# Patient Record
Sex: FEMALE | Race: WHITE | NOT HISPANIC OR LATINO | Employment: FULL TIME | URBAN - METROPOLITAN AREA
[De-identification: names, ages, dates, MRNs, and addresses within clinical notes are randomized per-mention and may not be internally consistent; named-entity substitution may affect disease eponyms.]

---

## 2022-11-17 ENCOUNTER — HOSPITAL ENCOUNTER (OUTPATIENT)
Dept: RADIOLOGY | Facility: HOSPITAL | Age: 67
Discharge: HOME | End: 2022-11-17
Attending: NURSE PRACTITIONER
Payer: COMMERCIAL

## 2022-11-17 VITALS — HEIGHT: 64 IN | BODY MASS INDEX: 32.27 KG/M2 | WEIGHT: 189 LBS

## 2022-11-17 DIAGNOSIS — N95.9 UNSPECIFIED MENOPAUSAL AND PERIMENOPAUSAL DISORDER: ICD-10-CM

## 2022-11-17 DIAGNOSIS — Z12.31 BREAST CANCER SCREENING BY MAMMOGRAM: ICD-10-CM

## 2022-11-17 PROCEDURE — 77063 BREAST TOMOSYNTHESIS BI: CPT | Mod: 26,,, | Performed by: RADIOLOGY

## 2022-11-17 PROCEDURE — 77063 MAMMO DIGITAL SCREENING BILAT WITH TOMO: ICD-10-PCS | Mod: 26,,, | Performed by: RADIOLOGY

## 2022-11-17 PROCEDURE — 77067 MAMMO DIGITAL SCREENING BILAT WITH TOMO: ICD-10-PCS | Mod: 26,,, | Performed by: RADIOLOGY

## 2022-11-17 PROCEDURE — 77080 DXA BONE DENSITY AXIAL: CPT | Mod: 26,,, | Performed by: RADIOLOGY

## 2022-11-17 PROCEDURE — 77080 DXA BONE DENSITY AXIAL: CPT | Mod: TC

## 2022-11-17 PROCEDURE — 77080 DEXA BONE DENSITY SPINE HIP: ICD-10-PCS | Mod: 26,,, | Performed by: RADIOLOGY

## 2022-11-17 PROCEDURE — 77067 SCR MAMMO BI INCL CAD: CPT | Mod: 26,,, | Performed by: RADIOLOGY

## 2022-11-17 PROCEDURE — 77063 BREAST TOMOSYNTHESIS BI: CPT | Mod: TC

## 2023-01-25 ENCOUNTER — OFFICE VISIT (OUTPATIENT)
Dept: OBSTETRICS AND GYNECOLOGY | Facility: CLINIC | Age: 68
End: 2023-01-25
Payer: COMMERCIAL

## 2023-01-25 ENCOUNTER — TELEPHONE (OUTPATIENT)
Dept: OBSTETRICS AND GYNECOLOGY | Facility: CLINIC | Age: 68
End: 2023-01-25

## 2023-01-25 VITALS
DIASTOLIC BLOOD PRESSURE: 74 MMHG | WEIGHT: 183.63 LBS | RESPIRATION RATE: 18 BRPM | OXYGEN SATURATION: 98 % | SYSTOLIC BLOOD PRESSURE: 126 MMHG | HEART RATE: 99 BPM | HEIGHT: 64 IN | BODY MASS INDEX: 31.35 KG/M2

## 2023-01-25 DIAGNOSIS — R10.2 PELVIC PAIN: Primary | ICD-10-CM

## 2023-01-25 DIAGNOSIS — N95.0 PMB (POSTMENOPAUSAL BLEEDING): Primary | ICD-10-CM

## 2023-01-25 PROCEDURE — 3074F SYST BP LT 130 MM HG: CPT | Mod: CPTII,S$GLB,, | Performed by: OBSTETRICS & GYNECOLOGY

## 2023-01-25 PROCEDURE — 1101F PT FALLS ASSESS-DOCD LE1/YR: CPT | Mod: CPTII,S$GLB,, | Performed by: OBSTETRICS & GYNECOLOGY

## 2023-01-25 PROCEDURE — 3288F PR FALLS RISK ASSESSMENT DOCUMENTED: ICD-10-PCS | Mod: CPTII,S$GLB,, | Performed by: OBSTETRICS & GYNECOLOGY

## 2023-01-25 PROCEDURE — 3008F BODY MASS INDEX DOCD: CPT | Mod: CPTII,S$GLB,, | Performed by: OBSTETRICS & GYNECOLOGY

## 2023-01-25 PROCEDURE — 1126F AMNT PAIN NOTED NONE PRSNT: CPT | Mod: CPTII,S$GLB,, | Performed by: OBSTETRICS & GYNECOLOGY

## 2023-01-25 PROCEDURE — 1159F PR MEDICATION LIST DOCUMENTED IN MEDICAL RECORD: ICD-10-PCS | Mod: CPTII,S$GLB,, | Performed by: OBSTETRICS & GYNECOLOGY

## 2023-01-25 PROCEDURE — 3078F DIAST BP <80 MM HG: CPT | Mod: CPTII,S$GLB,, | Performed by: OBSTETRICS & GYNECOLOGY

## 2023-01-25 PROCEDURE — 99203 OFFICE O/P NEW LOW 30 MIN: CPT | Mod: S$GLB,,, | Performed by: OBSTETRICS & GYNECOLOGY

## 2023-01-25 PROCEDURE — 1101F PR PT FALLS ASSESS DOC 0-1 FALLS W/OUT INJ PAST YR: ICD-10-PCS | Mod: CPTII,S$GLB,, | Performed by: OBSTETRICS & GYNECOLOGY

## 2023-01-25 PROCEDURE — 3008F PR BODY MASS INDEX (BMI) DOCUMENTED: ICD-10-PCS | Mod: CPTII,S$GLB,, | Performed by: OBSTETRICS & GYNECOLOGY

## 2023-01-25 PROCEDURE — 99203 PR OFFICE/OUTPT VISIT, NEW, LEVL III, 30-44 MIN: ICD-10-PCS | Mod: S$GLB,,, | Performed by: OBSTETRICS & GYNECOLOGY

## 2023-01-25 PROCEDURE — 1126F PR PAIN SEVERITY QUANTIFIED, NO PAIN PRESENT: ICD-10-PCS | Mod: CPTII,S$GLB,, | Performed by: OBSTETRICS & GYNECOLOGY

## 2023-01-25 PROCEDURE — 3074F PR MOST RECENT SYSTOLIC BLOOD PRESSURE < 130 MM HG: ICD-10-PCS | Mod: CPTII,S$GLB,, | Performed by: OBSTETRICS & GYNECOLOGY

## 2023-01-25 PROCEDURE — 99999 PR PBB SHADOW E&M-EST. PATIENT-LVL III: ICD-10-PCS | Mod: PBBFAC,,, | Performed by: OBSTETRICS & GYNECOLOGY

## 2023-01-25 PROCEDURE — 1159F MED LIST DOCD IN RCRD: CPT | Mod: CPTII,S$GLB,, | Performed by: OBSTETRICS & GYNECOLOGY

## 2023-01-25 PROCEDURE — 3288F FALL RISK ASSESSMENT DOCD: CPT | Mod: CPTII,S$GLB,, | Performed by: OBSTETRICS & GYNECOLOGY

## 2023-01-25 PROCEDURE — 3078F PR MOST RECENT DIASTOLIC BLOOD PRESSURE < 80 MM HG: ICD-10-PCS | Mod: CPTII,S$GLB,, | Performed by: OBSTETRICS & GYNECOLOGY

## 2023-01-25 PROCEDURE — 99999 PR PBB SHADOW E&M-EST. PATIENT-LVL III: CPT | Mod: PBBFAC,,, | Performed by: OBSTETRICS & GYNECOLOGY

## 2023-01-25 RX ORDER — DICLOFENAC SODIUM 10 MG/G
GEL TOPICAL
COMMUNITY
End: 2023-03-06 | Stop reason: CLARIF

## 2023-01-25 RX ORDER — ROSUVASTATIN CALCIUM 10 MG/1
10 TABLET, COATED ORAL NIGHTLY
COMMUNITY
Start: 2022-11-16

## 2023-01-25 RX ORDER — METFORMIN HYDROCHLORIDE 500 MG/1
500 TABLET, EXTENDED RELEASE ORAL
Status: ON HOLD | COMMUNITY
Start: 2023-01-15 | End: 2023-03-22 | Stop reason: HOSPADM

## 2023-01-25 RX ORDER — METHOCARBAMOL 500 MG/1
500-1000 TABLET, FILM COATED ORAL EVERY 6 HOURS PRN
COMMUNITY
Start: 2023-01-10 | End: 2023-03-06 | Stop reason: CLARIF

## 2023-01-25 RX ORDER — PRAMIPEXOLE DIHYDROCHLORIDE 0.25 MG/1
0.25 TABLET ORAL NIGHTLY
COMMUNITY
Start: 2022-10-13

## 2023-01-25 RX ORDER — LOSARTAN POTASSIUM 100 MG/1
100 TABLET ORAL DAILY
COMMUNITY
Start: 2022-11-07

## 2023-01-25 RX ORDER — FAMOTIDINE 20 MG/1
20 TABLET, FILM COATED ORAL 2 TIMES DAILY
COMMUNITY
Start: 2023-01-16

## 2023-01-25 RX ORDER — ASPIRIN 81 MG/1
1 TABLET ORAL DAILY
Status: ON HOLD | COMMUNITY
End: 2023-07-21 | Stop reason: HOSPADM

## 2023-01-25 RX ORDER — ALENDRONATE SODIUM 35 MG/1
35 TABLET ORAL
COMMUNITY
Start: 2023-01-10

## 2023-01-25 RX ORDER — ATENOLOL 50 MG/1
50 TABLET ORAL DAILY
COMMUNITY
Start: 2022-11-07

## 2023-01-25 NOTE — TELEPHONE ENCOUNTER
Pt was called and she desires appt due to complaints of PMB x 1 month. Referred by Marcia Dewey NP. Appt given for today with Dr. Marcum at 2:45 pm. Address given and pt voiced understanding.

## 2023-01-25 NOTE — TELEPHONE ENCOUNTER
----- Message from Hetal Harp MA sent at 1/25/2023 10:25 AM CST -----  Contact: self  Kaity Masterson  MRN: [id number]  Home Phone      Not on file.  Work Phone      Not on file.  Mobile          [phone]    Patient Care Team:  Marcia Dewey, NP as PCP - General (Family Medicine)  OB? No  What phone number can you be reached at? [phone]  Message: Needs to make appt for irrg bleeding.  Patient will be a NP / referral from Marcia Dewey.

## 2023-01-25 NOTE — PROGRESS NOTES
Subjective:    Patient ID: Kaity Masterson is a 67 y.o. y.o. female.     Chief Complaint:   Chief Complaint   Patient presents with    Vaginal Bleeding     Pt states she was having PMB        History of Present Illness:  Kaity presents today complaining of PMB. She reports the bleeding started ~ 1 1/2 months ago. It did stop about 3-4 days ago. Bleeding is light pink in nature. She reports no cramping present with it.         ROS:   Review of Systems   Constitutional:  Negative for activity change, appetite change, chills, diaphoresis, fatigue, fever and unexpected weight change.   HENT:  Negative for mouth sores and tinnitus.    Eyes:  Negative for discharge and visual disturbance.   Respiratory:  Negative for cough, shortness of breath and wheezing.    Cardiovascular:  Negative for chest pain, palpitations and leg swelling.   Gastrointestinal:  Negative for abdominal pain, bloating, blood in stool, constipation, diarrhea, nausea and vomiting.   Endocrine: Negative for diabetes, hair loss, hot flashes, hyperthyroidism and hypothyroidism.   Genitourinary:  Positive for postmenopausal bleeding. Negative for dysuria, flank pain, frequency, genital sores, hematuria, urgency, vaginal bleeding, vaginal discharge, vaginal pain, postcoital bleeding and vaginal odor.   Musculoskeletal:  Negative for back pain, joint swelling and myalgias.   Integumentary:  Negative for rash, acne, breast mass, nipple discharge and breast skin changes.   Neurological:  Negative for seizures, syncope, numbness and headaches.   Hematological:  Negative for adenopathy. Does not bruise/bleed easily.   Psychiatric/Behavioral:  Negative for depression and sleep disturbance. The patient is not nervous/anxious.    Breast: Negative for mass, mastodynia, nipple discharge and skin changes        Objective:    Vital Signs:  Vitals:    01/25/23 1524   BP: 126/74   Pulse: 99   Resp: 18       Physical Exam:  General:  alert, cooperative, no distress   Head  Normocephalic, without obvious abnormality, atraumatic   Neck .supple, symmetrical, trachea midline   Skin:  Skin color, texture, turgor normal. No rashes or lesions   Abdomen:  soft, non-tender; bowel sounds normal   Pelvis: External genitalia: normal general appearance  Urinary system: urethral meatus normal, bladder nontender  Vaginal: atrophic mucosa  Cervix: normal appearance  Uterus: normal size, shape, position  Adnexa: non palpable   Extremities extremities normal, atraumatic, no cyanosis or edema   Neurologic Grossly normal            Assessment:      1. PMB (postmenopausal bleeding)          Plan:      PLAN:   1. TVUS

## 2023-01-26 ENCOUNTER — APPOINTMENT (OUTPATIENT)
Dept: RADIOLOGY | Facility: CLINIC | Age: 68
End: 2023-01-26
Attending: OBSTETRICS & GYNECOLOGY
Payer: COMMERCIAL

## 2023-01-26 DIAGNOSIS — R10.2 PELVIC PAIN: ICD-10-CM

## 2023-01-26 PROCEDURE — 76830 TRANSVAGINAL US NON-OB: CPT | Mod: 26,,, | Performed by: RADIOLOGY

## 2023-01-26 PROCEDURE — 76856 US EXAM PELVIC COMPLETE: CPT | Mod: 26,,, | Performed by: RADIOLOGY

## 2023-01-26 PROCEDURE — 76856 US EXAM PELVIC COMPLETE: CPT | Mod: TC

## 2023-01-26 PROCEDURE — 76830 US PELVIS COMP WITH TRANSVAG NON-OB (XPD): ICD-10-PCS | Mod: 26,,, | Performed by: RADIOLOGY

## 2023-01-26 PROCEDURE — 76856 US PELVIS COMP WITH TRANSVAG NON-OB (XPD): ICD-10-PCS | Mod: 26,,, | Performed by: RADIOLOGY

## 2023-02-09 ENCOUNTER — PROCEDURE VISIT (OUTPATIENT)
Dept: OBSTETRICS AND GYNECOLOGY | Facility: CLINIC | Age: 68
End: 2023-02-09
Attending: OBSTETRICS & GYNECOLOGY
Payer: COMMERCIAL

## 2023-02-09 VITALS
BODY MASS INDEX: 30.56 KG/M2 | HEIGHT: 64 IN | HEART RATE: 68 BPM | OXYGEN SATURATION: 98 % | DIASTOLIC BLOOD PRESSURE: 78 MMHG | WEIGHT: 179 LBS | SYSTOLIC BLOOD PRESSURE: 124 MMHG | RESPIRATION RATE: 18 BRPM

## 2023-02-09 DIAGNOSIS — N95.0 PMB (POSTMENOPAUSAL BLEEDING): Primary | ICD-10-CM

## 2023-02-09 DIAGNOSIS — R93.89 THICKENED ENDOMETRIUM: ICD-10-CM

## 2023-02-09 PROCEDURE — 88342 IMHCHEM/IMCYTCHM 1ST ANTB: CPT | Mod: 26,,, | Performed by: PATHOLOGY

## 2023-02-09 PROCEDURE — 88341 IMHCHEM/IMCYTCHM EA ADD ANTB: CPT | Mod: 26,,, | Performed by: PATHOLOGY

## 2023-02-09 PROCEDURE — 88342 IMHCHEM/IMCYTCHM 1ST ANTB: CPT | Performed by: PATHOLOGY

## 2023-02-09 PROCEDURE — 88341 PR IHC OR ICC EACH ADD'L SINGLE ANTIBODY  STAINPR: ICD-10-PCS | Mod: 26,,, | Performed by: PATHOLOGY

## 2023-02-09 PROCEDURE — 88305 TISSUE EXAM BY PATHOLOGIST: ICD-10-PCS | Mod: 26,,, | Performed by: PATHOLOGY

## 2023-02-09 PROCEDURE — 58100 BIOPSY OF UTERUS LINING: CPT | Mod: S$GLB,,, | Performed by: OBSTETRICS & GYNECOLOGY

## 2023-02-09 PROCEDURE — 88341 IMHCHEM/IMCYTCHM EA ADD ANTB: CPT | Mod: 59 | Performed by: PATHOLOGY

## 2023-02-09 PROCEDURE — 88342 CHG IMMUNOCYTOCHEMISTRY: ICD-10-PCS | Mod: 26,,, | Performed by: PATHOLOGY

## 2023-02-09 PROCEDURE — 58100 ENDOMETRIAL BIOPSY: ICD-10-PCS | Mod: S$GLB,,, | Performed by: OBSTETRICS & GYNECOLOGY

## 2023-02-09 PROCEDURE — 88305 TISSUE EXAM BY PATHOLOGIST: CPT | Mod: 26,,, | Performed by: PATHOLOGY

## 2023-02-09 PROCEDURE — 88305 TISSUE EXAM BY PATHOLOGIST: CPT | Performed by: PATHOLOGY

## 2023-02-09 RX ORDER — HYDROCODONE BITARTRATE AND ACETAMINOPHEN 10; 325 MG/1; MG/1
1 TABLET ORAL EVERY 6 HOURS PRN
COMMUNITY
Start: 2023-02-01 | End: 2023-03-06 | Stop reason: CLARIF

## 2023-02-09 NOTE — PROCEDURES
Endometrial biopsy    Date/Time: 2/9/2023 12:15 PM  Performed by: Nery Carey MD  Authorized by: Nery Carey MD     Consent:     Consent obtained:  Verbal and written    Consent given by:  Patient    Patient questions answered: yes      Patient agrees, verbalizes understanding, and wants to proceed: yes      Instructions and paperwork completed: yes    Indication:     Indications: Post-menopausal bleeding      Chronicity of post-menopausal bleeding:  New    Progression of post-menopausal bleeding:  Unchanged  Procedure:     Procedure: endometrial biopsy with Pipelle      Cervix cleaned and prepped: yes      Uterus sounded: yes      Uterus sound depth (cm):  9    Specimen collected: specimen collected and sent to pathology      Patient tolerated procedure well with no complications: yes

## 2023-02-15 ENCOUNTER — TELEPHONE (OUTPATIENT)
Dept: OBSTETRICS AND GYNECOLOGY | Facility: CLINIC | Age: 68
End: 2023-02-15
Payer: COMMERCIAL

## 2023-02-15 NOTE — TELEPHONE ENCOUNTER
Pt informed that the pathology from the biopsy is still in process and has not resulted yet, pt informed we will call her with results when its reviewed, pt v/u.

## 2023-02-15 NOTE — TELEPHONE ENCOUNTER
----- Message from Mary Jo Giraldo sent at 2/15/2023  9:33 AM CST -----  Contact: self  Kaity Masterson  MRN: [id number]  Home Phone      [phone]  Work Phone      Not on file.  Mobile          [phone]    Patient Care Team:  Marcia Dewey NP as PCP - General (Family Medicine)  OB? No  What phone number can you be reached at? [phone]  Message: patient is calling in for results.

## 2023-02-20 LAB
COMMENT: NORMAL
FINAL PATHOLOGIC DIAGNOSIS: NORMAL
GROSS: NORMAL
Lab: NORMAL

## 2023-02-27 ENCOUNTER — TELEPHONE (OUTPATIENT)
Dept: OBSTETRICS AND GYNECOLOGY | Facility: CLINIC | Age: 68
End: 2023-02-27
Payer: COMMERCIAL

## 2023-02-27 DIAGNOSIS — C54.1 ENDOMETRIAL ADENOCARCINOMA: Primary | ICD-10-CM

## 2023-02-27 NOTE — TELEPHONE ENCOUNTER
Pt notified of EMB results of endometrial cancer.  Discussed referral to gyn oncology.  Order placed.  Questions answered.

## 2023-02-27 NOTE — TELEPHONE ENCOUNTER
Pt was called and appt for GYN ONC was made for Wednesday 3/1/23 @ 1:30 pm with Dr. Aguilar. Address and phone number given to pt. Pt voiced understanding.

## 2023-02-27 NOTE — TELEPHONE ENCOUNTER
Pt was called and she is reviewing EMB results on her portal and has questions. Pt aware Dr. Marcum is has been out of the office and will not be able to review her results until tomorrow. Offered pt that I can have on call provider review results today to give her an answer until Dr. Marcum is able to review results and pt desires that. Please advise. Thank you.

## 2023-02-27 NOTE — TELEPHONE ENCOUNTER
----- Message from Mary Jo Giraldo sent at 2/27/2023  9:18 AM CST -----  Contact: self  Kaity Masterson  MRN: [id number]  Home Phone      [phone]  Work Phone      Not on file.  Mobile          [phone]    Patient Care Team:  Marcia Dewey NP as PCP - General (Family Medicine)  OB? No  What phone number can you be reached at? [phone]  Message: patient is wanting results

## 2023-02-28 NOTE — PROGRESS NOTES
REFERRING PROVIDER  Nery Carey MD    HISTORY OF PRESENT CONDITION  CC: type 1 endometrial cancer  Kaity Masterson is a 67 y.o.  who presents in consultation at for an opinion regarding grade 2 endometrioid endometrial cancer.    +PO. -N/V. +Flatus. +BM. -VB, VD, changes in stool or urine. -Abdominal or pelvic pain. -Unintentional weight loss.      REVIEW OF SYSTEMS  All systems reviewed and negative except as noted in HPI.    OBJECTIVE   Vitals:    23 1314   BP: (!) 193/79   Pulse: 75      Body mass index is 29.59 kg/m².      1. General: Well appearing, no apparent distress, alert and oriented.  2. Lymph: Neck symmetric without cervical or supraclavicular adenopathy or mass.  3. Lungs: Normal respiratory rate, no accessory muscle use.  4. Cardiac: Normal rate  5. Psych: Normal affect.  6. Abdomen:  non-distended, soft, non-tender, are no masses, no ascites, no hepatosplenomegaly.  7. Skin: Warm, dry, no rashes or lesions.   8. Extremities: Bilateral lower extremities without edema or tenderness.  9. Genitourinary: Deferred             ECOG status: 0    LABORATORY DATA  Lab data reviewed.    RADIOLOGICAL DATA  Radiology data reviewed.    PATHOLOGY DATA  Pathology data reviewed.    ASSESSMENT / PLAN     1. Malignant neoplasm of endometrium    2. Endometrial adenocarcinoma       23: Hb 12.7, Plt 265, Cr 0.93  23: Pelvic US: 7 cm uterus    I discussed with the patient that the primary treatment for type 1 endometrial cancer is surgery. This includes a total hysterectomy, bilateral salpingo-oophorectomy, and sentinel lymph node biopsies. I will plan to perform this in a laparoscopic fashion using the robot. The procedure and its risks and benefits were discussed in detail.      PATIENT EDUCATION  Ready to learn, no apparent learning barriers were identified; learning preferences include listening.  Explained diagnosis and treatment plan; patient expressed understanding of the  content.    INFORMED CONSENT  Discussed the risks, benefits, and alternatives of the procedure and of possible blood transfusion.  Discussed the necessity of other members of the healthcare team participating in the procedure.  All questions answered and consent given.    Dallas Aguilar

## 2023-03-01 ENCOUNTER — TELEPHONE (OUTPATIENT)
Dept: OBSTETRICS AND GYNECOLOGY | Facility: CLINIC | Age: 68
End: 2023-03-01
Payer: COMMERCIAL

## 2023-03-01 ENCOUNTER — OFFICE VISIT (OUTPATIENT)
Dept: GYNECOLOGIC ONCOLOGY | Facility: CLINIC | Age: 68
End: 2023-03-01
Payer: COMMERCIAL

## 2023-03-01 VITALS
WEIGHT: 172.38 LBS | HEART RATE: 75 BPM | SYSTOLIC BLOOD PRESSURE: 193 MMHG | BODY MASS INDEX: 29.59 KG/M2 | DIASTOLIC BLOOD PRESSURE: 79 MMHG

## 2023-03-01 DIAGNOSIS — C54.1 MALIGNANT NEOPLASM OF ENDOMETRIUM: Primary | ICD-10-CM

## 2023-03-01 DIAGNOSIS — C54.1 ENDOMETRIAL ADENOCARCINOMA: ICD-10-CM

## 2023-03-01 PROCEDURE — 3077F PR MOST RECENT SYSTOLIC BLOOD PRESSURE >= 140 MM HG: ICD-10-PCS | Mod: CPTII,S$GLB,, | Performed by: OBSTETRICS & GYNECOLOGY

## 2023-03-01 PROCEDURE — 3288F FALL RISK ASSESSMENT DOCD: CPT | Mod: CPTII,S$GLB,, | Performed by: OBSTETRICS & GYNECOLOGY

## 2023-03-01 PROCEDURE — 1160F PR REVIEW ALL MEDS BY PRESCRIBER/CLIN PHARMACIST DOCUMENTED: ICD-10-PCS | Mod: CPTII,S$GLB,, | Performed by: OBSTETRICS & GYNECOLOGY

## 2023-03-01 PROCEDURE — 3078F DIAST BP <80 MM HG: CPT | Mod: CPTII,S$GLB,, | Performed by: OBSTETRICS & GYNECOLOGY

## 2023-03-01 PROCEDURE — 4010F ACE/ARB THERAPY RXD/TAKEN: CPT | Mod: CPTII,S$GLB,, | Performed by: OBSTETRICS & GYNECOLOGY

## 2023-03-01 PROCEDURE — 1101F PR PT FALLS ASSESS DOC 0-1 FALLS W/OUT INJ PAST YR: ICD-10-PCS | Mod: CPTII,S$GLB,, | Performed by: OBSTETRICS & GYNECOLOGY

## 2023-03-01 PROCEDURE — 1125F AMNT PAIN NOTED PAIN PRSNT: CPT | Mod: CPTII,S$GLB,, | Performed by: OBSTETRICS & GYNECOLOGY

## 2023-03-01 PROCEDURE — 99999 PR PBB SHADOW E&M-EST. PATIENT-LVL V: CPT | Mod: PBBFAC,,, | Performed by: OBSTETRICS & GYNECOLOGY

## 2023-03-01 PROCEDURE — 3008F PR BODY MASS INDEX (BMI) DOCUMENTED: ICD-10-PCS | Mod: CPTII,S$GLB,, | Performed by: OBSTETRICS & GYNECOLOGY

## 2023-03-01 PROCEDURE — 3288F PR FALLS RISK ASSESSMENT DOCUMENTED: ICD-10-PCS | Mod: CPTII,S$GLB,, | Performed by: OBSTETRICS & GYNECOLOGY

## 2023-03-01 PROCEDURE — 1159F MED LIST DOCD IN RCRD: CPT | Mod: CPTII,S$GLB,, | Performed by: OBSTETRICS & GYNECOLOGY

## 2023-03-01 PROCEDURE — 1159F PR MEDICATION LIST DOCUMENTED IN MEDICAL RECORD: ICD-10-PCS | Mod: CPTII,S$GLB,, | Performed by: OBSTETRICS & GYNECOLOGY

## 2023-03-01 PROCEDURE — 3077F SYST BP >= 140 MM HG: CPT | Mod: CPTII,S$GLB,, | Performed by: OBSTETRICS & GYNECOLOGY

## 2023-03-01 PROCEDURE — 1101F PT FALLS ASSESS-DOCD LE1/YR: CPT | Mod: CPTII,S$GLB,, | Performed by: OBSTETRICS & GYNECOLOGY

## 2023-03-01 PROCEDURE — 99205 OFFICE O/P NEW HI 60 MIN: CPT | Mod: S$GLB,,, | Performed by: OBSTETRICS & GYNECOLOGY

## 2023-03-01 PROCEDURE — 4010F PR ACE/ARB THEARPY RXD/TAKEN: ICD-10-PCS | Mod: CPTII,S$GLB,, | Performed by: OBSTETRICS & GYNECOLOGY

## 2023-03-01 PROCEDURE — 99205 PR OFFICE/OUTPT VISIT, NEW, LEVL V, 60-74 MIN: ICD-10-PCS | Mod: S$GLB,,, | Performed by: OBSTETRICS & GYNECOLOGY

## 2023-03-01 PROCEDURE — 3078F PR MOST RECENT DIASTOLIC BLOOD PRESSURE < 80 MM HG: ICD-10-PCS | Mod: CPTII,S$GLB,, | Performed by: OBSTETRICS & GYNECOLOGY

## 2023-03-01 PROCEDURE — 1160F RVW MEDS BY RX/DR IN RCRD: CPT | Mod: CPTII,S$GLB,, | Performed by: OBSTETRICS & GYNECOLOGY

## 2023-03-01 PROCEDURE — 3008F BODY MASS INDEX DOCD: CPT | Mod: CPTII,S$GLB,, | Performed by: OBSTETRICS & GYNECOLOGY

## 2023-03-01 PROCEDURE — 1125F PR PAIN SEVERITY QUANTIFIED, PAIN PRESENT: ICD-10-PCS | Mod: CPTII,S$GLB,, | Performed by: OBSTETRICS & GYNECOLOGY

## 2023-03-01 PROCEDURE — 99999 PR PBB SHADOW E&M-EST. PATIENT-LVL V: ICD-10-PCS | Mod: PBBFAC,,, | Performed by: OBSTETRICS & GYNECOLOGY

## 2023-03-01 RX ORDER — HEPARIN SODIUM 5000 [USP'U]/ML
5000 INJECTION, SOLUTION INTRAVENOUS; SUBCUTANEOUS ONCE
Status: CANCELLED | OUTPATIENT
Start: 2023-03-01 | End: 2023-03-01

## 2023-03-01 RX ORDER — LIDOCAINE HYDROCHLORIDE 10 MG/ML
1 INJECTION, SOLUTION EPIDURAL; INFILTRATION; INTRACAUDAL; PERINEURAL ONCE
Status: CANCELLED | OUTPATIENT
Start: 2023-03-01 | End: 2023-03-01

## 2023-03-01 NOTE — TELEPHONE ENCOUNTER
----- Message from Mary Jo Giraldo sent at 3/1/2023  2:43 PM CST -----  Contact: self  Kaity Masterson  MRN: [id number]  Home Phone      [phone]  Work Phone      Not on file.  Mobile          [phone]    Patient Care Team:  Marcia Dewey NP as PCP - General (Family Medicine)  OB? No  What phone number can you be reached at? [phone]  Message: patient have a couple of questions for the nurse.

## 2023-03-01 NOTE — TELEPHONE ENCOUNTER
Pt was called and she stated she wanted to mention that during the EMB she had some pain and hert  hip has been hurting since. Pt was seen by Dr. Aguilar and informed him of this and he stated he will evaluate this at time of surgery which is scheduled for 3/8/23. Pt desires this information to be passed along to Dr. Marcum.

## 2023-03-06 ENCOUNTER — ANESTHESIA EVENT (OUTPATIENT)
Dept: SURGERY | Facility: OTHER | Age: 68
End: 2023-03-06
Payer: COMMERCIAL

## 2023-03-06 ENCOUNTER — HOSPITAL ENCOUNTER (OUTPATIENT)
Dept: PREADMISSION TESTING | Facility: OTHER | Age: 68
Discharge: HOME | End: 2023-03-06
Attending: OBSTETRICS & GYNECOLOGY
Payer: COMMERCIAL

## 2023-03-06 ENCOUNTER — TELEPHONE (OUTPATIENT)
Dept: GYNECOLOGIC ONCOLOGY | Facility: CLINIC | Age: 68
End: 2023-03-06
Payer: COMMERCIAL

## 2023-03-06 VITALS
RESPIRATION RATE: 19 BRPM | DIASTOLIC BLOOD PRESSURE: 81 MMHG | TEMPERATURE: 99 F | BODY MASS INDEX: 28 KG/M2 | HEIGHT: 64 IN | SYSTOLIC BLOOD PRESSURE: 194 MMHG | OXYGEN SATURATION: 100 % | HEART RATE: 72 BPM | WEIGHT: 164 LBS

## 2023-03-06 DIAGNOSIS — Z01.818 PREOP TESTING: Primary | ICD-10-CM

## 2023-03-06 LAB
ABO + RH BLD: NORMAL
BLD GP AB SCN CELLS X3 SERPL QL: NORMAL

## 2023-03-06 PROCEDURE — 36415 COLL VENOUS BLD VENIPUNCTURE: CPT | Performed by: ANESTHESIOLOGY

## 2023-03-06 PROCEDURE — 86900 BLOOD TYPING SEROLOGIC ABO: CPT | Performed by: ANESTHESIOLOGY

## 2023-03-06 RX ORDER — SODIUM CHLORIDE, SODIUM LACTATE, POTASSIUM CHLORIDE, CALCIUM CHLORIDE 600; 310; 30; 20 MG/100ML; MG/100ML; MG/100ML; MG/100ML
INJECTION, SOLUTION INTRAVENOUS CONTINUOUS
Status: CANCELLED | OUTPATIENT
Start: 2023-03-06

## 2023-03-06 RX ORDER — FAMOTIDINE 20 MG/1
20 TABLET, FILM COATED ORAL
Status: CANCELLED | OUTPATIENT
Start: 2023-03-06 | End: 2023-03-06

## 2023-03-06 RX ORDER — LIDOCAINE HYDROCHLORIDE 10 MG/ML
0.5 INJECTION, SOLUTION EPIDURAL; INFILTRATION; INTRACAUDAL; PERINEURAL ONCE
Status: CANCELLED | OUTPATIENT
Start: 2023-03-06 | End: 2023-03-06

## 2023-03-06 RX ORDER — ACETAMINOPHEN 325 MG/1
975 TABLET ORAL
Status: CANCELLED | OUTPATIENT
Start: 2023-03-06 | End: 2023-03-06

## 2023-03-06 RX ORDER — OXYCODONE AND ACETAMINOPHEN 7.5; 325 MG/1; MG/1
1 TABLET ORAL EVERY 6 HOURS PRN
COMMUNITY
Start: 2023-02-07 | End: 2023-03-06 | Stop reason: CLARIF

## 2023-03-06 NOTE — ANESTHESIA PREPROCEDURE EVALUATION
03/06/2023  Kaity Masterson is a 67 y.o., female.      Pre-op Assessment    I have reviewed the Patient Summary Reports.     I have reviewed the Nursing Notes.    I have reviewed the Medications.     Review of Systems  Anesthesia Hx:  No problems with previous Anesthesia    Social:  Non-Smoker    EENT/Dental:EENT/Dental Normal   Cardiovascular:   Hypertension, well controlled    Pulmonary:  Pulmonary Normal    Hepatic/GI:   PUD,    Musculoskeletal:  Musculoskeletal Normal    Neurological:  Neurology Normal    Endocrine:   Diabetes, well controlled  Obesity / BMI > 30      Physical Exam  General: Cooperative and Alert    Airway:  Mallampati: II   Mouth Opening: Normal  TM Distance: Normal  Tongue: Normal  Neck ROM: Normal ROM    Dental:  Intact        Anesthesia Plan  Type of Anesthesia, risks & benefits discussed:    Anesthesia Type: Gen ETT  Intra-op Monitoring Plan: Standard ASA Monitors  Post Op Pain Control Plan: multimodal analgesia  Induction:  IV  Airway Plan: Video  Informed Consent: Informed consent signed with the Patient and all parties understand the risks and agree with anesthesia plan.  All questions answered.   ASA Score: 3  Anesthesia Plan Notes: Has recent labs, T&S ordered.   Sees PCP on reg basis, [facility] [place].    Ready For Surgery From Anesthesia Perspective.     .

## 2023-03-06 NOTE — DISCHARGE INSTRUCTIONS
Information to Prepare you for your Surgery    PRE-ADMIT TESTING -  [phone]    [address] [facility]          Your surgery has been scheduled at [facility]. We are pleased to have the opportunity to serve you. For Further Information please call [phone].    On the day of surgery please report to the Information Desk on the 1st floor.    CONTACT YOUR PHYSICIAN'S OFFICE THE DAY PRIOR TO YOUR SURGERY TO OBTAIN YOUR ARRIVAL TIME.     The evening before surgery do not eat anything after 9 p.m. ( this includes hard candy, chewing gum and mints).  You may only have WATER  from 9 p.m. until you leave your home.   DO NOT DRINK ANY LIQUIDS ON THE WAY TO THE HOSPITAL.      Gatorade/Powerade helps to increase your comfort before surgery and to decrease your nausea after surgery. The carbohydrates in Gatorade/Powerade help reduce your body's stress response to surgery.  If you are a diabetic-drink only water prior to surgery.      Current Visitor policy(12/27/2021) - Patients may have 2 visitors pre and post procedure. Only 2 visitors will be allowed in the Surgical building with the patient. No one under the age of 12 will be allowed into the facility.    SPECIAL MEDICATION INSTRUCTIONS: TAKE medications checked off by the Anesthesiologist on your Medication List.    Angiogram Patients: Take medications as instructed by your physician, including aspirin.     Surgery Patients:    If you take ASPIRIN - Your PHYSICIAN/SURGEON will need to inform you IF/OR when you need to stop taking aspirin prior to your surgery.     The week prior to surgery do not ot take any medications containing IBUPROFEN or NSAIDS ( Advil, Motrin, Goodys, BC, Aleve, Naproxen etc) If you are not sure if you should take a medicine please call your surgeon's office.  Ok to take Tylenol    Do Not Wear any make-up (especially eye make-up) to surgery. Please remove any false eyelashes or eyelash  extensions. If you arrive the day of surgery with makeup/eyelashes on you will be required to remove prior to surgery. (There is a risk of corneal abrasions if eye makeup/eyelash extensions are not removed)      Leave all valuables at home.   Do Not wear any jewelry or watches, including any metal in body piercings. Jewelry must be removed prior to coming to the hospital.  There is a possibility that rings that are unable to be removed may be cut off if they are on the surgical extremity.    Please remove all hair extensions, wigs, clips and any other metal accessories/ ornaments from your hair.  These items may pose a flammable/fire risk in Surgery and must be removed.    Do not shave your surgical area at least 5 days prior to your surgery. The surgical prep will be performed at the hospital according to Infection Control regulations.    Contact Lens must be removed before surgery. Either do not wear the contact lens or bring a case and solution for storage.  Please bring a container for eyeglasses or dentures as required.  Bring any paperwork your physician has provided, such as consent forms,  history and physicals, doctor's orders, etc.   Bring comfortable clothes that are loose fitting to wear upon discharge. Take into consideration the type of surgery being performed.  Maintain your diet as advised per your physician the day prior to surgery.      Adequate rest the night before surgery is advised.   Park in the Parking lot behind the hospital or in the [facility] [place] [facility] across the street from the parking lot. Parking is complimentary.  If you will be discharged the same day as your procedure, please arrange for a responsible adult to drive you home or to accompany you if traveling by taxi.   YOU WILL NOT BE PERMITTED TO DRIVE OR TO LEAVE THE HOSPITAL ALONE AFTER SURGERY.   If you are being discharged the same day, it is strongly recommended that you arrange for someone to remain with you for the first  24 hrs following your surgery.    The Surgeon will speak to your family/visitor after your surgery regarding the outcome of your surgery and post op care.  The Surgeon may speak to you after your surgery, but there is a possibility you may not remember the details.  Please check with your family members regarding the conversation with the Surgeon.    We strongly recommend whoever is bringing you home be present for discharge instructions.  This will ensure a thorough understanding for your post op home care.    ALL CHILDREN MUST ALWAYS BE ACCOMPANIED BY AN ADULT.    Visitors-Refer to current Visitor policy handouts.    Thank you for your cooperation.  The Staff of [facility].            Bathing Instructions with Hibiclens    Shower the evening before and morning of your procedure with Chlorhexidine (Hibiclens)  do not use Chlorhexidine on your face or genitals. Do not get in your eyes.  Wash your face with water and your regular face wash/soap  Use your regular shampoo  Apply Chlorhexidine (Hibiclens) directly on your skin or on a wet washcloth and wash gently. When showering: Move away from the shower stream when applying Chlorhexidine (Hibiclens) to avoid rinsing off too soon.  Rinse thoroughly with warm water  Do not dilute Chlorhexidine (Hibiclens)   Dry off as usual, do not use any deodorant, powder, body lotions, perfume, after shave or cologne.

## 2023-03-08 ENCOUNTER — ANESTHESIA (OUTPATIENT)
Dept: SURGERY | Facility: OTHER | Age: 68
End: 2023-03-08
Payer: COMMERCIAL

## 2023-03-08 ENCOUNTER — HOSPITAL ENCOUNTER (OUTPATIENT)
Facility: OTHER | Age: 68
Discharge: HOME | End: 2023-03-08
Attending: OBSTETRICS & GYNECOLOGY | Admitting: OBSTETRICS & GYNECOLOGY
Payer: COMMERCIAL

## 2023-03-08 DIAGNOSIS — C54.1 MALIGNANT NEOPLASM OF ENDOMETRIUM: ICD-10-CM

## 2023-03-08 DIAGNOSIS — Z98.890 S/P ROBOT-ASSISTED SURGICAL PROCEDURE: Primary | ICD-10-CM

## 2023-03-08 LAB
POCT GLUCOSE: 243 MG/DL (ref 70–110)
POCT GLUCOSE: 244 MG/DL (ref 70–110)
POCT GLUCOSE: 259 MG/DL (ref 70–110)

## 2023-03-08 PROCEDURE — 88307 TISSUE EXAM BY PATHOLOGIST: CPT | Mod: 26,,, | Performed by: PATHOLOGY

## 2023-03-08 PROCEDURE — 88309 PR  SURG PATH,LEVEL VI: ICD-10-PCS | Mod: 26,,, | Performed by: PATHOLOGY

## 2023-03-08 PROCEDURE — 88305 TISSUE EXAM BY PATHOLOGIST: CPT | Mod: 26,,, | Performed by: PATHOLOGY

## 2023-03-08 PROCEDURE — 71000016 HC POSTOP RECOV ADDL HR: Performed by: OBSTETRICS & GYNECOLOGY

## 2023-03-08 PROCEDURE — 81288 MLH1 GENE: CPT | Performed by: PATHOLOGY

## 2023-03-08 PROCEDURE — 25000003 PHARM REV CODE 250: Performed by: ANESTHESIOLOGY

## 2023-03-08 PROCEDURE — 63600175 PHARM REV CODE 636 W HCPCS: Performed by: ANESTHESIOLOGY

## 2023-03-08 PROCEDURE — 38571 LAPAROSCOPY LYMPHADENECTOMY: CPT | Mod: AS,51,, | Performed by: NURSE PRACTITIONER

## 2023-03-08 PROCEDURE — 88342 IMHCHEM/IMCYTCHM 1ST ANTB: CPT | Mod: 26,,, | Performed by: PATHOLOGY

## 2023-03-08 PROCEDURE — 58571 TLH W/T/O 250 G OR LESS: CPT | Mod: ,,, | Performed by: OBSTETRICS & GYNECOLOGY

## 2023-03-08 PROCEDURE — 88309 TISSUE EXAM BY PATHOLOGIST: CPT | Performed by: PATHOLOGY

## 2023-03-08 PROCEDURE — 36000713 HC OR TIME LEV V EA ADD 15 MIN: Performed by: OBSTETRICS & GYNECOLOGY

## 2023-03-08 PROCEDURE — 88307 TISSUE EXAM BY PATHOLOGIST: CPT | Mod: 59 | Performed by: PATHOLOGY

## 2023-03-08 PROCEDURE — 38571 PR LAP,PELVIC LYMPHADENECTOMY: ICD-10-PCS | Mod: 51,,, | Performed by: OBSTETRICS & GYNECOLOGY

## 2023-03-08 PROCEDURE — 88309 TISSUE EXAM BY PATHOLOGIST: CPT | Mod: 26,,, | Performed by: PATHOLOGY

## 2023-03-08 PROCEDURE — 37000008 HC ANESTHESIA 1ST 15 MINUTES: Performed by: OBSTETRICS & GYNECOLOGY

## 2023-03-08 PROCEDURE — 82962 GLUCOSE BLOOD TEST: CPT | Performed by: OBSTETRICS & GYNECOLOGY

## 2023-03-08 PROCEDURE — 38571 LAPAROSCOPY LYMPHADENECTOMY: CPT | Mod: 51,,, | Performed by: OBSTETRICS & GYNECOLOGY

## 2023-03-08 PROCEDURE — 36000712 HC OR TIME LEV V 1ST 15 MIN: Performed by: OBSTETRICS & GYNECOLOGY

## 2023-03-08 PROCEDURE — 25000003 PHARM REV CODE 250: Performed by: NURSE ANESTHETIST, CERTIFIED REGISTERED

## 2023-03-08 PROCEDURE — 63600175 PHARM REV CODE 636 W HCPCS: Performed by: OBSTETRICS & GYNECOLOGY

## 2023-03-08 PROCEDURE — 58571 PR LAPAROSCOPY W TOT HYSTERECTUTERUS <=250 GRAM  W TUBE/OVARY: ICD-10-PCS | Mod: AS,,, | Performed by: NURSE PRACTITIONER

## 2023-03-08 PROCEDURE — 88305 TISSUE EXAM BY PATHOLOGIST: CPT | Performed by: PATHOLOGY

## 2023-03-08 PROCEDURE — 88112 CYTOPATH CELL ENHANCE TECH: CPT | Mod: 26,,, | Performed by: PATHOLOGY

## 2023-03-08 PROCEDURE — 38571 PR LAP,PELVIC LYMPHADENECTOMY: ICD-10-PCS | Mod: AS,51,, | Performed by: NURSE PRACTITIONER

## 2023-03-08 PROCEDURE — 88342 CHG IMMUNOCYTOCHEMISTRY: ICD-10-PCS | Mod: 26,,, | Performed by: PATHOLOGY

## 2023-03-08 PROCEDURE — 88341 IMHCHEM/IMCYTCHM EA ADD ANTB: CPT | Mod: 59 | Performed by: PATHOLOGY

## 2023-03-08 PROCEDURE — 25000003 PHARM REV CODE 250: Performed by: OBSTETRICS & GYNECOLOGY

## 2023-03-08 PROCEDURE — 88307 PR  SURG PATH,LEVEL V: ICD-10-PCS | Mod: 26,,, | Performed by: PATHOLOGY

## 2023-03-08 PROCEDURE — 88341 IMHCHEM/IMCYTCHM EA ADD ANTB: CPT | Mod: 26,,, | Performed by: PATHOLOGY

## 2023-03-08 PROCEDURE — 37000009 HC ANESTHESIA EA ADD 15 MINS: Performed by: OBSTETRICS & GYNECOLOGY

## 2023-03-08 PROCEDURE — 88305 TISSUE EXAM BY PATHOLOGIST: ICD-10-PCS | Mod: 26,,, | Performed by: PATHOLOGY

## 2023-03-08 PROCEDURE — 71000039 HC RECOVERY, EACH ADD'L HOUR: Performed by: OBSTETRICS & GYNECOLOGY

## 2023-03-08 PROCEDURE — 38900 PR INTRAOPERATIVE SENTINEL LYMPH NODE ID W DYE INJECTION: ICD-10-PCS | Mod: AS,50,, | Performed by: NURSE PRACTITIONER

## 2023-03-08 PROCEDURE — 58571 TLH W/T/O 250 G OR LESS: CPT | Mod: AS,,, | Performed by: NURSE PRACTITIONER

## 2023-03-08 PROCEDURE — 58571 PR LAPAROSCOPY W TOT HYSTERECTUTERUS <=250 GRAM  W TUBE/OVARY: ICD-10-PCS | Mod: ,,, | Performed by: OBSTETRICS & GYNECOLOGY

## 2023-03-08 PROCEDURE — 38900 PR INTRAOPERATIVE SENTINEL LYMPH NODE ID W DYE INJECTION: ICD-10-PCS | Mod: 50,,, | Performed by: OBSTETRICS & GYNECOLOGY

## 2023-03-08 PROCEDURE — 88112 CYTOPATH CELL ENHANCE TECH: CPT | Performed by: PATHOLOGY

## 2023-03-08 PROCEDURE — 71000033 HC RECOVERY, INTIAL HOUR: Performed by: OBSTETRICS & GYNECOLOGY

## 2023-03-08 PROCEDURE — 27201423 OPTIME MED/SURG SUP & DEVICES STERILE SUPPLY: Performed by: OBSTETRICS & GYNECOLOGY

## 2023-03-08 PROCEDURE — 38900 IO MAP OF SENT LYMPH NODE: CPT | Mod: 50,,, | Performed by: OBSTETRICS & GYNECOLOGY

## 2023-03-08 PROCEDURE — 63600175 PHARM REV CODE 636 W HCPCS: Performed by: NURSE ANESTHETIST, CERTIFIED REGISTERED

## 2023-03-08 PROCEDURE — 88112 PR  CYTOPATH, CELL ENHANCE TECH: ICD-10-PCS | Mod: 26,,, | Performed by: PATHOLOGY

## 2023-03-08 PROCEDURE — 71000015 HC POSTOP RECOV 1ST HR: Performed by: OBSTETRICS & GYNECOLOGY

## 2023-03-08 PROCEDURE — 88341 PR IHC OR ICC EACH ADD'L SINGLE ANTIBODY  STAINPR: ICD-10-PCS | Mod: 26,,, | Performed by: PATHOLOGY

## 2023-03-08 PROCEDURE — 88342 IMHCHEM/IMCYTCHM 1ST ANTB: CPT | Mod: 59 | Performed by: PATHOLOGY

## 2023-03-08 PROCEDURE — 38900 IO MAP OF SENT LYMPH NODE: CPT | Mod: AS,50,, | Performed by: NURSE PRACTITIONER

## 2023-03-08 PROCEDURE — 88381 MICRODISSECTION MANUAL: CPT | Performed by: PATHOLOGY

## 2023-03-08 RX ORDER — MEPERIDINE HYDROCHLORIDE 25 MG/ML
12.5 INJECTION INTRAMUSCULAR; INTRAVENOUS; SUBCUTANEOUS ONCE AS NEEDED
Status: DISCONTINUED | OUTPATIENT
Start: 2023-03-08 | End: 2023-03-08 | Stop reason: HOSPADM

## 2023-03-08 RX ORDER — LIDOCAINE HYDROCHLORIDE 10 MG/ML
1 INJECTION, SOLUTION EPIDURAL; INFILTRATION; INTRACAUDAL; PERINEURAL ONCE
Status: DISCONTINUED | OUTPATIENT
Start: 2023-03-08 | End: 2023-03-08 | Stop reason: HOSPADM

## 2023-03-08 RX ORDER — ACETAMINOPHEN 325 MG/1
975 TABLET ORAL
Status: COMPLETED | OUTPATIENT
Start: 2023-03-08 | End: 2023-03-08

## 2023-03-08 RX ORDER — LIDOCAINE HCL/PF 100 MG/5ML
SYRINGE (ML) INTRAVENOUS
Status: DISCONTINUED | OUTPATIENT
Start: 2023-03-08 | End: 2023-03-08

## 2023-03-08 RX ORDER — ROCURONIUM BROMIDE 10 MG/ML
INJECTION, SOLUTION INTRAVENOUS
Status: DISCONTINUED | OUTPATIENT
Start: 2023-03-08 | End: 2023-03-08

## 2023-03-08 RX ORDER — FENTANYL CITRATE 50 UG/ML
INJECTION, SOLUTION INTRAMUSCULAR; INTRAVENOUS
Status: DISCONTINUED | OUTPATIENT
Start: 2023-03-08 | End: 2023-03-08

## 2023-03-08 RX ORDER — DEXAMETHASONE SODIUM PHOSPHATE 4 MG/ML
INJECTION, SOLUTION INTRA-ARTICULAR; INTRALESIONAL; INTRAMUSCULAR; INTRAVENOUS; SOFT TISSUE
Status: DISCONTINUED | OUTPATIENT
Start: 2023-03-08 | End: 2023-03-08

## 2023-03-08 RX ORDER — KETAMINE HCL IN 0.9 % NACL 50 MG/5 ML
SYRINGE (ML) INTRAVENOUS
Status: DISCONTINUED | OUTPATIENT
Start: 2023-03-08 | End: 2023-03-08

## 2023-03-08 RX ORDER — HYDROMORPHONE HYDROCHLORIDE 2 MG/ML
0.4 INJECTION, SOLUTION INTRAMUSCULAR; INTRAVENOUS; SUBCUTANEOUS EVERY 5 MIN PRN
Status: DISCONTINUED | OUTPATIENT
Start: 2023-03-08 | End: 2023-03-08 | Stop reason: HOSPADM

## 2023-03-08 RX ORDER — OXYCODONE HYDROCHLORIDE 5 MG/1
5 TABLET ORAL
Status: DISCONTINUED | OUTPATIENT
Start: 2023-03-08 | End: 2023-03-08 | Stop reason: HOSPADM

## 2023-03-08 RX ORDER — IBUPROFEN 600 MG/1
600 TABLET ORAL EVERY 6 HOURS
Qty: 30 TABLET | Refills: 2 | Status: SHIPPED | OUTPATIENT
Start: 2023-03-08 | End: 2023-04-05

## 2023-03-08 RX ORDER — LABETALOL HYDROCHLORIDE 5 MG/ML
INJECTION, SOLUTION INTRAVENOUS
Status: DISCONTINUED | OUTPATIENT
Start: 2023-03-08 | End: 2023-03-08

## 2023-03-08 RX ORDER — LIDOCAINE HYDROCHLORIDE 10 MG/ML
0.5 INJECTION, SOLUTION EPIDURAL; INFILTRATION; INTRACAUDAL; PERINEURAL ONCE
Status: DISCONTINUED | OUTPATIENT
Start: 2023-03-08 | End: 2023-03-08 | Stop reason: HOSPADM

## 2023-03-08 RX ORDER — HEPARIN SODIUM 5000 [USP'U]/ML
5000 INJECTION, SOLUTION INTRAVENOUS; SUBCUTANEOUS ONCE
Status: COMPLETED | OUTPATIENT
Start: 2023-03-08 | End: 2023-03-08

## 2023-03-08 RX ORDER — SODIUM CHLORIDE, SODIUM LACTATE, POTASSIUM CHLORIDE, CALCIUM CHLORIDE 600; 310; 30; 20 MG/100ML; MG/100ML; MG/100ML; MG/100ML
INJECTION, SOLUTION INTRAVENOUS CONTINUOUS
Status: DISCONTINUED | OUTPATIENT
Start: 2023-03-08 | End: 2023-03-08 | Stop reason: HOSPADM

## 2023-03-08 RX ORDER — DIPHENHYDRAMINE HYDROCHLORIDE 50 MG/ML
25 INJECTION INTRAMUSCULAR; INTRAVENOUS EVERY 6 HOURS PRN
Status: DISCONTINUED | OUTPATIENT
Start: 2023-03-08 | End: 2023-03-08 | Stop reason: HOSPADM

## 2023-03-08 RX ORDER — OXYCODONE HYDROCHLORIDE 5 MG/1
5 TABLET ORAL EVERY 4 HOURS PRN
Qty: 10 TABLET | Refills: 0 | Status: ON HOLD | OUTPATIENT
Start: 2023-03-08 | End: 2023-03-22 | Stop reason: HOSPADM

## 2023-03-08 RX ORDER — CEFAZOLIN SODIUM 1 G/3ML
2 INJECTION, POWDER, FOR SOLUTION INTRAMUSCULAR; INTRAVENOUS
Status: COMPLETED | OUTPATIENT
Start: 2023-03-08 | End: 2023-03-08

## 2023-03-08 RX ORDER — DEXTROMETHORPHAN HYDROBROMIDE, GUAIFENESIN 5; 100 MG/5ML; MG/5ML
650 LIQUID ORAL EVERY 6 HOURS
Qty: 30 TABLET | Refills: 2 | Status: ON HOLD | OUTPATIENT
Start: 2023-03-08 | End: 2023-07-18 | Stop reason: CLARIF

## 2023-03-08 RX ORDER — INDOCYANINE GREEN AND WATER 25 MG
KIT INJECTION
Status: DISCONTINUED | OUTPATIENT
Start: 2023-03-08 | End: 2023-03-08 | Stop reason: HOSPADM

## 2023-03-08 RX ORDER — PROPOFOL 10 MG/ML
VIAL (ML) INTRAVENOUS
Status: DISCONTINUED | OUTPATIENT
Start: 2023-03-08 | End: 2023-03-08

## 2023-03-08 RX ORDER — BUPIVACAINE HYDROCHLORIDE 2.5 MG/ML
INJECTION, SOLUTION EPIDURAL; INFILTRATION; INTRACAUDAL
Status: DISCONTINUED | OUTPATIENT
Start: 2023-03-08 | End: 2023-03-08 | Stop reason: HOSPADM

## 2023-03-08 RX ORDER — FAMOTIDINE 20 MG/1
20 TABLET, FILM COATED ORAL
Status: COMPLETED | OUTPATIENT
Start: 2023-03-08 | End: 2023-03-08

## 2023-03-08 RX ORDER — PHENYLEPHRINE HCL IN 0.9% NACL 1 MG/10 ML
SYRINGE (ML) INTRAVENOUS
Status: DISCONTINUED | OUTPATIENT
Start: 2023-03-08 | End: 2023-03-08

## 2023-03-08 RX ORDER — ONDANSETRON 2 MG/ML
INJECTION INTRAMUSCULAR; INTRAVENOUS
Status: DISCONTINUED | OUTPATIENT
Start: 2023-03-08 | End: 2023-03-08

## 2023-03-08 RX ORDER — SODIUM CHLORIDE 0.9 % (FLUSH) 0.9 %
3 SYRINGE (ML) INJECTION
Status: DISCONTINUED | OUTPATIENT
Start: 2023-03-08 | End: 2023-03-08 | Stop reason: HOSPADM

## 2023-03-08 RX ORDER — PROCHLORPERAZINE EDISYLATE 5 MG/ML
5 INJECTION INTRAMUSCULAR; INTRAVENOUS EVERY 30 MIN PRN
Status: DISCONTINUED | OUTPATIENT
Start: 2023-03-08 | End: 2023-03-08 | Stop reason: HOSPADM

## 2023-03-08 RX ADMIN — ROCURONIUM BROMIDE 10 MG: 10 SOLUTION INTRAVENOUS at 09:03

## 2023-03-08 RX ADMIN — GLYCOPYRROLATE 0.1 MG: 0.2 INJECTION, SOLUTION INTRAMUSCULAR; INTRAVITREAL at 08:03

## 2023-03-08 RX ADMIN — HEPARIN SODIUM 5000 UNITS: 5000 INJECTION, SOLUTION INTRAVENOUS; SUBCUTANEOUS at 08:03

## 2023-03-08 RX ADMIN — OXYCODONE HYDROCHLORIDE 5 MG: 5 TABLET ORAL at 12:03

## 2023-03-08 RX ADMIN — SODIUM CHLORIDE, SODIUM LACTATE, POTASSIUM CHLORIDE, AND CALCIUM CHLORIDE: .6; .31; .03; .02 INJECTION, SOLUTION INTRAVENOUS at 07:03

## 2023-03-08 RX ADMIN — DEXAMETHASONE SODIUM PHOSPHATE 8 MG: 4 INJECTION, SOLUTION INTRA-ARTICULAR; INTRALESIONAL; INTRAMUSCULAR; INTRAVENOUS; SOFT TISSUE at 08:03

## 2023-03-08 RX ADMIN — FAMOTIDINE 20 MG: 20 TABLET ORAL at 06:03

## 2023-03-08 RX ADMIN — CEFAZOLIN 2 G: 1 INJECTION, POWDER, FOR SOLUTION INTRAMUSCULAR; INTRAVENOUS at 08:03

## 2023-03-08 RX ADMIN — LABETALOL HYDROCHLORIDE 5 MG: 5 INJECTION INTRAVENOUS at 09:03

## 2023-03-08 RX ADMIN — ACETAMINOPHEN 975 MG: 325 TABLET, FILM COATED ORAL at 06:03

## 2023-03-08 RX ADMIN — ROCURONIUM BROMIDE 50 MG: 10 SOLUTION INTRAVENOUS at 08:03

## 2023-03-08 RX ADMIN — SODIUM CHLORIDE, SODIUM LACTATE, POTASSIUM CHLORIDE, AND CALCIUM CHLORIDE: .6; .31; .03; .02 INJECTION, SOLUTION INTRAVENOUS at 09:03

## 2023-03-08 RX ADMIN — LABETALOL HYDROCHLORIDE 5 MG: 5 INJECTION INTRAVENOUS at 08:03

## 2023-03-08 RX ADMIN — ONDANSETRON 4 MG: 2 INJECTION INTRAMUSCULAR; INTRAVENOUS at 10:03

## 2023-03-08 RX ADMIN — FENTANYL CITRATE 50 MCG: 0.05 INJECTION, SOLUTION INTRAMUSCULAR; INTRAVENOUS at 08:03

## 2023-03-08 RX ADMIN — LIDOCAINE HYDROCHLORIDE 40 MG: 20 INJECTION, SOLUTION INTRAVENOUS at 08:03

## 2023-03-08 RX ADMIN — HYDROMORPHONE HYDROCHLORIDE 0.4 MG: 2 INJECTION, SOLUTION INTRAMUSCULAR; INTRAVENOUS; SUBCUTANEOUS at 11:03

## 2023-03-08 RX ADMIN — Medication 100 MCG: at 08:03

## 2023-03-08 RX ADMIN — PROPOFOL 150 MG: 10 INJECTION, EMULSION INTRAVENOUS at 08:03

## 2023-03-08 RX ADMIN — Medication 40 MG: at 08:03

## 2023-03-08 RX ADMIN — SUGAMMADEX 200 MG: 100 INJECTION, SOLUTION INTRAVENOUS at 10:03

## 2023-03-08 RX ADMIN — PROCHLORPERAZINE EDISYLATE 5 MG: 5 INJECTION INTRAMUSCULAR; INTRAVENOUS at 11:03

## 2023-03-08 RX ADMIN — Medication 10 MG: at 09:03

## 2023-03-08 NOTE — PLAN OF CARE
Kaity Masterson has met all discharge criteria from Phase II. Vital Signs are stable, ambulating  without difficulty. Discharge instructions given, patient verbalized understanding. Discharged from facility via wheelchair in stable condition.

## 2023-03-08 NOTE — OP NOTE
[facility] Surgery ([place])    Procedure(s) (LRB):  XI ROBOTIC HYSTERECTOMY (N/A)  SALPINGO-OOPHORECTOMY (Bilateral)  BIOPSY, PELVIC LYMPH NODE (Bilateral)  CYSTOSCOPY    DATE OF SURGERY  3/8/2023     Surgeon(s) and Role:  Primary: Dallas Aguilar MD     ANESTHESIA TYPE  General     PRE-OPERATIVE DIAGNOSIS  Malignant neoplasm of endometrium [C54.1]    POST-OPERATIVE DIAGNOSIS  Post-Op Diagnosis Codes:     * Malignant neoplasm of endometrium [C54.1]    FINDINGS:  Normal diaphragms, liver capsule, and omentum.  Normal tubes and ovaries.  Bilateral sentinel lymph node mapping: two right external iliac artery lymph nodes, one left external iliac vein lymph node.  Surgiflo was applied to the vaginal cuff.  Intact bladder.    Procedure in Detail: The patient was prepped and draped in synchronous position in [facility]. After sterile prep and drape, the cervix was injected at 3 and 9 o'clock with ICG dye, and a Garcia catheter was then inserted. Gloves and gowns were changed, and we began by directly entering the abdomen.  A small incision was made at the umbilicus using a Hossan trocar, the peritoneal cavity was entered, and a pneumoperitoneum was established including trocar placement.  The laparoscope was inserted and examination of the peritoneal cavity revealed the operative findings above.  Intraperitoneal anatomy was normal and we proceeded with placement of our robotic trocars..  The remaining 3 robotic ports and assistant port were placed under direct vision and the robot was docked.  Intraperitoneal anatomy was normal and we proceeded with the assigned procedure..  We began by dividing the round ligament, developing the pararectal space, and identifying the ureter. [place] lymph nodes were visualized and removed bilaterally..  Hemostasis was ascertained. We then proceeded with hysterectomy.  Beginning on the right side, the ureter was identified, and the ovarian vessels were sealed and transected using  bipolar coagulation. The cardinal ligaments were skeletonized, the ureter visualized laterally, and the uterine vessels were ligated and divided using bipolar coagulation. An identical procedure was performed on the left side.  The bladder flap was developed and the uterosacral ligaments were transected.   The vagina was incised, and a circumferential incision was made.  The tubes, ovaries, uterus, and cervix were removed through the vagina..  The vagina was closed in a double fashion using  Stratafix suture, incorporating the uterosacral ligaments into the cuff closure. The ureters were followed from the pelvic brim to the vaginal cuff closure, and were not compromised.  Surgiflo was applied to the vaginal cuff.  Hemostasis was satisfactory. The trocars were removed, and the fascia of assistant port was closed with a Pawel-Flores using Vicryl suture. The camera port was closed with Vicryl suture. The skin of all sites was closed with 4-0 Monocryl.  Cystoscopy was performed in usual fashion and revealed the operative findings above.  The patient was taken to recovery in stable condition.    Salt, needle, sponge, and instrument count was correct.  I was present and scrubbed for the entirety of the case.     ASSISTANT SURGEONS  Kelley Whitt's presence was critical to the completion of this due case to a qualified resident not being available.    UNUSUAL CIRCUMSTANCES  No    CONDITION  chronic    POST-OP COMPLICATION  No    DIABETIC  No    SPECIMENS  Specimens (From admission, onward)       Start     Ordered    03/08/23 0955  Specimen to Pathology, Surgery Gynecology and Obstetrics  Once        Comments: Pre-op Diagnosis: Malignant neoplasm of endometrium [C54.1]Procedure(s):XI ROBOTIC HYSTERECTOMYSALPINGO-OOPHORECTOMYBIOPSY, PELVIC LYMPH NODE Number of specimens: 4Name of specimens: 1. Left pelvic sentinel lymph node2. Right pelvic sentinel lymph node # 13. Right pelvic sentinel lymph node # 24. Uterus, cervix,  bilateral tubes and ovaries     References:    Click here for ordering Quick Tip   Question Answer Comment   Procedure Type: Gynecology and Obstetrics    Specimen Class: Known or suspected malignancy    Which provider would you like to cc? MITESH ARCE    Release to patient Immediate        03/08/23 0954    03/08/23 0913  Cytology, Fluid/Wash/Brush  Once        Comments: Pelvic washings for cytology     Question Answer Comment   Source: Peritoneal/abdominal/pelvic wash    Clinical Information: malignant neoplasm of endometrium    Specific Site: pelvis    Other Requests: n/a    Release to patient Immediate        03/08/23 0914                     ESTIMATED BLOOD LOSS  25 mL

## 2023-03-08 NOTE — INTERVAL H&P NOTE
The patient has been examined and the H&P has been reviewed:    I concur with the findings and no changes have occurred since H&P was written.    Procedure risks, benefits and alternative options discussed and understood by patient/family.    Proceed to OR for Davinci assisted laparoscopic hysterectomy/BSO/bilateral SLND, OIP pending intraoperative findings.    Kelley Whitt, FNP-C, AOCNP  Gynecologic Oncology    For Dr. Dallas Aguilar    There are no hospital problems to display for this patient.

## 2023-03-08 NOTE — OPERATIVE NOTE ADDENDUM
Certification of Assistant at Surgery       Surgery Date: 3/8/2023     Participating Surgeons:  Surgeon(s) and Role:     * Dallas Aguilar MD - Primary    Procedures:  Procedure(s) (LRB):  ROBOTIC HYSTERECTOMY,WITH SALPINGO-OOPHORECTOMY (Bilateral)  BIOPSY, PELVIC LYMPH NODE (Bilateral)  CYSTOSCOPY    Assistant Surgeon's Certification of Necessity:  I understand that section 1842 (b) (6) (d) of the Social Security Act generally prohibits Medicare Part B reasonable charge payment for the services of assistants at surgery in teaching hospitals when qualified residents are available to furnish such services. I certify that the services for which payment is claimed were medically necessary, and that no qualified resident was available to perform the services. I further understand that these services are subject to post-payment review by the Medicare carrier.      Kelley Whitt NP    03/08/2023  11:38 AM

## 2023-03-08 NOTE — DISCHARGE INSTRUCTIONS
Abdominal Laparoscopy Discharge Instructions      Activity  Limit your activity for 4-6 weeks.  Dont lift anything heavier than 5-10 pounds.  Avoid strenuous activities, such as mowing the lawn, vacuuming, or playing sports.  Limit your activity to short, slow walks. Gradually increase your pace and distance as you feel able.  Listen to your body. If an activity causes pain, stop.  Rest when you are tired.  Dont have sexual intercourse or use tampons or douches until your doctor says its safe to do so.    Home Care  Always keep your incision clean and dry  Shower as instructed in 24 hours. You may wash your incision gently with mild soap and warm water and pat dry.  Avoid tub baths, hot tubs and swimming pools until seen by your physician for a post-op follow up.  If you have steri strips they should fall off in 7-10 days.    If you have band aids covering your incisions change daily or when soiled.  The band aids may be removed post op day 1 if they are clean and dry.  Take your medication exactly as instructed by your doctor.  Return to your diet as you feel able. Eat a healthy, well-balanced diet.  Avoid constipation.  Use laxatives, stool softeners, or enemas as directed by your doctor.  Eat more high-fiber foods.  Drink 6-8 glasses of water every day, unless directed otherwise.  Follow-Up  Make a follow-up appointment as directed by our staff.       When to Call Your Doctor  Call your doctor right away if you have any of the following:  Fever above 101.5°F  (38.6°C) or chills  Bright red vaginal bleeding or a foul-smelling discharge  Vaginal bleeding that soaks more than one sanitary pad per hour  Trouble urinating or burning sensation when you urinate  Severe abdominal pain or bloating  Redness, swelling, or drainage at your incision site  Shortness of breath        ________________________________________________________________  FOR EMERGENCIES:  If any unusual problems or difficulties occur, contact                  ________________________ or the resident at [phone] (page ) or at the Clinic office, [phone].

## 2023-03-08 NOTE — ANESTHESIA POSTPROCEDURE EVALUATION
Anesthesia Post Evaluation    Patient: Kaity Masterson    Procedure(s) Performed: Procedure(s) (LRB):  ROBOTIC HYSTERECTOMY,WITH SALPINGO-OOPHORECTOMY (Bilateral)  BIOPSY, PELVIC LYMPH NODE (Bilateral)  CYSTOSCOPY    Final Anesthesia Type: general      Patient location during evaluation: PACU  Patient participation: Yes- Able to Participate  Level of consciousness: awake and alert  Post-procedure vital signs: reviewed and stable  Pain management: adequate  Airway patency: patent    PONV status at discharge: No PONV  Anesthetic complications: no      Cardiovascular status: blood pressure returned to baseline  Respiratory status: unassisted, spontaneous ventilation and room air  Hydration status: euvolemic  Follow-up not needed.          Vitals Value Taken Time   /65 03/08/23 1222   Temp 36 °C (96.8 °F) 03/08/23 1104   Pulse 68 03/08/23 1229   Resp 17 03/08/23 1209   SpO2 98 % 03/08/23 1229   Vitals shown include unvalidated device data.      No case tracking events are documented in the log.      Pain/Carlos Score: Pain Rating Prior to Med Admin: 4 (3/8/2023 12:09 PM)  Carlos Score: 8 (3/8/2023 11:22 AM)

## 2023-03-08 NOTE — ANESTHESIA PROCEDURE NOTES
Intubation    Date/Time: 3/8/2023 8:15 AM  Performed by: Alejandra Fernandes CRNA  Authorized by: Brett Holcomb MD     Intubation:     Induction:  Intravenous    Intubated:  Postinduction    Mask Ventilation:  Easy with oral airway    Attempts:  1    Attempted By:  CRNA    Method of Intubation:  Video laryngoscopy    Blade:  Wagner 3    Laryngeal View Grade: Grade I - full view of cords      Difficult Airway Encountered?: No      Complications:  None    Airway Device:  Oral endotracheal tube    Airway Device Size:  7.5    Style/Cuff Inflation:  Cuffed (inflated to minimal occlusive pressure)    Inflation Amount (mL):  4    Tube secured:  21    Secured at:  The lips    Placement Verified By:  Capnometry    Complicating Factors:  None    Findings Post-Intubation:  Atraumatic/condition of teeth unchanged and BS equal bilateral

## 2023-03-08 NOTE — TRANSFER OF CARE
Anesthesia Transfer of Care Note    Patient: Kaity Masterson    Procedure(s) Performed: Procedure(s) (LRB):  ROBOTIC HYSTERECTOMY,WITH SALPINGO-OOPHORECTOMY (Bilateral)  BIOPSY, PELVIC LYMPH NODE (Bilateral)  CYSTOSCOPY    Patient location: PACU    Anesthesia Type: general    Transport from OR: Transported from OR on 6-10 L/min O2 by face mask with adequate spontaneous ventilation    Post pain: adequate analgesia    Post assessment: no apparent anesthetic complications and tolerated procedure well    Post vital signs: stable    Level of consciousness: awake and alert    Nausea/Vomiting: no nausea/vomiting    Complications: none    Transfer of care protocol was followed      Last vitals:   Visit Vitals  BP (!) 165/79 (BP Location: Left arm, Patient Position: Lying)   Pulse 66   Temp 36 °C (96.8 °F) (Skin)   Resp 17   SpO2 100%   Breastfeeding No

## 2023-03-08 NOTE — DISCHARGE SUMMARY
DISCHARGE SUMMARY:     Admission Date: 3/8/2023  Discharge Date: 3/8/2023   Discharge Provider: Dallas Arce M.D.   Responsible Author(s): Dallas Arce     PRIMARY DIAGNOSIS   #1 Grade 1 endometrioid endometrial cancer     ADDITIONAL DIAGNOSES   #2 Overweight     BRIEF HOSPITAL COURSE: No notes on file      Surgeon: Dallas Arce MD    PROCEDURE DATE: 3/8/2023     TYPE OF PROCEDURE(S):   Procedure(s) (LRB):  ROBOTIC HYSTERECTOMY,WITH SALPINGO-OOPHORECTOMY (Bilateral)  BIOPSY, PELVIC LYMPH NODE (Bilateral)  CYSTOSCOPY    PROCEDURAL COMPLICATIONS:   N/A    POSTOPERATIVE COURSE:   uncomplicated    PATHOLOGY:   Specimens (From admission, onward)       Start     Ordered    03/08/23 0955  Specimen to Pathology, Surgery Gynecology and Obstetrics  Once        Comments: Pre-op Diagnosis: Malignant neoplasm of endometrium [C54.1]Procedure(s):XI ROBOTIC HYSTERECTOMYSALPINGO-OOPHORECTOMYBIOPSY, PELVIC LYMPH NODE Number of specimens: 4Name of specimens: 1. Left pelvic sentinel lymph node2. Right pelvic sentinel lymph node # 13. Right pelvic sentinel lymph node # 24. Uterus, cervix, bilateral tubes and ovaries     References:    Click here for ordering Quick Tip   Question Answer Comment   Procedure Type: Gynecology and Obstetrics    Specimen Class: Known or suspected malignancy    Which provider would you like to cc? DALLAS ARCE    Release to patient Immediate        03/08/23 0954    03/08/23 0913  Cytology, Fluid/Wash/Brush  Once        Comments: Pelvic washings for cytology     Question Answer Comment   Source: Peritoneal/abdominal/pelvic wash    Clinical Information: malignant neoplasm of endometrium    Specific Site: pelvis    Other Requests: n/a    Release to patient Immediate        03/08/23 0914                     CYTOLOGY:  Specimens (From admission, onward)       Start     Ordered    03/08/23 0955  Specimen to Pathology, Surgery Gynecology and Obstetrics  Once        Comments: Pre-op Diagnosis: Malignant neoplasm of  endometrium [C54.1]Procedure(s):XI ROBOTIC HYSTERECTOMYSALPINGO-OOPHORECTOMYBIOPSY, PELVIC LYMPH NODE Number of specimens: 4Name of specimens: 1. Left pelvic sentinel lymph node2. Right pelvic sentinel lymph node # 13. Right pelvic sentinel lymph node # 24. Uterus, cervix, bilateral tubes and ovaries     References:    Click here for ordering Quick Tip   Question Answer Comment   Procedure Type: Gynecology and Obstetrics    Specimen Class: Known or suspected malignancy    Which provider would you like to cc? MITESH ARCE    Release to patient Immediate        03/08/23 0954 03/08/23 0913  Cytology, Fluid/Wash/Brush  Once        Comments: Pelvic washings for cytology     Question Answer Comment   Source: Peritoneal/abdominal/pelvic wash    Clinical Information: malignant neoplasm of endometrium    Specific Site: pelvis    Other Requests: n/a    Release to patient Immediate        03/08/23 0914                       Disposition: Home or Self Care

## 2023-03-08 NOTE — OR NURSING
Dr. Bishop notified of , no new orders noted,   Pt to take home medication that was held for her surgery

## 2023-03-09 VITALS
TEMPERATURE: 98 F | HEART RATE: 64 BPM | DIASTOLIC BLOOD PRESSURE: 61 MMHG | SYSTOLIC BLOOD PRESSURE: 126 MMHG | RESPIRATION RATE: 18 BRPM | OXYGEN SATURATION: 97 %

## 2023-03-10 LAB
FINAL PATHOLOGIC DIAGNOSIS: NORMAL
Lab: NORMAL

## 2023-03-19 PROBLEM — C54.1 MALIGNANT NEOPLASM OF ENDOMETRIUM: Status: ACTIVE | Noted: 2023-03-19

## 2023-03-20 ENCOUNTER — HOSPITAL ENCOUNTER (INPATIENT)
Facility: HOSPITAL | Age: 68
LOS: 2 days | Discharge: HOME | DRG: 639 | End: 2023-03-22
Attending: SURGERY | Admitting: INTERNAL MEDICINE
Payer: COMMERCIAL

## 2023-03-20 DIAGNOSIS — R06.02 SHORTNESS OF BREATH: ICD-10-CM

## 2023-03-20 DIAGNOSIS — C54.1 MALIGNANT NEOPLASM OF ENDOMETRIUM: ICD-10-CM

## 2023-03-20 DIAGNOSIS — K86.89 PANCREATIC MASS: ICD-10-CM

## 2023-03-20 DIAGNOSIS — E11.10 DIABETIC KETOACIDOSIS WITHOUT COMA ASSOCIATED WITH TYPE 2 DIABETES MELLITUS: Primary | ICD-10-CM

## 2023-03-20 LAB
ALBUMIN SERPL BCP-MCNC: 3.7 G/DL (ref 3.5–5.2)
ALP SERPL-CCNC: 112 U/L (ref 55–135)
ALT SERPL W/O P-5'-P-CCNC: 29 U/L (ref 10–44)
ANION GAP SERPL CALC-SCNC: 12 MMOL/L (ref 8–16)
ANION GAP SERPL CALC-SCNC: 14 MMOL/L (ref 8–16)
ANION GAP SERPL CALC-SCNC: 18 MMOL/L (ref 8–16)
AST SERPL-CCNC: 24 U/L (ref 10–40)
B-OH-BUTYR BLD STRIP-SCNC: 5.8 MMOL/L (ref 0–0.5)
BACTERIA #/AREA URNS HPF: ABNORMAL /HPF
BASOPHILS # BLD AUTO: 0.05 K/UL (ref 0–0.2)
BASOPHILS NFR BLD: 0.5 % (ref 0–1.9)
BILIRUB SERPL-MCNC: 0.5 MG/DL (ref 0.1–1)
BILIRUB UR QL STRIP: ABNORMAL
BNP SERPL-MCNC: 20 PG/ML (ref 0–99)
BUN SERPL-MCNC: 18 MG/DL (ref 8–23)
BUN SERPL-MCNC: 19 MG/DL (ref 8–23)
BUN SERPL-MCNC: 20 MG/DL (ref 8–23)
CALCIUM SERPL-MCNC: 10.9 MG/DL (ref 8.7–10.5)
CALCIUM SERPL-MCNC: 11.5 MG/DL (ref 8.7–10.5)
CALCIUM SERPL-MCNC: 12.2 MG/DL (ref 8.7–10.5)
CHLORIDE SERPL-SCNC: 105 MMOL/L (ref 95–110)
CHLORIDE SERPL-SCNC: 108 MMOL/L (ref 95–110)
CHLORIDE SERPL-SCNC: 110 MMOL/L (ref 95–110)
CK SERPL-CCNC: 27 U/L (ref 20–180)
CLARITY UR: CLEAR
CO2 SERPL-SCNC: 12 MMOL/L (ref 23–29)
CO2 SERPL-SCNC: 13 MMOL/L (ref 23–29)
CO2 SERPL-SCNC: 13 MMOL/L (ref 23–29)
COLOR UR: YELLOW
CREAT SERPL-MCNC: 0.9 MG/DL (ref 0.5–1.4)
CREAT SERPL-MCNC: 1.2 MG/DL (ref 0.5–1.4)
CREAT SERPL-MCNC: 1.4 MG/DL (ref 0.5–1.4)
D DIMER PPP IA.FEU-MCNC: 2.49 MG/L FEU
DIFFERENTIAL METHOD: ABNORMAL
EOSINOPHIL # BLD AUTO: 0.1 K/UL (ref 0–0.5)
EOSINOPHIL NFR BLD: 0.7 % (ref 0–8)
ERYTHROCYTE [DISTWIDTH] IN BLOOD BY AUTOMATED COUNT: 14.6 % (ref 11.5–14.5)
EST. GFR  (NO RACE VARIABLE): 41 ML/MIN/1.73 M^2
EST. GFR  (NO RACE VARIABLE): 49 ML/MIN/1.73 M^2
EST. GFR  (NO RACE VARIABLE): >60 ML/MIN/1.73 M^2
GLUCOSE SERPL-MCNC: 271 MG/DL (ref 70–110)
GLUCOSE SERPL-MCNC: 402 MG/DL (ref 70–110)
GLUCOSE SERPL-MCNC: 464 MG/DL (ref 70–110)
GLUCOSE UR QL STRIP: ABNORMAL
HCT VFR BLD AUTO: 43.8 % (ref 37–48.5)
HGB BLD-MCNC: 14.2 G/DL (ref 12–16)
HGB UR QL STRIP: NEGATIVE
HYALINE CASTS #/AREA URNS LPF: 4 /LPF
IMM GRANULOCYTES # BLD AUTO: 0.04 K/UL (ref 0–0.04)
IMM GRANULOCYTES NFR BLD AUTO: 0.4 % (ref 0–0.5)
INFLUENZA A, MOLECULAR: NEGATIVE
INFLUENZA B, MOLECULAR: NEGATIVE
KETONES UR QL STRIP: ABNORMAL
LACTATE SERPL-SCNC: 1.6 MMOL/L (ref 0.5–2.2)
LEUKOCYTE ESTERASE UR QL STRIP: NEGATIVE
LYMPHOCYTES # BLD AUTO: 1.9 K/UL (ref 1–4.8)
LYMPHOCYTES NFR BLD: 18.7 % (ref 18–48)
MAGNESIUM SERPL-MCNC: 2 MG/DL (ref 1.6–2.6)
MCH RBC QN AUTO: 29.6 PG (ref 27–31)
MCHC RBC AUTO-ENTMCNC: 32.4 G/DL (ref 32–36)
MCV RBC AUTO: 91 FL (ref 82–98)
MICROSCOPIC COMMENT: ABNORMAL
MONOCYTES # BLD AUTO: 0.6 K/UL (ref 0.3–1)
MONOCYTES NFR BLD: 6.1 % (ref 4–15)
NEUTROPHILS # BLD AUTO: 7.6 K/UL (ref 1.8–7.7)
NEUTROPHILS NFR BLD: 73.6 % (ref 38–73)
NITRITE UR QL STRIP: NEGATIVE
NRBC BLD-RTO: 0 /100 WBC
PH UR STRIP: 6 [PH] (ref 5–8)
PHOSPHATE SERPL-MCNC: 1.8 MG/DL (ref 2.7–4.5)
PHOSPHATE SERPL-MCNC: 2.7 MG/DL (ref 2.7–4.5)
PLATELET # BLD AUTO: 360 K/UL (ref 150–450)
PMV BLD AUTO: 11.7 FL (ref 9.2–12.9)
POC PH VENOUS: 7.24
POCT GLUCOSE: 194 MG/DL (ref 70–110)
POCT GLUCOSE: 215 MG/DL (ref 70–110)
POCT GLUCOSE: 271 MG/DL (ref 70–110)
POCT GLUCOSE: 273 MG/DL (ref 70–110)
POCT GLUCOSE: 302 MG/DL (ref 70–110)
POCT GLUCOSE: 306 MG/DL (ref 70–110)
POCT GLUCOSE: 346 MG/DL (ref 70–110)
POCT GLUCOSE: 425 MG/DL (ref 70–110)
POTASSIUM SERPL-SCNC: 3.7 MMOL/L (ref 3.5–5.1)
POTASSIUM SERPL-SCNC: 4 MMOL/L (ref 3.5–5.1)
POTASSIUM SERPL-SCNC: 4.7 MMOL/L (ref 3.5–5.1)
PROCALCITONIN SERPL IA-MCNC: 0.12 NG/ML
PROT SERPL-MCNC: 7.6 G/DL (ref 6–8.4)
PROT UR QL STRIP: ABNORMAL
RBC # BLD AUTO: 4.8 M/UL (ref 4–5.4)
RBC #/AREA URNS HPF: 1 /HPF (ref 0–4)
SARS-COV-2 RDRP RESP QL NAA+PROBE: NEGATIVE
SODIUM SERPL-SCNC: 135 MMOL/L (ref 136–145)
SP GR UR STRIP: >=1.03 (ref 1–1.03)
SPECIMEN SOURCE: NORMAL
SQUAMOUS #/AREA URNS HPF: 3 /HPF
TROPONIN I SERPL DL<=0.01 NG/ML-MCNC: 0.01 NG/ML (ref 0–0.03)
URN SPEC COLLECT METH UR: ABNORMAL
UROBILINOGEN UR STRIP-ACNC: 1 EU/DL
WBC # BLD AUTO: 10.35 K/UL (ref 3.9–12.7)
WBC #/AREA URNS HPF: 5 /HPF (ref 0–5)

## 2023-03-20 PROCEDURE — 83605 ASSAY OF LACTIC ACID: CPT | Performed by: SURGERY

## 2023-03-20 PROCEDURE — 99291 CRITICAL CARE FIRST HOUR: CPT

## 2023-03-20 PROCEDURE — 82800 BLOOD PH: CPT | Performed by: SURGERY

## 2023-03-20 PROCEDURE — 85379 FIBRIN DEGRADATION QUANT: CPT | Performed by: SURGERY

## 2023-03-20 PROCEDURE — 82550 ASSAY OF CK (CPK): CPT | Performed by: SURGERY

## 2023-03-20 PROCEDURE — 83880 ASSAY OF NATRIURETIC PEPTIDE: CPT | Performed by: SURGERY

## 2023-03-20 PROCEDURE — 87502 INFLUENZA DNA AMP PROBE: CPT | Performed by: SURGERY

## 2023-03-20 PROCEDURE — 80048 BASIC METABOLIC PNL TOTAL CA: CPT | Mod: XB | Performed by: SURGERY

## 2023-03-20 PROCEDURE — 25000003 PHARM REV CODE 250: Performed by: INTERNAL MEDICINE

## 2023-03-20 PROCEDURE — 20000000 HC ICU ROOM

## 2023-03-20 PROCEDURE — 25000003 PHARM REV CODE 250: Performed by: SURGERY

## 2023-03-20 PROCEDURE — 85025 COMPLETE CBC W/AUTO DIFF WBC: CPT | Performed by: SURGERY

## 2023-03-20 PROCEDURE — 80053 COMPREHEN METABOLIC PANEL: CPT | Performed by: SURGERY

## 2023-03-20 PROCEDURE — 94760 N-INVAS EAR/PLS OXIMETRY 1: CPT

## 2023-03-20 PROCEDURE — 84100 ASSAY OF PHOSPHORUS: CPT | Mod: 91 | Performed by: SURGERY

## 2023-03-20 PROCEDURE — 81000 URINALYSIS NONAUTO W/SCOPE: CPT | Performed by: SURGERY

## 2023-03-20 PROCEDURE — 93010 ELECTROCARDIOGRAM REPORT: CPT | Mod: ,,, | Performed by: INTERNAL MEDICINE

## 2023-03-20 PROCEDURE — 96361 HYDRATE IV INFUSION ADD-ON: CPT

## 2023-03-20 PROCEDURE — 83970 ASSAY OF PARATHORMONE: CPT | Performed by: SURGERY

## 2023-03-20 PROCEDURE — 82962 GLUCOSE BLOOD TEST: CPT

## 2023-03-20 PROCEDURE — 99900035 HC TECH TIME PER 15 MIN (STAT)

## 2023-03-20 PROCEDURE — 96374 THER/PROPH/DIAG INJ IV PUSH: CPT

## 2023-03-20 PROCEDURE — U0002 COVID-19 LAB TEST NON-CDC: HCPCS | Performed by: SURGERY

## 2023-03-20 PROCEDURE — 25500020 PHARM REV CODE 255: Performed by: SURGERY

## 2023-03-20 PROCEDURE — 83735 ASSAY OF MAGNESIUM: CPT | Performed by: SURGERY

## 2023-03-20 PROCEDURE — 84484 ASSAY OF TROPONIN QUANT: CPT | Performed by: SURGERY

## 2023-03-20 PROCEDURE — 93005 ELECTROCARDIOGRAM TRACING: CPT

## 2023-03-20 PROCEDURE — 36415 COLL VENOUS BLD VENIPUNCTURE: CPT | Performed by: SURGERY

## 2023-03-20 PROCEDURE — 82010 KETONE BODYS QUAN: CPT | Performed by: SURGERY

## 2023-03-20 PROCEDURE — 93010 EKG 12-LEAD: ICD-10-PCS | Mod: ,,, | Performed by: INTERNAL MEDICINE

## 2023-03-20 PROCEDURE — 63600175 PHARM REV CODE 636 W HCPCS: Performed by: INTERNAL MEDICINE

## 2023-03-20 PROCEDURE — 87040 BLOOD CULTURE FOR BACTERIA: CPT | Mod: 59 | Performed by: SURGERY

## 2023-03-20 PROCEDURE — 84145 PROCALCITONIN (PCT): CPT | Performed by: SURGERY

## 2023-03-20 RX ORDER — MUPIROCIN 20 MG/G
OINTMENT TOPICAL 2 TIMES DAILY
Status: CANCELLED | OUTPATIENT
Start: 2023-03-21 | End: 2023-03-26

## 2023-03-20 RX ORDER — ACETAMINOPHEN 325 MG/1
650 TABLET ORAL EVERY 8 HOURS PRN
Status: DISCONTINUED | OUTPATIENT
Start: 2023-03-20 | End: 2023-03-22 | Stop reason: HOSPADM

## 2023-03-20 RX ORDER — TALC
6 POWDER (GRAM) TOPICAL NIGHTLY PRN
Status: DISCONTINUED | OUTPATIENT
Start: 2023-03-20 | End: 2023-03-22 | Stop reason: HOSPADM

## 2023-03-20 RX ORDER — DEXTROSE MONOHYDRATE, SODIUM CHLORIDE, AND POTASSIUM CHLORIDE 50; 1.49; 4.5 G/1000ML; G/1000ML; G/1000ML
INJECTION, SOLUTION INTRAVENOUS CONTINUOUS
Status: DISCONTINUED | OUTPATIENT
Start: 2023-03-20 | End: 2023-03-21

## 2023-03-20 RX ORDER — SODIUM CHLORIDE 9 MG/ML
INJECTION, SOLUTION INTRAVENOUS CONTINUOUS
Status: DISCONTINUED | OUTPATIENT
Start: 2023-03-20 | End: 2023-03-20

## 2023-03-20 RX ORDER — SODIUM CHLORIDE 0.9 % (FLUSH) 0.9 %
10 SYRINGE (ML) INJECTION
Status: DISCONTINUED | OUTPATIENT
Start: 2023-03-20 | End: 2023-03-22 | Stop reason: HOSPADM

## 2023-03-20 RX ORDER — ONDANSETRON 2 MG/ML
4 INJECTION INTRAMUSCULAR; INTRAVENOUS EVERY 8 HOURS PRN
Status: DISCONTINUED | OUTPATIENT
Start: 2023-03-20 | End: 2023-03-22 | Stop reason: HOSPADM

## 2023-03-20 RX ORDER — PRAMIPEXOLE DIHYDROCHLORIDE 0.12 MG/1
0.25 TABLET ORAL NIGHTLY
Status: DISCONTINUED | OUTPATIENT
Start: 2023-03-20 | End: 2023-03-22 | Stop reason: HOSPADM

## 2023-03-20 RX ADMIN — IOHEXOL 75 ML: 350 INJECTION, SOLUTION INTRAVENOUS at 02:03

## 2023-03-20 RX ADMIN — INSULIN HUMAN 3 UNITS/HR: 1 INJECTION, SOLUTION INTRAVENOUS at 03:03

## 2023-03-20 RX ADMIN — Medication 6 MG: at 08:03

## 2023-03-20 RX ADMIN — PRAMIPEXOLE DIHYDROCHLORIDE 0.25 MG: 0.12 TABLET ORAL at 08:03

## 2023-03-20 RX ADMIN — SODIUM CHLORIDE 1000 ML: 9 INJECTION, SOLUTION INTRAVENOUS at 03:03

## 2023-03-20 RX ADMIN — SODIUM CHLORIDE 1000 ML: 9 INJECTION, SOLUTION INTRAVENOUS at 01:03

## 2023-03-20 RX ADMIN — ACETAMINOPHEN 650 MG: 325 TABLET ORAL at 11:03

## 2023-03-20 RX ADMIN — SODIUM CHLORIDE: 9 INJECTION, SOLUTION INTRAVENOUS at 05:03

## 2023-03-20 RX ADMIN — DEXTROSE, SODIUM CHLORIDE, AND POTASSIUM CHLORIDE: 5; .45; .15 INJECTION INTRAVENOUS at 11:03

## 2023-03-20 NOTE — Clinical Note
Diagnosis: Diabetic ketoacidosis without coma associated with type 2 diabetes mellitus [[id number]]   Future Attending Provider: DEACON CHARLTON [[id number]]   Admitting Provider:: DEACON CHARLTON [[id number]]

## 2023-03-20 NOTE — PLAN OF CARE
Problem: Adult Inpatient Plan of Care  Goal: Plan of Care Review  Outcome: Ongoing, Progressing  Goal: Patient-Specific Goal (Individualized)  Outcome: Ongoing, Progressing  Goal: Absence of Hospital-Acquired Illness or Injury  Outcome: Ongoing, Progressing  Goal: Optimal Comfort and Wellbeing  Outcome: Ongoing, Progressing  Goal: Readiness for Transition of Care  Outcome: Ongoing, Progressing     Problem: Fall Injury Risk  Goal: Absence of Fall and Fall-Related Injury  Outcome: Ongoing, Progressing     Problem: Diabetic Ketoacidosis  Goal: Fluid and Electrolyte Balance with Absence of Ketosis  Outcome: Ongoing, Progressing     Problem: Electrolyte Imbalance  Goal: Electrolyte Balance  Outcome: Ongoing, Progressing     Problem: Gas Exchange Impaired  Goal: Optimal Gas Exchange  Outcome: Ongoing, Progressing     Problem: Tissue Perfusion Altered  Goal: Improved Tissue Perfusion  Outcome: Ongoing, Progressing     Problem: Pain Acute  Goal: Acceptable Pain Control and Functional Ability  Outcome: Ongoing, Progressing

## 2023-03-20 NOTE — ED PROVIDER NOTES
Encounter Date: 3/20/2023       History     Chief Complaint   Patient presents with    Shortness of Breath     Patient to ER SOB, weakness for a week, denies fever, coughing reports that she had a hysterectomy on 3/8/23 due to cancer      Kaity Masterson is a 67 y.o. female that presents with shortness of breath today  Patient with recent hysterectomy for possible endometrial cancer/[facility]  Patient with shortness of breath & fatigue over last couple days, type 2 diabetic  Patient with no obvious fever, no hypoxia, just not feeling well today in the ER  Blood sugar over 400 with obvious ketone smell on her breath on evaluation  Patient takes metformin for type 2 diabetes with never insulin based on history now    Review of patient's allergies indicates:  No Known Allergies  Past Medical History:   Diagnosis Date    Anemia, unspecified     Hypertension     PUD (peptic ulcer disease)     Type 2 diabetes mellitus without complications      Past Surgical History:   Procedure Laterality Date    colonsocopy      2017    LAPAROSCOPIC CHOLECYSTECTOMY      LYMPH NODE BIOPSY Bilateral 3/8/2023    Procedure: BIOPSY, PELVIC LYMPH NODE;  Surgeon: Dallas Aguilar MD;  Location: [facility];  Service: OB/GYN;  Laterality: Bilateral;    ROBOTIC HYSTERECTOMY, WITH SALPINGO-OOPHORECTOMY Bilateral 3/8/2023    Procedure: ROBOTIC HYSTERECTOMY,WITH SALPINGO-OOPHORECTOMY;  Surgeon: Dallas Aguilar MD;  Location: [facility];  Service: OB/GYN;  Laterality: Bilateral;    TOTAL KNEE ARTHROPLASTY Left     2016    TOTAL KNEE ARTHROPLASTY Right     2019    VAGINAL DELIVERY      x 2  (one with epidural)     Family History   Problem Relation Age of Onset    Diabetes Mother     Colon cancer Mother     Breast cancer Mother     Hypertension Brother     Hypertension Sister     Breast cancer Sister     Lung cancer Sister     Breast cancer Maternal Aunt     Colon cancer Maternal Aunt     Ovarian cancer Neg Hx      Social History     Tobacco Use    Smoking  status: Never    Smokeless tobacco: Never   Substance Use Topics    Alcohol use: Not Currently    Drug use: Never     Review of Systems   Constitutional:  Positive for fatigue.   HENT: Negative.     Eyes: Negative.    Respiratory:  Positive for shortness of breath.    Cardiovascular: Negative.    Genitourinary: Negative.    Musculoskeletal: Negative.    Skin: Negative.    Neurological: Negative.    Psychiatric/Behavioral: Negative.       Physical Exam     Initial Vitals [03/20/23 1217]   BP Pulse Resp Temp SpO2   (!) 140/93 (!) 142 20 96.5 °F (35.8 °C) 99 %      MAP       --         Physical Exam    Nursing note and vitals reviewed.  Constitutional: She appears well-developed.   HENT:   Head: Normocephalic and atraumatic.   Right Ear: External ear normal.   Left Ear: External ear normal.   Nose: Nose normal.   Mouth/Throat: Oropharynx is clear and moist.   Eyes: Conjunctivae and EOM are normal. Pupils are equal, round, and reactive to light.   Neck: Neck supple. No JVD present.   Normal range of motion.  Cardiovascular:  Normal rate and regular rhythm.           Pulmonary/Chest: No respiratory distress. She has no wheezes. She has no rhonchi. She has no rales. She exhibits no tenderness.   Abdominal: Abdomen is soft. Bowel sounds are normal. She exhibits no distension. There is no abdominal tenderness. There is no rebound.   Musculoskeletal:         General: Normal range of motion.      Cervical back: Normal range of motion and neck supple.     Neurological: She is alert and oriented to person, place, and time. She has normal strength and normal reflexes.   Skin: Skin is warm and dry.       ED Course   Procedures  Labs Reviewed   CBC W/ AUTO DIFFERENTIAL - Abnormal; Notable for the following components:       Result Value    RDW 14.6 (*)     Gran % 73.6 (*)     All other components within normal limits   COMPREHENSIVE METABOLIC PANEL - Abnormal; Notable for the following components:    Sodium 135 (*)     CO2 12 (*)      Glucose 464 (*)     Calcium 12.2 (*)     Anion Gap 18 (*)     eGFR 41 (*)     All other components within normal limits    Narrative:     Glucose & Calcium critical result(s) called and verbal readback   obtained from Kizzy Abrams RN. by MK1 03/20/2023 13:57   D DIMER, QUANTITATIVE - Abnormal; Notable for the following components:    D-Dimer 2.49 (*)     All other components within normal limits   URINALYSIS, REFLEX TO URINE CULTURE - Abnormal; Notable for the following components:    Specific Gravity, UA >=1.030 (*)     Protein, UA 1+ (*)     Glucose, UA 2+ (*)     Ketones, UA 3+ (*)     Bilirubin (UA) 1+ (*)     All other components within normal limits    Narrative:     Specimen Source->Urine   URINALYSIS MICROSCOPIC - Abnormal; Notable for the following components:    Bacteria Moderate (*)     Hyaline Casts, UA 4 (*)     All other components within normal limits    Narrative:     Specimen Source->Urine   BETA - HYDROXYBUTYRATE, SERUM - Abnormal; Notable for the following components:    Beta-Hydroxybutyrate 5.8 (*)     All other components within normal limits   BASIC METABOLIC PANEL - Abnormal; Notable for the following components:    Sodium 135 (*)     CO2 13 (*)     Glucose 402 (*)     Calcium 11.5 (*)     eGFR 49 (*)     All other components within normal limits    Narrative:     With next lab draw   POCT GLUCOSE - Abnormal; Notable for the following components:    POCT Glucose 425 (*)     All other components within normal limits   INFLUENZA A & B BY MOLECULAR   CULTURE, BLOOD   CULTURE, BLOOD   SARS-COV-2 RNA AMPLIFICATION, QUAL   TROPONIN I   CK   B-TYPE NATRIURETIC PEPTIDE   LACTIC ACID, PLASMA   PROCALCITONIN   PHOSPHORUS    Narrative:     With next lab draw   MAGNESIUM    Narrative:     With next lab draw   BASIC METABOLIC PANEL   PHOSPHORUS   PTH, INTACT   BASIC METABOLIC PANEL   PHOSPHORUS   POCT GLUCOSE MONITORING CONTINUOUS     EKG Readings: (Independently Interpreted)   No STEMI  Normal  sinus rhythm  No ectopy  Normal conduction  Normal ST segments  Normal T-wave  Normal axis  Heart rate in the 90s   ECG Results              EKG 12-lead (Final result)  Result time 03/20/23 15:48:08      Final result by Interface, Lab In [facility] (03/20/23 15:48:08)                   Narrative:    Test Reason : U07.1,    Vent. Rate : 136 BPM     Atrial Rate : 136 BPM     P-R Int : 144 ms          QRS Dur : 076 ms      QT Int : 282 ms       P-R-T Axes : 075 067 119 degrees     QTc Int : 424 ms    Sinus tachycardia  Nonspecific ST abnormality  Abnormal ECG  No previous ECGs available  Confirmed by Basilio Goode MD ([id number]) on 3/20/2023 3:47:56 PM    Referred By: [email or web address]   SELF           Confirmed By:Basilio Goode MD                                  Imaging Results              CTA Chest Non-Coronary (PE Studies) (Final result)  Result time 03/20/23 14:59:45      Final result by Palomo Casper MD (03/20/23 14:59:45)                   Impression:      No evidence for pulmonary embolus or other acute intrathoracic process.    Hepatic steatosis.  Cholecystectomy.      Electronically signed by: Palomo Casper MD  Date:    03/20/2023  Time:    14:59               Narrative:    EXAMINATION:  CTA CHEST NON CORONARY (PE STUDIES)    CLINICAL HISTORY:  Pulmonary embolism (PE) suspected, high prob;    TECHNIQUE:  Low dose axial images, sagittal and coronal reformations were obtained from the thoracic inlet to the lung bases following the IV administration of 75 mL of Omnipaque 350.  Contrast timing was optimized to evaluate the pulmonary arteries.  MIP images were performed.    COMPARISON:  None    FINDINGS:  There are no pulmonary arterial filling defects to indicate the presence of pulmonary thromboemboli.    The heart size is normal without pericardial effusion.  There is no hilar or mediastinal lymphadenopathy.    No pulmonary consolidation or pleural effusion.    The liver is hypoattenuating indicative of fatty  infiltration.  There has been a cholecystectomy.  Degenerative change of the spine typical for age is present.                                       X-Ray Chest 1 View (Final result)  Result time 23 14:03:49      Final result by Claudine Pierce MD (23 14:03:49)                   Impression:      No acute abnormality.      Electronically signed by: Claudine Pierce MD  Date:    2023  Time:    14:03               Narrative:    EXAMINATION:  XR CHEST 1 VIEW    CLINICAL HISTORY:  COVID;    TECHNIQUE:  Single frontal view of the chest was performed.    COMPARISON:  None    FINDINGS:  The lungs are clear, with normal appearance of pulmonary vasculature and no pleural effusion or pneumothorax.    The cardiac silhouette is normal in size. The hilar and mediastinal contours are unremarkable.    Bones are intact.                                       Medications   sodium chloride 0.9% bolus 1,000 mL 1,000 mL (1,000 mLs Intravenous New Bag 3/20/23 1545)   dextrose 10% bolus 125 mL 125 mL (has no administration in time range)   dextrose 10% bolus 250 mL 250 mL (has no administration in time range)   insulin regular in 0.9 % NaCl 100 unit/100 mL (1 unit/mL) infusion (3 Units/hr Intravenous New Bag 3/20/23 1546)   sodium chloride 0.9% bolus 1,000 mL 1,000 mL (0 mLs Intravenous Stopped 3/20/23 1454)   iohexoL (OMNIPAQUE 350) injection 75 mL (75 mLs Intravenous Given 3/20/23 1450)   insulin regular bolus from bag/infusion 7.05 Units 7.05 mL (7.05 Units Intravenous Bolus from Bag 3/20/23 1547)     Medical Decision Makin-year-old female presents with shortness of breath in the emergency room today  Patient with a blood sugar over 400 with CO2 of 12 with likely diabetic ketoacidosis  Patient has been recovering from hysterectomy & drinking protein drinks every day  These are not diabetic drinks based on the pictures she provided in the ER today  Patient with serum ketones, ketones in the urine, IV fluids &  insulin drip initiated   Patient will need to be treated for diabetic ketoacidosis going forward on admit    Critical Care ED Physician Time (minutes):  -- Performed by: Perez Horton M.D.  -- Date/Time: 4:14 PM 3/20/2023   -- Direct Patient Care (Face Time): 5  -- Additional History from Records or Taking Additional History: 5  -- Ordering, Reviewing, and Interpreting Diagnostic Studies: 5  -- Total Time in Documentation: 11  -- Consultation with Other Physicians: 5  -- Consultation with Family Related to Condition: 0  -- Total Critical Care Time: 31  -- Critical care was necessary to treat diabetic ketoacidosis  -- Critical care was time spent personally by me on the following activities:   -- examination of patient, ordering and performing treatments   -- review of radiographic studies, re-evaluation of pt's condition  -- review of labs and evaluation of response to treatment                           Clinical Impression:   Final diagnoses:  [E11.10] Diabetic ketoacidosis without coma associated with type 2 diabetes mellitus (Primary)        ED Disposition Condition    Observation Stable                Perez Horton MD  03/20/23 4925

## 2023-03-20 NOTE — PLAN OF CARE
03/20/23 1614   ED Admissions Case Approval   ED Admissions Case Approval  Approved         Chart review complete. Admission criteria met in critical inpatient level of care. Upgrade complete.

## 2023-03-21 PROBLEM — K76.0 FATTY LIVER: Status: ACTIVE | Noted: 2023-03-21

## 2023-03-21 PROBLEM — E78.5 HYPERLIPIDEMIA: Status: ACTIVE | Noted: 2023-03-21

## 2023-03-21 PROBLEM — I10 PRIMARY HYPERTENSION: Status: ACTIVE | Noted: 2023-03-21

## 2023-03-21 PROBLEM — R06.09 DYSPNEA ON EXERTION: Status: ACTIVE | Noted: 2023-03-21

## 2023-03-21 PROBLEM — E11.10 DIABETIC KETOACIDOSIS WITHOUT COMA ASSOCIATED WITH TYPE 2 DIABETES MELLITUS: Status: ACTIVE | Noted: 2023-03-21

## 2023-03-21 LAB
AMYLASE SERPL-CCNC: 20 U/L (ref 20–110)
ANION GAP SERPL CALC-SCNC: 11 MMOL/L (ref 8–16)
ANION GAP SERPL CALC-SCNC: 8 MMOL/L (ref 8–16)
ANION GAP SERPL CALC-SCNC: 8 MMOL/L (ref 8–16)
ANION GAP SERPL CALC-SCNC: 9 MMOL/L (ref 8–16)
ASCENDING AORTA: 2.64 CM
AV INDEX (PROSTH): 0.69
AV MEAN GRADIENT: 4 MMHG
AV PEAK GRADIENT: 6 MMHG
AV VALVE AREA: 2.29 CM2
AV VELOCITY RATIO: 0.63
BASOPHILS # BLD AUTO: 0.04 K/UL (ref 0–0.2)
BASOPHILS NFR BLD: 0.7 % (ref 0–1.9)
BSA FOR ECHO PROCEDURE: 1.78 M2
BUN SERPL-MCNC: 15 MG/DL (ref 8–23)
BUN SERPL-MCNC: 15 MG/DL (ref 8–23)
BUN SERPL-MCNC: 16 MG/DL (ref 8–23)
BUN SERPL-MCNC: 17 MG/DL (ref 8–23)
CALCIUM SERPL-MCNC: 10.4 MG/DL (ref 8.7–10.5)
CALCIUM SERPL-MCNC: 10.4 MG/DL (ref 8.7–10.5)
CALCIUM SERPL-MCNC: 10.5 MG/DL (ref 8.7–10.5)
CALCIUM SERPL-MCNC: 10.5 MG/DL (ref 8.7–10.5)
CHLORIDE SERPL-SCNC: 110 MMOL/L (ref 95–110)
CHLORIDE SERPL-SCNC: 111 MMOL/L (ref 95–110)
CHLORIDE SERPL-SCNC: 111 MMOL/L (ref 95–110)
CHLORIDE SERPL-SCNC: 112 MMOL/L (ref 95–110)
CO2 SERPL-SCNC: 14 MMOL/L (ref 23–29)
CO2 SERPL-SCNC: 15 MMOL/L (ref 23–29)
CO2 SERPL-SCNC: 16 MMOL/L (ref 23–29)
CO2 SERPL-SCNC: 16 MMOL/L (ref 23–29)
CREAT SERPL-MCNC: 0.7 MG/DL (ref 0.5–1.4)
CREAT SERPL-MCNC: 0.8 MG/DL (ref 0.5–1.4)
CV ECHO LV RWT: 0.37 CM
DIFFERENTIAL METHOD: ABNORMAL
DOP CALC AO PEAK VEL: 1.2 M/S
DOP CALC AO VTI: 22.4 CM
DOP CALC LVOT AREA: 3.3 CM2
DOP CALC LVOT DIAMETER: 2.06 CM
DOP CALC LVOT PEAK VEL: 0.75 M/S
DOP CALC LVOT STROKE VOLUME: 51.3 CM3
DOP CALC RVOT PEAK VEL: 0.59 M/S
DOP CALC RVOT VTI: 11.5 CM
DOP CALCLVOT PEAK VEL VTI: 15.4 CM
E WAVE DECELERATION TIME: 308.97 MSEC
E/A RATIO: 0.58
E/E' RATIO: 7 M/S
ECHO LV POSTERIOR WALL: 0.87 CM (ref 0.6–1.1)
EJECTION FRACTION: 60 %
EOSINOPHIL # BLD AUTO: 0.2 K/UL (ref 0–0.5)
EOSINOPHIL NFR BLD: 2.6 % (ref 0–8)
ERYTHROCYTE [DISTWIDTH] IN BLOOD BY AUTOMATED COUNT: 14.6 % (ref 11.5–14.5)
EST. GFR  (NO RACE VARIABLE): >60 ML/MIN/1.73 M^2
ESTIMATED AVG GLUCOSE: 255 MG/DL (ref 68–131)
FRACTIONAL SHORTENING: 30 % (ref 28–44)
GLUCOSE SERPL-MCNC: 215 MG/DL (ref 70–110)
GLUCOSE SERPL-MCNC: 225 MG/DL (ref 70–110)
GLUCOSE SERPL-MCNC: 275 MG/DL (ref 70–110)
GLUCOSE SERPL-MCNC: 276 MG/DL (ref 70–110)
HBA1C MFR BLD: 10.5 % (ref 4–5.6)
HCT VFR BLD AUTO: 33.6 % (ref 37–48.5)
HGB BLD-MCNC: 11.1 G/DL (ref 12–16)
IMM GRANULOCYTES # BLD AUTO: 0.03 K/UL (ref 0–0.04)
IMM GRANULOCYTES NFR BLD AUTO: 0.5 % (ref 0–0.5)
INTERVENTRICULAR SEPTUM: 1.06 CM (ref 0.6–1.1)
IVC DIAMETER: 1.13 CM
IVRT: 121.79 MSEC
LA MAJOR: 3.83 CM
LA MINOR: 3.49 CM
LA WIDTH: 2.8 CM
LEFT ATRIUM SIZE: 3.36 CM
LEFT ATRIUM VOLUME INDEX MOD: 18 ML/M2
LEFT ATRIUM VOLUME INDEX: 16.6 ML/M2
LEFT ATRIUM VOLUME MOD: 31.65 CM3
LEFT ATRIUM VOLUME: 29.21 CM3
LEFT INTERNAL DIMENSION IN SYSTOLE: 3.33 CM (ref 2.1–4)
LEFT VENTRICLE DIASTOLIC VOLUME INDEX: 60.03 ML/M2
LEFT VENTRICLE DIASTOLIC VOLUME: 105.65 ML
LEFT VENTRICLE MASS INDEX: 91 G/M2
LEFT VENTRICLE SYSTOLIC VOLUME INDEX: 25.6 ML/M2
LEFT VENTRICLE SYSTOLIC VOLUME: 45.09 ML
LEFT VENTRICULAR INTERNAL DIMENSION IN DIASTOLE: 4.76 CM (ref 3.5–6)
LEFT VENTRICULAR MASS: 159.98 G
LIPASE SERPL-CCNC: 19 U/L (ref 4–60)
LV LATERAL E/E' RATIO: 8.4 M/S
LV SEPTAL E/E' RATIO: 6 M/S
LVOT MG: 1.45 MMHG
LVOT MV: 0.58 CM/S
LYMPHOCYTES # BLD AUTO: 1.3 K/UL (ref 1–4.8)
LYMPHOCYTES NFR BLD: 22.7 % (ref 18–48)
MCH RBC QN AUTO: 29.8 PG (ref 27–31)
MCHC RBC AUTO-ENTMCNC: 33 G/DL (ref 32–36)
MCV RBC AUTO: 90 FL (ref 82–98)
MONOCYTES # BLD AUTO: 0.6 K/UL (ref 0.3–1)
MONOCYTES NFR BLD: 9.7 % (ref 4–15)
MV PEAK A VEL: 0.73 M/S
MV PEAK E VEL: 0.42 M/S
MV STENOSIS PRESSURE HALF TIME: 89.6 MS
MV VALVE AREA P 1/2 METHOD: 2.46 CM2
NEUTROPHILS # BLD AUTO: 3.7 K/UL (ref 1.8–7.7)
NEUTROPHILS NFR BLD: 63.8 % (ref 38–73)
NRBC BLD-RTO: 0 /100 WBC
PHOSPHATE SERPL-MCNC: 1.1 MG/DL (ref 2.7–4.5)
PHOSPHATE SERPL-MCNC: 1.8 MG/DL (ref 2.7–4.5)
PHOSPHATE SERPL-MCNC: 2.2 MG/DL (ref 2.7–4.5)
PISA TR MAX VEL: 2.6 M/S
PLATELET # BLD AUTO: 210 K/UL (ref 150–450)
PLATELET BLD QL SMEAR: ABNORMAL
PMV BLD AUTO: 11.6 FL (ref 9.2–12.9)
POCT GLUCOSE: 223 MG/DL (ref 70–110)
POCT GLUCOSE: 223 MG/DL (ref 70–110)
POCT GLUCOSE: 236 MG/DL (ref 70–110)
POCT GLUCOSE: 242 MG/DL (ref 70–110)
POCT GLUCOSE: 243 MG/DL (ref 70–110)
POCT GLUCOSE: 245 MG/DL (ref 70–110)
POCT GLUCOSE: 255 MG/DL (ref 70–110)
POCT GLUCOSE: 259 MG/DL (ref 70–110)
POCT GLUCOSE: 261 MG/DL (ref 70–110)
POCT GLUCOSE: 265 MG/DL (ref 70–110)
POCT GLUCOSE: 273 MG/DL (ref 70–110)
POCT GLUCOSE: 286 MG/DL (ref 70–110)
POCT GLUCOSE: 288 MG/DL (ref 70–110)
POCT GLUCOSE: 314 MG/DL (ref 70–110)
POTASSIUM SERPL-SCNC: 3.5 MMOL/L (ref 3.5–5.1)
POTASSIUM SERPL-SCNC: 3.5 MMOL/L (ref 3.5–5.1)
POTASSIUM SERPL-SCNC: 3.6 MMOL/L (ref 3.5–5.1)
POTASSIUM SERPL-SCNC: 3.6 MMOL/L (ref 3.5–5.1)
PTH-INTACT SERPL-MCNC: 48.5 PG/ML (ref 9–77)
PULM VEIN S/D RATIO: 1.88
PV MEAN GRADIENT: 0.95 MMHG
PV MV: 0.63 M/S
PV PEAK D VEL: 0.26 M/S
PV PEAK S VEL: 0.49 M/S
PV PEAK VELOCITY: 0.82 CM/S
RA MAJOR: 3.83 CM
RA PRESSURE: 8 MMHG
RA WIDTH: 2.69 CM
RBC # BLD AUTO: 3.73 M/UL (ref 4–5.4)
RIGHT VENTRICULAR END-DIASTOLIC DIMENSION: 3.06 CM
SINUS: 2.82 CM
SODIUM SERPL-SCNC: 134 MMOL/L (ref 136–145)
SODIUM SERPL-SCNC: 135 MMOL/L (ref 136–145)
SODIUM SERPL-SCNC: 136 MMOL/L (ref 136–145)
SODIUM SERPL-SCNC: 136 MMOL/L (ref 136–145)
STJ: 2.75 CM
TDI LATERAL: 0.05 M/S
TDI SEPTAL: 0.07 M/S
TDI: 0.06 M/S
TR MAX PG: 27 MMHG
TV REST PULMONARY ARTERY PRESSURE: 35 MMHG
WBC # BLD AUTO: 5.76 K/UL (ref 3.9–12.7)

## 2023-03-21 PROCEDURE — 82150 ASSAY OF AMYLASE: CPT | Performed by: INTERNAL MEDICINE

## 2023-03-21 PROCEDURE — 83036 HEMOGLOBIN GLYCOSYLATED A1C: CPT | Performed by: INTERNAL MEDICINE

## 2023-03-21 PROCEDURE — 84100 ASSAY OF PHOSPHORUS: CPT | Performed by: SURGERY

## 2023-03-21 PROCEDURE — 25000003 PHARM REV CODE 250: Performed by: SURGERY

## 2023-03-21 PROCEDURE — 36415 COLL VENOUS BLD VENIPUNCTURE: CPT | Performed by: SURGERY

## 2023-03-21 PROCEDURE — 25000003 PHARM REV CODE 250: Performed by: INTERNAL MEDICINE

## 2023-03-21 PROCEDURE — 11000001 HC ACUTE MED/SURG PRIVATE ROOM

## 2023-03-21 PROCEDURE — 25000003 PHARM REV CODE 250: Performed by: PHYSICIAN ASSISTANT

## 2023-03-21 PROCEDURE — 36415 COLL VENOUS BLD VENIPUNCTURE: CPT | Performed by: INTERNAL MEDICINE

## 2023-03-21 PROCEDURE — 63600175 PHARM REV CODE 636 W HCPCS: Performed by: INTERNAL MEDICINE

## 2023-03-21 PROCEDURE — 80048 BASIC METABOLIC PNL TOTAL CA: CPT | Performed by: SURGERY

## 2023-03-21 PROCEDURE — 85025 COMPLETE CBC W/AUTO DIFF WBC: CPT | Performed by: SURGERY

## 2023-03-21 PROCEDURE — 83690 ASSAY OF LIPASE: CPT | Performed by: INTERNAL MEDICINE

## 2023-03-21 PROCEDURE — 63600175 PHARM REV CODE 636 W HCPCS: Performed by: PHYSICIAN ASSISTANT

## 2023-03-21 PROCEDURE — 94761 N-INVAS EAR/PLS OXIMETRY MLT: CPT

## 2023-03-21 PROCEDURE — 99223 PR INITIAL HOSPITAL CARE,LEVL III: ICD-10-PCS | Mod: ,,, | Performed by: INTERNAL MEDICINE

## 2023-03-21 PROCEDURE — 99223 1ST HOSP IP/OBS HIGH 75: CPT | Mod: ,,, | Performed by: INTERNAL MEDICINE

## 2023-03-21 RX ORDER — ALUMINUM HYDROXIDE, MAGNESIUM HYDROXIDE, AND SIMETHICONE 2400; 240; 2400 MG/30ML; MG/30ML; MG/30ML
30 SUSPENSION ORAL EVERY 6 HOURS PRN
Status: DISCONTINUED | OUTPATIENT
Start: 2023-03-21 | End: 2023-03-22 | Stop reason: HOSPADM

## 2023-03-21 RX ORDER — DEXTROSE 40 %
30 GEL (GRAM) ORAL
Status: DISCONTINUED | OUTPATIENT
Start: 2023-03-21 | End: 2023-03-22 | Stop reason: HOSPADM

## 2023-03-21 RX ORDER — INSULIN ASPART 100 [IU]/ML
0-5 INJECTION, SOLUTION INTRAVENOUS; SUBCUTANEOUS
Status: DISCONTINUED | OUTPATIENT
Start: 2023-03-21 | End: 2023-03-22 | Stop reason: HOSPADM

## 2023-03-21 RX ORDER — GLUCAGON 1 MG
1 KIT INJECTION
Status: DISCONTINUED | OUTPATIENT
Start: 2023-03-21 | End: 2023-03-22 | Stop reason: HOSPADM

## 2023-03-21 RX ORDER — ASPIRIN 81 MG/1
81 TABLET ORAL DAILY
Status: DISCONTINUED | OUTPATIENT
Start: 2023-03-22 | End: 2023-03-22 | Stop reason: HOSPADM

## 2023-03-21 RX ORDER — INSULIN ASPART 100 [IU]/ML
5 INJECTION, SOLUTION INTRAVENOUS; SUBCUTANEOUS
Status: DISCONTINUED | OUTPATIENT
Start: 2023-03-21 | End: 2023-03-22 | Stop reason: HOSPADM

## 2023-03-21 RX ORDER — DEXTROSE MONOHYDRATE, SODIUM CHLORIDE, AND POTASSIUM CHLORIDE 50; 1.49; 4.5 G/1000ML; G/1000ML; G/1000ML
INJECTION, SOLUTION INTRAVENOUS CONTINUOUS
Status: DISCONTINUED | OUTPATIENT
Start: 2023-03-21 | End: 2023-03-21

## 2023-03-21 RX ORDER — FAMOTIDINE 20 MG/1
20 TABLET, FILM COATED ORAL 2 TIMES DAILY
Status: DISCONTINUED | OUTPATIENT
Start: 2023-03-21 | End: 2023-03-22 | Stop reason: HOSPADM

## 2023-03-21 RX ORDER — PRAMIPEXOLE DIHYDROCHLORIDE 0.12 MG/1
0.25 TABLET ORAL NIGHTLY
Status: DISCONTINUED | OUTPATIENT
Start: 2023-03-21 | End: 2023-03-21 | Stop reason: SDUPTHER

## 2023-03-21 RX ORDER — ATORVASTATIN CALCIUM 40 MG/1
40 TABLET, FILM COATED ORAL NIGHTLY
Status: DISCONTINUED | OUTPATIENT
Start: 2023-03-21 | End: 2023-03-22 | Stop reason: HOSPADM

## 2023-03-21 RX ORDER — DEXTROSE 40 %
15 GEL (GRAM) ORAL
Status: DISCONTINUED | OUTPATIENT
Start: 2023-03-21 | End: 2023-03-22 | Stop reason: HOSPADM

## 2023-03-21 RX ORDER — MUPIROCIN 20 MG/G
OINTMENT TOPICAL 2 TIMES DAILY
Status: DISCONTINUED | OUTPATIENT
Start: 2023-03-21 | End: 2023-03-22 | Stop reason: HOSPADM

## 2023-03-21 RX ORDER — LOSARTAN POTASSIUM 50 MG/1
100 TABLET ORAL DAILY
Status: DISCONTINUED | OUTPATIENT
Start: 2023-03-21 | End: 2023-03-22 | Stop reason: HOSPADM

## 2023-03-21 RX ORDER — CLONIDINE HYDROCHLORIDE 0.1 MG/1
0.1 TABLET ORAL EVERY 6 HOURS PRN
Status: DISCONTINUED | OUTPATIENT
Start: 2023-03-21 | End: 2023-03-22 | Stop reason: HOSPADM

## 2023-03-21 RX ORDER — ATENOLOL 25 MG/1
50 TABLET ORAL DAILY
Status: DISCONTINUED | OUTPATIENT
Start: 2023-03-21 | End: 2023-03-22 | Stop reason: HOSPADM

## 2023-03-21 RX ADMIN — MUPIROCIN: 20 OINTMENT TOPICAL at 10:03

## 2023-03-21 RX ADMIN — INSULIN DETEMIR 5 UNITS: 100 INJECTION, SOLUTION SUBCUTANEOUS at 12:03

## 2023-03-21 RX ADMIN — CLONIDINE HYDROCHLORIDE 0.1 MG: 0.1 TABLET ORAL at 05:03

## 2023-03-21 RX ADMIN — DEXTROSE, SODIUM CHLORIDE, AND POTASSIUM CHLORIDE: 5; .45; .15 INJECTION INTRAVENOUS at 06:03

## 2023-03-21 RX ADMIN — MUPIROCIN: 20 OINTMENT TOPICAL at 08:03

## 2023-03-21 RX ADMIN — PRAMIPEXOLE DIHYDROCHLORIDE 0.25 MG: 0.12 TABLET ORAL at 08:03

## 2023-03-21 RX ADMIN — INSULIN ASPART 1 UNITS: 100 INJECTION, SOLUTION INTRAVENOUS; SUBCUTANEOUS at 08:03

## 2023-03-21 RX ADMIN — FAMOTIDINE 20 MG: 20 TABLET ORAL at 12:03

## 2023-03-21 RX ADMIN — LOSARTAN POTASSIUM 100 MG: 50 TABLET, FILM COATED ORAL at 12:03

## 2023-03-21 RX ADMIN — INSULIN ASPART 5 UNITS: 100 INJECTION, SOLUTION INTRAVENOUS; SUBCUTANEOUS at 04:03

## 2023-03-21 RX ADMIN — ATENOLOL 50 MG: 25 TABLET ORAL at 12:03

## 2023-03-21 RX ADMIN — INSULIN ASPART 5 UNITS: 100 INJECTION, SOLUTION INTRAVENOUS; SUBCUTANEOUS at 10:03

## 2023-03-21 RX ADMIN — INSULIN DETEMIR 14 UNITS: 100 INJECTION, SOLUTION SUBCUTANEOUS at 09:03

## 2023-03-21 RX ADMIN — ATORVASTATIN CALCIUM 40 MG: 40 TABLET, FILM COATED ORAL at 08:03

## 2023-03-21 RX ADMIN — Medication 6 MG: at 08:03

## 2023-03-21 RX ADMIN — FAMOTIDINE 20 MG: 20 TABLET ORAL at 08:03

## 2023-03-21 RX ADMIN — ACETAMINOPHEN 650 MG: 325 TABLET ORAL at 04:03

## 2023-03-21 NOTE — PLAN OF CARE
Problem: Adult Inpatient Plan of Care  Goal: Plan of Care Review  Outcome: Ongoing, Progressing  Goal: Patient-Specific Goal (Individualized)  Outcome: Ongoing, Progressing  Goal: Absence of Hospital-Acquired Illness or Injury  Outcome: Ongoing, Progressing  Goal: Optimal Comfort and Wellbeing  Outcome: Ongoing, Progressing  Goal: Readiness for Transition of Care  Outcome: Ongoing, Progressing     Problem: Fall Injury Risk  Goal: Absence of Fall and Fall-Related Injury  Outcome: Ongoing, Progressing     Problem: Diabetic Ketoacidosis  Goal: Fluid and Electrolyte Balance with Absence of Ketosis  Outcome: Ongoing, Progressing     Problem: Electrolyte Imbalance  Goal: Electrolyte Balance  Outcome: Ongoing, Progressing     Problem: Gas Exchange Impaired  Goal: Optimal Gas Exchange  Outcome: Ongoing, Progressing     Problem: Tissue Perfusion Altered  Goal: Improved Tissue Perfusion  Outcome: Ongoing, Progressing     Problem: Pain Acute  Goal: Acceptable Pain Control and Functional Ability  Outcome: Ongoing, Progressing     Problem: Diabetes Comorbidity  Goal: Blood Glucose Level Within Targeted Range  Outcome: Ongoing, Progressing

## 2023-03-21 NOTE — EICU
"Intervention Initiated From:  Bedside    Lawanda intervened regarding:  Rounding (Video assessment)    Comments: Video assessment completed.  Pt lying in bed.  Noted to be AAO.  Responds appropriately.  States, "I had a rough night."  Insulin infusion in progress.  Pt without acute distress at present.  "

## 2023-03-21 NOTE — EICU
Intervention Initiated From:  [facility] / EICU    Lawanda intervened regarding:  Rounding (Video assessment)    Nurse Notified:  Yes    Doctor Notified:  No    Comments: Video rounding complete. Nurse at bedside.VSS. Alert with Insulin drip infusing. No complaints voiced.

## 2023-03-21 NOTE — H&P
[facility] Medicine  History & Physical    Patient Name: Kaity Masterson  MRN: [id number]  Patient Class: IP- Inpatient  Admission Date: 3/20/2023  Attending Physician: Skye Allen MD   Primary Care Provider: Marcia Dewey NP         Patient information was obtained from patient and ER records.     Subjective:     Principal Problem:Diabetic ketoacidosis without coma associated with type 2 diabetes mellitus    Chief Complaint:   Chief Complaint   Patient presents with    Shortness of Breath     Patient to ER SOB, weakness for a week, denies fever, coughing reports that she had a hysterectomy on 3/8/23 due to cancer         HPI:   Kaity Masterson is a 67 y.o. female with PMH of DM-2, HTN , hyperlipidemia who gets care  with [facility] [place] .  She reports she has been taking protein shakes for the last 3 weeks .She reports her DM has been well controlled  on metformin    ( reported A1c 6.1 %) .   She was admitted to ICU for DKA ; Her sugars were high and she had a large AG. And she had high betaydroxybutyrate .  Beta-Hydroxybutyrate 0.0 - 0.5 mmol/L 5.8 High     She received IVF and IV insulin drip ; her Gap has closed . She is off of insulin drip .  Her sugars are 215-288. Her presenting complaint was shortness of breath , CXR , CTA chest are normal   BNP normal , troponin is  normal . EKG : Sinus tachycardia , Nonspecific ST abnormality ,Abnormal ECG   No previous ECGs available .          Past Medical History:   Diagnosis Date    Anemia, unspecified     Hypertension     Osteoporosis     PUD (peptic ulcer disease)     Type 2 diabetes mellitus without complications        Past Surgical History:   Procedure Laterality Date    CHOLECYSTECTOMY      colonsocopy      2017    HYSTERECTOMY  03/08/2023    LAPAROSCOPIC CHOLECYSTECTOMY      LYMPH NODE BIOPSY Bilateral 03/08/2023    Procedure: BIOPSY, PELVIC LYMPH NODE;  Surgeon: Dallas Aguilar MD;  [facility] OR;   Service: OB/GYN;  Laterality: Bilateral;    ROBOTIC HYSTERECTOMY, WITH SALPINGO-OOPHORECTOMY Bilateral 03/08/2023    Procedure: ROBOTIC HYSTERECTOMY,WITH SALPINGO-OOPHORECTOMY;  Surgeon: Dallas Aguilar MD;  Location: [facility];  Service: OB/GYN;  Laterality: Bilateral;    TOTAL KNEE ARTHROPLASTY Left     2016    TOTAL KNEE ARTHROPLASTY Right     2019    VAGINAL DELIVERY      x 2  (one with epidural)       Review of patient's allergies indicates:  No Known Allergies    No current facility-administered medications on file prior to encounter.     Current Outpatient Medications on File Prior to Encounter   Medication Sig    acetaminophen (TYLENOL) 650 MG TbSR Take 1 tablet (650 mg total) by mouth every 6 (six) hours. Alternate with ibuprofen so you are taking something every 6 hours    alendronate (FOSAMAX) 35 MG tablet Take 35 mg by mouth every 7 days.    aspirin (ECOTRIN) 81 MG EC tablet Take 1 tablet by mouth once daily.    atenoloL (TENORMIN) 50 MG tablet Take 50 mg by mouth once daily. am    famotidine (PEPCID) 20 MG tablet Take 20 mg by mouth 2 (two) times daily.    ibuprofen (ADVIL,MOTRIN) 600 MG tablet Take 1 tablet (600 mg total) by mouth every 6 (six) hours. Alternative with tylenol so you are taking something every 3 hours    losartan (COZAAR) 100 MG tablet Take 100 mg by mouth.    metFORMIN (GLUCOPHAGE-XR) 500 MG ER 24hr tablet Take 500 mg by mouth.    oxyCODONE (ROXICODONE) 5 MG immediate release tablet Take 1 tablet (5 mg total) by mouth every 4 (four) hours as needed for Pain.    pramipexole (MIRAPEX) 0.25 MG tablet 0.25 mg every evening.    rosuvastatin (CRESTOR) 10 MG tablet Take 10 mg by mouth.     Family History       Problem Relation (Age of Onset)    Breast cancer Mother, Sister, Maternal Aunt    Colon cancer Mother, Maternal Aunt    Diabetes Mother    Hypertension Brother, Sister    Lung cancer Sister          Tobacco Use    Smoking status: Never    Smokeless tobacco: Never    Substance and Sexual Activity    Alcohol use: Not Currently    Drug use: Never    Sexual activity: Not Currently     Partners: Male     Review of Systems   Constitutional:  Positive for fatigue. Negative for chills and fever.   HENT:  Negative for congestion, hearing loss, sinus pressure and sore throat.    Eyes:  Negative for photophobia.   Respiratory:  Positive for shortness of breath. Negative for cough, choking, chest tightness and wheezing.    Cardiovascular:  Negative for chest pain and palpitations.   Gastrointestinal:  Negative for blood in stool, nausea and vomiting.   Genitourinary:  Negative for dysuria and hematuria.   Musculoskeletal:  Negative for arthralgias and myalgias.   Skin:  Negative for pallor.   Neurological:  Positive for weakness. Negative for dizziness and numbness.   Hematological:  Does not bruise/bleed easily.   Psychiatric/Behavioral:  Negative for confusion and suicidal ideas. The patient is not nervous/anxious.    Objective:     Vital Signs (Most Recent):  Temp: 97.8 °F (36.6 °C) (03/21/23 0713)  Pulse: 82 (03/21/23 0900)  Resp: (!) 23 (03/21/23 0900)  BP: (!) 147/75 (03/21/23 0900)  SpO2: 96 % (03/21/23 0900)   Vital Signs (24h Range):  Temp:  [96.5 °F (35.8 °C)-98.1 °F (36.7 °C)] 97.8 °F (36.6 °C)  Pulse:  [] 82  Resp:  [5-36] 23  SpO2:  [96 %-100 %] 96 %  BP: (123-180)/() 147/75     Weight: 70.5 kg (155 lb 5 oz)  Body mass index is 26.66 kg/m².    Physical Exam  Vitals and nursing note reviewed.   Constitutional:       Appearance: She is well-developed.   HENT:      Head: Normocephalic and atraumatic.      Right Ear: External ear normal.      Left Ear: External ear normal.   Eyes:      Conjunctiva/sclera: Conjunctivae normal.      Pupils: Pupils are equal, round, and reactive to light.   Neck:      Thyroid: No thyromegaly.      Vascular: No JVD.      Trachea: No tracheal deviation.   Cardiovascular:      Rate and Rhythm: Normal rate and regular rhythm.      Heart  sounds: Normal heart sounds.   Pulmonary:      Effort: Pulmonary effort is normal. No respiratory distress.      Breath sounds: Normal breath sounds. No wheezing or rales.   Chest:      Chest wall: No tenderness.   Abdominal:      General: Bowel sounds are normal. There is no distension.      Palpations: Abdomen is soft. There is no mass.      Tenderness: There is no abdominal tenderness. There is no guarding or rebound.   Musculoskeletal:         General: Normal range of motion.      Cervical back: Normal range of motion and neck supple.   Lymphadenopathy:      Cervical: No cervical adenopathy.   Skin:     General: Skin is warm and dry.   Neurological:      Mental Status: She is alert and oriented to person, place, and time.      Cranial Nerves: No cranial nerve deficit.      Motor: No abnormal muscle tone.      Coordination: Coordination normal.      Deep Tendon Reflexes: Reflexes are normal and symmetric. Reflexes normal.      Comments: CN: Optic discs are flat with normal vasculature, PERRL, Extraoccular movements and visual fields are full. Normal facial sensation and strength, Hearing symmetric, Tongue and Palate are midline and strong. Shoulder Shrug symmetric and strong.         CRANIAL NERVES     CN III, IV, VI   Pupils are equal, round, and reactive to light.     Significant Labs: All pertinent labs within the past 24 hours have been reviewed.  A1C: No results for input(s): HGBA1C in the last 4320 hours.  CBC:   Recent Labs   Lab 03/20/23  1244 03/21/23  0407   WBC 10.35 5.76   HGB 14.2 11.1*   HCT 43.8 33.6*    210     CMP:   Recent Labs   Lab 03/20/23  1244 03/20/23  1527 03/20/23  2357 03/21/23  0407 03/21/23  0817   *   < > 136 135*  134* 136   K 4.0   < > 3.5 3.6  3.5 3.6      < > 111* 111*  110 112*   CO2 12*   < > 14* 16*  15* 16*   *   < > 215* 276*  275* 225*   BUN 18   < > 17 16  15 15   CREATININE 1.4   < > 0.7 0.7  0.7 0.8   CALCIUM 12.2*   < > 10.5 10.4  10.4  10.5   PROT 7.6  --   --   --   --    ALBUMIN 3.7  --   --   --   --    BILITOT 0.5  --   --   --   --    ALKPHOS 112  --   --   --   --    AST 24  --   --   --   --    ALT 29  --   --   --   --    ANIONGAP 18*   < > 11 8  9 8    < > = values in this interval not displayed.   BNP  Recent Labs   Lab 03/20/23  1244   BNP 20       Cardiac Markers:   Recent Labs   Lab 03/20/23  1244   BNP 20     Lipid Panel: No results for input(s): CHOL, HDL, LDLCALC, TRIG, CHOLHDL in the last 48 hours.  Troponin:   Recent Labs   Lab 03/20/23  1244   TROPONINI 0.011     TSH: No results for input(s): TSH in the last 4320 hours.  Urine Studies:   Recent Labs   Lab 03/20/23  1253   COLORU Yellow   APPEARANCEUA Clear   PHUR 6.0   SPECGRAV >=1.030*   PROTEINUA 1+*   GLUCUA 2+*   KETONESU 3+*   BILIRUBINUA 1+*   OCCULTUA Negative   NITRITE Negative   UROBILINOGEN 1.0   LEUKOCYTESUR Negative   RBCUA 1   WBCUA 5   BACTERIA Moderate*   SQUAMEPITHEL 3   HYALINECASTS 4*       Significant Imaging: I have reviewed all pertinent imaging results/findings within the past 24 hours.  CT: I have reviewed all pertinent results/findings within the past 24 hours and my personal findings are:     CXR: I have reviewed all pertinent results/findings within the past 24 hours and my personal findings are:     EKG: I have reviewed all pertinent results/findings within the past 24 hours and my personal findings are:          The lungs are clear, with normal appearance of pulmonary vasculature and no pleural effusion or pneumothorax.     The cardiac silhouette is normal in size. The hilar and mediastinal contours are unremarkable.     Bones are intact.     Impression:     No acute abnormality.    No evidence for pulmonary embolus or other acute intrathoracic process.     Hepatic steatosis.  Cholecystectomy.         Assessment/Plan:     * Diabetic ketoacidosis without coma associated with type 2 diabetes mellitus  She received IVF  And IV insulin .  Now off of  insulin drip   Keep him off of metformin   Long acting insulin .  Diabetic teaching consult   Transfer to floor       Fatty liver  check ultrasound liver   Will  remind patient to avoid alcohol 100%.  Avoid excessive tylenol intake also .    Dyspnea on exertion  Normal BNP  Normal cxr  Normal CTA chest   ECHO  Today         Primary hypertension  Resume losartan and atenolol        Hyperlipidemia  Resume statin        VTE Risk Mitigation (From admission, onward)         Ordered     IP VTE HIGH RISK PATIENT  Once         03/20/23 1615     Place sequential compression device  Until discontinued         03/20/23 1615              On 03/21/2023, patient should be placed in hospital observation services under my care.        Melody Egan MD  Department of Hospital Medicine  [facility] - Intensive Care

## 2023-03-21 NOTE — PLAN OF CARE
[facility] - Intensive Care  Initial Discharge Assessment       Primary Care Provider: Marcia Dewey NP    Admission Diagnosis: Diabetic ketoacidosis without coma associated with type 2 diabetes mellitus [E11.10]    Admission Date: 3/20/2023  Expected Discharge Date:     Discharge Barriers Identified: None    Payor: BLUE CROSS BLUE SHIELD / Plan: [employer] [place] PPO / Product Type: PPO /     Extended Emergency Contact Information  Primary Emergency Contact: irene walden  Mobile Phone: [phone]  Relation: Spouse   needed? No  Secondary Emergency Contact: Radha Walden  Mobile Phone: [phone]  Relation: Daughter  Preferred language: English   needed? No    Discharge Plan A: Home with family  Discharge Plan B: Home with family      [id number] - [place], [place] - [address]  [address]  [place] [address]  Phone: [phone] Fax: [phone]      Initial Assessment (most recent)       Adult Discharge Assessment - 03/21/23 1252          Discharge Assessment    Assessment Type Discharge Planning Assessment     Confirmed/corrected address, phone number and insurance Yes     Confirmed Demographics Correct on Facesheet     Source of Information patient     Communicated CYNTHIA with patient/caregiver Date not available/Unable to determine     Reason For Admission DKA     People in Home spouse     Facility Arrived From: Home     Do you expect to return to your current living situation? Yes     Do you have help at home or someone to help you manage your care at home? Yes     Who are your caregiver(s) and their phone number(s)? Rolly Walden (Spouse) [phone]     Prior to hospitilization cognitive status: Alert/Oriented     Current cognitive status: Alert/Oriented     Equipment Currently Used at Home walker, rolling;cane, straight     Readmission within 30 days? No     Patient currently being followed by outpatient case management? No     Do you currently have  service(s) that help you manage your care at home? No     Do you take prescription medications? Yes     Do you have prescription coverage? Yes     Coverage BCBS     Do you have any problems affording any of your prescribed medications? No     How do you get to doctors appointments? car, drives self;family or friend will provide     Are you on dialysis? No     Do you take coumadin? No     Discharge Plan A Home with family     Discharge Plan B Home with family     DME Needed Upon Discharge  glucometer     Discharge Plan discussed with: Patient     Discharge Barriers Identified None                        Discharge assessment completed with patient. Denies any post-acute care needs at this time. SW to confirm with patient if she has a working glucometer at home. SW to remain available.

## 2023-03-21 NOTE — ASSESSMENT & PLAN NOTE
check ultrasound liver   Will  remind patient to avoid alcohol 100%.  Avoid excessive tylenol intake also .

## 2023-03-21 NOTE — SUBJECTIVE & OBJECTIVE
Past Medical History:   Diagnosis Date    Anemia, unspecified     Hypertension     Osteoporosis     PUD (peptic ulcer disease)     Type 2 diabetes mellitus without complications        Past Surgical History:   Procedure Laterality Date    CHOLECYSTECTOMY      colonsocopy      2017    HYSTERECTOMY  03/08/2023    LAPAROSCOPIC CHOLECYSTECTOMY      LYMPH NODE BIOPSY Bilateral 03/08/2023    Procedure: BIOPSY, PELVIC LYMPH NODE;  Surgeon: Dallas Aguilar MD;  Location: [facility];  Service: OB/GYN;  Laterality: Bilateral;    ROBOTIC HYSTERECTOMY, WITH SALPINGO-OOPHORECTOMY Bilateral 03/08/2023    Procedure: ROBOTIC HYSTERECTOMY,WITH SALPINGO-OOPHORECTOMY;  Surgeon: Dallas Aguilar MD;  Location: [facility];  Service: OB/GYN;  Laterality: Bilateral;    TOTAL KNEE ARTHROPLASTY Left     2016    TOTAL KNEE ARTHROPLASTY Right     2019    VAGINAL DELIVERY      x 2  (one with epidural)       Review of patient's allergies indicates:  No Known Allergies    No current facility-administered medications on file prior to encounter.     Current Outpatient Medications on File Prior to Encounter   Medication Sig    acetaminophen (TYLENOL) 650 MG TbSR Take 1 tablet (650 mg total) by mouth every 6 (six) hours. Alternate with ibuprofen so you are taking something every 6 hours    alendronate (FOSAMAX) 35 MG tablet Take 35 mg by mouth every 7 days.    aspirin (ECOTRIN) 81 MG EC tablet Take 1 tablet by mouth once daily.    atenoloL (TENORMIN) 50 MG tablet Take 50 mg by mouth once daily. am    famotidine (PEPCID) 20 MG tablet Take 20 mg by mouth 2 (two) times daily.    ibuprofen (ADVIL,MOTRIN) 600 MG tablet Take 1 tablet (600 mg total) by mouth every 6 (six) hours. Alternative with tylenol so you are taking something every 3 hours    losartan (COZAAR) 100 MG tablet Take 100 mg by mouth.    metFORMIN (GLUCOPHAGE-XR) 500 MG ER 24hr tablet Take 500 mg by mouth.    oxyCODONE (ROXICODONE) 5 MG immediate release tablet Take 1 tablet (5 mg total) by mouth  every 4 (four) hours as needed for Pain.    pramipexole (MIRAPEX) 0.25 MG tablet 0.25 mg every evening.    rosuvastatin (CRESTOR) 10 MG tablet Take 10 mg by mouth.     Family History       Problem Relation (Age of Onset)    Breast cancer Mother, Sister, Maternal Aunt    Colon cancer Mother, Maternal Aunt    Diabetes Mother    Hypertension Brother, Sister    Lung cancer Sister          Tobacco Use    Smoking status: Never    Smokeless tobacco: Never   Substance and Sexual Activity    Alcohol use: Not Currently    Drug use: Never    Sexual activity: Not Currently     Partners: Male     Review of Systems   Constitutional:  Positive for fatigue. Negative for chills and fever.   HENT:  Negative for congestion, hearing loss, sinus pressure and sore throat.    Eyes:  Negative for photophobia.   Respiratory:  Positive for shortness of breath. Negative for cough, choking, chest tightness and wheezing.    Cardiovascular:  Negative for chest pain and palpitations.   Gastrointestinal:  Negative for blood in stool, nausea and vomiting.   Genitourinary:  Negative for dysuria and hematuria.   Musculoskeletal:  Negative for arthralgias and myalgias.   Skin:  Negative for pallor.   Neurological:  Positive for weakness. Negative for dizziness and numbness.   Hematological:  Does not bruise/bleed easily.   Psychiatric/Behavioral:  Negative for confusion and suicidal ideas. The patient is not nervous/anxious.    Objective:     Vital Signs (Most Recent):  Temp: 97.8 °F (36.6 °C) (03/21/23 0713)  Pulse: 82 (03/21/23 0900)  Resp: (!) 23 (03/21/23 0900)  BP: (!) 147/75 (03/21/23 0900)  SpO2: 96 % (03/21/23 0900)   Vital Signs (24h Range):  Temp:  [96.5 °F (35.8 °C)-98.1 °F (36.7 °C)] 97.8 °F (36.6 °C)  Pulse:  [] 82  Resp:  [5-36] 23  SpO2:  [96 %-100 %] 96 %  BP: (123-180)/() 147/75     Weight: 70.5 kg (155 lb 5 oz)  Body mass index is 26.66 kg/m².    Physical Exam  Vitals and nursing note reviewed.   Constitutional:        Appearance: She is well-developed.   HENT:      Head: Normocephalic and atraumatic.      Right Ear: External ear normal.      Left Ear: External ear normal.   Eyes:      Conjunctiva/sclera: Conjunctivae normal.      Pupils: Pupils are equal, round, and reactive to light.   Neck:      Thyroid: No thyromegaly.      Vascular: No JVD.      Trachea: No tracheal deviation.   Cardiovascular:      Rate and Rhythm: Normal rate and regular rhythm.      Heart sounds: Normal heart sounds.   Pulmonary:      Effort: Pulmonary effort is normal. No respiratory distress.      Breath sounds: Normal breath sounds. No wheezing or rales.   Chest:      Chest wall: No tenderness.   Abdominal:      General: Bowel sounds are normal. There is no distension.      Palpations: Abdomen is soft. There is no mass.      Tenderness: There is no abdominal tenderness. There is no guarding or rebound.   Musculoskeletal:         General: Normal range of motion.      Cervical back: Normal range of motion and neck supple.   Lymphadenopathy:      Cervical: No cervical adenopathy.   Skin:     General: Skin is warm and dry.   Neurological:      Mental Status: She is alert and oriented to person, place, and time.      Cranial Nerves: No cranial nerve deficit.      Motor: No abnormal muscle tone.      Coordination: Coordination normal.      Deep Tendon Reflexes: Reflexes are normal and symmetric. Reflexes normal.      Comments: CN: Optic discs are flat with normal vasculature, PERRL, Extraoccular movements and visual fields are full. Normal facial sensation and strength, Hearing symmetric, Tongue and Palate are midline and strong. Shoulder Shrug symmetric and strong.         CRANIAL NERVES     CN III, IV, VI   Pupils are equal, round, and reactive to light.     Significant Labs: All pertinent labs within the past 24 hours have been reviewed.  A1C: No results for input(s): HGBA1C in the last 4320 hours.  CBC:   Recent Labs   Lab 03/20/23  1244 03/21/23  0407    WBC 10.35 5.76   HGB 14.2 11.1*   HCT 43.8 33.6*    210     CMP:   Recent Labs   Lab 03/20/23  1244 03/20/23  1527 03/20/23  2357 03/21/23  0407 03/21/23  0817   *   < > 136 135*  134* 136   K 4.0   < > 3.5 3.6  3.5 3.6      < > 111* 111*  110 112*   CO2 12*   < > 14* 16*  15* 16*   *   < > 215* 276*  275* 225*   BUN 18   < > 17 16  15 15   CREATININE 1.4   < > 0.7 0.7  0.7 0.8   CALCIUM 12.2*   < > 10.5 10.4  10.4 10.5   PROT 7.6  --   --   --   --    ALBUMIN 3.7  --   --   --   --    BILITOT 0.5  --   --   --   --    ALKPHOS 112  --   --   --   --    AST 24  --   --   --   --    ALT 29  --   --   --   --    ANIONGAP 18*   < > 11 8  9 8    < > = values in this interval not displayed.   BNP  Recent Labs   Lab 03/20/23  1244   BNP 20       Cardiac Markers:   Recent Labs   Lab 03/20/23  1244   BNP 20     Lipid Panel: No results for input(s): CHOL, HDL, LDLCALC, TRIG, CHOLHDL in the last 48 hours.  Troponin:   Recent Labs   Lab 03/20/23  1244   TROPONINI 0.011     TSH: No results for input(s): TSH in the last 4320 hours.  Urine Studies:   Recent Labs   Lab 03/20/23  1253   COLORU Yellow   APPEARANCEUA Clear   PHUR 6.0   SPECGRAV >=1.030*   PROTEINUA 1+*   GLUCUA 2+*   KETONESU 3+*   BILIRUBINUA 1+*   OCCULTUA Negative   NITRITE Negative   UROBILINOGEN 1.0   LEUKOCYTESUR Negative   RBCUA 1   WBCUA 5   BACTERIA Moderate*   SQUAMEPITHEL 3   HYALINECASTS 4*       Significant Imaging: I have reviewed all pertinent imaging results/findings within the past 24 hours.  CT: I have reviewed all pertinent results/findings within the past 24 hours and my personal findings are:     CXR: I have reviewed all pertinent results/findings within the past 24 hours and my personal findings are:     EKG: I have reviewed all pertinent results/findings within the past 24 hours and my personal findings are:          The lungs are clear, with normal appearance of pulmonary vasculature and no pleural effusion  or pneumothorax.     The cardiac silhouette is normal in size. The hilar and mediastinal contours are unremarkable.     Bones are intact.     Impression:     No acute abnormality.    No evidence for pulmonary embolus or other acute intrathoracic process.     Hepatic steatosis.  Cholecystectomy.

## 2023-03-21 NOTE — CONSULTS
"3:15 PM - 4:00 PM - Consulted for Diabetes education. Pt.'s daughter-in-law, Radha was present for consultation. Pt. states she was diagnosed with diabetes "around October last year" Has not had any type of Diabetes Education.    Educated on what is diabetes and what is Type 2 Diabetes. She states her mother and several sisters have diabetes. Explained to her what diabetic ketoacidosis is and why she was feeling weak and having trouble breathing yesterday when she came to ER. Patient not exhibiting signs of fully understanding and can only partially repeat back information taught. Pt. admits that she has had very poor appetite and is eating little to no carbohydrates at meal times. Explained to her the importance of eating carbohydrates and how the body uses carbohydrates for energy. Explained to her that by not eating carbohydrates, her body was burning the fats in her body. She is able to repeat back some of what was explained to her. Instructed on hyper/hypoglycemia symptoms and treatments of each. Set goal of her fasting blood sugar of 70 mg/dl - 130 mg/dl and before bedtime of less than 140 mg/dl. Instructed her that anytime her blood sugars were greater than 400 mg/dl, she needed to call her doctor or come to ER. Also, that if her blood sugars were greater than 250 mg/dl for three days, she needed to call her doctor. Instructed her that she would have to monitor her blood sugars at home. States she has a Free Style meter at home and knows how to use it. Her daughter- in -law verified this information. I gave her a log book to write down her blood sugar readings and stressed the importance of bringing it to her doctor appointments, so her doctor would know how to regulate her diabetes medication. The number of times a day that she would check her blood sugar would depend on if she went home on insulin or Metformin. Patient states that she does not want to go home on insulin. She just wants to go home on " Metformin. Instructed her that that would depend on her blood sugar readings and her doctor would discuss with her in the morning. I gave her a copy of all information covered I explained to her that I would be back tomorrow, since she said she was tired and I didn't want to overwhelm her.   Pt. not exhibiting signs of interest in learning. Radha showed great interest and was able to repeat back all of information taught. Radha states she lives three house away from Pt. And will be checking on her daily. Plan to see Pt. Again in the morning.

## 2023-03-21 NOTE — HPI
Kaity Masterson is a 67 y.o. female with PMH of DM-2, HTN , hyperlipidemia who gets care  with Marcia Novoa down the [facility] .  She reports she has been taking protein shakes for the last 3 weeks .She reports her DM has been well controlled  on metformin    ( reported A1c 6.1 %) .   She was admitted to ICU for DKA ; Her sugars were high and she had a large AG. And she had high betaydroxybutyrate .  Beta-Hydroxybutyrate 0.0 - 0.5 mmol/L 5.8 High     She received IVF and IV insulin drip ; her Gap has closed . She is off of insulin drip .  Her sugars are 215-288. Her presenting complaint was shortness of breath , CXR , CTA chest are normal   BNP normal , troponin is  normal . EKG : Sinus tachycardia , Nonspecific ST abnormality ,Abnormal ECG   No previous ECGs available .

## 2023-03-21 NOTE — NURSING
1903 Lying in bed alert and oriented times 4. C/O right hip pain from a post GYN procedure. Discussed plan of care with patient. Continuous cardiac monitoring in progress, NSR on monitor. Continuous insulin infusion with hourly accu checks in progress.  NS infusing at 150 ml/hr. JOANNA. Will continue to monitor. Side rails times 2 up, call button in reach, and bed low and locked.     2230 Accu check 194 continuous IV fluids changed to D5 1/2 NS with 20 meq KCL to infuse at 150 ml/hr.    0600 Patient slept throughout the night without complications. Insulin infusion and hourly accu checks remain in progress. D5 1/2 NS with 20 meq of KCL infusing at 150 ml/hr.

## 2023-03-21 NOTE — ASSESSMENT & PLAN NOTE
She received IVF  And IV insulin .  Now off of insulin drip   Keep him off of metformin   Long acting insulin .  Diabetic teaching consult   Transfer to floor

## 2023-03-22 VITALS
OXYGEN SATURATION: 99 % | WEIGHT: 155.31 LBS | RESPIRATION RATE: 18 BRPM | HEART RATE: 79 BPM | TEMPERATURE: 98 F | DIASTOLIC BLOOD PRESSURE: 81 MMHG | SYSTOLIC BLOOD PRESSURE: 175 MMHG | HEIGHT: 64 IN | BODY MASS INDEX: 26.52 KG/M2

## 2023-03-22 PROBLEM — K86.89 PANCREATIC MASS: Status: ACTIVE | Noted: 2023-03-22

## 2023-03-22 LAB
ALBUMIN SERPL BCP-MCNC: 2.7 G/DL (ref 3.5–5.2)
ALP SERPL-CCNC: 78 U/L (ref 55–135)
ALT SERPL W/O P-5'-P-CCNC: 26 U/L (ref 10–44)
ANION GAP SERPL CALC-SCNC: 11 MMOL/L (ref 8–16)
AST SERPL-CCNC: 22 U/L (ref 10–40)
BASOPHILS # BLD AUTO: 0.04 K/UL (ref 0–0.2)
BASOPHILS NFR BLD: 0.9 % (ref 0–1.9)
BILIRUB SERPL-MCNC: 0.4 MG/DL (ref 0.1–1)
BUN SERPL-MCNC: 10 MG/DL (ref 8–23)
CALCIUM SERPL-MCNC: 10.5 MG/DL (ref 8.7–10.5)
CHLORIDE SERPL-SCNC: 112 MMOL/L (ref 95–110)
CO2 SERPL-SCNC: 15 MMOL/L (ref 23–29)
CREAT SERPL-MCNC: 0.7 MG/DL (ref 0.5–1.4)
DIFFERENTIAL METHOD: ABNORMAL
EOSINOPHIL # BLD AUTO: 0.1 K/UL (ref 0–0.5)
EOSINOPHIL NFR BLD: 2.7 % (ref 0–8)
ERYTHROCYTE [DISTWIDTH] IN BLOOD BY AUTOMATED COUNT: 14.8 % (ref 11.5–14.5)
EST. GFR  (NO RACE VARIABLE): >60 ML/MIN/1.73 M^2
GLUCOSE SERPL-MCNC: 226 MG/DL (ref 70–110)
HCT VFR BLD AUTO: 32.5 % (ref 37–48.5)
HGB BLD-MCNC: 10.8 G/DL (ref 12–16)
IMM GRANULOCYTES # BLD AUTO: 0.01 K/UL (ref 0–0.04)
IMM GRANULOCYTES NFR BLD AUTO: 0.2 % (ref 0–0.5)
LYMPHOCYTES # BLD AUTO: 1 K/UL (ref 1–4.8)
LYMPHOCYTES NFR BLD: 23.2 % (ref 18–48)
MAGNESIUM SERPL-MCNC: 1.8 MG/DL (ref 1.6–2.6)
MCH RBC QN AUTO: 29.6 PG (ref 27–31)
MCHC RBC AUTO-ENTMCNC: 33.2 G/DL (ref 32–36)
MCV RBC AUTO: 89 FL (ref 82–98)
MONOCYTES # BLD AUTO: 0.3 K/UL (ref 0.3–1)
MONOCYTES NFR BLD: 7.7 % (ref 4–15)
NEUTROPHILS # BLD AUTO: 2.9 K/UL (ref 1.8–7.7)
NEUTROPHILS NFR BLD: 65.3 % (ref 38–73)
NRBC BLD-RTO: 0 /100 WBC
PHOSPHATE SERPL-MCNC: 3 MG/DL (ref 2.7–4.5)
PLATELET # BLD AUTO: 179 K/UL (ref 150–450)
PMV BLD AUTO: 11.6 FL (ref 9.2–12.9)
POCT GLUCOSE: 227 MG/DL (ref 70–110)
POCT GLUCOSE: 280 MG/DL (ref 70–110)
POCT GLUCOSE: 288 MG/DL (ref 70–110)
POTASSIUM SERPL-SCNC: 3.7 MMOL/L (ref 3.5–5.1)
PROT SERPL-MCNC: 5.6 G/DL (ref 6–8.4)
RBC # BLD AUTO: 3.65 M/UL (ref 4–5.4)
SODIUM SERPL-SCNC: 138 MMOL/L (ref 136–145)
WBC # BLD AUTO: 4.39 K/UL (ref 3.9–12.7)

## 2023-03-22 PROCEDURE — 99238 HOSP IP/OBS DSCHRG MGMT 30/<: CPT | Mod: ,,, | Performed by: FAMILY MEDICINE

## 2023-03-22 PROCEDURE — 85025 COMPLETE CBC W/AUTO DIFF WBC: CPT | Performed by: INTERNAL MEDICINE

## 2023-03-22 PROCEDURE — 94760 N-INVAS EAR/PLS OXIMETRY 1: CPT

## 2023-03-22 PROCEDURE — 83735 ASSAY OF MAGNESIUM: CPT | Performed by: INTERNAL MEDICINE

## 2023-03-22 PROCEDURE — 36415 COLL VENOUS BLD VENIPUNCTURE: CPT | Performed by: INTERNAL MEDICINE

## 2023-03-22 PROCEDURE — 25500020 PHARM REV CODE 255: Performed by: INTERNAL MEDICINE

## 2023-03-22 PROCEDURE — 25000003 PHARM REV CODE 250: Performed by: INTERNAL MEDICINE

## 2023-03-22 PROCEDURE — 84100 ASSAY OF PHOSPHORUS: CPT | Performed by: INTERNAL MEDICINE

## 2023-03-22 PROCEDURE — 80053 COMPREHEN METABOLIC PANEL: CPT | Performed by: INTERNAL MEDICINE

## 2023-03-22 PROCEDURE — 25000003 PHARM REV CODE 250: Performed by: SURGERY

## 2023-03-22 PROCEDURE — 99238 PR HOSPITAL DISCHARGE DAY,<30 MIN: ICD-10-PCS | Mod: ,,, | Performed by: FAMILY MEDICINE

## 2023-03-22 RX ORDER — PEN NEEDLE, DIABETIC 30 GX3/16"
30 NEEDLE, DISPOSABLE MISCELLANEOUS NIGHTLY
Qty: 30 EACH | Refills: 0 | Status: SHIPPED | OUTPATIENT
Start: 2023-03-22

## 2023-03-22 RX ADMIN — ACETAMINOPHEN 650 MG: 325 TABLET ORAL at 12:03

## 2023-03-22 RX ADMIN — FAMOTIDINE 20 MG: 20 TABLET ORAL at 09:03

## 2023-03-22 RX ADMIN — ASPIRIN 81 MG: 81 TABLET, COATED ORAL at 09:03

## 2023-03-22 RX ADMIN — ATENOLOL 50 MG: 25 TABLET ORAL at 09:03

## 2023-03-22 RX ADMIN — INSULIN ASPART 5 UNITS: 100 INJECTION, SOLUTION INTRAVENOUS; SUBCUTANEOUS at 05:03

## 2023-03-22 RX ADMIN — INSULIN ASPART 3 UNITS: 100 INJECTION, SOLUTION INTRAVENOUS; SUBCUTANEOUS at 05:03

## 2023-03-22 RX ADMIN — INSULIN ASPART 5 UNITS: 100 INJECTION, SOLUTION INTRAVENOUS; SUBCUTANEOUS at 08:03

## 2023-03-22 RX ADMIN — LOSARTAN POTASSIUM 100 MG: 50 TABLET, FILM COATED ORAL at 09:03

## 2023-03-22 RX ADMIN — INSULIN ASPART 3 UNITS: 100 INJECTION, SOLUTION INTRAVENOUS; SUBCUTANEOUS at 11:03

## 2023-03-22 RX ADMIN — INSULIN ASPART 2 UNITS: 100 INJECTION, SOLUTION INTRAVENOUS; SUBCUTANEOUS at 08:03

## 2023-03-22 RX ADMIN — IOHEXOL 75 ML: 350 INJECTION, SOLUTION INTRAVENOUS at 02:03

## 2023-03-22 RX ADMIN — MUPIROCIN: 20 OINTMENT TOPICAL at 09:03

## 2023-03-22 NOTE — DISCHARGE SUMMARY
[facility] ([facility])  [facility] Medicine  Discharge Summary      Patient Name: Kaity Masterson  MRN: [id number]  [facility]: [id number]  Patient Class: IP- Inpatient  Admission Date: 3/20/2023  Hospital Length of Stay: 2 days  Discharge Date and Time: No discharge date for patient encounter.  Attending Physician: Skye Allen MD   Discharging Provider: Onur King MD  Primary Care Provider: Marcia Dewey NP    Primary Care Team: Networked reference to record PCT     HPI:   Kaity Masterson is a 67 y.o. female with PMH of DM-2, HTN , hyperlipidemia who gets care  with Marcia Novoa down the [facility] .  She reports she has been taking protein shakes for the last 3 weeks .She reports her DM has been well controlled  on metformin    ( reported A1c 6.1 %) .   She was admitted to ICU for DKA ; Her sugars were high and she had a large AG. And she had high betaydroxybutyrate .  Beta-Hydroxybutyrate 0.0 - 0.5 mmol/L 5.8 High     She received IVF and IV insulin drip ; her Gap has closed . She is off of insulin drip .  Her sugars are 215-288. Her presenting complaint was shortness of breath , CXR , CTA chest are normal   BNP normal , troponin is  normal . EKG : Sinus tachycardia , Nonspecific ST abnormality ,Abnormal ECG   No previous ECGs available .          * No surgery found *      Hospital Course:   DKA resolved.  Glucose controlled.  Plan to go home on determir with follow up endocrinology.  CTAP with 3 liver phase pending pending due to dilated pancreatic duct  and pancreatic mass head seen on abdominal u/s.  Possible d/c today pending results.  She may need hepatobiliary referral.         Goals of Care Treatment Preferences:  Code Status: Full Code      Consults:   Consults (From admission, onward)        Status Ordering Provider     Inpatient consult to Registered Dietitian/Nutritionist  Once        Provider:  (Not yet assigned)    Completed NEVILLE FRANCOIS     Inpatient consult to Diabetes Educator   Once        Provider:  (Not yet assigned)    Completed NEVILLE FRANCOIS     Inpatient consult to Social Work  Once        Provider:  (Not yet assigned)    Completed DEACON CHARLTON          Cardiac/Vascular  Dyspnea on exertion  Normal BNP  Normal cxr  Normal CTA chest   ECHO with EF 60%     Resolved         Primary hypertension  continue losartan and atenolol        Hyperlipidemia  continue statin      Endocrine  * Diabetic ketoacidosis without coma associated with type 2 diabetes mellitus  HgA1C 10, ketones in urine and elevated beta hydroxybutryate   She received IVF And IV insulin gtt    Now off of insulin drip and on long acting insulin 14 units and short acting 5 units TID wmeals.  Low correction sliding scale.    Diabetic teaching consult   Glucose controlled.    Plan for discharge with determir 14 units qhs     GI  Pancreatic mass  Seen on abdominal u/s   F/u CTAP with contrast   Her imaging is highly concerning for pancreatic adenocarcinoma.  I am discharging her with an oncology appointment and an appt with Dr. Delgado, GI oncology surgery   Findings of imaging discussed with patient     Fatty liver  Will  remind patient to avoid alcohol 100%.  Avoid excessive tylenol intake also .      Final Active Diagnoses:    Diagnosis Date Noted POA    PRINCIPAL PROBLEM:  Diabetic ketoacidosis without coma associated with type 2 diabetes mellitus [E11.10] 03/21/2023 Yes    Pancreatic mass [K86.89] 03/22/2023 Yes    Hyperlipidemia [E78.5] 03/21/2023 Yes    Primary hypertension [I10] 03/21/2023 Yes    Dyspnea on exertion [R06.09] 03/21/2023 Yes    Fatty liver [K76.0] 03/21/2023 Yes      Problems Resolved During this Admission:       Discharged Condition: stable    Disposition: Home or Self Care    Follow Up:   Follow-up Information     Marcia Dewey NP. Call.    Specialty: Family Medicine  Why: Message sent to patient's nurse via clinic receptionist. Clinic states they will contact patient with an  appointment.  Contact information:  [address]  [facility]  [place] [address]  [phone]                       Patient Instructions:      Ambulatory referral/consult to Hematology / Oncology   Standing Status: Future   Referral Priority: Routine Referral Type: Consultation   Referral Reason: Specialty Services Required   Requested Specialty: Hematology and Oncology   Number of Visits Requested: 1     Ambulatory referral/consult to General Surgery   Standing Status: Future   Referral Priority: Routine Referral Type: Consultation   Referral Reason: Specialty Services Required   Referred to Provider: HEIDI HANCOCK Requested Specialty: General Surgery   Number of Visits Requested: 1       Significant Diagnostic Studies: Labs:   CMP   Recent Labs   Lab 03/21/23  0407 03/21/23  0817 03/22/23  0557   *  134* 136 138   K 3.6  3.5 3.6 3.7   *  110 112* 112*   CO2 16*  15* 16* 15*   *  275* 225* 226*   BUN 16  15 15 10   CREATININE 0.7  0.7 0.8 0.7   CALCIUM 10.4  10.4 10.5 10.5   PROT  --   --  5.6*   ALBUMIN  --   --  2.7*   BILITOT  --   --  0.4   ALKPHOS  --   --  78   AST  --   --  22   ALT  --   --  26   ANIONGAP 8  9 8 11    and CBC   Recent Labs   Lab 03/21/23  0407 03/22/23  0557   WBC 5.76 4.39   HGB 11.1* 10.8*   HCT 33.6* 32.5*    179     Radiology: CT scan: CT ABDOMEN PELVIS WITH CONTRAST:   Results for orders placed or performed during the hospital encounter of 03/20/23   CT Abdomen Pelvis With Contrast    Narrative    EXAMINATION:  CT ABDOMEN PELVIS WITH CONTRAST    CLINICAL HISTORY:  Abdominal abscess/infection suspected;    TECHNIQUE:  Low dose axial images, sagittal and coronal reformations were obtained from the lung bases to the pubic symphysis following the IV administration of 75 mL of Omnipaque 350 .  Oral contrast was not given.    COMPARISON:  03/21/2023    FINDINGS:  Bibasilar subsegmental atelectasis/scarring.  No basilar consolidation or pleural  effusions.  The base of the heart appears normal.  The aorta is of normal caliber and tapers appropriately, demonstrating moderate calcified atheromatous disease.    There is a large mass centered in the region of the pancreatic head/uncinate process that measures at least 3.4 x 3.1 cm in transverse dimension and approximately 3.2 cm in craniocaudal dimension highly concerning for adenocarcinoma.  There is downstream dilatation of the main pancreatic duct.  There is haziness of the fat adjacent to the SMA by approximately 180°.  The portal vein and splenic vein are patent.  Adjacent prominent peripancreatic lymph nodes measuring up to approximately 1 cm.  The gallbladder has been removed.  No intrahepatic or extrahepatic biliary ductal dilatation is identified.  There is fatty infiltration of the liver.  The spleen, adrenal glands and kidneys are normal in size, shape and contour.  No hydronephrosis or hydroureter is seen.  The urinary bladder is partially distended and has a normal appearance.  The uterus has been removed.  Adnexal regions are unremarkable.    Moderate colonic stool retention suggesting constipation.  No free air, free fluid or obstruction.    Patient has a reported history of an abdominal wall abscess.  There is stranding of the fat of the anterior abdominal wall on the right as well as a small amount of subcutaneous emphysema more inferiorly in the right lower anterior abdominal wall.  No drainable fluid collections are seen.    Age-appropriate degenerative changes affect the skeleton.      Impression    There is a 3.4 cm mass centered in the pancreatic head/uncinate process with associated downstream dilatation of the main pancreatic duct.  Imaging findings are highly concerning for pancreatic adenocarcinoma.  Adjacent prominent peripancreatic lymph nodes are seen.  There is some stranding of the fat about the SMV for approximately 180°.  The portal vein, SMV and splenic vein are  "patent.    Fatty infiltration of the liver.    Constipation.      Electronically signed by: Claudine Pierce MD  Date:    03/22/2023  Time:    14:47       Pending Diagnostic Studies:     None         Medications:  Reconciled Home Medications:      Medication List      START taking these medications    insulin detemir U-100 (Levemir) 100 unit/mL (3 mL) Inpn pen  Inject 14 Units into the skin every evening.     pen needle, diabetic 31 gauge x 3/16" Ndle  30 each by Misc.(Non-Drug; Combo Route) route every evening.        CONTINUE taking these medications    alendronate 35 MG tablet  Commonly known as: FOSAMAX  Take 35 mg by mouth every 7 days.     ARTHRITIS PAIN RELIEF (ACETAM) 650 MG Tbsr  Generic drug: acetaminophen  Take 1 tablet (650 mg total) by mouth every 6 (six) hours. Alternate with ibuprofen so you are taking something every 6 hours     aspirin 81 MG EC tablet  Commonly known as: ECOTRIN  Take 1 tablet by mouth once daily.     atenoloL 50 MG tablet  Commonly known as: TENORMIN  Take 50 mg by mouth once daily. am     famotidine 20 MG tablet  Commonly known as: PEPCID  Take 20 mg by mouth 2 (two) times daily.     ibuprofen 600 MG tablet  Commonly known as: ADVIL,MOTRIN  Take 1 tablet (600 mg total) by mouth every 6 (six) hours. Alternative with tylenol so you are taking something every 3 hours     losartan 100 MG tablet  Commonly known as: COZAAR  Take 100 mg by mouth.     pramipexole 0.25 MG tablet  Commonly known as: MIRAPEX  0.25 mg every evening.     rosuvastatin 10 MG tablet  Commonly known as: CRESTOR  Take 10 mg by mouth.        STOP taking these medications    metFORMIN 500 MG ER 24hr tablet  Commonly known as: GLUCOPHAGE-XR     oxyCODONE 5 MG immediate release tablet  Commonly known as: ROXICODONE            Indwelling Lines/Drains at time of discharge:   Lines/Drains/Airways     None                 Time spent on the discharge of patient: 20 minutes         Onur King MD  Department of Hospital " Medicine  [facility]urg (3rd Fl)

## 2023-03-22 NOTE — SUBJECTIVE & OBJECTIVE
Review of Systems   Constitutional:  Negative for chills and fever.   Respiratory:  Negative for cough and shortness of breath (resolved).    Gastrointestinal:  Positive for abdominal pain (band like pain on right). Negative for nausea and vomiting.   Genitourinary:  Negative for difficulty urinating and dysuria.   Musculoskeletal:  Positive for arthralgias (right hip pain). Negative for gait problem.   Skin:  Negative for pallor and rash.   Neurological:  Negative for dizziness and speech difficulty.   Psychiatric/Behavioral:  Negative for agitation and behavioral problems.    Objective:     Vital Signs (Most Recent):  Temp: 98.1 °F (36.7 °C) (03/22/23 1100)  Pulse: 81 (03/22/23 1100)  Resp: 18 (03/22/23 1100)  BP: (!) 155/70 (03/22/23 1100)  SpO2: 99 % (03/22/23 1100)   Vital Signs (24h Range):  Temp:  [96.9 °F (36.1 °C)-98.5 °F (36.9 °C)] 98.1 °F (36.7 °C)  Pulse:  [64-82] 81  Resp:  [16-23] 18  SpO2:  [93 %-100 %] 99 %  BP: (139-176)/(63-86) 155/70     Weight: 70.5 kg (155 lb 5 oz)  Body mass index is 26.66 kg/m².    Intake/Output Summary (Last 24 hours) at 3/22/2023 1151  Last data filed at 3/22/2023 0800  Gross per 24 hour   Intake 240 ml   Output 850 ml   Net -610 ml      Physical Exam  Constitutional:       Appearance: Normal appearance.   HENT:      Head: Normocephalic and atraumatic.   Cardiovascular:      Rate and Rhythm: Normal rate and regular rhythm.   Pulmonary:      Effort: Pulmonary effort is normal. No respiratory distress.      Breath sounds: Normal breath sounds. No wheezing.   Abdominal:      General: Abdomen is flat. Bowel sounds are normal.      Palpations: Abdomen is soft.   Musculoskeletal:         General: No swelling or tenderness.      Right lower leg: No edema.      Left lower leg: No edema.   Neurological:      Mental Status: She is alert and oriented to person, place, and time. Mental status is at baseline.       Significant Labs: All pertinent labs within the past 24 hours have  been reviewed.  A1C:   Recent Labs   Lab 03/21/23  0407   HGBA1C 10.5*     ABGs: No results for input(s): PH, PCO2, HCO3, POCSATURATED, BE, TOTALHB, COHB, METHB, O2HB, POCFIO2, PO2 in the last 48 hours.  Bilirubin:   Recent Labs   Lab 03/20/23  1244 03/22/23  0557   BILITOT 0.5 0.4     Blood Culture:   Recent Labs   Lab 03/20/23  1412 03/20/23  1431   LABBLOO No Growth to date  No Growth to date No Growth to date  No Growth to date     BMP:   Recent Labs   Lab 03/22/23  0557   *      K 3.7   *   CO2 15*   BUN 10   CREATININE 0.7   CALCIUM 10.5   MG 1.8     CBC:   Recent Labs   Lab 03/20/23  1244 03/21/23  0407 03/22/23  0557   WBC 10.35 5.76 4.39   HGB 14.2 11.1* 10.8*   HCT 43.8 33.6* 32.5*    210 179     CMP:   Recent Labs   Lab 03/20/23  1244 03/20/23  1527 03/21/23  0407 03/21/23  0817 03/22/23  0557   *   < > 135*  134* 136 138   K 4.0   < > 3.6  3.5 3.6 3.7      < > 111*  110 112* 112*   CO2 12*   < > 16*  15* 16* 15*   *   < > 276*  275* 225* 226*   BUN 18   < > 16  15 15 10   CREATININE 1.4   < > 0.7  0.7 0.8 0.7   CALCIUM 12.2*   < > 10.4  10.4 10.5 10.5   PROT 7.6  --   --   --  5.6*   ALBUMIN 3.7  --   --   --  2.7*   BILITOT 0.5  --   --   --  0.4   ALKPHOS 112  --   --   --  78   AST 24  --   --   --  22   ALT 29  --   --   --  26   ANIONGAP 18*   < > 8  9 8 11    < > = values in this interval not displayed.     Cardiac Markers:   Recent Labs   Lab 03/20/23  1244   BNP 20     Coagulation: No results for input(s): PT, INR, APTT in the last 48 hours.  Lactic Acid:   Recent Labs   Lab 03/20/23  1412   LACTATE 1.6     Lipase:   Recent Labs   Lab 03/21/23  0817   LIPASE 19     Lipid Panel: No results for input(s): CHOL, HDL, LDLCALC, TRIG, CHOLHDL in the last 48 hours.  Magnesium:   Recent Labs   Lab 03/20/23  1527 03/22/23  0557   MG 2.0 1.8     Respiratory Culture: No results for input(s): GSRESP, RESPIRATORYC in the last 48 hours.  Troponin:   Recent  Labs   Lab 03/20/23  1244   TROPONINI 0.011     TSH: No results for input(s): TSH in the last 4320 hours.  Urine Culture: No results for input(s): LABURIN in the last 48 hours.  Urine Studies:   Recent Labs   Lab 03/20/23  1253   COLORU Yellow   APPEARANCEUA Clear   PHUR 6.0   SPECGRAV >=1.030*   PROTEINUA 1+*   GLUCUA 2+*   KETONESU 3+*   BILIRUBINUA 1+*   OCCULTUA Negative   NITRITE Negative   UROBILINOGEN 1.0   LEUKOCYTESUR Negative   RBCUA 1   WBCUA 5   BACTERIA Moderate*   SQUAMEPITHEL 3   HYALINECASTS 4*       Significant Imaging:   Abdominal u/s 3/22  1. Surgically removed gallbladder with no biliary dilatation  2. Fatty liver  3. Dilated pancreatic duct at 3.4 mm with a pancreatic head mass 2.97 x 2.83 x 2.91 cm this is concerning for malignancy soft tissue mass and or adenopathy.  Recommend CT scan 3 phase through the liver    CTA Chest 3/22  No evidence for pulmonary embolus or other acute intrathoracic process.  Hepatic steatosis.  Cholecystectomy.       CXR: No acute abnormality.    Echo:    The left ventricle is normal in size with moderate concentric hypertrophy and normal systolic function.  The estimated ejection fraction is 60%.  Grade I left ventricular diastolic dysfunction.  Mild tricuspid regurgitation.  Mild pulmonic regurgitation.  Normal right ventricular size with normal right ventricular systolic function.  There is no pulmonary hypertension.

## 2023-03-22 NOTE — ASSESSMENT & PLAN NOTE
Seen on abdominal u/s   F/u CTAP with contrast   Her imaging is highly concerning for pancreatic adenocarcinoma.  I am discharging her with an oncology appointment and an appt with Dr. Delgado, GI oncology surgery   Findings of imaging discussed with patient

## 2023-03-22 NOTE — HOSPITAL COURSE
DKA resolved.  Glucose controlled.  Plan to go home on determir with follow up endocrinology.  CTAP with 3 liver phase pending pending due to dilated pancreatic duct  and pancreatic mass head seen on abdominal u/s.  Possible d/c today pending results.  She may need hepatobiliary referral.

## 2023-03-22 NOTE — NURSING
Called [facility] in [place]. Spoke with pharmacist about detemer/Levemer Insulin prescription. He reports about manufacturers price with flex pen. Pharmacist quoted copay price of $120.00 which was previously different than what was discussed. I transferred called to patient's room back to [place] to discuss and/or agree on copay price.

## 2023-03-22 NOTE — CONSULTS
9:45 AM - 10:30 AM Met with patient to find out if she had any questions re our education session yesterday. She states she does not. Instructed her that she will be going home on insulin because of her blood sugars still being elevated in the 200's and her A1c yesterday was 10%. States she does not want to take insulin and will discuss with her primary care physician before she starts to take it at home. Spoke with her and her sister, Lorelei, that insulin injections were necessary at this time to bring down her blood sugar levels. She did agree to let me teach her how to use an insulin pen to give her injections. I demonstrated to her, using an insulin teaching pen, how to correctly use the pen. She did a return demonstration and remembered some of the steps but needed prompting to complete the procedure. Her sister did comprehend the correct technique. Instructed her on the different sites that she could use to give her injections and the importance of rotating sites. Diagram of injection sites given to her. Also, written instructions on how to use the insulin pen. Patient needs more practice on using the pen. I spoke with her nurse, Carrie Watts, regarding allowing , under the direction of a nurse, patient to give her own injections while she was in the hospital. Instruction patient and her sister on how to properly dispose of needles at home. Instructed her that she would need to check her blood sugars twice a day at home, before breakfast and before her night-time injection of Detemir. Reviewed the glucometer result log book with her and circled where she would document her results. Patient states she is tired and wants to rest now.   Patient is still not demonstrating signs of interest in diabetes education.

## 2023-03-22 NOTE — NURSING
Dr. King notified that [place] patient's daughter-in-law reports that [facility] has a problem with order for determir. Discussed detemir/Levemir order. Informed MD that I would contact [facility] in [place] to check on Levemir.

## 2023-03-22 NOTE — PROGRESS NOTES
[facility] ([address] [place] [address] Medicine  Progress Note    Patient Name: Kaity Masterson  MRN: [id number]  Patient Class: IP- Inpatient   Admission Date: 3/20/2023  Length of Stay: 2 days  Attending Physician: Skye Allen MD  Primary Care Provider: Marcia Dewey NP        Subjective:     Principal Problem:Diabetic ketoacidosis without coma associated with type 2 diabetes mellitus        HPI:  Kaity Masterson is a 67 y.o. female with PMH of DM-2, HTN , hyperlipidemia who gets care  with Marcia Novoa down the [facility] .  She reports she has been taking protein shakes for the last 3 weeks .She reports her DM has been well controlled  on metformin    ( reported A1c 6.1 %) .   She was admitted to ICU for DKA ; Her sugars were high and she had a large AG. And she had high betaydroxybutyrate .  Beta-Hydroxybutyrate 0.0 - 0.5 mmol/L 5.8 High     She received IVF and IV insulin drip ; her Gap has closed . She is off of insulin drip .  Her sugars are 215-288. Her presenting complaint was shortness of breath , CXR , CTA chest are normal   BNP normal , troponin is  normal . EKG : Sinus tachycardia , Nonspecific ST abnormality ,Abnormal ECG   No previous ECGs available .          Overview/Hospital Course:  DKA resolved.  Glucose controlled.  Plan to go home on determir with follow up endocrinology.  CTAP with 3 liver phase pending pending due to dilated pancreatic duct  and pancreatic mass head seen on abdominal u/s.  Possible d/c today pending results.  She may need hepatobiliary referral.            Review of Systems   Constitutional:  Negative for chills and fever.   Respiratory:  Negative for cough and shortness of breath (resolved).    Gastrointestinal:  Positive for abdominal pain (band like pain on right). Negative for nausea and vomiting.   Genitourinary:  Negative for difficulty urinating and dysuria.   Musculoskeletal:  Positive for arthralgias (right hip pain). Negative for gait problem.   Skin:   Negative for pallor and rash.   Neurological:  Negative for dizziness and speech difficulty.   Psychiatric/Behavioral:  Negative for agitation and behavioral problems.    Objective:     Vital Signs (Most Recent):  Temp: 98.1 °F (36.7 °C) (03/22/23 1100)  Pulse: 81 (03/22/23 1100)  Resp: 18 (03/22/23 1100)  BP: (!) 155/70 (03/22/23 1100)  SpO2: 99 % (03/22/23 1100)   Vital Signs (24h Range):  Temp:  [96.9 °F (36.1 °C)-98.5 °F (36.9 °C)] 98.1 °F (36.7 °C)  Pulse:  [64-82] 81  Resp:  [16-23] 18  SpO2:  [93 %-100 %] 99 %  BP: (139-176)/(63-86) 155/70     Weight: 70.5 kg (155 lb 5 oz)  Body mass index is 26.66 kg/m².    Intake/Output Summary (Last 24 hours) at 3/22/2023 1151  Last data filed at 3/22/2023 0800  Gross per 24 hour   Intake 240 ml   Output 850 ml   Net -610 ml      Physical Exam  Constitutional:       Appearance: Normal appearance.   HENT:      Head: Normocephalic and atraumatic.   Cardiovascular:      Rate and Rhythm: Normal rate and regular rhythm.   Pulmonary:      Effort: Pulmonary effort is normal. No respiratory distress.      Breath sounds: Normal breath sounds. No wheezing.   Abdominal:      General: Abdomen is flat. Bowel sounds are normal.      Palpations: Abdomen is soft.   Musculoskeletal:         General: No swelling or tenderness.      Right lower leg: No edema.      Left lower leg: No edema.   Neurological:      Mental Status: She is alert and oriented to person, place, and time. Mental status is at baseline.       Significant Labs: All pertinent labs within the past 24 hours have been reviewed.  A1C:   Recent Labs   Lab 03/21/23  0407   HGBA1C 10.5*     ABGs: No results for input(s): PH, PCO2, HCO3, POCSATURATED, BE, TOTALHB, COHB, METHB, O2HB, POCFIO2, PO2 in the last 48 hours.  Bilirubin:   Recent Labs   Lab 03/20/23  1244 03/22/23  0557   BILITOT 0.5 0.4     Blood Culture:   Recent Labs   Lab 03/20/23  1412 03/20/23  1431   LABBLOO No Growth to date  No Growth to date No Growth to date   No Growth to date     BMP:   Recent Labs   Lab 03/22/23  0557   *      K 3.7   *   CO2 15*   BUN 10   CREATININE 0.7   CALCIUM 10.5   MG 1.8     CBC:   Recent Labs   Lab 03/20/23  1244 03/21/23  0407 03/22/23  0557   WBC 10.35 5.76 4.39   HGB 14.2 11.1* 10.8*   HCT 43.8 33.6* 32.5*    210 179     CMP:   Recent Labs   Lab 03/20/23  1244 03/20/23  1527 03/21/23  0407 03/21/23  0817 03/22/23  0557   *   < > 135*  134* 136 138   K 4.0   < > 3.6  3.5 3.6 3.7      < > 111*  110 112* 112*   CO2 12*   < > 16*  15* 16* 15*   *   < > 276*  275* 225* 226*   BUN 18   < > 16  15 15 10   CREATININE 1.4   < > 0.7  0.7 0.8 0.7   CALCIUM 12.2*   < > 10.4  10.4 10.5 10.5   PROT 7.6  --   --   --  5.6*   ALBUMIN 3.7  --   --   --  2.7*   BILITOT 0.5  --   --   --  0.4   ALKPHOS 112  --   --   --  78   AST 24  --   --   --  22   ALT 29  --   --   --  26   ANIONGAP 18*   < > 8  9 8 11    < > = values in this interval not displayed.     Cardiac Markers:   Recent Labs   Lab 03/20/23  1244   BNP 20     Coagulation: No results for input(s): PT, INR, APTT in the last 48 hours.  Lactic Acid:   Recent Labs   Lab 03/20/23  1412   LACTATE 1.6     Lipase:   Recent Labs   Lab 03/21/23  0817   LIPASE 19     Lipid Panel: No results for input(s): CHOL, HDL, LDLCALC, TRIG, CHOLHDL in the last 48 hours.  Magnesium:   Recent Labs   Lab 03/20/23  1527 03/22/23  0557   MG 2.0 1.8     Respiratory Culture: No results for input(s): GSRESP, RESPIRATORYC in the last 48 hours.  Troponin:   Recent Labs   Lab 03/20/23  1244   TROPONINI 0.011     TSH: No results for input(s): TSH in the last 4320 hours.  Urine Culture: No results for input(s): LABURIN in the last 48 hours.  Urine Studies:   Recent Labs   Lab 03/20/23  1253   COLORU Yellow   APPEARANCEUA Clear   PHUR 6.0   SPECGRAV >=1.030*   PROTEINUA 1+*   GLUCUA 2+*   KETONESU 3+*   BILIRUBINUA 1+*   OCCULTUA Negative   NITRITE Negative   UROBILINOGEN 1.0    LEUKOCYTESUR Negative   RBCUA 1   WBCUA 5   BACTERIA Moderate*   SQUAMEPITHEL 3   HYALINECASTS 4*       Significant Imaging:   Abdominal u/s 3/22  1. Surgically removed gallbladder with no biliary dilatation  2. Fatty liver  3. Dilated pancreatic duct at 3.4 mm with a pancreatic head mass 2.97 x 2.83 x 2.91 cm this is concerning for malignancy soft tissue mass and or adenopathy.  Recommend CT scan 3 phase through the liver    CTA Chest 3/22  No evidence for pulmonary embolus or other acute intrathoracic process.  Hepatic steatosis.  Cholecystectomy.       CXR: No acute abnormality.    Echo:    The left ventricle is normal in size with moderate concentric hypertrophy and normal systolic function.   The estimated ejection fraction is 60%.   Grade I left ventricular diastolic dysfunction.   Mild tricuspid regurgitation.   Mild pulmonic regurgitation.   Normal right ventricular size with normal right ventricular systolic function.   There is no pulmonary hypertension.           Assessment/Plan:      * Diabetic ketoacidosis without coma associated with type 2 diabetes mellitus  HgA1C 10, ketones in urine and elevated beta hydroxybutryate   She received IVF And IV insulin gtt    Now off of insulin drip and on long acting insulin 14 units and short acting 5 units TID wmeals.  Low correction sliding scale.    Diabetic teaching consult   Glucose controlled.    Plan for discharge with determir 14 units qhs     Pancreatic mass  Seen on abdominal u/s   F/u CTAP with contrast   Will likely need hepatobiliary follow up.            Fatty liver  Will  remind patient to avoid alcohol 100%.  Avoid excessive tylenol intake also .    Dyspnea on exertion  Normal BNP  Normal cxr  Normal CTA chest   ECHO with EF 60%     Resolved         Primary hypertension  continue losartan and atenolol        Hyperlipidemia  continue statin        VTE Risk Mitigation (From admission, onward)         Ordered     IP VTE HIGH RISK PATIENT  Once          03/20/23 1615     Place sequential compression device  Until discontinued         03/20/23 1615                Discharge Planning   CYNTHIA:      Code Status: Full Code   Is the patient medically ready for discharge?:     Reason for patient still in hospital (select all that apply)possible d/c today pending CTAP.    Discharge Plan A: Home with family                  Annamarie Park PA-C  Department of Hospital Medicine   [place] [facility] ([facility])

## 2023-03-22 NOTE — PLAN OF CARE
03/22/23 1158   Medicare Message   Important Message from Medicare regarding Discharge Appeal Rights Given to patient/caregiver;Explained to patient/caregiver;Signed/date by patient/caregiver   Date IMM was signed 03/22/23   Time IMM was signed 1000

## 2023-03-22 NOTE — PLAN OF CARE
Spoke with Abbie at Hematology Oncology clinic for follow up. She states all providers schedules are set to private therefore she will have to send a message to Dr. Delgado about appointment. She states she will send message to all providers in clinic that way if Dr. Delgado can not see patient, then someone else can.  She states they will call patient and CM here once appointment is made.     Attempted to call to get appointment with Hematology/oncology but John states she see's referral but it will have to be sent to the nurse for scheduling. I asked they call both patient and I with appointment information once made.   CM will follow.     Niru,  Liasridhar, called and states patient needs a biopsy first, and then they will set up appointments with hem/onc and surgery. Niru assures she will get these appointments done and that she is sending her information over to the team to get biopsy scheduled. Niru states she has already contacted patient as well.   Dr. King notified of this.      Follow-up Information       Marcia Dewey NP. Call.    Specialty: Family Medicine  Why: Message sent to patient's nurse via clinic receptionist. Clinic states they will contact patient with an appointment.  Contact information:  [address]  [facility]  [place] [address]  [phone]               Rios Delgado MD. Call.    Specialties: Surgical Oncology, General Surgery  Why: Clinic will call with appointment. If they do not call by Monday, please call clinic.  Contact information:  [facility] [address]  [facility] [address]  [phone]

## 2023-03-22 NOTE — PROGRESS NOTES
Food & Nutrition  Education    Diet Education: Consistent Carbohydrate Diet  Time Spent: 45 mins  Learners: Patient, sister, and daughter in law      Nutrition Problem  Food and nutrition related knowledge deficit    Related to (etiology):   No prior knowledge of consistent CHO diet s/t T2DM    Signs and Symptoms (as evidenced by):   Pt stated she refused previous diet education for diabetes    Interventions/Recommendations (treatment strategy):  Nutrition Education provided with handouts:   Pathophysiology of diabetes  What are carbs and general sources of CHO  How many servings of CHO are allotted her meal and snack   How many grams of CHO make one serving  Food list with serving sizes and portion sizes  Plate method  How to read a food label  Rule of 15 - what to do when blood sugar is low  General information about foot care    Nutrition Diagnosis Status:   Improving      Comments:  Consistent CHO diet using above information. Pt not retaining information well; however, pt was waiting to be taken by radiology for CT scan. Family present and was receptive to information. Family asked many questions -- will be a great support system for patient. Recommended outpatient diabetes classes to improve knowledge about diet. Let patient and family know to contact me if any questions/concerns arise before discharge. Please Re-consult as needed    All questions and concerns answered.      Follow-Up: 3/27/23        Thanks!    Carla Shannon RDN, VICKIEN

## 2023-03-22 NOTE — CONSULTS
3:15 PM - 3:30 PM Met with patient, her sister Keisha, and patient's daughter-law Radha.    Instucted on and demonstrated use of the insulin pen, using a teaching pen. Patient did not want to demonstrate. Radha did return demonstration and did an excellent job. She skipped no steps. She and Keisha asked pertinent quests and showed signs of interest. Patient still not showing signs of interest and has already scheduled her follow up visit with her PCP, Marcia Dewey. Radha contacted patient's pharmacy, [facility] in [place], and found out that patient's co-pay would only be $40, which they are willing to pay. Instructed her that she would not be going home on Metformin.  No further questions from the 3 of them.

## 2023-03-22 NOTE — PLAN OF CARE
Problem: Adult Inpatient Plan of Care  Goal: Plan of Care Review  Outcome: Ongoing, Progressing  Goal: Optimal Comfort and Wellbeing  Outcome: Ongoing, Progressing  Goal: Readiness for Transition of Care  Outcome: Ongoing, Progressing     Problem: Fall Injury Risk  Goal: Absence of Fall and Fall-Related Injury  Outcome: Ongoing, Progressing     Problem: Diabetic Ketoacidosis  Goal: Fluid and Electrolyte Balance with Absence of Ketosis  Outcome: Ongoing, Progressing     Problem: Electrolyte Imbalance  Goal: Electrolyte Balance  Outcome: Ongoing, Progressing     Problem: Gas Exchange Impaired  Goal: Optimal Gas Exchange  Outcome: Ongoing, Progressing     Problem: Tissue Perfusion Altered  Goal: Improved Tissue Perfusion  Outcome: Ongoing, Progressing     Problem: Pain Acute  Goal: Acceptable Pain Control and Functional Ability  Outcome: Ongoing, Progressing     Problem: Diabetes Comorbidity  Goal: Blood Glucose Level Within Targeted Range  Outcome: Ongoing, Progressing

## 2023-03-22 NOTE — ASSESSMENT & PLAN NOTE
Seen on abdominal u/s   F/u CTAP with contrast   Will likely need hepatobiliary follow up.

## 2023-03-22 NOTE — ASSESSMENT & PLAN NOTE
HgA1C 10, ketones in urine and elevated beta hydroxybutryate   She received IVF And IV insulin gtt    Now off of insulin drip and on long acting insulin 14 units and short acting 5 units TID wmeals.  Low correction sliding scale.    Diabetic teaching consult   Glucose controlled.    Plan for discharge with determir 14 units qhs

## 2023-03-23 ENCOUNTER — PATIENT OUTREACH (OUTPATIENT)
Dept: ADMINISTRATIVE | Facility: CLINIC | Age: 68
End: 2023-03-23
Payer: COMMERCIAL

## 2023-03-23 ENCOUNTER — TELEPHONE (OUTPATIENT)
Dept: HEMATOLOGY/ONCOLOGY | Facility: CLINIC | Age: 68
End: 2023-03-23
Payer: COMMERCIAL

## 2023-03-23 NOTE — PLAN OF CARE
[place] - Med Surg (3rd Fl)  Discharge Final Note    Primary Care Provider: Marcia Dewey NP    Expected Discharge Date: 3/22/2023    Final Discharge Note (most recent)       Final Note - 03/23/23 0945          Final Note    Assessment Type Final Discharge Note     Anticipated Discharge Disposition Home or Self Care     Hospital Resources/Appts/Education Provided Appointments scheduled and added to AVS        Post-Acute Status    Post-Acute Authorization Other     Other Status No Post-Acute Service Needs     Discharge Delays None known at this time                     Important Message from Medicare  Important Message from Medicare regarding Discharge Appeal Rights: Given to patient/caregiver, Explained to patient/caregiver, Signed/date by patient/caregiver     Date IMM was signed: 03/22/23  Time IMM was signed: 1000    Contact Info       Marcia Dewey NP   Specialty: Family Medicine   Relationship: PCP - General    [place] [facility]  [address]  [facility]  [place] [address]   Phone: [phone]       Next Steps: Call    Instructions: Message sent to patient's nurse via clinic receptionist. Clinic states they will contact patient with an appointment.    Rios Delgado MD   Specialty: Surgical Oncology, General Surgery    [facility] [address]   Phone: [phone]       Next Steps: Call    Instructions: Clinic will call with appointment. If they do not call by Monday, please call clinic.

## 2023-03-23 NOTE — NURSING
Radha patient's daughter-in-law reports that the RX insulin is all worked out at [facility] pharmacy in [place]. Patient given verbal permission to give discharge instructions to her daughter-in-law Radha. Reviewed plan of care for home. Voiced understanding.

## 2023-03-23 NOTE — PROGRESS NOTES
C3 nurse spoke with Kaity Masterson  for a TCC post hospital discharge follow up call. The patient has a scheduled HOSFU appointment with Marcia eDwey on 03/29/23 @ 1030.

## 2023-03-23 NOTE — TELEPHONE ENCOUNTER
Spoke with yaniv Martinez at [facility] to let her know need for EUS/bx prior to surg onc and med onc appts.  Pt to f/u with her PCP for glucose control.

## 2023-03-26 LAB
BACTERIA BLD CULT: NORMAL
BACTERIA BLD CULT: NORMAL

## 2023-03-27 ENCOUNTER — TELEPHONE (OUTPATIENT)
Dept: ENDOSCOPY | Facility: HOSPITAL | Age: 68
End: 2023-03-27
Payer: COMMERCIAL

## 2023-03-27 ENCOUNTER — TELEPHONE (OUTPATIENT)
Dept: HEMATOLOGY/ONCOLOGY | Facility: CLINIC | Age: 68
End: 2023-03-27
Payer: COMMERCIAL

## 2023-03-27 DIAGNOSIS — K86.89 PANCREATIC MASS: Primary | ICD-10-CM

## 2023-03-27 NOTE — TELEPHONE ENCOUNTER
Pt is scheduled for an EUS on 3/31/23.  Reviewed medical history and instructions with pt.  Pt verbalized understanding.  Instructions emailed to daughter's email.

## 2023-03-27 NOTE — TELEPHONE ENCOUNTER
MD Beverly Schwab MA  Need EUS (linear) for pancreas mass on CT scan. Main or Rod. 60 minutes. Urgent.   Thanks,   Eugene Gtz MD

## 2023-03-27 NOTE — TELEPHONE ENCOUNTER
----- Message from Eugene Gtz MD sent at 3/24/2023  3:31 PM CDT -----  Need EUS (linear) for pancreas mass on CT scan. Main or Rod. 60 minutes. Urgent.  Thanks,  Eugene Gtz MD  ----- Message -----  From: Beverly Mcelroy MA  Sent: 3/23/2023   9:25 PM CDT  To: Eugene Gtz MD      ----- Message -----  From: Edilia Staples RN  Sent: 3/23/2023   3:23 PM CDT  To: Beverly Mcelroy MA    New pancreas mass.    Please have your team review.  She needs EUS/bx asap.    I have spoken to the pt to let her know to expect a call from your group to set her up.  She's going to speak with her daughter and see if she has any dates she can't come.    Thank you,    Niru

## 2023-03-27 NOTE — PROGRESS NOTES
REFERRING PROVIDER  Nery Carey MD    REASON FOR CONSULT  Kaity Masterson  is a 67 y.o.  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    HISTORY OF PRESENT ILLNESS    Please refer below for the patient's tumor history.  The interval history is as follows: +PO. -N/V. +Flatus. +BM. -VB, VD, changes in stool or urine. -Abdominal or pelvic pain. -Unintentional weight loss.      Oncology History   Malignant neoplasm of endometrium   3/8/2023 Surgery    TRH/BSO/BSLND/cystoscopy.    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  2.5 cm, 10% MI (2/10 mm), -LVSI, 0/1+ LSLN, 0/2+ RSLN, -cervix, -tubes, -ovaries, -cytology     Malignant neoplasm of head of pancreas   4/5/2023 Initial Diagnosis    Malignant neoplasm of head of pancreas       4/19/2023 - 4/19/2023 Chemotherapy    Treatment Summary   Plan Name: OP PANC mFOLFIRINOX Q2W  Treatment Goal: Curative  Status: Inactive  Start Date:   End Date:   Provider: Carolina Casey MD  Chemotherapy: irinotecan (CAMPTOSAR) 150 mg/m2 = 268 mg in sodium chloride 0.9% 513.4 mL chemo infusion, 150 mg/m2 = 268 mg, Intravenous, Clinic/HOD 1 time, 0 of 12 cycles  oxaliplatin (ELOXATIN) 65 mg/m2 = 116 mg in dextrose 5 % (D5W) 523.2 mL chemo infusion, 65 mg/m2 = 116 mg (original dose ), Intravenous, Clinic/HOD 1 time, 0 of 12 cycles  Dose modification: 65 mg/m2 (Cycle 1, Reason: MD Discretion)       4/19/2023 -  Chemotherapy    Treatment Summary   Plan Name: OP PANC mFOLFIRINOX Q2W  Treatment Goal: Curative  Status: Active  Start Date: 4/19/2023 (Planned)  End Date: 9/22/2023 (Planned)  Provider: Carolina Casey MD  Chemotherapy: irinotecan (CAMPTOSAR) 150 mg/m2 = 270 mg in sodium chloride 0.9% 513.5 mL chemo infusion, 150 mg/m2 = 270 mg, Intravenous, Clinic/HOD 1 time, 0 of 12 cycles  oxaliplatin (ELOXATIN) 65 mg/m2 = 117 mg in dextrose 5 % (D5W) 523.4 mL chemo infusion, 65 mg/m2 = 117 mg (original dose ), Intravenous,  Clinic/HOD 1 time, 0 of 12 cycles  Dose modification: 65 mg/m2 (Cycle 1, Reason: MD Discretion)            REVIEW OF SYSTEMS  All systems reviewed and negative except as noted in HPI.    OBJECTIVE   Vitals:    04/05/23 1449   BP: 122/61   Pulse: 70      Body mass index is 27.13 kg/m².      1. General: Well appearing, no apparent distress, alert and oriented.  2. Lymph: Neck symmetric without cervical or supraclavicular adenopathy or mass.  3. Lungs: Normal respiratory rate, no accessory muscle use.  4. Cardiac: Normal rate  5. Psych: Normal affect.  6. Abdomen:  non-distended, soft, non-tender, are no masses, no ascites, no hepatosplenomegaly; incision sites C/D/I  7. Skin: Warm, dry, no rashes or lesions.   8. Extremities: Bilateral lower extremities without edema or tenderness.  9. Genitourinary               Pelvic Examination including:                a. External genitalia are normal in appearance. No lesions noted.               b. Urethral meatus is normal size, location, and appearance.               c. Urethra is negative.               d. Bladder is nontender. No masses noted.               e. Vagina has normal mucosa with physiologic discharge. No lesions noted.              f. Uterus absent              g. Adnexa absent   h. Rectum deferred    ECOG status: 0    LABORATORY DATA  Lab data reviewed.    RADIOLOGICAL DATA  Radiology data reviewed.    ASSESSMENT / PLAN     1. Malignant neoplasm of endometrium         We reviewed operative findings and the pathology report.  This is most consistent with a low-risk, early stage endometrial cancer.  She does not meet GOG 99 or PORTEC-1 criteria and there is no benefit to adjuvant therapy.  We discussed commons symptoms of recurrence and the logistics of surveillance.  RONC 6 months.  All other care per OB/Gyn.     PATIENT EDUCATION  Ready to learn, no apparent learning barriers were identified; learning preferences include listening. Explained diagnosis and  treatment plan; patient expressed understanding of the content.    ADMINISTRATIVE BILLING  Greater than 50% of was spent in counseling.       Dallas Aguilar

## 2023-03-28 ENCOUNTER — TELEPHONE (OUTPATIENT)
Dept: SURGERY | Facility: CLINIC | Age: 68
End: 2023-03-28
Payer: COMMERCIAL

## 2023-03-30 ENCOUNTER — TELEPHONE (OUTPATIENT)
Dept: OBSTETRICS AND GYNECOLOGY | Facility: CLINIC | Age: 68
End: 2023-03-30
Payer: COMMERCIAL

## 2023-03-30 NOTE — TELEPHONE ENCOUNTER
Daughter  states she needs copy of pathology to send to her mothers cancer policy.  Made her aware she can either print from pts portal or she can come into clinic to to sign med release to have path faxed.  Understanding voiced.

## 2023-03-30 NOTE — TELEPHONE ENCOUNTER
----- Message from Idalia Gardner sent at 3/30/2023  3:06 PM CDT -----   Name of Who is Calling:     What is the request in detail:  request call back in reference to  copy of pathology report Please contact to further discuss and advise      Can the clinic reply by [facility]:     [facility]  [phone] / crystal / daughter in law

## 2023-03-31 ENCOUNTER — ANESTHESIA EVENT (OUTPATIENT)
Dept: ENDOSCOPY | Facility: HOSPITAL | Age: 68
End: 2023-03-31
Payer: COMMERCIAL

## 2023-03-31 ENCOUNTER — HOSPITAL ENCOUNTER (OUTPATIENT)
Facility: HOSPITAL | Age: 68
Discharge: HOME | End: 2023-03-31
Attending: INTERNAL MEDICINE | Admitting: INTERNAL MEDICINE
Payer: COMMERCIAL

## 2023-03-31 ENCOUNTER — ANESTHESIA (OUTPATIENT)
Dept: ENDOSCOPY | Facility: HOSPITAL | Age: 68
End: 2023-03-31
Payer: COMMERCIAL

## 2023-03-31 VITALS
HEART RATE: 65 BPM | WEIGHT: 150 LBS | HEIGHT: 66 IN | BODY MASS INDEX: 24.11 KG/M2 | TEMPERATURE: 98 F | OXYGEN SATURATION: 100 % | SYSTOLIC BLOOD PRESSURE: 139 MMHG | RESPIRATION RATE: 17 BRPM | DIASTOLIC BLOOD PRESSURE: 79 MMHG

## 2023-03-31 DIAGNOSIS — K86.89 PANCREATIC MASS: ICD-10-CM

## 2023-03-31 LAB — POCT GLUCOSE: 195 MG/DL (ref 70–110)

## 2023-03-31 PROCEDURE — 88173 CYTOPATH EVAL FNA REPORT: CPT | Performed by: PATHOLOGY

## 2023-03-31 PROCEDURE — 88177 CYTP FNA EVAL EA ADDL: CPT | Mod: 26,,, | Performed by: PATHOLOGY

## 2023-03-31 PROCEDURE — 88305 TISSUE EXAM BY PATHOLOGIST: CPT | Performed by: PATHOLOGY

## 2023-03-31 PROCEDURE — 88172 CYTP DX EVAL FNA 1ST EA SITE: CPT | Mod: 26,,, | Performed by: PATHOLOGY

## 2023-03-31 PROCEDURE — 88172 CYTP DX EVAL FNA 1ST EA SITE: CPT | Performed by: PATHOLOGY

## 2023-03-31 PROCEDURE — 63600175 PHARM REV CODE 636 W HCPCS: Performed by: NURSE ANESTHETIST, CERTIFIED REGISTERED

## 2023-03-31 PROCEDURE — 25000003 PHARM REV CODE 250: Performed by: INTERNAL MEDICINE

## 2023-03-31 PROCEDURE — 88305 TISSUE EXAM BY PATHOLOGIST: ICD-10-PCS | Mod: 26,,, | Performed by: PATHOLOGY

## 2023-03-31 PROCEDURE — 88177 PR  EVALUATION OF FNA SMEAR TO DETERMINE ADEQUACY, EA ADD EVAL: ICD-10-PCS | Mod: 26,,, | Performed by: PATHOLOGY

## 2023-03-31 PROCEDURE — D9220A PRA ANESTHESIA: ICD-10-PCS | Mod: ANES,,, | Performed by: ANESTHESIOLOGY

## 2023-03-31 PROCEDURE — 37000009 HC ANESTHESIA EA ADD 15 MINS: Performed by: INTERNAL MEDICINE

## 2023-03-31 PROCEDURE — 43242 EGD US FINE NEEDLE BX/ASPIR: CPT | Performed by: INTERNAL MEDICINE

## 2023-03-31 PROCEDURE — 27202131 HC NEEDLE, FNB SINGLE (ANY): Performed by: INTERNAL MEDICINE

## 2023-03-31 PROCEDURE — 88177 CYTP FNA EVAL EA ADDL: CPT | Performed by: PATHOLOGY

## 2023-03-31 PROCEDURE — D9220A PRA ANESTHESIA: Mod: ANES,,, | Performed by: ANESTHESIOLOGY

## 2023-03-31 PROCEDURE — 88173 CYTOPATH EVAL FNA REPORT: CPT | Mod: 26,,, | Performed by: PATHOLOGY

## 2023-03-31 PROCEDURE — 88172 PR  EVALUATION OF FNA SMEAR TO DETERMINE ADEQUACY, FIRST EVAL: ICD-10-PCS | Mod: 26,,, | Performed by: PATHOLOGY

## 2023-03-31 PROCEDURE — 43242 EGD US FINE NEEDLE BX/ASPIR: CPT | Mod: ,,, | Performed by: INTERNAL MEDICINE

## 2023-03-31 PROCEDURE — D9220A PRA ANESTHESIA: ICD-10-PCS | Mod: CRNA,,, | Performed by: NURSE ANESTHETIST, CERTIFIED REGISTERED

## 2023-03-31 PROCEDURE — 88173 PR  INTERPRETATION OF FNA SMEAR: ICD-10-PCS | Mod: 26,,, | Performed by: PATHOLOGY

## 2023-03-31 PROCEDURE — 43242 PR UPGI ENDOSCOPY,FN NEEDLE BX,GUIDED: ICD-10-PCS | Mod: ,,, | Performed by: INTERNAL MEDICINE

## 2023-03-31 PROCEDURE — 88305 TISSUE EXAM BY PATHOLOGIST: CPT | Mod: 26,,, | Performed by: PATHOLOGY

## 2023-03-31 PROCEDURE — 25000003 PHARM REV CODE 250: Performed by: NURSE ANESTHETIST, CERTIFIED REGISTERED

## 2023-03-31 PROCEDURE — 37000008 HC ANESTHESIA 1ST 15 MINUTES: Performed by: INTERNAL MEDICINE

## 2023-03-31 PROCEDURE — D9220A PRA ANESTHESIA: Mod: CRNA,,, | Performed by: NURSE ANESTHETIST, CERTIFIED REGISTERED

## 2023-03-31 RX ORDER — HYDROMORPHONE HYDROCHLORIDE 1 MG/ML
0.2 INJECTION, SOLUTION INTRAMUSCULAR; INTRAVENOUS; SUBCUTANEOUS EVERY 5 MIN PRN
Status: DISCONTINUED | OUTPATIENT
Start: 2023-03-31 | End: 2023-03-31 | Stop reason: HOSPADM

## 2023-03-31 RX ORDER — LIDOCAINE HYDROCHLORIDE 20 MG/ML
INJECTION INTRAVENOUS
Status: DISCONTINUED | OUTPATIENT
Start: 2023-03-31 | End: 2023-03-31

## 2023-03-31 RX ORDER — SODIUM CHLORIDE 0.9 % (FLUSH) 0.9 %
10 SYRINGE (ML) INJECTION
Status: DISCONTINUED | OUTPATIENT
Start: 2023-03-31 | End: 2023-03-31 | Stop reason: HOSPADM

## 2023-03-31 RX ORDER — PROPOFOL 10 MG/ML
VIAL (ML) INTRAVENOUS
Status: DISCONTINUED | OUTPATIENT
Start: 2023-03-31 | End: 2023-03-31

## 2023-03-31 RX ORDER — SODIUM CHLORIDE 9 MG/ML
INJECTION, SOLUTION INTRAVENOUS CONTINUOUS
Status: DISCONTINUED | OUTPATIENT
Start: 2023-03-31 | End: 2023-03-31 | Stop reason: HOSPADM

## 2023-03-31 RX ADMIN — PROPOFOL 70 MG: 10 INJECTION, EMULSION INTRAVENOUS at 02:03

## 2023-03-31 RX ADMIN — LIDOCAINE HYDROCHLORIDE 75 MG: 20 INJECTION INTRAVENOUS at 02:03

## 2023-03-31 RX ADMIN — SODIUM CHLORIDE: 9 INJECTION, SOLUTION INTRAVENOUS at 01:03

## 2023-03-31 NOTE — ANESTHESIA PREPROCEDURE EVALUATION
03/31/2023  Kaity Masterson is a 67 y.o., female with a pancreatic mass her for EUS. Recent hysterectomy.  In a wheelchair today and c/o shortness of breath.    Results for orders placed during the hospital encounter of 03/20/23    Echo Saline Bubble? No    Interpretation Summary  · The left ventricle is normal in size with moderate concentric hypertrophy and normal systolic function.  · The estimated ejection fraction is 60%.  · Grade I left ventricular diastolic dysfunction.  · Mild tricuspid regurgitation.  · Mild pulmonic regurgitation.  · Normal right ventricular size with normal right ventricular systolic function.  · There is no pulmonary hypertension.      Past Medical History:   Diagnosis Date    Anemia, unspecified     Hypertension     Osteoporosis     PUD (peptic ulcer disease)     Type 2 diabetes mellitus without complications      Lab Results   Component Value Date    WBC 4.39 03/22/2023    HGB 10.8 (L) 03/22/2023    HCT 32.5 (L) 03/22/2023    MCV 89 03/22/2023     03/22/2023         Chemistry        Component Value Date/Time     03/22/2023 0557    K 3.7 03/22/2023 0557     (H) 03/22/2023 0557    CO2 15 (L) 03/22/2023 0557    BUN 10 03/22/2023 0557    CREATININE 0.7 03/22/2023 0557     (H) 03/22/2023 0557        Component Value Date/Time    CALCIUM 10.5 03/22/2023 0557    ALKPHOS 78 03/22/2023 0557    AST 22 03/22/2023 0557    ALT 26 03/22/2023 0557    BILITOT 0.4 03/22/2023 0557              Pre-op Assessment    I have reviewed the Patient Summary Reports.     I have reviewed the Nursing Notes. I have reviewed the NPO Status.      Review of Systems  Anesthesia Hx:  No problems with previous Anesthesia    Social:  Non-Smoker    Cardiovascular:   Exercise tolerance: poor Hypertension Non-ambulatory since recent surgeries; prior was working as a head     Pulmonary:   Shortness of breath    Hepatic/GI:   PUD,    Endocrine:   Diabetes, well controlled        Physical Exam  General: Cooperative, Oriented, Well nourished, Alert and Anxious    Airway:  Mallampati: II   Mouth Opening: Normal  TM Distance: Normal  Neck ROM: Normal ROM    Dental:  Intact        Anesthesia Plan  Type of Anesthesia, risks & benefits discussed:    Anesthesia Type: Gen Natural Airway  Intra-op Monitoring Plan: Standard ASA Monitors  Post Op Pain Control Plan: multimodal analgesia  Induction:  IV  Informed Consent: Informed consent signed with the Patient and all parties understand the risks and agree with anesthesia plan.  All questions answered.   ASA Score: 3  Anesthesia Plan Notes: Pt with O2 sats >95% however states she feels SOB.  I gave her an albuterol inhaler in pre-op with some subjective improvement    Ready For Surgery From Anesthesia Perspective.     .

## 2023-03-31 NOTE — ANESTHESIA POSTPROCEDURE EVALUATION
Anesthesia Post Evaluation    Patient: Kaity Masterson    Procedure(s) Performed: Procedure(s) (LRB):  ULTRASOUND, UPPER GI TRACT, ENDOSCOPIC (N/A)    Final Anesthesia Type: general      Patient location during evaluation: GI PACU  Patient participation: Yes- Able to Participate  Level of consciousness: awake and alert  Post-procedure vital signs: reviewed and stable  Pain management: adequate  Airway patency: patent    PONV status at discharge: No PONV  Anesthetic complications: no      Cardiovascular status: blood pressure returned to baseline  Respiratory status: spontaneous ventilation  Hydration status: euvolemic  Follow-up not needed.          Vitals Value Taken Time   /79 03/31/23 1500   Temp 36.6 03/31/23 1713   Pulse 68 03/31/23 1526   Resp 40 03/31/23 1525   SpO2 100 % 03/31/23 1526   Vitals shown include unvalidated device data.      No case tracking events are documented in the log.      Pain/Carlos Score: No data recorded

## 2023-03-31 NOTE — PROVATION PATIENT INSTRUCTIONS
Discharge Summary/Instructions after an Endoscopic Procedure  Patient Name: Kaity Masterson  Patient MRN: [id number]  Patient YOB: 1955 Friday, March 31, 2023  Kleber Bolaños MD  Dear patient,  As a result of recent federal legislation (The Federal Cures Act), you may   receive lab or pathology results from your procedure in your [facility]   account before your physician is able to contact you. Your physician or   their representative will relay the results to you with their   recommendations at their soonest availability.  Thank you,  RESTRICTIONS:  During your procedure today, you received medications for sedation.  These   medications may affect your judgment, balance and coordination.  Therefore,   for 24 hours, you have the following restrictions:   - DO NOT drive a car, operate machinery, make legal/financial decisions,   sign important papers or drink alcohol.    ACTIVITY:  Today: no heavy lifting, straining or running due to procedural   sedation/anesthesia.  The following day: return to full activity including work.  DIET:  Eat and drink normally unless instructed otherwise.     TREATMENT FOR COMMON SIDE EFFECTS:  - Mild abdominal pain, nausea, belching, bloating or excessive gas:  rest,   eat lightly and use a heating pad.  - Sore Throat: treat with throat lozenges and/or gargle with warm salt   water.  - Because air was used during the procedure, expelling large amounts of air   from your rectum or belching is normal.  - If a bowel prep was taken, you may not have a bowel movement for 1-3 days.    This is normal.  SYMPTOMS TO WATCH FOR AND REPORT TO YOUR PHYSICIAN:  1. Abdominal pain or bloating, other than gas cramps.  2. Chest pain.  3. Back pain.  4. Signs of infection such as: chills or fever occurring within 24 hours   after the procedure.  5. Rectal bleeding, which would show as bright red, maroon, or black stools.   (A tablespoon of blood from the rectum is not serious, especially if    hemorrhoids are present.)  6. Vomiting.  7. Weakness or dizziness.  GO DIRECTLY TO THE NEAREST EMERGENCY ROOM IF YOU HAVE ANY OF THE FOLLOWING:      Difficulty breathing              Chills and/or fever over 101 F   Persistent vomiting and/or vomiting blood   Severe abdominal pain   Severe chest pain   Black, tarry stools   Bleeding- more than one tablespoon   Any other symptom or condition that you feel may need urgent attention  Your doctor recommends these additional instructions:  If any biopsies were taken, your doctors clinic will contact you in 1 to 2   weeks with any results.  - Discharge patient to home (ambulatory).   - Resume previous diet; Discharge to home (ambulatory); Resume outpatient   medications  - Return to primary care physician as previously scheduled.   - Follow-up with oncology appt, as already scheduled.  For questions, problems or results please call your physician - Kleber Bolaños MD at Work:  [phone].  [facility], EMERGENCY ROOM PHONE NUMBER: [phone]  IF A COMPLICATION OR EMERGENCY SITUATION ARISES AND YOU ARE UNABLE TO REACH   YOUR PHYSICIAN - GO DIRECTLY TO THE EMERGENCY ROOM.  Kleber Bolaños MD  3/31/2023 2:43:31 PM  This report has been verified and signed electronically.  Dear patient,  As a result of recent federal legislation (The Federal Cures Act), you may   receive lab or pathology results from your procedure in your [facility]   account before your physician is able to contact you. Your physician or   their representative will relay the results to you with their   recommendations at their soonest availability.  Thank you,  PROVATION

## 2023-03-31 NOTE — H&P
Short Stay Endoscopy History and Physical    PCP - Marcia Dewey NP  Referring Physician - No referring provider defined for this encounter.    Procedure - EUS  ASA - per anesthesia  Mallampati - per anesthesia  History of Anesthesia problems - no  Family history Anesthesia problems -  no   Plan of anesthesia - General    HPI:  This is a 67 y.o. female here for evaluation of: pancreatic mass    Reflux - no  Dysphagia - no  Abdominal pain - no  Diarrhea - no    ROS:  Constitutional: No fevers, chills, No weight loss  CV: No chest pain  Pulm: No cough, No shortness of breath  GI: see HPI    Medical History:  has a past medical history of Anemia, unspecified, Hypertension, Osteoporosis, PUD (peptic ulcer disease), and Type 2 diabetes mellitus without complications.    Surgical History:  has a past surgical history that includes Total knee arthroplasty (Left); Total knee arthroplasty (Right); Vaginal delivery; Laparoscopic cholecystectomy; colonsocopy; robotic hysterectomy, with salpingo-oophorectomy (Bilateral, 03/08/2023); Lymph node biopsy (Bilateral, 03/08/2023); Hysterectomy (03/08/2023); and Cholecystectomy.    Family History: family history includes Breast cancer in her maternal aunt, mother, and sister; Colon cancer in her maternal aunt and mother; Diabetes in her mother; Hypertension in her brother and sister; Lung cancer in her sister..    Social History:  reports that she has never smoked. She has never used smokeless tobacco. She reports that she does not currently use alcohol. She reports that she does not use drugs.    Review of patient's allergies indicates:  No Known Allergies    Medications:   Medications Prior to Admission   Medication Sig Dispense Refill Last Dose    acetaminophen (TYLENOL) 650 MG TbSR Take 1 tablet (650 mg total) by mouth every 6 (six) hours. Alternate with ibuprofen so you are taking something every 6 hours 30 tablet 2 3/30/2023    alendronate (FOSAMAX) 35 MG tablet Take  "35 mg by mouth every 7 days.   3/30/2023    aspirin (ECOTRIN) 81 MG EC tablet Take 1 tablet by mouth once daily.   3/30/2023    atenoloL (TENORMIN) 50 MG tablet Take 50 mg by mouth once daily. am   3/31/2023    famotidine (PEPCID) 20 MG tablet Take 20 mg by mouth 2 (two) times daily.   3/30/2023    ibuprofen (ADVIL,MOTRIN) 600 MG tablet Take 1 tablet (600 mg total) by mouth every 6 (six) hours. Alternative with tylenol so you are taking something every 3 hours 30 tablet 2 3/30/2023    insulin detemir U-100 (LEVEMIR FLEXTOUCH) 100 unit/mL (3 mL) SubQ InPn pen Inject 14 Units into the skin every evening. 4.2 mL 0 3/30/2023    losartan (COZAAR) 100 MG tablet Take 100 mg by mouth.   3/31/2023    pramipexole (MIRAPEX) 0.25 MG tablet 0.25 mg every evening.   3/30/2023    rosuvastatin (CRESTOR) 10 MG tablet Take 10 mg by mouth.   3/30/2023    pen needle, diabetic 31 gauge x 3/16" Ndle 30 each by Misc.(Non-Drug; Combo Route) route every evening. 30 each 0 Unknown       Physical Exam:    Vital Signs:   Vitals:    03/31/23 1340   BP: 122/81   Pulse: 75   Resp: 20   Temp: 97.7 °F (36.5 °C)       General Appearance: Well appearing in no acute distress  Lungs: no labored breathing  CVS:  regular rate  Abdomen: non tender    Labs:  Lab Results   Component Value Date    WBC 4.39 03/22/2023    HGB 10.8 (L) 03/22/2023    HCT 32.5 (L) 03/22/2023     03/22/2023    ALT 26 03/22/2023    AST 22 03/22/2023     03/22/2023    K 3.7 03/22/2023     (H) 03/22/2023    CREATININE 0.7 03/22/2023    BUN 10 03/22/2023    CO2 15 (L) 03/22/2023    HGBA1C 10.5 (H) 03/21/2023       I have explained the risks and benefits of this endoscopic procedure to the patient including but not limited to bleeding, inflammation, infection, perforation, and death.      Kleber Bolaños MD   "

## 2023-03-31 NOTE — TRANSFER OF CARE
"Anesthesia Transfer of Care Note    Patient: Kaity Masterson    Procedure(s) Performed: Procedure(s) (LRB):  ULTRASOUND, UPPER GI TRACT, ENDOSCOPIC (N/A)    Patient location: [facility]    Anesthesia Type: general    Transport from OR: Transported from OR on 6-10 L/min O2 by face mask with adequate spontaneous ventilation    Post pain: adequate analgesia    Post assessment: no apparent anesthetic complications    Post vital signs: stable    Level of consciousness: awake    Nausea/Vomiting: no nausea/vomiting    Complications: none    Transfer of care protocol was followed      Last vitals:   Visit Vitals  /81 (BP Location: Left arm, Patient Position: Lying)   Pulse 75   Temp 36.5 °C (97.7 °F) (Temporal)   Resp 20   Ht 5' 6" (1.676 m)   Wt 68 kg (150 lb)   SpO2 100%   Breastfeeding No   BMI 24.21 kg/m²     "

## 2023-04-03 DIAGNOSIS — K86.89 PANCREATIC MASS: Primary | ICD-10-CM

## 2023-04-03 LAB
COMMENT: NORMAL
FINAL PATHOLOGIC DIAGNOSIS: NORMAL
GROSS: NORMAL
Lab: NORMAL
SUPPLEMENTAL DIAGNOSIS: NORMAL

## 2023-04-04 NOTE — PROGRESS NOTES
CC:  Pancreatic adenocarcinoma    HPI:  Ms Masterson is a 66 yo woman with HTN, T2DM, pathologic T1a endometrial cancer s/p surgery on 3/8/23, PUD, fatty liver, osteoporosis, who presents today for further management of pancreatic adenocarcinoma. She was admitted to ICU for DKA in 2023. CT A/P 3/22/23 showed  a 3.4 cm mass centered in the pancreatic head/uncinate process with associated downstream dilatation of the main pancreatic duct.  Imaging findings are highly concerning for pancreatic adenocarcinoma.  Adjacent prominent peripancreatic lymph nodes are seen.  There is some stranding of the fat about the SMV for approximately 180°.  The portal vein, SMV and splenic vein are patent. Fatty infiltration of the liver. CT chest 3/20/23: No evidence for pulmonary embolus or other acute intrathoracic process. Hepatic steatosis.  Cholecystectomy. She underwent upper EUS biopsy on 3/31/23. Pathology showed adenocarcinoma. She presents today for further evaluation. Strong family history of breast cancer, endometrial cancer and colon cancer. She is a little weak after surgery but active at home and is still working.   Family history:  Mother: Breast cancer (middle age)  Aunt maternal (2): cancer (unknown)  Sister (3): breast cancer, lung cancer (smoking). Other sister: colon cancer (older diagnosis 60's). Third sister (breast cancer) and endometrial cancer  Brother:  finger bone cancer    ECO    Past Medical History:   Diagnosis Date    Anemia, unspecified     Hypertension     Osteoporosis     PUD (peptic ulcer disease)     Type 2 diabetes mellitus without complications         Past Surgical History:   Procedure Laterality Date    CHOLECYSTECTOMY      colonsocopy      2017    ENDOSCOPIC ULTRASOUND OF UPPER GASTROINTESTINAL TRACT N/A 3/31/2023    Procedure: ULTRASOUND, UPPER GI TRACT, ENDOSCOPIC;  Surgeon: Kleber Bolaños MD;  Location: [facility] ([address] [place] [address]);  Service: Endoscopy;  Laterality: N/A;  3/27 - precall  attempted, no answer. SG    HYSTERECTOMY  03/08/2023    LAPAROSCOPIC CHOLECYSTECTOMY      LYMPH NODE BIOPSY Bilateral 03/08/2023    Procedure: BIOPSY, PELVIC LYMPH NODE;  Surgeon: Dallas Aguilar MD;  Location: [facility];  Service: OB/GYN;  Laterality: Bilateral;    ROBOTIC HYSTERECTOMY, WITH SALPINGO-OOPHORECTOMY Bilateral 03/08/2023    Procedure: ROBOTIC HYSTERECTOMY,WITH SALPINGO-OOPHORECTOMY;  Surgeon: Dallas Aguilar MD;  Location: [facility];  Service: OB/GYN;  Laterality: Bilateral;    TOTAL KNEE ARTHROPLASTY Left     2016    TOTAL KNEE ARTHROPLASTY Right     2019    VAGINAL DELIVERY      x 2  (one with epidural)       Family History   Problem Relation Age of Onset    Diabetes Mother     Colon cancer Mother     Breast cancer Mother     Hypertension Brother     Hypertension Sister     Breast cancer Sister     Lung cancer Sister     Breast cancer Maternal Aunt     Colon cancer Maternal Aunt     Ovarian cancer Neg Hx        Social History     Socioeconomic History    Marital status:    Tobacco Use    Smoking status: Never    Smokeless tobacco: Never   Substance and Sexual Activity    Alcohol use: Not Currently    Drug use: Never    Sexual activity: Not Currently     Partners: Male       Review of Systems   Constitutional:  Positive for unexpected weight change. Negative for appetite change, chills, fatigue and fever.   HENT:  Negative for mouth sores, nosebleeds, tinnitus, trouble swallowing and voice change.    Eyes:  Negative for pain, redness and visual disturbance.   Respiratory:  Negative for cough, shortness of breath and wheezing.    Cardiovascular:  Negative for chest pain, palpitations and leg swelling.   Gastrointestinal:  Negative for abdominal distention, abdominal pain, blood in stool, constipation, diarrhea, nausea and vomiting.   Endocrine: Negative for polydipsia, polyphagia and polyuria.   Genitourinary:  Negative for flank pain, frequency and hematuria.   Musculoskeletal:  Positive for back pain.  Negative for arthralgias, gait problem, joint swelling, myalgias, neck pain and neck stiffness.   Skin:  Negative for color change, pallor, rash and wound.   Neurological:  Positive for weakness. Negative for tremors, seizures, syncope, speech difficulty, light-headedness, numbness and headaches.   Hematological:  Negative for adenopathy. Does not bruise/bleed easily.   Psychiatric/Behavioral:  Negative for confusion, dysphoric mood and self-injury. The patient is not nervous/anxious.    All other systems reviewed and are negative.    Objective:  Physical Exam  Vitals reviewed.   Constitutional:       General: She is not in acute distress.     Appearance: She is well-developed. She is not diaphoretic.   HENT:      Head: Normocephalic and atraumatic.      Mouth/Throat:      Pharynx: No oropharyngeal exudate.   Eyes:      General: No scleral icterus.     Conjunctiva/sclera: Conjunctivae normal.      Pupils: Pupils are equal, round, and reactive to light.   Neck:      Thyroid: No thyromegaly.      Vascular: No JVD.   Cardiovascular:      Rate and Rhythm: Normal rate and regular rhythm.      Heart sounds: Normal heart sounds. No murmur heard.    No friction rub. No gallop.   Pulmonary:      Effort: Pulmonary effort is normal. No respiratory distress.      Breath sounds: Normal breath sounds. No wheezing or rales.   Abdominal:      General: Bowel sounds are normal. There is no distension.      Palpations: Abdomen is soft. There is no mass.      Tenderness: There is no abdominal tenderness. There is no rebound.      Hernia: No hernia is present.   Musculoskeletal:         General: No tenderness or deformity. Normal range of motion.      Cervical back: Normal range of motion and neck supple.   Lymphadenopathy:      Cervical: No cervical adenopathy.      Upper Body:      Right upper body: No supraclavicular adenopathy.      Left upper body: No supraclavicular adenopathy.   Skin:     General: Skin is warm and dry.       Coloration: Skin is not pale.      Findings: No erythema or rash.   Neurological:      Mental Status: She is alert and oriented to person, place, and time.      Cranial Nerves: No cranial nerve deficit.      Motor: No abnormal muscle tone.   Psychiatric:         Behavior: Behavior normal.         Thought Content: Thought content normal.         Judgment: Judgment normal.     Labs:  CA 19-9 76.8    Imaging Data:  CT A/P 3/22/23 showed  a 3.4 cm mass centered in the pancreatic head/uncinate process with associated downstream dilatation of the main pancreatic duct.  Imaging findings are highly concerning for pancreatic adenocarcinoma.  Adjacent prominent peripancreatic lymph nodes are seen.  There is some stranding of the fat about the SMV for approximately 180°.  The portal vein, SMV and splenic vein are patent. Fatty infiltration of the liver.     CT chest 3/20/23: No evidence for pulmonary embolus or other acute intrathoracic process. Hepatic steatosis.  Cholecystectomy.    Assessment and plan:    1. Malignant neoplasm of head of pancreas    2. Diabetic ketoacidosis without coma associated with type 2 diabetes mellitus    3. Pancreatic mass    4. Cancer related pain      1.3  - Ms Zelalem is a 66 yo woman with recently resected T1 endometrial cancer and newly diagnosed pancreatic adenocarcinoma. Also has strong family history of breast, endometrial and colon cancer  - long discussion with patient and daughter-in-law, Radha. I have messaged Dr Silva re checking MMR status on cancer. If she has MSI-high cancer, treatment will be immunotherapy. If she has MSI-low cancer which is more common, treatment is chemotherapy. She is scheduled to see Dr Anderson this afternoon to discuss if she is a candidate for surgery. Discussed if she is a candidate for surgery, she needs 6 months of periop chemo. If not a candidate for surgery, treatment is chemo indefinitely. That is provided that she has MSI-low cancer  - The side  effects of FOLFIRINOX were discussed with patient, which include but are not limited to hair loss, fatigue, decreased appetite, weight loss, weakness, bone marrow suppression, increased risk of infection, sepsis, anemia that may require blood transfusion, low platelet counts with increased risk of bleeding that may require platelet transfusion, diarrhea, constipation, mucositis, damage to the brain, heart, liver, kidney, damage to the nerves that may not be reversible, severe allergic reaction, damage at the injection site, sterility, secondary cancer, death. Handouts provided to patient. Discussed regimen. Will start at a low dose. Pgx pending  - Consented the patient to the treatment plan and the patient was educated on the planned duration of the treatment and schedule of the treatment administration.  - emla, zofran, phenergan sent. She has high BS. Will hold off on oral dex  - tempus ordered  - messaged Dr Anderson to place port    2.  - asked patient to f/u with PCP closely to manage DM    4.  - start oxycodone prn    RTC in 2 weeks. If MMR comes back showing dMMR cancer, will see her sooner to discuss keytruda.     Route Chart for Scheduling    Med Onc Chart Routing  Urgent    Follow up with physician 2 weeks and 6 weeks. refer to dietician (when she comes back). verify with PA re FOLFIRINOX. see me in 2 weeks with labs then chemo. see SHELTON in 4 weeks with labs then chemo   Follow up with SHELTON 4 weeks.   Infusion scheduling note FOLFIRINOX every 2 weeks, pump off day 3   Injection scheduling note    Labs CBC, CMP and CA 19-9   Schedulin day prior to infusion  Preferred lab: [facility]  Lab interval:     Imaging    Pharmacy appointment    Other referrals  Additional referrals needed

## 2023-04-05 ENCOUNTER — OFFICE VISIT (OUTPATIENT)
Dept: HEMATOLOGY/ONCOLOGY | Facility: CLINIC | Age: 68
End: 2023-04-05
Payer: COMMERCIAL

## 2023-04-05 ENCOUNTER — OFFICE VISIT (OUTPATIENT)
Dept: SURGERY | Facility: CLINIC | Age: 68
End: 2023-04-05
Payer: COMMERCIAL

## 2023-04-05 ENCOUNTER — LAB VISIT (OUTPATIENT)
Dept: LAB | Facility: HOSPITAL | Age: 68
End: 2023-04-05
Payer: COMMERCIAL

## 2023-04-05 ENCOUNTER — OFFICE VISIT (OUTPATIENT)
Dept: GYNECOLOGIC ONCOLOGY | Facility: CLINIC | Age: 68
End: 2023-04-05
Payer: COMMERCIAL

## 2023-04-05 VITALS
BODY MASS INDEX: 27.13 KG/M2 | SYSTOLIC BLOOD PRESSURE: 122 MMHG | WEIGHT: 158.06 LBS | HEART RATE: 70 BPM | DIASTOLIC BLOOD PRESSURE: 61 MMHG

## 2023-04-05 VITALS
RESPIRATION RATE: 16 BRPM | OXYGEN SATURATION: 99 % | SYSTOLIC BLOOD PRESSURE: 128 MMHG | TEMPERATURE: 98 F | BODY MASS INDEX: 26.57 KG/M2 | WEIGHT: 155.63 LBS | DIASTOLIC BLOOD PRESSURE: 81 MMHG | HEART RATE: 77 BPM | HEIGHT: 64 IN

## 2023-04-05 VITALS
HEIGHT: 64 IN | BODY MASS INDEX: 26.57 KG/M2 | SYSTOLIC BLOOD PRESSURE: 133 MMHG | DIASTOLIC BLOOD PRESSURE: 74 MMHG | WEIGHT: 155.63 LBS | OXYGEN SATURATION: 98 % | HEART RATE: 70 BPM | TEMPERATURE: 98 F

## 2023-04-05 DIAGNOSIS — C25.0 MALIGNANT NEOPLASM OF HEAD OF PANCREAS: Primary | ICD-10-CM

## 2023-04-05 DIAGNOSIS — K86.89 PANCREATIC MASS: ICD-10-CM

## 2023-04-05 DIAGNOSIS — C54.1 MALIGNANT NEOPLASM OF ENDOMETRIUM: Primary | ICD-10-CM

## 2023-04-05 DIAGNOSIS — Z80.49 FAMILY HISTORY OF UTERINE CANCER: ICD-10-CM

## 2023-04-05 DIAGNOSIS — E11.10 DIABETIC KETOACIDOSIS WITHOUT COMA ASSOCIATED WITH TYPE 2 DIABETES MELLITUS: ICD-10-CM

## 2023-04-05 DIAGNOSIS — Z80.3 FAMILY HISTORY OF BREAST CANCER IN FEMALE: ICD-10-CM

## 2023-04-05 DIAGNOSIS — G89.3 CANCER RELATED PAIN: ICD-10-CM

## 2023-04-05 LAB — CANCER AG19-9 SERPL-ACNC: 76.8 U/ML (ref 0–40)

## 2023-04-05 PROCEDURE — 99215 OFFICE O/P EST HI 40 MIN: CPT | Mod: 24,S$GLB,, | Performed by: OBSTETRICS & GYNECOLOGY

## 2023-04-05 PROCEDURE — 3046F HEMOGLOBIN A1C LEVEL >9.0%: CPT | Mod: CPTII,S$GLB,,

## 2023-04-05 PROCEDURE — 1111F DSCHRG MED/CURRENT MED MERGE: CPT | Mod: CPTII,S$GLB,,

## 2023-04-05 PROCEDURE — 1126F AMNT PAIN NOTED NONE PRSNT: CPT | Mod: CPTII,S$GLB,, | Performed by: OBSTETRICS & GYNECOLOGY

## 2023-04-05 PROCEDURE — 1101F PR PT FALLS ASSESS DOC 0-1 FALLS W/OUT INJ PAST YR: ICD-10-PCS | Mod: CPTII,S$GLB,, | Performed by: OBSTETRICS & GYNECOLOGY

## 2023-04-05 PROCEDURE — 99205 OFFICE O/P NEW HI 60 MIN: CPT | Mod: S$GLB,,,

## 2023-04-05 PROCEDURE — 3008F BODY MASS INDEX DOCD: CPT | Mod: CPTII,S$GLB,,

## 2023-04-05 PROCEDURE — 99205 PR OFFICE/OUTPT VISIT, NEW, LEVL V, 60-74 MIN: ICD-10-PCS | Mod: S$GLB,,,

## 2023-04-05 PROCEDURE — 1101F PR PT FALLS ASSESS DOC 0-1 FALLS W/OUT INJ PAST YR: ICD-10-PCS | Mod: CPTII,S$GLB,, | Performed by: INTERNAL MEDICINE

## 2023-04-05 PROCEDURE — 3074F PR MOST RECENT SYSTOLIC BLOOD PRESSURE < 130 MM HG: ICD-10-PCS | Mod: CPTII,S$GLB,, | Performed by: INTERNAL MEDICINE

## 2023-04-05 PROCEDURE — 4010F PR ACE/ARB THEARPY RXD/TAKEN: ICD-10-PCS | Mod: CPTII,S$GLB,, | Performed by: INTERNAL MEDICINE

## 2023-04-05 PROCEDURE — 3046F HEMOGLOBIN A1C LEVEL >9.0%: CPT | Mod: CPTII,S$GLB,, | Performed by: INTERNAL MEDICINE

## 2023-04-05 PROCEDURE — 99999 PR PBB SHADOW E&M-EST. PATIENT-LVL V: CPT | Mod: PBBFAC,,, | Performed by: INTERNAL MEDICINE

## 2023-04-05 PROCEDURE — 1111F PR DISCHARGE MEDS RECONCILED W/ CURRENT OUTPATIENT MED LIST: ICD-10-PCS | Mod: CPTII,S$GLB,, | Performed by: INTERNAL MEDICINE

## 2023-04-05 PROCEDURE — 3079F DIAST BP 80-89 MM HG: CPT | Mod: CPTII,S$GLB,, | Performed by: INTERNAL MEDICINE

## 2023-04-05 PROCEDURE — 3078F PR MOST RECENT DIASTOLIC BLOOD PRESSURE < 80 MM HG: ICD-10-PCS | Mod: CPTII,S$GLB,,

## 2023-04-05 PROCEDURE — 99999 PR PBB SHADOW E&M-EST. PATIENT-LVL IV: ICD-10-PCS | Mod: PBBFAC,,, | Performed by: OBSTETRICS & GYNECOLOGY

## 2023-04-05 PROCEDURE — 1125F PR PAIN SEVERITY QUANTIFIED, PAIN PRESENT: ICD-10-PCS | Mod: CPTII,S$GLB,,

## 2023-04-05 PROCEDURE — 3008F PR BODY MASS INDEX (BMI) DOCUMENTED: ICD-10-PCS | Mod: CPTII,S$GLB,, | Performed by: INTERNAL MEDICINE

## 2023-04-05 PROCEDURE — 1111F PR DISCHARGE MEDS RECONCILED W/ CURRENT OUTPATIENT MED LIST: ICD-10-PCS | Mod: CPTII,S$GLB,,

## 2023-04-05 PROCEDURE — 1111F DSCHRG MED/CURRENT MED MERGE: CPT | Mod: CPTII,S$GLB,, | Performed by: OBSTETRICS & GYNECOLOGY

## 2023-04-05 PROCEDURE — 3078F PR MOST RECENT DIASTOLIC BLOOD PRESSURE < 80 MM HG: ICD-10-PCS | Mod: CPTII,S$GLB,, | Performed by: OBSTETRICS & GYNECOLOGY

## 2023-04-05 PROCEDURE — 3288F PR FALLS RISK ASSESSMENT DOCUMENTED: ICD-10-PCS | Mod: CPTII,S$GLB,, | Performed by: INTERNAL MEDICINE

## 2023-04-05 PROCEDURE — 3008F BODY MASS INDEX DOCD: CPT | Mod: CPTII,S$GLB,, | Performed by: INTERNAL MEDICINE

## 2023-04-05 PROCEDURE — 1111F DSCHRG MED/CURRENT MED MERGE: CPT | Mod: CPTII,S$GLB,, | Performed by: INTERNAL MEDICINE

## 2023-04-05 PROCEDURE — 3046F PR MOST RECENT HEMOGLOBIN A1C LEVEL > 9.0%: ICD-10-PCS | Mod: CPTII,S$GLB,,

## 2023-04-05 PROCEDURE — 1159F MED LIST DOCD IN RCRD: CPT | Mod: CPTII,S$GLB,, | Performed by: OBSTETRICS & GYNECOLOGY

## 2023-04-05 PROCEDURE — 3288F FALL RISK ASSESSMENT DOCD: CPT | Mod: CPTII,S$GLB,, | Performed by: INTERNAL MEDICINE

## 2023-04-05 PROCEDURE — 1126F PR PAIN SEVERITY QUANTIFIED, NO PAIN PRESENT: ICD-10-PCS | Mod: CPTII,S$GLB,, | Performed by: OBSTETRICS & GYNECOLOGY

## 2023-04-05 PROCEDURE — 1125F AMNT PAIN NOTED PAIN PRSNT: CPT | Mod: CPTII,S$GLB,,

## 2023-04-05 PROCEDURE — 3288F FALL RISK ASSESSMENT DOCD: CPT | Mod: CPTII,S$GLB,, | Performed by: OBSTETRICS & GYNECOLOGY

## 2023-04-05 PROCEDURE — 1125F PR PAIN SEVERITY QUANTIFIED, PAIN PRESENT: ICD-10-PCS | Mod: CPTII,S$GLB,, | Performed by: INTERNAL MEDICINE

## 2023-04-05 PROCEDURE — 4010F ACE/ARB THERAPY RXD/TAKEN: CPT | Mod: CPTII,S$GLB,,

## 2023-04-05 PROCEDURE — 3075F SYST BP GE 130 - 139MM HG: CPT | Mod: CPTII,S$GLB,,

## 2023-04-05 PROCEDURE — 3008F BODY MASS INDEX DOCD: CPT | Mod: CPTII,S$GLB,, | Performed by: OBSTETRICS & GYNECOLOGY

## 2023-04-05 PROCEDURE — 3078F DIAST BP <80 MM HG: CPT | Mod: CPTII,S$GLB,,

## 2023-04-05 PROCEDURE — 3046F PR MOST RECENT HEMOGLOBIN A1C LEVEL > 9.0%: ICD-10-PCS | Mod: CPTII,S$GLB,, | Performed by: INTERNAL MEDICINE

## 2023-04-05 PROCEDURE — 3074F PR MOST RECENT SYSTOLIC BLOOD PRESSURE < 130 MM HG: ICD-10-PCS | Mod: CPTII,S$GLB,, | Performed by: OBSTETRICS & GYNECOLOGY

## 2023-04-05 PROCEDURE — 1111F PR DISCHARGE MEDS RECONCILED W/ CURRENT OUTPATIENT MED LIST: ICD-10-PCS | Mod: CPTII,S$GLB,, | Performed by: OBSTETRICS & GYNECOLOGY

## 2023-04-05 PROCEDURE — 4010F PR ACE/ARB THEARPY RXD/TAKEN: ICD-10-PCS | Mod: CPTII,S$GLB,,

## 2023-04-05 PROCEDURE — 3046F HEMOGLOBIN A1C LEVEL >9.0%: CPT | Mod: CPTII,S$GLB,, | Performed by: OBSTETRICS & GYNECOLOGY

## 2023-04-05 PROCEDURE — 99999 PR PBB SHADOW E&M-EST. PATIENT-LVL V: ICD-10-PCS | Mod: PBBFAC,,,

## 2023-04-05 PROCEDURE — 4010F ACE/ARB THERAPY RXD/TAKEN: CPT | Mod: CPTII,S$GLB,, | Performed by: INTERNAL MEDICINE

## 2023-04-05 PROCEDURE — 3074F SYST BP LT 130 MM HG: CPT | Mod: CPTII,S$GLB,, | Performed by: INTERNAL MEDICINE

## 2023-04-05 PROCEDURE — 3075F PR MOST RECENT SYSTOLIC BLOOD PRESS GE 130-139MM HG: ICD-10-PCS | Mod: CPTII,S$GLB,,

## 2023-04-05 PROCEDURE — 1125F AMNT PAIN NOTED PAIN PRSNT: CPT | Mod: CPTII,S$GLB,, | Performed by: INTERNAL MEDICINE

## 2023-04-05 PROCEDURE — 3008F PR BODY MASS INDEX (BMI) DOCUMENTED: ICD-10-PCS | Mod: CPTII,S$GLB,,

## 2023-04-05 PROCEDURE — 99999 PR PBB SHADOW E&M-EST. PATIENT-LVL V: CPT | Mod: PBBFAC,,,

## 2023-04-05 PROCEDURE — 1101F PT FALLS ASSESS-DOCD LE1/YR: CPT | Mod: CPTII,S$GLB,, | Performed by: INTERNAL MEDICINE

## 2023-04-05 PROCEDURE — 3008F PR BODY MASS INDEX (BMI) DOCUMENTED: ICD-10-PCS | Mod: CPTII,S$GLB,, | Performed by: OBSTETRICS & GYNECOLOGY

## 2023-04-05 PROCEDURE — 3078F DIAST BP <80 MM HG: CPT | Mod: CPTII,S$GLB,, | Performed by: OBSTETRICS & GYNECOLOGY

## 2023-04-05 PROCEDURE — 3079F PR MOST RECENT DIASTOLIC BLOOD PRESSURE 80-89 MM HG: ICD-10-PCS | Mod: CPTII,S$GLB,, | Performed by: INTERNAL MEDICINE

## 2023-04-05 PROCEDURE — 3046F PR MOST RECENT HEMOGLOBIN A1C LEVEL > 9.0%: ICD-10-PCS | Mod: CPTII,S$GLB,, | Performed by: OBSTETRICS & GYNECOLOGY

## 2023-04-05 PROCEDURE — 99205 OFFICE O/P NEW HI 60 MIN: CPT | Mod: S$GLB,,, | Performed by: INTERNAL MEDICINE

## 2023-04-05 PROCEDURE — 3288F PR FALLS RISK ASSESSMENT DOCUMENTED: ICD-10-PCS | Mod: CPTII,S$GLB,, | Performed by: OBSTETRICS & GYNECOLOGY

## 2023-04-05 PROCEDURE — 4010F PR ACE/ARB THEARPY RXD/TAKEN: ICD-10-PCS | Mod: CPTII,S$GLB,, | Performed by: OBSTETRICS & GYNECOLOGY

## 2023-04-05 PROCEDURE — 99999 PR PBB SHADOW E&M-EST. PATIENT-LVL IV: CPT | Mod: PBBFAC,,, | Performed by: OBSTETRICS & GYNECOLOGY

## 2023-04-05 PROCEDURE — 36415 COLL VENOUS BLD VENIPUNCTURE: CPT | Performed by: INTERNAL MEDICINE

## 2023-04-05 PROCEDURE — 99205 PR OFFICE/OUTPT VISIT, NEW, LEVL V, 60-74 MIN: ICD-10-PCS | Mod: S$GLB,,, | Performed by: INTERNAL MEDICINE

## 2023-04-05 PROCEDURE — 1101F PT FALLS ASSESS-DOCD LE1/YR: CPT | Mod: CPTII,S$GLB,, | Performed by: OBSTETRICS & GYNECOLOGY

## 2023-04-05 PROCEDURE — 3074F SYST BP LT 130 MM HG: CPT | Mod: CPTII,S$GLB,, | Performed by: OBSTETRICS & GYNECOLOGY

## 2023-04-05 PROCEDURE — 4010F ACE/ARB THERAPY RXD/TAKEN: CPT | Mod: CPTII,S$GLB,, | Performed by: OBSTETRICS & GYNECOLOGY

## 2023-04-05 PROCEDURE — 99215 PR OFFICE/OUTPT VISIT, EST, LEVL V, 40-54 MIN: ICD-10-PCS | Mod: 24,S$GLB,, | Performed by: OBSTETRICS & GYNECOLOGY

## 2023-04-05 PROCEDURE — 99999 PR PBB SHADOW E&M-EST. PATIENT-LVL V: ICD-10-PCS | Mod: PBBFAC,,, | Performed by: INTERNAL MEDICINE

## 2023-04-05 PROCEDURE — 86301 IMMUNOASSAY TUMOR CA 19-9: CPT | Performed by: INTERNAL MEDICINE

## 2023-04-05 PROCEDURE — 1159F PR MEDICATION LIST DOCUMENTED IN MEDICAL RECORD: ICD-10-PCS | Mod: CPTII,S$GLB,, | Performed by: OBSTETRICS & GYNECOLOGY

## 2023-04-05 RX ORDER — OXYCODONE HYDROCHLORIDE 5 MG/1
5 TABLET ORAL EVERY 6 HOURS PRN
Qty: 90 TABLET | Refills: 0 | Status: SHIPPED | OUTPATIENT
Start: 2023-04-05

## 2023-04-05 RX ORDER — BLOOD SUGAR DIAGNOSTIC
STRIP MISCELLANEOUS
COMMUNITY
Start: 2023-03-23

## 2023-04-05 RX ORDER — PROMETHAZINE HYDROCHLORIDE 25 MG/1
25 TABLET ORAL EVERY 6 HOURS PRN
Qty: 120 TABLET | Refills: 2 | Status: SHIPPED | OUTPATIENT
Start: 2023-04-05

## 2023-04-05 RX ORDER — LIDOCAINE AND PRILOCAINE 25; 25 MG/G; MG/G
CREAM TOPICAL ONCE
Qty: 30 G | Refills: 2 | Status: SHIPPED | OUTPATIENT
Start: 2023-04-05 | End: 2023-04-06

## 2023-04-05 RX ORDER — ONDANSETRON 8 MG/1
8 TABLET, ORALLY DISINTEGRATING ORAL EVERY 8 HOURS PRN
Qty: 60 TABLET | Refills: 1 | Status: SHIPPED | OUTPATIENT
Start: 2023-04-05 | End: 2024-04-04

## 2023-04-05 RX ORDER — ESOMEPRAZOLE MAGNESIUM 40 MG/1
CAPSULE, DELAYED RELEASE ORAL
COMMUNITY

## 2023-04-05 RX ORDER — OXYCODONE AND ACETAMINOPHEN 7.5; 325 MG/1; MG/1
7.5 TABLET ORAL
Status: ON HOLD | COMMUNITY
End: 2023-06-08

## 2023-04-05 RX ORDER — ONDANSETRON 4 MG/1
TABLET, FILM COATED ORAL
COMMUNITY
End: 2023-04-05

## 2023-04-05 RX ORDER — LANCETS 30 GAUGE
EACH MISCELLANEOUS
COMMUNITY
Start: 2023-03-29

## 2023-04-05 RX ORDER — GABAPENTIN 100 MG/1
CAPSULE ORAL
Status: ON HOLD | COMMUNITY
End: 2023-06-08

## 2023-04-05 NOTE — PROGRESS NOTES
CC:      HPI:  Ms Masterson is a 66 yo woman with HTN, T2DM,pathologic T1a endometrial cancer s/p surgery on 3/8/23, PUD, fatty liver, osteoporosis, who presents today for further management of pancreatic adenocarcinoma. CT A/P 3/22/23 showed    a 3.4 cm mass centered in the pancreatic head/uncinate process with associated downstream dilatation of the main pancreatic duct.  Imaging findings are highly concerning for pancreatic adenocarcinoma.  Adjacent prominent peripancreatic lymph nodes are seen.  There is some stranding of the fat about the SMV for approximately 180°.  The portal vein, SMV and splenic vein are patent. Fatty infiltration of the liver. CT chest 3/20/23: No evidence for pulmonary embolus or other acute intrathoracic process. Hepatic steatosis.  Cholecystectomy. She underwent upper EUS biopsy on 3/31/23. Pathology showed adenocarcinoma.         HA1c was okay in Nov last year (6) , last month was 10     ECOG:      Past Medical History:   Diagnosis Date    Anemia, unspecified     Hypertension     Osteoporosis     PUD (peptic ulcer disease)     Type 2 diabetes mellitus without complications         Past Surgical History:   Procedure Laterality Date    CHOLECYSTECTOMY      colonsocopy      2017    ENDOSCOPIC ULTRASOUND OF UPPER GASTROINTESTINAL TRACT N/A 3/31/2023    Procedure: ULTRASOUND, UPPER GI TRACT, ENDOSCOPIC;  Surgeon: Kleber Bolaños MD;  Location: [facility] ([address] [place] [address]);  Service: Endoscopy;  Laterality: N/A;  3/27 - precall attempted, no answer. SG    HYSTERECTOMY  03/08/2023    LAPAROSCOPIC CHOLECYSTECTOMY      LYMPH NODE BIOPSY Bilateral 03/08/2023    Procedure: BIOPSY, PELVIC LYMPH NODE;  Surgeon: Dallas Aguilar MD;  Location: [facility];  Service: OB/GYN;  Laterality: Bilateral;    ROBOTIC HYSTERECTOMY, WITH SALPINGO-OOPHORECTOMY Bilateral 03/08/2023    Procedure: ROBOTIC HYSTERECTOMY,WITH SALPINGO-OOPHORECTOMY;  Surgeon: Dallas Aguilar MD;  Location: [facility] OR;  Service: OB/GYN;  Laterality:  Bilateral;    TOTAL KNEE ARTHROPLASTY Left     2016    TOTAL KNEE ARTHROPLASTY Right     2019    VAGINAL DELIVERY      x 2  (one with epidural)       Family History   Problem Relation Age of Onset    Diabetes Mother     Colon cancer Mother     Breast cancer Mother     Hypertension Brother     Hypertension Sister     Breast cancer Sister     Lung cancer Sister     Breast cancer Maternal Aunt     Colon cancer Maternal Aunt     Ovarian cancer Neg Hx        Social History     Socioeconomic History    Marital status:    Tobacco Use    Smoking status: Never    Smokeless tobacco: Never   Substance and Sexual Activity    Alcohol use: Not Currently    Drug use: Never    Sexual activity: Not Currently     Partners: Male       Review of Systems    Objective:  Physical Exam    Labs:      Imaging Data:      Assessment and plan:    1. Diabetic ketoacidosis without coma associated with type 2 diabetes mellitus    2. Pancreatic mass        Periop FOLFIRINOX?  [facility] given two cancers

## 2023-04-05 NOTE — PLAN OF CARE
START ON PATHWAY REGIMEN - Pancreatic Adenocarcinoma    [id number]        Oxaliplatin (Eloxatin)       Leucovorin       Irinotecan (Camptosar)       Fluorouracil           Additional Orders: The use of growth factor was mandated in some, but   not all mFOLFIRINOX studies; please follow institutional protocol/physician   discretion regarding use of growth factor.    **Always confirm dose/schedule in your pharmacy ordering system**    Patient Characteristics:  Preoperative (Clinical Staging), Borderline Resectable, PS = 0,1, BRCA1/2 and   PALB2 Mutation Absent/Unknown  Therapeutic Status: Preoperative (Clinical Staging)  AJCC T Category: cT2  AJCC N Category: cN0  Resectability Status: Borderline Resectable  AJCC M Category: cM0  AJCC 8 Stage Grouping: IB  ECOG Performance Status: 1  BRCA1/2 Mutation Status: Awaiting Test Results  PALB2 Mutation Status: Awaiting Test Results  Intent of Therapy:  Curative Intent, Discussed with Patient

## 2023-04-05 NOTE — Clinical Note
Hey, she has an early stage, low risk endometrial cancer and will not require any adjuvant therapy.  However, she was recently diagnosed with a pancreatic cancer.  She is doing better than I expected but is obviously overwhelmed.  Thanks for sending.

## 2023-04-06 ENCOUNTER — DOCUMENTATION ONLY (OUTPATIENT)
Dept: HEMATOLOGY/ONCOLOGY | Facility: CLINIC | Age: 68
End: 2023-04-06
Payer: COMMERCIAL

## 2023-04-06 ENCOUNTER — TELEPHONE (OUTPATIENT)
Dept: HEMATOLOGY/ONCOLOGY | Facility: CLINIC | Age: 68
End: 2023-04-06
Payer: COMMERCIAL

## 2023-04-06 LAB
COMMENT: ABNORMAL
FINAL PATHOLOGIC DIAGNOSIS: ABNORMAL
SUPPLEMENTAL DIAGNOSIS: ABNORMAL

## 2023-04-06 NOTE — H&P (VIEW-ONLY)
Encounter Date:  2023    Patient ID: Kaity Masterson  Age:  67 y.o. :  1955    Chief Complaint   Patient presents with    Consult     History:    Ms. Masterson is a 67 y.o. female who presents with pancreatic adenocarcinoma. She has a history of endometrial cancer s/p surgery on 3/8/23. Shortly after surgery she was admitted for DKA. She states that she has always had glycemic control prior to that hospitalization. CT on 3/22/23 demonstrated a 3.4 cm mass centered in the pancreatic head/uncinate process with associated downstream dilatation of the main pancreatic duct with adjacent prominent peripancreatic lymph nodes.  Subsequent EUS on 3/31/23 confirmed a mass and biopsy returned as adenocarcinoma. She states that overall she feels well. 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 She denies N/V/D, change in appetite or activity, SOB, chest pain, jaundice, abd pain, and unintentional weight loss.      Referred by: Dr. King     Family hx of CA  Not currently taking anticoagulation  Never smoked    Data:     Radiology:  Dr. Anderson and I personally reviewed these images:   3/22/23: CT A/P:  Impression:  There is a 3.4 cm mass centered in the pancreatic head/uncinate process with associated downstream dilatation of the main pancreatic duct.  Imaging findings are highly concerning for pancreatic adenocarcinoma.  Adjacent prominent peripancreatic lymph nodes are seen.  There is some stranding of the fat about the SMV for approximately 180°.  The portal vein, SMV and splenic vein are patent.     Fatty infiltration of the liver.     Constipation.    3/21/23: Us Abd:  Impression:  1. Surgically removed gallbladder with no biliary dilatation  2. Fatty liver  3. Dilated pancreatic duct at 3.4 mm with a pancreatic head mass 2.97 x 2.83 x 2.91 cm this is concerning for malignancy soft tissue mass and or adenopathy.  Recommend CT scan 3 phase through the liver      Endoscopy:    3/31/23:  EUS:  Impression:     - A mass was identified in the pancreatic head.                          Fine needle biopsy performed.     Pathology:    3/31/23: EUS:  Final Pathologic Diagnosis  Abnormal   1. Pancreas, head, mass, FNB:   - Positive for malignancy, adenocarcinoma     Labs:      Lab Results   Component Value Date    WBC 4.39 03/22/2023    HGB 10.8 (L) 03/22/2023    HCT 32.5 (L) 03/22/2023    MCV 89 03/22/2023     03/22/2023       Chemistry        Component Value Date/Time     03/22/2023 0557    K 3.7 03/22/2023 0557     (H) 03/22/2023 0557    CO2 15 (L) 03/22/2023 0557    BUN 10 03/22/2023 0557    CREATININE 0.7 03/22/2023 0557     (H) 03/22/2023 0557        Component Value Date/Time    CALCIUM 10.5 03/22/2023 0557    ALKPHOS 78 03/22/2023 0557    AST 22 03/22/2023 0557    ALT 26 03/22/2023 0557    BILITOT 0.4 03/22/2023 0557        Lab Results   Component Value Date    HGBA1C 10.5 (H) 03/21/2023     Past Medical History:   Diagnosis Date    Anemia, unspecified     Hypertension     Osteoporosis     PUD (peptic ulcer disease)     Type 2 diabetes mellitus without complications      Past Surgical History:   Procedure Laterality Date    CHOLECYSTECTOMY      colonsocopy      2017    ENDOSCOPIC ULTRASOUND OF UPPER GASTROINTESTINAL TRACT N/A 3/31/2023    Procedure: ULTRASOUND, UPPER GI TRACT, ENDOSCOPIC;  Surgeon: Kleber Bolaños MD;  Location: [address];  Service: Endoscopy;  Laterality: N/A;  3/27 - precall attempted, no answer. SG    HYSTERECTOMY  03/08/2023    LAPAROSCOPIC CHOLECYSTECTOMY      LYMPH NODE BIOPSY Bilateral 03/08/2023    Procedure: BIOPSY, PELVIC LYMPH NODE;  Surgeon: Dallas Aguilar MD;  Location: [facility];  Service: OB/GYN;  Laterality: Bilateral;    ROBOTIC HYSTERECTOMY, WITH SALPINGO-OOPHORECTOMY Bilateral 03/08/2023    Procedure: ROBOTIC HYSTERECTOMY,WITH SALPINGO-OOPHORECTOMY;  Surgeon: Dallas Aguilar MD;  Location: [facility];  Service: OB/GYN;  Laterality:  "Bilateral;    TOTAL KNEE ARTHROPLASTY Left     2016    TOTAL KNEE ARTHROPLASTY Right     2019    VAGINAL DELIVERY      x 2  (one with epidural)     Current Outpatient Medications on File Prior to Visit   Medication Sig Dispense Refill    acetaminophen (TYLENOL) 650 MG TbSR Take 1 tablet (650 mg total) by mouth every 6 (six) hours. Alternate with ibuprofen so you are taking something every 6 hours 30 tablet 2    alendronate (FOSAMAX) 35 MG tablet Take 35 mg by mouth every 7 days.      aspirin (ECOTRIN) 81 MG EC tablet Take 1 tablet by mouth once daily.      atenoloL (TENORMIN) 50 MG tablet Take 50 mg by mouth once daily. am      famotidine (PEPCID) 20 MG tablet Take 20 mg by mouth 2 (two) times daily.      insulin detemir U-100 (LEVEMIR FLEXTOUCH) 100 unit/mL (3 mL) SubQ InPn pen Inject 14 Units into the skin every evening. 4.2 mL 0    losartan (COZAAR) 100 MG tablet Take 100 mg by mouth.      pramipexole (MIRAPEX) 0.25 MG tablet 0.25 mg every evening.      rosuvastatin (CRESTOR) 10 MG tablet Take 10 mg by mouth.      oxyCODONE-acetaminophen (PERCOCET) 7.5-325 mg per tablet 7.5 tablets.      pen needle, diabetic 31 gauge x 3/16" Ndle 30 each by Misc.(Non-Drug; Combo Route) route every evening. 30 each 0     No current facility-administered medications on file prior to visit.     Review of patient's allergies indicates:  No Known Allergies    Family History:  Her family history includes Breast cancer in her maternal aunt, mother, and sister; Colon cancer in her maternal aunt and mother; Diabetes in her mother; Hypertension in her brother and sister; Lung cancer in her sister.     Social History:   reports that she has never smoked. She has never used smokeless tobacco. She reports that she does not currently use alcohol. She reports that she does not use drugs.     ROS: Pertinent positive/negatives detailed in HPI, all other systems negative.     Physical Exam:  /74 (BP Location: Left arm, Patient Position: " "Sitting)   Pulse 70   Temp 97.9 °F (36.6 °C) (Oral)   Ht 5' 4" (1.626 m)   Wt 70.6 kg (155 lb 9.6 oz)   SpO2 98%   BMI 26.71 kg/m²     Constitutional:  Non-toxic, no acute distress.   Eyes:  Sclerae anicteric, gaze symmetrical  Resp:  Even and unlabored resp  Abd:  Soft, non-tender, no masses, no ascites, no superficial varices  Musculoskeletal:  Ambulatory, normal gait, no muscle wasting  Neuro:  No gross deficits  Psych:  Awake, alert, oriented.  Answers and asks questions appropriately      ICD-10-CM ICD-9-CM    1. Malignant neoplasm of head of pancreas  C25.0 157.0       2. Diabetic ketoacidosis without coma associated with type 2 diabetes mellitus  E11.10 250.12 Ambulatory referral/consult to General Surgery      3. Pancreatic mass  K86.89 577.8 Ambulatory referral/consult to General Surgery        Plan:  Ms. Masterson is a 67 y.o. female who presents with pancreatic adenocarcinoma. She has a history of endometrial cancer s/p surgery on 3/8/23. Shortly after surgery she was admitted for DKA. She states that she has always had glycemic control prior to that hospitalization. CT on 3/22/23 demonstrated a 3.4 cm mass centered in the pancreatic head/uncinate process with associated downstream dilatation of the main pancreatic duct with adjacent prominent peripancreatic lymph nodes.  Subsequent EUS on 3/31/23 confirmed a mass and biopsy returned as adenocarcinoma.She was seen by Dr. Casey prior to today's appt for discussion of chemo. We discussed her imaging. She has some enhancement abutting the SMV- we discussed that she is borderline resectable. Discussed potential for port placement. Will see her back for restaging scans with labs. Questions were asked and answered to patient's satisfaction.  We discussed the need for continued clinical/radiographic/endoscopic follow-up.    Follow up if symptoms worsen or fail to improve, for imaging after finished chemo therapy.    Case discussed with Dr. Anderson.    "     Michelle Pena NP  Upper GI / Hepatobiliary Surgical Oncology  [facility] [place], [place]  Office: [phone]  Fax: [phone]

## 2023-04-06 NOTE — PROGRESS NOTES
Encounter Date:  2023    Patient ID: Kaity Masterson  Age:  67 y.o. :  1955    Chief Complaint   Patient presents with    Consult     History:    Ms. Masterson is a 67 y.o. female who presents with pancreatic adenocarcinoma. She has a history of endometrial cancer s/p surgery on 3/8/23. Shortly after surgery she was admitted for DKA. She states that she has always had glycemic control prior to that hospitalization. CT on 3/22/23 demonstrated a 3.4 cm mass centered in the pancreatic head/uncinate process with associated downstream dilatation of the main pancreatic duct with adjacent prominent peripancreatic lymph nodes.  Subsequent EUS on 3/31/23 confirmed a mass and biopsy returned as adenocarcinoma. She states that overall she feels well. 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 She denies N/V/D, change in appetite or activity, SOB, chest pain, jaundice, abd pain, and unintentional weight loss.      Referred by: Dr. King     Family hx of CA  Not currently taking anticoagulation  Never smoked    Data:     Radiology:  Dr. Anderosn and I personally reviewed these images:   3/22/23: CT A/P:  Impression:  There is a 3.4 cm mass centered in the pancreatic head/uncinate process with associated downstream dilatation of the main pancreatic duct.  Imaging findings are highly concerning for pancreatic adenocarcinoma.  Adjacent prominent peripancreatic lymph nodes are seen.  There is some stranding of the fat about the SMV for approximately 180°.  The portal vein, SMV and splenic vein are patent.     Fatty infiltration of the liver.     Constipation.    3/21/23: Us Abd:  Impression:  1. Surgically removed gallbladder with no biliary dilatation  2. Fatty liver  3. Dilated pancreatic duct at 3.4 mm with a pancreatic head mass 2.97 x 2.83 x 2.91 cm this is concerning for malignancy soft tissue mass and or adenopathy.  Recommend CT scan 3 phase through the liver      Endoscopy:    3/31/23:  EUS:  Impression:     - A mass was identified in the pancreatic head.                          Fine needle biopsy performed.     Pathology:    3/31/23: EUS:  Final Pathologic Diagnosis  Abnormal   1. Pancreas, head, mass, FNB:   - Positive for malignancy, adenocarcinoma     Labs:      Lab Results   Component Value Date    WBC 4.39 03/22/2023    HGB 10.8 (L) 03/22/2023    HCT 32.5 (L) 03/22/2023    MCV 89 03/22/2023     03/22/2023       Chemistry        Component Value Date/Time     03/22/2023 0557    K 3.7 03/22/2023 0557     (H) 03/22/2023 0557    CO2 15 (L) 03/22/2023 0557    BUN 10 03/22/2023 0557    CREATININE 0.7 03/22/2023 0557     (H) 03/22/2023 0557        Component Value Date/Time    CALCIUM 10.5 03/22/2023 0557    ALKPHOS 78 03/22/2023 0557    AST 22 03/22/2023 0557    ALT 26 03/22/2023 0557    BILITOT 0.4 03/22/2023 0557        Lab Results   Component Value Date    HGBA1C 10.5 (H) 03/21/2023     Past Medical History:   Diagnosis Date    Anemia, unspecified     Hypertension     Osteoporosis     PUD (peptic ulcer disease)     Type 2 diabetes mellitus without complications      Past Surgical History:   Procedure Laterality Date    CHOLECYSTECTOMY      colonsocopy      2017    ENDOSCOPIC ULTRASOUND OF UPPER GASTROINTESTINAL TRACT N/A 3/31/2023    Procedure: ULTRASOUND, UPPER GI TRACT, ENDOSCOPIC;  Surgeon: Kleber Bolaños MD;  Location: [address];  Service: Endoscopy;  Laterality: N/A;  3/27 - precall attempted, no answer. SG    HYSTERECTOMY  03/08/2023    LAPAROSCOPIC CHOLECYSTECTOMY      LYMPH NODE BIOPSY Bilateral 03/08/2023    Procedure: BIOPSY, PELVIC LYMPH NODE;  Surgeon: Dallas Aguilar MD;  Location: [facility];  Service: OB/GYN;  Laterality: Bilateral;    ROBOTIC HYSTERECTOMY, WITH SALPINGO-OOPHORECTOMY Bilateral 03/08/2023    Procedure: ROBOTIC HYSTERECTOMY,WITH SALPINGO-OOPHORECTOMY;  Surgeon: Dallas Aguilar MD;  Location: [facility];  Service: OB/GYN;  Laterality:  "Bilateral;    TOTAL KNEE ARTHROPLASTY Left     2016    TOTAL KNEE ARTHROPLASTY Right     2019    VAGINAL DELIVERY      x 2  (one with epidural)     Current Outpatient Medications on File Prior to Visit   Medication Sig Dispense Refill    acetaminophen (TYLENOL) 650 MG TbSR Take 1 tablet (650 mg total) by mouth every 6 (six) hours. Alternate with ibuprofen so you are taking something every 6 hours 30 tablet 2    alendronate (FOSAMAX) 35 MG tablet Take 35 mg by mouth every 7 days.      aspirin (ECOTRIN) 81 MG EC tablet Take 1 tablet by mouth once daily.      atenoloL (TENORMIN) 50 MG tablet Take 50 mg by mouth once daily. am      famotidine (PEPCID) 20 MG tablet Take 20 mg by mouth 2 (two) times daily.      insulin detemir U-100 (LEVEMIR FLEXTOUCH) 100 unit/mL (3 mL) SubQ InPn pen Inject 14 Units into the skin every evening. 4.2 mL 0    losartan (COZAAR) 100 MG tablet Take 100 mg by mouth.      pramipexole (MIRAPEX) 0.25 MG tablet 0.25 mg every evening.      rosuvastatin (CRESTOR) 10 MG tablet Take 10 mg by mouth.      oxyCODONE-acetaminophen (PERCOCET) 7.5-325 mg per tablet 7.5 tablets.      pen needle, diabetic 31 gauge x 3/16" Ndle 30 each by Misc.(Non-Drug; Combo Route) route every evening. 30 each 0     No current facility-administered medications on file prior to visit.     Review of patient's allergies indicates:  No Known Allergies    Family History:  Her family history includes Breast cancer in her maternal aunt, mother, and sister; Colon cancer in her maternal aunt and mother; Diabetes in her mother; Hypertension in her brother and sister; Lung cancer in her sister.     Social History:   reports that she has never smoked. She has never used smokeless tobacco. She reports that she does not currently use alcohol. She reports that she does not use drugs.     ROS: Pertinent positive/negatives detailed in HPI, all other systems negative.     Physical Exam:  /74 (BP Location: Left arm, Patient Position: " "Sitting)   Pulse 70   Temp 97.9 °F (36.6 °C) (Oral)   Ht 5' 4" (1.626 m)   Wt 70.6 kg (155 lb 9.6 oz)   SpO2 98%   BMI 26.71 kg/m²     Constitutional:  Non-toxic, no acute distress.   Eyes:  Sclerae anicteric, gaze symmetrical  Resp:  Even and unlabored resp  Abd:  Soft, non-tender, no masses, no ascites, no superficial varices  Musculoskeletal:  Ambulatory, normal gait, no muscle wasting  Neuro:  No gross deficits  Psych:  Awake, alert, oriented.  Answers and asks questions appropriately      ICD-10-CM ICD-9-CM    1. Malignant neoplasm of head of pancreas  C25.0 157.0       2. Diabetic ketoacidosis without coma associated with type 2 diabetes mellitus  E11.10 250.12 Ambulatory referral/consult to General Surgery      3. Pancreatic mass  K86.89 577.8 Ambulatory referral/consult to General Surgery        Plan:  Ms. Masterson is a 67 y.o. female who presents with pancreatic adenocarcinoma. She has a history of endometrial cancer s/p surgery on 3/8/23. Shortly after surgery she was admitted for DKA. She states that she has always had glycemic control prior to that hospitalization. CT on 3/22/23 demonstrated a 3.4 cm mass centered in the pancreatic head/uncinate process with associated downstream dilatation of the main pancreatic duct with adjacent prominent peripancreatic lymph nodes.  Subsequent EUS on 3/31/23 confirmed a mass and biopsy returned as adenocarcinoma.She was seen by Dr. Casey prior to today's appt for discussion of chemo. We discussed her imaging. She has some enhancement abutting the SMV- we discussed that she is borderline resectable. Discussed potential for port placement. Will see her back for restaging scans with labs. Questions were asked and answered to patient's satisfaction.  We discussed the need for continued clinical/radiographic/endoscopic follow-up.    Follow up if symptoms worsen or fail to improve, for imaging after finished chemo therapy.    Case discussed with Dr. Anderson.    "     Michelle Pena NP  Upper GI / Hepatobiliary Surgical Oncology  [facility] [place], [place]  Office: [phone]  Fax: [phone]

## 2023-04-06 NOTE — PROGRESS NOTES
Received message from Dr Silva with pathology. Pancreatic adenocarcinoma is MMR-proficient. [facility] sent out.

## 2023-04-10 ENCOUNTER — PATIENT MESSAGE (OUTPATIENT)
Dept: HEMATOLOGY/ONCOLOGY | Facility: CLINIC | Age: 68
End: 2023-04-10
Payer: COMMERCIAL

## 2023-04-11 ENCOUNTER — TELEPHONE (OUTPATIENT)
Dept: SURGERY | Facility: CLINIC | Age: 68
End: 2023-04-11
Payer: COMMERCIAL

## 2023-04-11 DIAGNOSIS — C25.0 MALIGNANT NEOPLASM OF HEAD OF PANCREAS: Primary | ICD-10-CM

## 2023-04-11 LAB
ONEOME COMMENT: NORMAL
ONEOME METHOD: NORMAL

## 2023-04-11 NOTE — TELEPHONE ENCOUNTER
Placed call to pt daughter in law who is her care giver and informed her pt port placement has been larissa for Thursday 4/20/23 and a RN will give her a call 1-2 days before sx date for pt sx time.   Informed pt to be at the surgery center at the hospital  on  [address] [facility] 2nd floor 2 hours prior to her surgery time     Surgery instruction provided:   Do not to eat or drink after midnight. No chewing gum or sucking candy   PAT Nurse will give pt a call to go thru pt medications list the day before sx date  Take a shower the night before and the morning of the surgery day with antibacterial soap and not to apply powder, deodorant and body lotion after shower  Will need someone to take her to the hospital and drive her home after the surgery. pt daughter states she will be with the pt  Leave all jewelries and valuable belongings at home.  Remove all body piercing, denture and contact lens  before surgery   Patient can bring their cell phone.  Wear button front clothing/lose clothing  Post operative instruction given.  Keep dressing/wound clean and dry.  Avoid any activities that require pulling, pushing, raising arm overhead, and heavy lifting. Avoid strenuous and vigorous activities.   Pt daughter in law questions and concerns were addressed. Informed her if she has further questions and concerns to call our office. Telephone number provided

## 2023-04-14 DIAGNOSIS — C25.9 PANCREATIC ADENOCARCINOMA: ICD-10-CM

## 2023-04-14 DIAGNOSIS — C25.0 MALIGNANT NEOPLASM OF HEAD OF PANCREAS: Primary | ICD-10-CM

## 2023-04-14 RX ORDER — CEFAZOLIN SODIUM 2 G/50ML
2 SOLUTION INTRAVENOUS
Status: CANCELLED | OUTPATIENT
Start: 2023-04-14

## 2023-04-19 ENCOUNTER — PATIENT MESSAGE (OUTPATIENT)
Dept: HEMATOLOGY/ONCOLOGY | Facility: CLINIC | Age: 68
End: 2023-04-19
Payer: COMMERCIAL

## 2023-04-19 ENCOUNTER — TELEPHONE (OUTPATIENT)
Dept: SURGERY | Facility: CLINIC | Age: 68
End: 2023-04-19
Payer: COMMERCIAL

## 2023-04-19 ENCOUNTER — ANESTHESIA EVENT (OUTPATIENT)
Dept: SURGERY | Facility: HOSPITAL | Age: 68
End: 2023-04-19
Payer: COMMERCIAL

## 2023-04-19 DIAGNOSIS — C25.0 MALIGNANT NEOPLASM OF HEAD OF PANCREAS: Primary | ICD-10-CM

## 2023-04-19 NOTE — TELEPHONE ENCOUNTER
Placed call to pt daughter- in- law and informed her pt sx is confirmed for tomorrow @ 7 am and pt has to be at the hospital @ 5 am to check in. Facility address provided  Informed her PAT nurse will give pt a call to go thru pt medications list. Pt daughter- in-law states she is aware of the pre op instructions that was given to her last week.   She states they will check in at [facility] this afternoon.   Informed her if she has further questions to call our office. Telephone number provided.   Pt daughter-in-law verbalized understanding all of the above

## 2023-04-20 ENCOUNTER — HOSPITAL ENCOUNTER (OUTPATIENT)
Facility: HOSPITAL | Age: 68
Discharge: HOME | End: 2023-04-20
Attending: STUDENT IN AN ORGANIZED HEALTH CARE EDUCATION/TRAINING PROGRAM | Admitting: STUDENT IN AN ORGANIZED HEALTH CARE EDUCATION/TRAINING PROGRAM
Payer: COMMERCIAL

## 2023-04-20 ENCOUNTER — ANESTHESIA (OUTPATIENT)
Dept: SURGERY | Facility: HOSPITAL | Age: 68
End: 2023-04-20
Payer: COMMERCIAL

## 2023-04-20 VITALS
TEMPERATURE: 98 F | WEIGHT: 150 LBS | RESPIRATION RATE: 18 BRPM | BODY MASS INDEX: 25.61 KG/M2 | DIASTOLIC BLOOD PRESSURE: 70 MMHG | HEART RATE: 85 BPM | OXYGEN SATURATION: 100 % | HEIGHT: 64 IN | SYSTOLIC BLOOD PRESSURE: 121 MMHG

## 2023-04-20 DIAGNOSIS — C25.0 MALIGNANT NEOPLASM OF HEAD OF PANCREAS: ICD-10-CM

## 2023-04-20 DIAGNOSIS — C25.9 PANCREATIC ADENOCARCINOMA: Primary | ICD-10-CM

## 2023-04-20 LAB
POCT GLUCOSE: 95 MG/DL (ref 70–110)
POCT GLUCOSE: 97 MG/DL (ref 70–110)

## 2023-04-20 PROCEDURE — 82962 GLUCOSE BLOOD TEST: CPT | Performed by: STUDENT IN AN ORGANIZED HEALTH CARE EDUCATION/TRAINING PROGRAM

## 2023-04-20 PROCEDURE — 77001 CHG FLUOROGUIDE CNTRL VEN ACCESS,PLACE,REPLACE,REMOVE: ICD-10-PCS | Mod: 26,,, | Performed by: STUDENT IN AN ORGANIZED HEALTH CARE EDUCATION/TRAINING PROGRAM

## 2023-04-20 PROCEDURE — D9220A PRA ANESTHESIA: Mod: ANES,,, | Performed by: ANESTHESIOLOGY

## 2023-04-20 PROCEDURE — 63600175 PHARM REV CODE 636 W HCPCS: Performed by: STUDENT IN AN ORGANIZED HEALTH CARE EDUCATION/TRAINING PROGRAM

## 2023-04-20 PROCEDURE — 36000706: Performed by: STUDENT IN AN ORGANIZED HEALTH CARE EDUCATION/TRAINING PROGRAM

## 2023-04-20 PROCEDURE — 25000003 PHARM REV CODE 250: Performed by: NURSE ANESTHETIST, CERTIFIED REGISTERED

## 2023-04-20 PROCEDURE — 77001 FLUOROGUIDE FOR VEIN DEVICE: CPT | Mod: 26,,, | Performed by: STUDENT IN AN ORGANIZED HEALTH CARE EDUCATION/TRAINING PROGRAM

## 2023-04-20 PROCEDURE — 71000044 HC DOSC ROUTINE RECOVERY FIRST HOUR: Performed by: STUDENT IN AN ORGANIZED HEALTH CARE EDUCATION/TRAINING PROGRAM

## 2023-04-20 PROCEDURE — 71000016 HC POSTOP RECOV ADDL HR: Performed by: STUDENT IN AN ORGANIZED HEALTH CARE EDUCATION/TRAINING PROGRAM

## 2023-04-20 PROCEDURE — 37000009 HC ANESTHESIA EA ADD 15 MINS: Performed by: STUDENT IN AN ORGANIZED HEALTH CARE EDUCATION/TRAINING PROGRAM

## 2023-04-20 PROCEDURE — 36561 INSERT TUNNELED CV CATH: CPT | Mod: RT,,, | Performed by: STUDENT IN AN ORGANIZED HEALTH CARE EDUCATION/TRAINING PROGRAM

## 2023-04-20 PROCEDURE — D9220A PRA ANESTHESIA: Mod: CRNA,,, | Performed by: NURSE ANESTHETIST, CERTIFIED REGISTERED

## 2023-04-20 PROCEDURE — 71000015 HC POSTOP RECOV 1ST HR: Performed by: STUDENT IN AN ORGANIZED HEALTH CARE EDUCATION/TRAINING PROGRAM

## 2023-04-20 PROCEDURE — C1788 PORT, INDWELLING, IMP: HCPCS | Performed by: STUDENT IN AN ORGANIZED HEALTH CARE EDUCATION/TRAINING PROGRAM

## 2023-04-20 PROCEDURE — 63600175 PHARM REV CODE 636 W HCPCS

## 2023-04-20 PROCEDURE — 63600175 PHARM REV CODE 636 W HCPCS: Performed by: NURSE ANESTHETIST, CERTIFIED REGISTERED

## 2023-04-20 PROCEDURE — 25000003 PHARM REV CODE 250

## 2023-04-20 PROCEDURE — 36000707: Performed by: STUDENT IN AN ORGANIZED HEALTH CARE EDUCATION/TRAINING PROGRAM

## 2023-04-20 PROCEDURE — D9220A PRA ANESTHESIA: ICD-10-PCS | Mod: ANES,,, | Performed by: ANESTHESIOLOGY

## 2023-04-20 PROCEDURE — 37000008 HC ANESTHESIA 1ST 15 MINUTES: Performed by: STUDENT IN AN ORGANIZED HEALTH CARE EDUCATION/TRAINING PROGRAM

## 2023-04-20 PROCEDURE — 36561 PR INSERT TUNNELED CV CATH WITH PORT: ICD-10-PCS | Mod: RT,,, | Performed by: STUDENT IN AN ORGANIZED HEALTH CARE EDUCATION/TRAINING PROGRAM

## 2023-04-20 PROCEDURE — D9220A PRA ANESTHESIA: ICD-10-PCS | Mod: CRNA,,, | Performed by: NURSE ANESTHETIST, CERTIFIED REGISTERED

## 2023-04-20 PROCEDURE — 25000003 PHARM REV CODE 250: Performed by: STUDENT IN AN ORGANIZED HEALTH CARE EDUCATION/TRAINING PROGRAM

## 2023-04-20 DEVICE — KIT POWERPORT SINGLE 8FR: Type: IMPLANTABLE DEVICE | Site: CHEST | Status: FUNCTIONAL

## 2023-04-20 RX ORDER — PROPOFOL 10 MG/ML
VIAL (ML) INTRAVENOUS
Status: DISCONTINUED | OUTPATIENT
Start: 2023-04-20 | End: 2023-04-20

## 2023-04-20 RX ORDER — LIDOCAINE HYDROCHLORIDE 10 MG/ML
1 INJECTION, SOLUTION EPIDURAL; INFILTRATION; INTRACAUDAL; PERINEURAL ONCE AS NEEDED
Status: COMPLETED | OUTPATIENT
Start: 2023-04-20 | End: 2023-04-20

## 2023-04-20 RX ORDER — HYDROMORPHONE HYDROCHLORIDE 1 MG/ML
0.2 INJECTION, SOLUTION INTRAMUSCULAR; INTRAVENOUS; SUBCUTANEOUS EVERY 5 MIN PRN
Status: DISCONTINUED | OUTPATIENT
Start: 2023-04-20 | End: 2023-04-20 | Stop reason: HOSPADM

## 2023-04-20 RX ORDER — BUPIVACAINE HYDROCHLORIDE 2.5 MG/ML
INJECTION, SOLUTION EPIDURAL; INFILTRATION; INTRACAUDAL
Status: DISCONTINUED | OUTPATIENT
Start: 2023-04-20 | End: 2023-04-20 | Stop reason: HOSPADM

## 2023-04-20 RX ORDER — HALOPERIDOL 5 MG/ML
0.5 INJECTION INTRAMUSCULAR EVERY 10 MIN PRN
Status: DISCONTINUED | OUTPATIENT
Start: 2023-04-20 | End: 2023-04-20 | Stop reason: HOSPADM

## 2023-04-20 RX ORDER — ONDANSETRON 2 MG/ML
4 INJECTION INTRAMUSCULAR; INTRAVENOUS DAILY PRN
Status: DISCONTINUED | OUTPATIENT
Start: 2023-04-20 | End: 2023-04-20 | Stop reason: HOSPADM

## 2023-04-20 RX ORDER — OXYCODONE HYDROCHLORIDE 5 MG/1
5 TABLET ORAL ONCE
Status: DISCONTINUED | OUTPATIENT
Start: 2023-04-20 | End: 2023-04-20 | Stop reason: HOSPADM

## 2023-04-20 RX ORDER — PROPOFOL 10 MG/ML
VIAL (ML) INTRAVENOUS CONTINUOUS PRN
Status: DISCONTINUED | OUTPATIENT
Start: 2023-04-20 | End: 2023-04-20

## 2023-04-20 RX ORDER — HEPARIN 100 UNIT/ML
SYRINGE INTRAVENOUS
Status: DISCONTINUED | OUTPATIENT
Start: 2023-04-20 | End: 2023-04-20 | Stop reason: HOSPADM

## 2023-04-20 RX ORDER — DEXMEDETOMIDINE HYDROCHLORIDE 100 UG/ML
INJECTION, SOLUTION INTRAVENOUS
Status: DISCONTINUED | OUTPATIENT
Start: 2023-04-20 | End: 2023-04-20

## 2023-04-20 RX ORDER — OXYCODONE HYDROCHLORIDE 5 MG/1
5 TABLET ORAL EVERY 4 HOURS PRN
Qty: 10 TABLET | Refills: 0 | Status: ON HOLD | OUTPATIENT
Start: 2023-04-20 | End: 2023-06-08 | Stop reason: SDUPTHER

## 2023-04-20 RX ORDER — MIDAZOLAM HYDROCHLORIDE 1 MG/ML
INJECTION, SOLUTION INTRAMUSCULAR; INTRAVENOUS
Status: DISCONTINUED | OUTPATIENT
Start: 2023-04-20 | End: 2023-04-20

## 2023-04-20 RX ORDER — FENTANYL CITRATE 50 UG/ML
INJECTION, SOLUTION INTRAMUSCULAR; INTRAVENOUS
Status: DISCONTINUED | OUTPATIENT
Start: 2023-04-20 | End: 2023-04-20

## 2023-04-20 RX ORDER — SODIUM CHLORIDE 9 MG/ML
INJECTION, SOLUTION INTRAVENOUS CONTINUOUS
Status: DISCONTINUED | OUTPATIENT
Start: 2023-04-20 | End: 2023-04-20 | Stop reason: HOSPADM

## 2023-04-20 RX ADMIN — DEXMEDETOMIDINE HYDROCHLORIDE 8 MCG: 100 INJECTION, SOLUTION INTRAVENOUS at 07:04

## 2023-04-20 RX ADMIN — SODIUM CHLORIDE: 9 INJECTION, SOLUTION INTRAVENOUS at 07:04

## 2023-04-20 RX ADMIN — MIDAZOLAM HYDROCHLORIDE 1 MG: 1 INJECTION, SOLUTION INTRAMUSCULAR; INTRAVENOUS at 07:04

## 2023-04-20 RX ADMIN — CEFAZOLIN 2 G: 2 INJECTION, POWDER, FOR SOLUTION INTRAMUSCULAR; INTRAVENOUS at 07:04

## 2023-04-20 RX ADMIN — Medication 100 MCG/KG/MIN: at 07:04

## 2023-04-20 RX ADMIN — SODIUM CHLORIDE: 9 INJECTION, SOLUTION INTRAVENOUS at 06:04

## 2023-04-20 RX ADMIN — FENTANYL CITRATE 50 MCG: 50 INJECTION, SOLUTION INTRAMUSCULAR; INTRAVENOUS at 07:04

## 2023-04-20 RX ADMIN — PROPOFOL 40 MG: 10 INJECTION, EMULSION INTRAVENOUS at 07:04

## 2023-04-20 RX ADMIN — LIDOCAINE HYDROCHLORIDE 1 MG: 10 INJECTION, SOLUTION EPIDURAL; INFILTRATION; INTRACAUDAL; PERINEURAL at 06:04

## 2023-04-20 NOTE — ANESTHESIA PREPROCEDURE EVALUATION
04/20/2023  Kaity Masterson is a 67 y.o., female.  Pre-operative evaluation for INSERTION, SINGLE LUMEN CATHETER WITH PORT, WITH FLUOROSCOPIC GUIDANCE (N/A)    Chief Complaint:pancreatic mass    PMH:  HTN, T2DM, pathologic T1a endometrial cancer s/p surgery on 3/8/23, PUD, fatty liver, osteoporosis, who presents today for further management of pancreatic adenocarcinoma. She was admitted to ICU for DKA in March 2023. CT A/P 3/22/23 showed  a 3.4 cm mass centered in the pancreatic head/uncinate process  Past Surgical History:   Procedure Laterality Date    CHOLECYSTECTOMY      colonsocopy      2017    ENDOSCOPIC ULTRASOUND OF UPPER GASTROINTESTINAL TRACT N/A 3/31/2023    Procedure: ULTRASOUND, UPPER GI TRACT, ENDOSCOPIC;  Surgeon: Kleber Bolaños MD;  Location: [facility] ([address] [place] [address]);  Service: Endoscopy;  Laterality: N/A;  3/27 - precall attempted, no answer. SG    HYSTERECTOMY  03/08/2023    LAPAROSCOPIC CHOLECYSTECTOMY      LYMPH NODE BIOPSY Bilateral 03/08/2023    Procedure: BIOPSY, PELVIC LYMPH NODE;  Surgeon: Dallas Aguilar MD;  Location: [facility];  Service: OB/GYN;  Laterality: Bilateral;    ROBOTIC HYSTERECTOMY, WITH SALPINGO-OOPHORECTOMY Bilateral 03/08/2023    Procedure: ROBOTIC HYSTERECTOMY,WITH SALPINGO-OOPHORECTOMY;  Surgeon: Dallas Aguilar MD;  Location: [facility];  Service: OB/GYN;  Laterality: Bilateral;    TOTAL KNEE ARTHROPLASTY Left     2016    TOTAL KNEE ARTHROPLASTY Right     2019    VAGINAL DELIVERY      x 2  (one with epidural)         Vital Signs Range (Last 24H):  Temp:  [36.7 °C (98.1 °F)]   Pulse:  [76]   Resp:  [20]   BP: (181)/(86)   SpO2:  [99 %]       CBC:     Recent Labs   Lab 03/22/23  0557   WBC 4.39   RBC 3.65*   HGB 10.8*   HCT 32.5*      MCV 89   MCH 29.6   MCHC 33.2       CMP:   Recent Labs   Lab 03/21/23  0817 03/22/23  0557    138   K 3.6 3.7   * 112*    CO2 16* 15*   BUN 15 10   CREATININE 0.8 0.7   * 226*   MG  --  1.8   PHOS 1.1* 3.0   CALCIUM 10.5 10.5   ALBUMIN  --  2.7*   PROT  --  5.6*   ALKPHOS  --  78   ALT  --  26   AST  --  22   BILITOT  --  0.4       INR:  No results for input(s): PT, INR, PROTIME, APTT in the last 720 hours.      Diagnostic Studies:      EKD Echo:    Pre-op Assessment    I have reviewed the Patient Summary Reports.     I have reviewed the Nursing Notes. I have reviewed the NPO Status.   I have reviewed the Medications.     Review of Systems  Anesthesia Hx:  No problems with previous Anesthesia    Neurological:  Neurology Normal        Physical Exam  General: Well nourished, Cooperative, Alert and Oriented    Airway:  Mallampati: II   Mouth Opening: Normal  TM Distance: Normal  Tongue: Normal  Neck ROM: Normal ROM    Dental:  Intact    Chest/Lungs:  Normal Respiratory Rate        Anesthesia Plan  Type of Anesthesia, risks & benefits discussed:    Anesthesia Type: Gen ETT, Gen Supraglottic Airway, Gen Natural Airway  Intra-op Monitoring Plan: Standard ASA Monitors  Post Op Pain Control Plan: multimodal analgesia  Induction:  IV  Airway Plan: Direct and Video  Informed Consent: Informed consent signed with the Patient and all parties understand the risks and agree with anesthesia plan.  All questions answered.   ASA Score: 3  Day of Surgery Review of History & Physical: H&P Update referred to the surgeon/provider.    Ready For Surgery From Anesthesia Perspective.     .

## 2023-04-20 NOTE — TRANSFER OF CARE
"Anesthesia Transfer of Care Note    Patient: Kaity Masterson    Procedure(s) Performed: Procedure(s) (LRB):  INSERTION, SINGLE LUMEN CATHETER WITH PORT, WITH FLUOROSCOPIC GUIDANCE (N/A)    Patient location: [facility]    Anesthesia Type: general    Transport from OR: Transported from OR on 6-10 L/min O2 by face mask with adequate spontaneous ventilation    Post pain: adequate analgesia    Post assessment: no apparent anesthetic complications    Post vital signs: stable    Level of consciousness: sedated    Nausea/Vomiting: no nausea/vomiting    Complications: none    Transfer of care protocol was followed      Last vitals:   Visit Vitals  /72   Pulse 76   Temp 36.7 °C (98.1 °F) (Oral)   Resp 20   Ht 5' 4" (1.626 m)   Wt 68 kg (150 lb)   SpO2 99%   Breastfeeding No   BMI 25.75 kg/m²     "

## 2023-04-20 NOTE — OP NOTE
[facility]  Surgical Oncology  Operative Note           Date of Procedure: 4/20/2023   Time: 0730    Procedure: Procedure(s) (LRB):  INSERTION, SINGLE LUMEN CATHETER WITH PORT, WITH FLUOROSCOPIC GUIDANCE (N/A)     Surgeon(s) and Role:     * Philip Anderson Jr., MD - Primary     * Keena Lai MD - Resident - Assisting     * Carlos De Los Santos MD - Resident - Chief      Pre-Operative Diagnosis: Malignant neoplasm of head of pancreas [C25.0]  Pancreas ductal adenocarcinoma, head    Post-Operative Diagnosis:   Same    Anesthesia: Local MAC    Procedure:  Port-a-cath placement, right internal jugular vein  Fluoroscopic guidance for central venous access device placement    Operative Findings:   Right internal jugular vein identified and found to be compressible  Right internal jugular access obtained with ultrasound guidance   Insertion and final placement of port and attached catheter confirmed with fluoroscopic guidance          Indications:  Kaity Masterson is a 67 y.o. year old female with head of pancreas ductal adenocarcinoma who presents today for port-a-cath placement.     Risks and benefits were reviewed including bleeding, infection, pain, scar, damage to surrounding structures, cardiovascular and pulmonary complications, pneumothorax, damage to major vascular structures, need for additional procedures, death, and imponderables.  She expressed understanding and gave informed consent to proceed.    Procedure in Detail:  After informed consent was obtained and the patient was properly identified, the patient was taken to the operating room and placed in a supine position.  After the uneventful induction of MAC anesthesia, a shoulder roll was placed and the patient's bilateral neck and chest were prepped and draped in a standard surgical fashion. A timeout was performed according to the [facility] Guidelines. The patient was placed in Trendelenburg position and an ultrasound was used  to identify the right internal jugular vein as a compressible, nonpulsatile structure lateral to the carotid artery.  Local anesthetic was injected at the planned access site and using intraoperative ultrasound guidance, the right internal jugular vein was accessed with the finder needle without incident with the aspiration of dark red, nonpulsatile blood.  The syringe was removed the guidewire was advanced slowly into the vessel without incident.  The needle was removed and the ultrasound was then used to identify the course of the guidewire, confirming its location within the lumen of the right internal jugular vein.  The guidewire was secured to the drape and attention was then turned to the right chest.    After injection of local anesthetic, a 3 cm incision was made on the right chest inferior to the clavicle and sharp dissection was carried down with Bovie electrocautery to the pectoralis fascia.  A pocket for the port device was created inferiorly along the pectoralis fascia.  Once this was complete and hemostasis was confirmed, the port catheter was attached to the port device and locked into place. The port was flushed and secured within the port pocket with 3-0 Prolene suture. The other end of the port catheter was attached to the tunneling device and the catheter was tunneled retrograde from the port pocket, over the top of the clavicle, to the jugular venous access site.  Once this was accomplished the course of the catheter was measured on the chest with fluoroscopic assistance so the catheter tip would sit at the superior vena cava/right atrial junction. The catheter was cut to the appropriate length and attention was turned back to the guidewire.  Using the Seldinger technique, the dilator was passed over the wire without incident.  The dilator was then placed within the sheath and this was placed over the guidewire into the right internal jugular vein using fluoroscopic guidance without complication.   Once placement of the sheath was confirmed with fluoroscopy, the guidewire and internal dilator were removed leaving the sheath in place.  At this time, the port catheter was advanced through the sheath into the right internal jugular vein under under direct fluoroscopic visualization.  The sheath was then torn away leaving the catheter in place and the distal tip was positioned at the junction of the superior vena cava and right atrium with fluoroscopic guidance. It was aspirated demonstrating steady return of blood and flushed with normal saline without resistance. Heparinized saline was administered into the port as a final flush.    Fluoroscopy was used to confirm the location of the distal port catheter tip at the SVC/right atrial junction, a smooth course of the catheter and the proper location of the port device on the right chest wall. The port pocket was closed in two layers and the venous access site was closed with a single interrupted 4-0 Monocryl. Dermabond was used for sterile dressing.      All lap, needle, and sponge counts were correct x2. The patient was awakened from anesthesia and taken to the recovery room in stable condition. A postoperative chest x-ray will be taken in the recovery room.    Implants:   Implant Name Type Inv. Item Serial No.  Lot No. LRB No. Used Action   KIT POWERPORT SINGLE 8FR - [id number]  KIT POWERPORT SINGLE 8FR  [employer] [id number] 1 Implanted       Drains: None    Estimated Blood Loss (EBL):  Minimal    Specimens:   Specimen (24h ago, onward)      None                    Condition: Good    Disposition: PACU - hemodynamically stable.    Attestation: I was present for the entire procedure.             Philip Anderson Jr, MD      Surgical Oncology      4/20/2023, 8:56 AM      Portions of the record were created with [employer] Fluency Direct voice recognition software. This may lead to occasional typographical errors due to the inherent limitations of the  software. Read the chart carefully and recognize, using context, where substitutions have occurred. Please do not hesitate to contact me directly if clarification is needed.

## 2023-04-20 NOTE — ANESTHESIA POSTPROCEDURE EVALUATION
Anesthesia Post Evaluation    Patient: Kaity Masterson    Procedure(s) Performed: Procedure(s) (LRB):  INSERTION, SINGLE LUMEN CATHETER WITH PORT, WITH FLUOROSCOPIC GUIDANCE (N/A)    Final Anesthesia Type: general      Patient location during evaluation: PACU  Patient participation: Yes- Able to Participate  Level of consciousness: awake and alert and oriented  Post-procedure vital signs: reviewed and stable  Pain management: adequate  Airway patency: patent    PONV status at discharge: No PONV  Anesthetic complications: no      Cardiovascular status: hemodynamically stable  Respiratory status: unassisted, spontaneous ventilation and room air  Hydration status: euvolemic  Follow-up not needed.          Vitals Value Taken Time   /70 04/20/23 0946   Temp 36.2 °C (97.2 °F) 04/20/23 0830   Pulse 86 04/20/23 0948   Resp 16 04/20/23 0930   SpO2 100 % 04/20/23 0948   Vitals shown include unvalidated device data.      No case tracking events are documented in the log.      Pain/Carlos Score: Carlos Score: 10 (4/20/2023  8:56 AM)

## 2023-04-20 NOTE — BRIEF OP NOTE
[facility] - Surgery (2nd Fl)  Brief Operative Note    Surgery Date: 4/20/2023     Surgeon(s) and Role:     * Philip Anderson Jr., MD - Primary     * Keena Lai MD - Resident - Assisting     * Carlos De Los Santos MD - Resident - Chief        Pre-op Diagnosis:  Malignant neoplasm of head of pancreas [C25.0]    Post-op Diagnosis:  Post-Op Diagnosis Codes:     * Malignant neoplasm of head of pancreas [C25.0]    Procedure(s) (LRB):  INSERTION, SINGLE LUMEN CATHETER WITH PORT, WITH FLUOROSCOPIC GUIDANCE (N/A)    Anesthesia: Local MAC    Operative Findings: R IJ port placed under fluoro uneventfully    Estimated Blood Loss:          Specimens:   Specimen (24h ago, onward)      None              Discharge Note    OUTCOME: Patient tolerated treatment/procedure well without complication and is now ready for discharge.    DISPOSITION: Home or Self Care    FINAL DIAGNOSIS:   Malignant neoplasm of head of pancreas [C25.0]    FOLLOWUP: In clinic    DISCHARGE INSTRUCTIONS:    Discharge Procedure Orders   Diet Adult Regular     Lifting restrictions   Order Comments: No lifting greater than 10 pounds for 6 weeks from day of surgery.  No pushing/pulling such as vacuuming or raking.  No straining, avoid constipation and take stool softeners as described and laxatives as needed.  No driving while on narcotics and until you can react quickly without pain.     No dressing needed   Order Comments: WOUND CARE  You have skin glue over your incision(s)  This will slowly flake away in about 10 days  It is okay to shower starting the day after surgery  Can pat your incision dry  Please do not scrub hard over your incisions  Please do not pick the glue off or it will reopen the wound     Notify your health care provider if you experience any of the following:  temperature >100.4     Notify your health care provider if you experience any of the following:  severe uncontrolled pain     Notify your health care provider if you experience any  of the following:  redness, tenderness, or signs of infection (pain, swelling, redness, odor or green/yellow discharge around incision site)

## 2023-04-21 ENCOUNTER — OFFICE VISIT (OUTPATIENT)
Dept: HEMATOLOGY/ONCOLOGY | Facility: CLINIC | Age: 68
End: 2023-04-21
Payer: COMMERCIAL

## 2023-04-21 ENCOUNTER — LAB VISIT (OUTPATIENT)
Dept: LAB | Facility: HOSPITAL | Age: 68
End: 2023-04-21
Payer: COMMERCIAL

## 2023-04-21 ENCOUNTER — PATIENT MESSAGE (OUTPATIENT)
Dept: ENDOSCOPY | Facility: HOSPITAL | Age: 68
End: 2023-04-21
Payer: COMMERCIAL

## 2023-04-21 ENCOUNTER — TELEPHONE (OUTPATIENT)
Dept: ENDOSCOPY | Facility: HOSPITAL | Age: 68
End: 2023-04-21

## 2023-04-21 ENCOUNTER — TELEPHONE (OUTPATIENT)
Dept: ENDOSCOPY | Facility: HOSPITAL | Age: 68
End: 2023-04-21
Payer: COMMERCIAL

## 2023-04-21 VITALS
BODY MASS INDEX: 26.17 KG/M2 | HEIGHT: 64 IN | DIASTOLIC BLOOD PRESSURE: 58 MMHG | TEMPERATURE: 98 F | RESPIRATION RATE: 16 BRPM | HEART RATE: 97 BPM | WEIGHT: 153.31 LBS | SYSTOLIC BLOOD PRESSURE: 97 MMHG

## 2023-04-21 DIAGNOSIS — G89.3 CANCER-RELATED PAIN: ICD-10-CM

## 2023-04-21 DIAGNOSIS — I10 PRIMARY HYPERTENSION: ICD-10-CM

## 2023-04-21 DIAGNOSIS — Z79.4 TYPE 2 DIABETES MELLITUS WITH HYPERGLYCEMIA, WITH LONG-TERM CURRENT USE OF INSULIN: ICD-10-CM

## 2023-04-21 DIAGNOSIS — D84.81 IMMUNODEFICIENCY SECONDARY TO NEOPLASM: ICD-10-CM

## 2023-04-21 DIAGNOSIS — D49.9 IMMUNODEFICIENCY SECONDARY TO NEOPLASM: ICD-10-CM

## 2023-04-21 DIAGNOSIS — K83.1 BILIARY STRICTURE: Primary | ICD-10-CM

## 2023-04-21 DIAGNOSIS — C25.0 MALIGNANT NEOPLASM OF HEAD OF PANCREAS: ICD-10-CM

## 2023-04-21 DIAGNOSIS — C25.0 MALIGNANT NEOPLASM OF HEAD OF PANCREAS: Primary | ICD-10-CM

## 2023-04-21 DIAGNOSIS — E87.6 HYPOKALEMIA: ICD-10-CM

## 2023-04-21 DIAGNOSIS — E80.6 HYPERBILIRUBINEMIA: ICD-10-CM

## 2023-04-21 DIAGNOSIS — R06.02 SHORTNESS OF BREATH: ICD-10-CM

## 2023-04-21 DIAGNOSIS — E11.65 TYPE 2 DIABETES MELLITUS WITH HYPERGLYCEMIA, WITH LONG-TERM CURRENT USE OF INSULIN: ICD-10-CM

## 2023-04-21 LAB
ALBUMIN SERPL BCP-MCNC: 2.7 G/DL (ref 3.5–5.2)
ALP SERPL-CCNC: 372 U/L (ref 55–135)
ALT SERPL W/O P-5'-P-CCNC: 188 U/L (ref 10–44)
ANION GAP SERPL CALC-SCNC: 12 MMOL/L (ref 8–16)
AST SERPL-CCNC: 690 U/L (ref 10–40)
BILIRUB SERPL-MCNC: 2.1 MG/DL (ref 0.1–1)
BUN SERPL-MCNC: 7 MG/DL (ref 8–23)
CALCIUM SERPL-MCNC: 10.1 MG/DL (ref 8.7–10.5)
CANCER AG19-9 SERPL-ACNC: 82.4 U/ML (ref 0–40)
CHLORIDE SERPL-SCNC: 107 MMOL/L (ref 95–110)
CO2 SERPL-SCNC: 17 MMOL/L (ref 23–29)
CREAT SERPL-MCNC: 0.7 MG/DL (ref 0.5–1.4)
ERYTHROCYTE [DISTWIDTH] IN BLOOD BY AUTOMATED COUNT: 15.9 % (ref 11.5–14.5)
EST. GFR  (NO RACE VARIABLE): >60 ML/MIN/1.73 M^2
GLUCOSE SERPL-MCNC: 142 MG/DL (ref 70–110)
HCT VFR BLD AUTO: 35.1 % (ref 37–48.5)
HGB BLD-MCNC: 11.2 G/DL (ref 12–16)
IMM GRANULOCYTES # BLD AUTO: 0.01 K/UL (ref 0–0.04)
MCH RBC QN AUTO: 29.6 PG (ref 27–31)
MCHC RBC AUTO-ENTMCNC: 31.9 G/DL (ref 32–36)
MCV RBC AUTO: 93 FL (ref 82–98)
NEUTROPHILS # BLD AUTO: 2.6 K/UL (ref 1.8–7.7)
PLATELET # BLD AUTO: 269 K/UL (ref 150–450)
PMV BLD AUTO: 10.9 FL (ref 9.2–12.9)
POTASSIUM SERPL-SCNC: 3.4 MMOL/L (ref 3.5–5.1)
PROT SERPL-MCNC: 5.8 G/DL (ref 6–8.4)
RBC # BLD AUTO: 3.78 M/UL (ref 4–5.4)
SODIUM SERPL-SCNC: 136 MMOL/L (ref 136–145)
WBC # BLD AUTO: 3.77 K/UL (ref 3.9–12.7)

## 2023-04-21 PROCEDURE — 99999 PR PBB SHADOW E&M-EST. PATIENT-LVL V: ICD-10-PCS | Mod: PBBFAC,,, | Performed by: INTERNAL MEDICINE

## 2023-04-21 PROCEDURE — 1125F PR PAIN SEVERITY QUANTIFIED, PAIN PRESENT: ICD-10-PCS | Mod: CPTII,S$GLB,, | Performed by: INTERNAL MEDICINE

## 2023-04-21 PROCEDURE — 3288F PR FALLS RISK ASSESSMENT DOCUMENTED: ICD-10-PCS | Mod: CPTII,S$GLB,, | Performed by: INTERNAL MEDICINE

## 2023-04-21 PROCEDURE — 1160F PR REVIEW ALL MEDS BY PRESCRIBER/CLIN PHARMACIST DOCUMENTED: ICD-10-PCS | Mod: CPTII,S$GLB,, | Performed by: INTERNAL MEDICINE

## 2023-04-21 PROCEDURE — 1111F DSCHRG MED/CURRENT MED MERGE: CPT | Mod: CPTII,S$GLB,, | Performed by: INTERNAL MEDICINE

## 2023-04-21 PROCEDURE — 1101F PR PT FALLS ASSESS DOC 0-1 FALLS W/OUT INJ PAST YR: ICD-10-PCS | Mod: CPTII,S$GLB,, | Performed by: INTERNAL MEDICINE

## 2023-04-21 PROCEDURE — 1159F PR MEDICATION LIST DOCUMENTED IN MEDICAL RECORD: ICD-10-PCS | Mod: CPTII,S$GLB,, | Performed by: INTERNAL MEDICINE

## 2023-04-21 PROCEDURE — 1101F PT FALLS ASSESS-DOCD LE1/YR: CPT | Mod: CPTII,S$GLB,, | Performed by: INTERNAL MEDICINE

## 2023-04-21 PROCEDURE — 3078F DIAST BP <80 MM HG: CPT | Mod: CPTII,S$GLB,, | Performed by: INTERNAL MEDICINE

## 2023-04-21 PROCEDURE — 85027 COMPLETE CBC AUTOMATED: CPT | Performed by: INTERNAL MEDICINE

## 2023-04-21 PROCEDURE — 3008F BODY MASS INDEX DOCD: CPT | Mod: CPTII,S$GLB,, | Performed by: INTERNAL MEDICINE

## 2023-04-21 PROCEDURE — 3008F PR BODY MASS INDEX (BMI) DOCUMENTED: ICD-10-PCS | Mod: CPTII,S$GLB,, | Performed by: INTERNAL MEDICINE

## 2023-04-21 PROCEDURE — 3288F FALL RISK ASSESSMENT DOCD: CPT | Mod: CPTII,S$GLB,, | Performed by: INTERNAL MEDICINE

## 2023-04-21 PROCEDURE — 1111F PR DISCHARGE MEDS RECONCILED W/ CURRENT OUTPATIENT MED LIST: ICD-10-PCS | Mod: CPTII,S$GLB,, | Performed by: INTERNAL MEDICINE

## 2023-04-21 PROCEDURE — 1125F AMNT PAIN NOTED PAIN PRSNT: CPT | Mod: CPTII,S$GLB,, | Performed by: INTERNAL MEDICINE

## 2023-04-21 PROCEDURE — 1159F MED LIST DOCD IN RCRD: CPT | Mod: CPTII,S$GLB,, | Performed by: INTERNAL MEDICINE

## 2023-04-21 PROCEDURE — 86301 IMMUNOASSAY TUMOR CA 19-9: CPT | Performed by: INTERNAL MEDICINE

## 2023-04-21 PROCEDURE — 99999 PR PBB SHADOW E&M-EST. PATIENT-LVL V: CPT | Mod: PBBFAC,,, | Performed by: INTERNAL MEDICINE

## 2023-04-21 PROCEDURE — 99215 OFFICE O/P EST HI 40 MIN: CPT | Mod: S$GLB,,, | Performed by: INTERNAL MEDICINE

## 2023-04-21 PROCEDURE — 3074F PR MOST RECENT SYSTOLIC BLOOD PRESSURE < 130 MM HG: ICD-10-PCS | Mod: CPTII,S$GLB,, | Performed by: INTERNAL MEDICINE

## 2023-04-21 PROCEDURE — 3074F SYST BP LT 130 MM HG: CPT | Mod: CPTII,S$GLB,, | Performed by: INTERNAL MEDICINE

## 2023-04-21 PROCEDURE — 1160F RVW MEDS BY RX/DR IN RCRD: CPT | Mod: CPTII,S$GLB,, | Performed by: INTERNAL MEDICINE

## 2023-04-21 PROCEDURE — 4010F ACE/ARB THERAPY RXD/TAKEN: CPT | Mod: CPTII,S$GLB,, | Performed by: INTERNAL MEDICINE

## 2023-04-21 PROCEDURE — 99215 PR OFFICE/OUTPT VISIT, EST, LEVL V, 40-54 MIN: ICD-10-PCS | Mod: S$GLB,,, | Performed by: INTERNAL MEDICINE

## 2023-04-21 PROCEDURE — 36415 COLL VENOUS BLD VENIPUNCTURE: CPT | Performed by: INTERNAL MEDICINE

## 2023-04-21 PROCEDURE — 3078F PR MOST RECENT DIASTOLIC BLOOD PRESSURE < 80 MM HG: ICD-10-PCS | Mod: CPTII,S$GLB,, | Performed by: INTERNAL MEDICINE

## 2023-04-21 PROCEDURE — 80053 COMPREHEN METABOLIC PANEL: CPT | Performed by: INTERNAL MEDICINE

## 2023-04-21 PROCEDURE — 3046F PR MOST RECENT HEMOGLOBIN A1C LEVEL > 9.0%: ICD-10-PCS | Mod: CPTII,S$GLB,, | Performed by: INTERNAL MEDICINE

## 2023-04-21 PROCEDURE — 3046F HEMOGLOBIN A1C LEVEL >9.0%: CPT | Mod: CPTII,S$GLB,, | Performed by: INTERNAL MEDICINE

## 2023-04-21 PROCEDURE — 4010F PR ACE/ARB THEARPY RXD/TAKEN: ICD-10-PCS | Mod: CPTII,S$GLB,, | Performed by: INTERNAL MEDICINE

## 2023-04-21 RX ORDER — EPINEPHRINE 0.3 MG/.3ML
0.3 INJECTION SUBCUTANEOUS ONCE AS NEEDED
Status: CANCELLED | OUTPATIENT
Start: 2023-04-24

## 2023-04-21 RX ORDER — HEPARIN 100 UNIT/ML
500 SYRINGE INTRAVENOUS
Status: CANCELLED | OUTPATIENT
Start: 2023-04-26

## 2023-04-21 RX ORDER — ALBUTEROL SULFATE 90 UG/1
2 AEROSOL, METERED RESPIRATORY (INHALATION) EVERY 6 HOURS PRN
Qty: 8 G | Refills: 0 | Status: ON HOLD | OUTPATIENT
Start: 2023-04-21 | End: 2023-07-18 | Stop reason: CLARIF

## 2023-04-21 RX ORDER — SODIUM CHLORIDE 0.9 % (FLUSH) 0.9 %
10 SYRINGE (ML) INJECTION
Status: CANCELLED | OUTPATIENT
Start: 2023-04-24

## 2023-04-21 RX ORDER — MORPHINE SULFATE 15 MG/1
15 TABLET, FILM COATED, EXTENDED RELEASE ORAL NIGHTLY
Qty: 30 TABLET | Refills: 0 | Status: SHIPPED | OUTPATIENT
Start: 2023-04-21 | End: 2023-04-21

## 2023-04-21 RX ORDER — SODIUM CHLORIDE 0.9 % (FLUSH) 0.9 %
10 SYRINGE (ML) INJECTION
Status: CANCELLED | OUTPATIENT
Start: 2023-04-26

## 2023-04-21 RX ORDER — MORPHINE SULFATE 15 MG/1
15 TABLET, FILM COATED, EXTENDED RELEASE ORAL NIGHTLY
Qty: 60 TABLET | Refills: 0 | Status: SHIPPED | OUTPATIENT
Start: 2023-04-21 | End: 2023-04-24 | Stop reason: SDUPTHER

## 2023-04-21 RX ORDER — ATROPINE SULFATE 0.4 MG/ML
0.4 INJECTION, SOLUTION ENDOTRACHEAL; INTRAMEDULLARY; INTRAMUSCULAR; INTRAVENOUS; SUBCUTANEOUS ONCE AS NEEDED
Status: CANCELLED | OUTPATIENT
Start: 2023-04-24

## 2023-04-21 RX ORDER — NALOXONE HYDROCHLORIDE 4 MG/.1ML
SPRAY NASAL
Qty: 1 EACH | Refills: 11 | Status: SHIPPED | OUTPATIENT
Start: 2023-04-21

## 2023-04-21 RX ORDER — DIPHENHYDRAMINE HYDROCHLORIDE 50 MG/ML
50 INJECTION INTRAMUSCULAR; INTRAVENOUS ONCE AS NEEDED
Status: CANCELLED | OUTPATIENT
Start: 2023-04-24

## 2023-04-21 RX ORDER — POTASSIUM CHLORIDE 20 MEQ/1
40 TABLET, EXTENDED RELEASE ORAL ONCE
Qty: 2 TABLET | Refills: 0 | Status: SHIPPED | OUTPATIENT
Start: 2023-04-21 | End: 2023-04-21

## 2023-04-21 RX ORDER — HEPARIN 100 UNIT/ML
500 SYRINGE INTRAVENOUS
Status: CANCELLED | OUTPATIENT
Start: 2023-04-24

## 2023-04-21 NOTE — TELEPHONE ENCOUNTER
Kleber Bolaños MD  Please arrange for ERCP for stent placement with one of us the week after next. Can be done at [facility] or [place]. 60 min case.

## 2023-04-21 NOTE — PROGRESS NOTES
PROGRESS NOTE    Subjective:       Patient ID: Kaity Masterson is a 67 y.o. female.    Chief Complaint: follow up for borderline resectable pancreatic adenocarcinoma, MMR-proficient    Diagnosis:  Borderline resectable pancreatic adenocarcinoma, MMR proficient  UGT1A1/DPYD normal metabolizer    Oncologic History:  1.Ms Masterson is a 68 yo woman with HTN, T2DM, pathologic T1a endometrial cancer s/p surgery on 3/8/23, PUD, fatty liver, osteoporosis, who initially saw me on 4/5/23 for further management of pancreatic adenocarcinoma. She was admitted to ICU for DKA in March 2023. CT A/P 3/22/23 showed  a 3.4 cm mass centered in the pancreatic head/uncinate process with associated downstream dilatation of the main pancreatic duct.  Imaging findings are highly concerning for pancreatic adenocarcinoma.  Adjacent prominent peripancreatic lymph nodes are seen.  There is some stranding of the fat about the SMV for approximately 180°.  The portal vein, SMV and splenic vein are patent. Fatty infiltration of the liver. CT chest 3/20/23: No evidence for pulmonary embolus or other acute intrathoracic process. Hepatic steatosis.  Cholecystectomy. She underwent upper EUS biopsy on 3/31/23. Pathology showed adenocarcinoma, MMR intact. She presents today for further evaluation. Strong family history of breast cancer, endometrial cancer and colon cancer. She is a little weak after surgery but active at home and is still working.   Family history:  Mother: Breast cancer (middle age)  Aunt maternal (2): cancer (unknown)  Sister (3): breast cancer, lung cancer (smoking). Other sister: colon cancer (older diagnosis 60's). Third sister (breast cancer) and endometrial cancer  Brother:  finger bone cancer  Discussed perioperative FOLFIRINOX  2. FOLFIRINOX to be started on 4/24/23.     Interval History:   Ms Masterson returns to start chemo. Pain is getting worse. +back pain and right  flank pain. Eating but not a lot. Worried about taking pain meds as she needs to take care of her . Sometimes feels SOB. Needs albuterol inhaler.     ECO    ROS:   A ten-point system review is obtained and negative except for what was stated in the Interval History.     Physical Examination:   Vital signs reviewed.   General: well hydrated, in pain. In a wheelchair due to pain  HEENT: normocephalic, PERRLA, EOMI, mild +icteric sclerae, oropharynx clear  Neck: supple, no JVD, thyromegaly, cervical or supraclavicular lymphadenopathy  Lungs: clear breath sounds bilaterally, no wheezing, rales, or rhonchi  Heart: RRR, no M/R/G  Abdomen: soft, no tenderness, non-distended, no hepatosplenomegaly, mass, or hernia. BS present  Extremities: no clubbing, cyanosis, or edema  Skin: no rash, ulcer, or open wounds. Mildly jaundiced  Neuro: alert and oriented x 4, no focal neuro deficit  Psych: appropriate mood and affect    Objective:     Laboratory Data:  Labs reviewed. Bilirubin 2.1. newly elevated AST/ALT. K 3.4    Imaging Data:  CT A/P 3/23/23:  Impression:     There is a 3.4 cm mass centered in the pancreatic head/uncinate process with associated downstream dilatation of the main pancreatic duct.  Imaging findings are highly concerning for pancreatic adenocarcinoma.  Adjacent prominent peripancreatic lymph nodes are seen.  There is some stranding of the fat about the SMV for approximately 180°.  The portal vein, SMV and splenic vein are patent.     Fatty infiltration of the liver.     Constipation.    CT chest 3/20/23:  Impression:     No evidence for pulmonary embolus or other acute intrathoracic process.     Hepatic steatosis.  Cholecystectomy.       Assessment and Plan:     1. Malignant neoplasm of head of pancreas    2. Immunodeficiency secondary to neoplasm    3. Hyperbilirubinemia    4. Shortness of breath    5. Cancer-related pain    6. Hypokalemia    7. Primary hypertension    8. Type 2 diabetes mellitus  with hyperglycemia, with long-term current use of insulin      1.2  - Ms Masterson is a 68 yo woman with borderline resectable adenocarcinoma of the pancreatic head, MMR-proficient. Tempus pending. Strong family history of cancer  - She returns to start FOLFIRINOX. Pain is worse. New elevated in bilirubin of 2.1 and AST/ALT. Likely cancer obstructing the bile duct. Discussed case with Dr Bolaños. Will start FOLFOX next Monday. ERCP with stent placement the following week  - discussed with patient and daughter re the above. They understand and agree with the plan  - given new rise in bilirubin, will start with FOLFOX. 5FU full dose given bili is only mildly elevated. Oxaliplatin increased to 85 mg/m2 given that we are dropping irinotecan for cycle 1 due to hyperbilirubinemia.  - return in 2 weeks for cycle 2. Will add irinotecan back and adjust oxaliplatin dose once bilirubin normalized    3.  - see above. Dr Bolaños will do ERCP    4.  - albuterol prescribed    5.  - add MS contin 15 mg bid  - refer to palliative medicine. They live far away. Will do virtual visit  - c/w oxycodone prn    6.  - K-Dur 40 mEq x 1    7.  - BP 97/58  - asked patient to hold atenolol, c/w losartan and to monitor BP    8.  - asked them to closely f/u with PCP to control BS    Follow-up:     RTC in 2 weeks  Knows to call in the interval if any problems arise.    Electronically signed by Carolina Robles Chart for Scheduling    Med Onc Chart Routing  Urgent    Follow up with physician 4 weeks and 2 months. ugent virtual visit with  palliative medicine. please schedule. keep appts in 2 weeks. see me in 4 weeks with labs prior then chemo. see SHELTON in 6 weeks with labs prior then chemo   Follow up with SHELTON 6 weeks and 2 weeks.   Infusion scheduling note FOLFIRINOX every 2 weeks, remove pump and get neulasta OBI on day 3   Injection scheduling note    Labs CBC, CMP and CA 19-9   Schedulin day prior to infusion  Preferred lab: [facility]  Lab interval: every 2 weeks     Imaging    Pharmacy appointment    Other referrals  Additional referrals needed         Treatment Plan Information   OP PANC mFOLFIRINOX Q2W   Carolina Casey MD   Upcoming Treatment Dates - OP PANC mFOLFIRINOX Q2W    4/24/2023       Chemotherapy       oxaliplatin (ELOXATIN) 85 mg/m2 = 153 mg in dextrose 5 % (D5W) 530.6 mL chemo infusion       leucovorin calcium 400 mg/m2 = 720 mg in dextrose 5 % (D5W) 250 mL infusion       fluorouracil (ADRUCIL) 2,400 mg/m2 = 4,320 mg in sodium chloride 0.9% 100 mL chemo infusion       Supportive Care       atropine injection 0.4 mg       Antiemetics       palonosetron (ALOXI) 0.25 mg with Dexamethasone (DECADRON) 12 mg in NS 50 mL IVPB       aprepitant (CINVANTI) injection 130 mg  5/8/2023       Chemotherapy       oxaliplatin (ELOXATIN) 65 mg/m2 = 117 mg in dextrose 5 % (D5W) 523.4 mL chemo infusion       leucovorin calcium 400 mg/m2 = 720 mg in dextrose 5 % (D5W) 250 mL infusion       irinotecan (CAMPTOSAR) 150 mg/m2 = 270 mg in sodium chloride 0.9% 513.5 mL chemo infusion       fluorouracil (ADRUCIL) 2,400 mg/m2 = 4,320 mg in sodium chloride 0.9% 100 mL chemo infusion       Supportive Care       atropine injection 0.4 mg       Antiemetics       aprepitant (CINVANTI) injection 130 mg       palonosetron (ALOXI) 0.25 mg with Dexamethasone (DECADRON) 12 mg in NS 50 mL IVPB  5/22/2023       Chemotherapy       oxaliplatin (ELOXATIN) 65 mg/m2 = 117 mg in dextrose 5 % (D5W) 523.4 mL chemo infusion       leucovorin calcium 400 mg/m2 = 720 mg in dextrose 5 % (D5W) 250 mL infusion       irinotecan (CAMPTOSAR) 150 mg/m2 = 270 mg in sodium chloride 0.9% 513.5 mL chemo infusion       fluorouracil (ADRUCIL) 2,400 mg/m2 = 4,320 mg in sodium chloride 0.9% 100 mL chemo infusion       Supportive Care       atropine injection 0.4 mg       Antiemetics       aprepitant (CINVANTI) injection 130 mg       palonosetron (ALOXI) 0.25 mg with Dexamethasone (DECADRON)  12 mg in NS 50 mL IVPB  6/5/2023       Chemotherapy       oxaliplatin (ELOXATIN) 65 mg/m2 = 117 mg in dextrose 5 % (D5W) 523.4 mL chemo infusion       leucovorin calcium 400 mg/m2 = 720 mg in dextrose 5 % (D5W) 250 mL infusion       irinotecan (CAMPTOSAR) 150 mg/m2 = 270 mg in sodium chloride 0.9% 513.5 mL chemo infusion       fluorouracil (ADRUCIL) 2,400 mg/m2 = 4,320 mg in sodium chloride 0.9% 100 mL chemo infusion       Supportive Care       atropine injection 0.4 mg       Antiemetics       aprepitant (CINVANTI) injection 130 mg       palonosetron (ALOXI) 0.25 mg with Dexamethasone (DECADRON) 12 mg in NS 50 mL IVPB

## 2023-04-21 NOTE — TELEPHONE ENCOUNTER
Telephoned pt to schedule ERCP.  Spoke with pt's daughter, Radha, and procedure scheduled for 5/5/23 at 8:45am.  Reviewed history and medications.  Instructed on procedure and preparation.  Radha verbalized understanding.  Instructions sent via patient portal.  Instructed her on how to locate prep instructions within the portal.  She verbalized understanding.

## 2023-04-24 ENCOUNTER — INFUSION (OUTPATIENT)
Dept: INFUSION THERAPY | Facility: HOSPITAL | Age: 68
End: 2023-04-24
Payer: COMMERCIAL

## 2023-04-24 VITALS
DIASTOLIC BLOOD PRESSURE: 92 MMHG | SYSTOLIC BLOOD PRESSURE: 133 MMHG | RESPIRATION RATE: 18 BRPM | WEIGHT: 151.88 LBS | OXYGEN SATURATION: 100 % | BODY MASS INDEX: 25.93 KG/M2 | HEIGHT: 64 IN | HEART RATE: 127 BPM | TEMPERATURE: 98 F

## 2023-04-24 DIAGNOSIS — C25.0 MALIGNANT NEOPLASM OF HEAD OF PANCREAS: Primary | ICD-10-CM

## 2023-04-24 DIAGNOSIS — G89.3 CANCER-RELATED PAIN: ICD-10-CM

## 2023-04-24 DIAGNOSIS — C25.0 MALIGNANT NEOPLASM OF HEAD OF PANCREAS: ICD-10-CM

## 2023-04-24 PROCEDURE — 25000003 PHARM REV CODE 250: Performed by: INTERNAL MEDICINE

## 2023-04-24 PROCEDURE — 96367 TX/PROPH/DG ADDL SEQ IV INF: CPT

## 2023-04-24 PROCEDURE — 96415 CHEMO IV INFUSION ADDL HR: CPT

## 2023-04-24 PROCEDURE — 96416 CHEMO PROLONG INFUSE W/PUMP: CPT

## 2023-04-24 PROCEDURE — 96375 TX/PRO/DX INJ NEW DRUG ADDON: CPT

## 2023-04-24 PROCEDURE — 96413 CHEMO IV INFUSION 1 HR: CPT

## 2023-04-24 PROCEDURE — 96368 THER/DIAG CONCURRENT INF: CPT

## 2023-04-24 PROCEDURE — 63600175 PHARM REV CODE 636 W HCPCS: Performed by: INTERNAL MEDICINE

## 2023-04-24 RX ORDER — HEPARIN 100 UNIT/ML
500 SYRINGE INTRAVENOUS
Status: DISCONTINUED | OUTPATIENT
Start: 2023-04-24 | End: 2023-04-24 | Stop reason: HOSPADM

## 2023-04-24 RX ORDER — EPINEPHRINE 0.3 MG/.3ML
0.3 INJECTION SUBCUTANEOUS ONCE AS NEEDED
Status: DISCONTINUED | OUTPATIENT
Start: 2023-04-24 | End: 2023-04-24 | Stop reason: HOSPADM

## 2023-04-24 RX ORDER — SODIUM CHLORIDE 0.9 % (FLUSH) 0.9 %
10 SYRINGE (ML) INJECTION
Status: DISCONTINUED | OUTPATIENT
Start: 2023-04-24 | End: 2023-04-24 | Stop reason: HOSPADM

## 2023-04-24 RX ORDER — MORPHINE SULFATE 15 MG/1
15 TABLET, FILM COATED, EXTENDED RELEASE ORAL NIGHTLY
Qty: 60 TABLET | Refills: 0 | Status: SHIPPED | OUTPATIENT
Start: 2023-04-24

## 2023-04-24 RX ORDER — DIPHENHYDRAMINE HYDROCHLORIDE 50 MG/ML
50 INJECTION INTRAMUSCULAR; INTRAVENOUS ONCE AS NEEDED
Status: DISCONTINUED | OUTPATIENT
Start: 2023-04-24 | End: 2023-04-24 | Stop reason: HOSPADM

## 2023-04-24 RX ORDER — ATROPINE SULFATE 0.4 MG/ML
0.4 INJECTION, SOLUTION ENDOTRACHEAL; INTRAMEDULLARY; INTRAMUSCULAR; INTRAVENOUS; SUBCUTANEOUS ONCE AS NEEDED
Status: DISCONTINUED | OUTPATIENT
Start: 2023-04-24 | End: 2023-04-24 | Stop reason: HOSPADM

## 2023-04-24 RX ADMIN — OXALIPLATIN 150 MG: 5 INJECTION, SOLUTION INTRAVENOUS at 10:04

## 2023-04-24 RX ADMIN — APREPITANT 130 MG: 130 INJECTION, EMULSION INTRAVENOUS at 09:04

## 2023-04-24 RX ADMIN — DEXTROSE: 50 INJECTION, SOLUTION INTRAVENOUS at 09:04

## 2023-04-24 RX ADMIN — LEUCOVORIN CALCIUM 700 MG: 350 INJECTION, POWDER, LYOPHILIZED, FOR SOLUTION INTRAMUSCULAR; INTRAVENOUS at 10:04

## 2023-04-24 RX ADMIN — DEXAMETHASONE SODIUM PHOSPHATE 0.25 MG: 4 INJECTION, SOLUTION INTRA-ARTICULAR; INTRALESIONAL; INTRAMUSCULAR; INTRAVENOUS; SOFT TISSUE at 09:04

## 2023-04-24 RX ADMIN — FLUOROURACIL 4320 MG: 50 INJECTION, SOLUTION INTRAVENOUS at 12:04

## 2023-04-24 NOTE — TELEPHONE ENCOUNTER
----- Message from Nanci Bliss sent at 4/24/2023  1:12 PM CDT -----  Regarding: Patient Call Back  .Type: Patient Call Back    Who called: Daughter -- Susy    What is the request in detail: Calling in regards to her medication. Needs the morphine called in to a different pharmacy because the one it was sent to doesn't have it. Please advise.     Can the clinic reply by [facility]? Yes     Would the patient rather a call back or a response via [facility]? Call back    Best call back number: [phone]    Additional Information:

## 2023-04-24 NOTE — TELEPHONE ENCOUNTER
----- Message from Pita Staples sent at 4/24/2023 12:51 PM CDT -----  .Type:  RX Refill Request    Who Called: PT DAUGHTER     Refill or New Rx:REFILL     RX Name and Strength: morphine (MS CONTIN) 15 MG 12 hr tablet     Preferred Pharmacy with phone number: [phone]     Pt Call Back Number:  [phone]    Additional Information: Thank You

## 2023-04-24 NOTE — PLAN OF CARE
1215  Patient completed Oxaliplatin, leucovorin infusions, tolerated well.  CADD pump connected to port to infuse @ 2.2 cc/hr for 46 hrs for a total of 100 cc of 5FU.  Pump infusing prior to patient transferring from chair to wheel chair with stand by assist and daughter wheeling patient off floor to discharge home.  RTC 4/26/23 @ 1015 for pump DC and OBI.

## 2023-04-24 NOTE — PLAN OF CARE
0848  Patient wheeled into treatment area accompanied by daughter.  Patient transferred from wheel chair to chair with stand by assist.  VSS, assessment done. Port accessed without issue, flushed, blood return noted.  Started D5W @ 20 cc/hr KVO while waiting for Oxaliplatin, Leucovorin, and  5FU from pharmacy.  [facility] for safety

## 2023-04-25 ENCOUNTER — PATIENT MESSAGE (OUTPATIENT)
Dept: HEMATOLOGY/ONCOLOGY | Facility: CLINIC | Age: 68
End: 2023-04-25
Payer: COMMERCIAL

## 2023-04-26 ENCOUNTER — INFUSION (OUTPATIENT)
Dept: INFUSION THERAPY | Facility: HOSPITAL | Age: 68
End: 2023-04-26
Payer: COMMERCIAL

## 2023-04-26 VITALS
HEART RATE: 93 BPM | RESPIRATION RATE: 20 BRPM | DIASTOLIC BLOOD PRESSURE: 79 MMHG | TEMPERATURE: 98 F | OXYGEN SATURATION: 97 % | SYSTOLIC BLOOD PRESSURE: 128 MMHG

## 2023-04-26 DIAGNOSIS — C25.0 MALIGNANT NEOPLASM OF HEAD OF PANCREAS: Primary | ICD-10-CM

## 2023-04-26 PROCEDURE — 96377 APPLICATON ON-BODY INJECTOR: CPT

## 2023-04-26 PROCEDURE — 25000003 PHARM REV CODE 250: Performed by: INTERNAL MEDICINE

## 2023-04-26 PROCEDURE — 63600175 PHARM REV CODE 636 W HCPCS: Performed by: INTERNAL MEDICINE

## 2023-04-26 PROCEDURE — A4216 STERILE WATER/SALINE, 10 ML: HCPCS | Performed by: INTERNAL MEDICINE

## 2023-04-26 RX ORDER — SODIUM CHLORIDE 0.9 % (FLUSH) 0.9 %
10 SYRINGE (ML) INJECTION
Status: DISCONTINUED | OUTPATIENT
Start: 2023-04-26 | End: 2023-04-26 | Stop reason: HOSPADM

## 2023-04-26 RX ORDER — HEPARIN 100 UNIT/ML
500 SYRINGE INTRAVENOUS
Status: DISCONTINUED | OUTPATIENT
Start: 2023-04-26 | End: 2023-04-26 | Stop reason: HOSPADM

## 2023-04-26 RX ADMIN — Medication 10 ML: at 11:04

## 2023-04-26 RX ADMIN — HEPARIN 500 UNITS: 100 SYRINGE at 11:04

## 2023-04-26 RX ADMIN — PEGFILGRASTIM 6 MG: KIT SUBCUTANEOUS at 11:04

## 2023-04-26 NOTE — PLAN OF CARE
Pt here for pump D/C and neulasta OBI. Pump noted to be complete- PAC de-accessed. Education provided to patient and family about OBI- video watched. OBI placed to abdomen- blinking green light noted. Instructed pt that medication will inject in 27 hours. Informed pt to take claritin for bone pain. Pt discharged in wheelchair, escorted by family.

## 2023-05-01 ENCOUNTER — HOSPITAL ENCOUNTER (EMERGENCY)
Facility: HOSPITAL | Age: 68
Discharge: OTHER INSTITUTION | End: 2023-05-01
Attending: SURGERY
Payer: COMMERCIAL

## 2023-05-01 ENCOUNTER — TELEPHONE (OUTPATIENT)
Dept: HEMATOLOGY/ONCOLOGY | Facility: CLINIC | Age: 68
End: 2023-05-01
Payer: COMMERCIAL

## 2023-05-01 VITALS
TEMPERATURE: 98 F | BODY MASS INDEX: 25.27 KG/M2 | OXYGEN SATURATION: 100 % | SYSTOLIC BLOOD PRESSURE: 141 MMHG | DIASTOLIC BLOOD PRESSURE: 66 MMHG | HEIGHT: 64 IN | WEIGHT: 148 LBS | HEART RATE: 108 BPM | RESPIRATION RATE: 15 BRPM

## 2023-05-01 DIAGNOSIS — R63.0 ANOREXIA: Primary | ICD-10-CM

## 2023-05-01 DIAGNOSIS — R06.02 SHORTNESS OF BREATH: ICD-10-CM

## 2023-05-01 DIAGNOSIS — K83.1 BILE OBSTRUCTION: Primary | ICD-10-CM

## 2023-05-01 DIAGNOSIS — R53.1 WEAKNESS: ICD-10-CM

## 2023-05-01 PROBLEM — I10 PRIMARY HYPERTENSION: Chronic | Status: ACTIVE | Noted: 2023-03-21

## 2023-05-01 PROBLEM — E78.5 HYPERLIPIDEMIA: Chronic | Status: ACTIVE | Noted: 2023-03-21

## 2023-05-01 LAB
ALBUMIN SERPL BCP-MCNC: 2.4 G/DL (ref 3.5–5.2)
ALP SERPL-CCNC: 522 U/L (ref 55–135)
ALT SERPL W/O P-5'-P-CCNC: 213 U/L (ref 10–44)
ANION GAP SERPL CALC-SCNC: 13 MMOL/L (ref 8–16)
ANISOCYTOSIS BLD QL SMEAR: ABNORMAL
APTT PPP: 22.6 SEC (ref 21–32)
AST SERPL-CCNC: 266 U/L (ref 10–40)
BACTERIA #/AREA URNS HPF: ABNORMAL /HPF
BASOPHILS # BLD AUTO: 0.04 K/UL (ref 0–0.2)
BASOPHILS NFR BLD: 0.5 % (ref 0–1.9)
BILIRUB SERPL-MCNC: 10.8 MG/DL (ref 0.1–1)
BILIRUB UR QL STRIP: ABNORMAL
BNP SERPL-MCNC: 20 PG/ML (ref 0–99)
BUN SERPL-MCNC: 7 MG/DL (ref 8–23)
CALCIUM SERPL-MCNC: 10.7 MG/DL (ref 8.7–10.5)
CHLORIDE SERPL-SCNC: 100 MMOL/L (ref 95–110)
CLARITY UR: CLEAR
CO2 SERPL-SCNC: 16 MMOL/L (ref 23–29)
COLOR UR: YELLOW
CREAT SERPL-MCNC: 0.8 MG/DL (ref 0.5–1.4)
D DIMER PPP IA.FEU-MCNC: 1.18 MG/L FEU
DIFFERENTIAL METHOD: ABNORMAL
DNA RANGE(S) EXAMINED NAR: NORMAL
EOSINOPHIL # BLD AUTO: 0 K/UL (ref 0–0.5)
EOSINOPHIL NFR BLD: 0.3 % (ref 0–8)
ERYTHROCYTE [DISTWIDTH] IN BLOOD BY AUTOMATED COUNT: 14.9 % (ref 11.5–14.5)
EST. GFR  (NO RACE VARIABLE): >60 ML/MIN/1.73 M^2
GENE DIS ANL INTERP-IMP: POSITIVE
GENE DIS ASSESSED: NORMAL
GLUCOSE SERPL-MCNC: 181 MG/DL (ref 70–110)
GLUCOSE UR QL STRIP: NEGATIVE
HCT VFR BLD AUTO: 29.8 % (ref 37–48.5)
HGB BLD-MCNC: 9.7 G/DL (ref 12–16)
HGB UR QL STRIP: NEGATIVE
HYPOCHROMIA BLD QL SMEAR: ABNORMAL
IMM GRANULOCYTES # BLD AUTO: 0.07 K/UL (ref 0–0.04)
IMM GRANULOCYTES NFR BLD AUTO: 0.9 % (ref 0–0.5)
INR PPP: 1 (ref 0.8–1.2)
KETONES UR QL STRIP: NEGATIVE
LACTATE SERPL-SCNC: 1.2 MMOL/L (ref 0.5–2.2)
LACTATE SERPL-SCNC: 3.7 MMOL/L (ref 0.5–2.2)
LEUKOCYTE ESTERASE UR QL STRIP: ABNORMAL
LIPASE SERPL-CCNC: <3 U/L (ref 4–60)
LYMPHOCYTES # BLD AUTO: 1.2 K/UL (ref 1–4.8)
LYMPHOCYTES NFR BLD: 15 % (ref 18–48)
MCH RBC QN AUTO: 31.1 PG (ref 27–31)
MCHC RBC AUTO-ENTMCNC: 32.6 G/DL (ref 32–36)
MCV RBC AUTO: 96 FL (ref 82–98)
MICROSCOPIC COMMENT: ABNORMAL
MONOCYTES # BLD AUTO: 0.9 K/UL (ref 0.3–1)
MONOCYTES NFR BLD: 11.7 % (ref 4–15)
MSI CA SPEC-IMP: NOT DETECTED
NEUTROPHILS # BLD AUTO: 5.7 K/UL (ref 1.8–7.7)
NEUTROPHILS NFR BLD: 71.6 % (ref 38–73)
NITRITE UR QL STRIP: POSITIVE
NON-SQ EPI CELLS #/AREA URNS HPF: 2 /HPF
NRBC BLD-RTO: 0 /100 WBC
PH UR STRIP: 7 [PH] (ref 5–8)
PLATELET # BLD AUTO: 172 K/UL (ref 150–450)
PLATELET BLD QL SMEAR: ABNORMAL
PMV BLD AUTO: 11.6 FL (ref 9.2–12.9)
POCT GLUCOSE: 160 MG/DL (ref 70–110)
POTASSIUM SERPL-SCNC: 3.2 MMOL/L (ref 3.5–5.1)
PROT SERPL-MCNC: 6.1 G/DL (ref 6–8.4)
PROT UR QL STRIP: NEGATIVE
PROTHROMBIN TIME: 10.4 SEC (ref 9–12.5)
RBC # BLD AUTO: 3.12 M/UL (ref 4–5.4)
RBC #/AREA URNS HPF: 0 /HPF (ref 0–4)
REASON FOR STUDY: NORMAL
SODIUM SERPL-SCNC: 129 MMOL/L (ref 136–145)
SP GR UR STRIP: 1.01 (ref 1–1.03)
SPHEROCYTES BLD QL SMEAR: ABNORMAL
SQUAMOUS #/AREA URNS HPF: 1 /HPF
STOMATOCYTES BLD QL SMEAR: PRESENT
TARGETS BLD QL SMEAR: ABNORMAL
TEMPUS LCA: NORMAL
TEMPUS PORTAL: NORMAL
TEMPUS TRIAL1: NORMAL
TEMPUS TRIAL2: NORMAL
TEMPUS TRIAL3: NORMAL
TEMPUS TRIALCOUNT: 3
TROPONIN I SERPL DL<=0.01 NG/ML-MCNC: 0.02 NG/ML (ref 0–0.03)
URN SPEC COLLECT METH UR: ABNORMAL
UROBILINOGEN UR STRIP-ACNC: NEGATIVE EU/DL
WBC # BLD AUTO: 7.98 K/UL (ref 3.9–12.7)
WBC #/AREA URNS HPF: 12 /HPF (ref 0–5)

## 2023-05-01 PROCEDURE — 85610 PROTHROMBIN TIME: CPT | Performed by: SURGERY

## 2023-05-01 PROCEDURE — 85379 FIBRIN DEGRADATION QUANT: CPT | Performed by: NURSE PRACTITIONER

## 2023-05-01 PROCEDURE — 93005 ELECTROCARDIOGRAM TRACING: CPT

## 2023-05-01 PROCEDURE — 83880 ASSAY OF NATRIURETIC PEPTIDE: CPT | Performed by: SURGERY

## 2023-05-01 PROCEDURE — 96366 THER/PROPH/DIAG IV INF ADDON: CPT

## 2023-05-01 PROCEDURE — 27000221 HC OXYGEN, UP TO 24 HOURS

## 2023-05-01 PROCEDURE — 63600175 PHARM REV CODE 636 W HCPCS: Performed by: SURGERY

## 2023-05-01 PROCEDURE — 87040 BLOOD CULTURE FOR BACTERIA: CPT | Performed by: NURSE PRACTITIONER

## 2023-05-01 PROCEDURE — 96365 THER/PROPH/DIAG IV INF INIT: CPT

## 2023-05-01 PROCEDURE — 93010 ELECTROCARDIOGRAM REPORT: CPT | Mod: ,,, | Performed by: INTERNAL MEDICINE

## 2023-05-01 PROCEDURE — 80053 COMPREHEN METABOLIC PANEL: CPT | Performed by: NURSE PRACTITIONER

## 2023-05-01 PROCEDURE — 85025 COMPLETE CBC W/AUTO DIFF WBC: CPT | Performed by: NURSE PRACTITIONER

## 2023-05-01 PROCEDURE — 96375 TX/PRO/DX INJ NEW DRUG ADDON: CPT

## 2023-05-01 PROCEDURE — 96361 HYDRATE IV INFUSION ADD-ON: CPT

## 2023-05-01 PROCEDURE — 87186 SC STD MICRODIL/AGAR DIL: CPT | Performed by: NURSE PRACTITIONER

## 2023-05-01 PROCEDURE — 25000003 PHARM REV CODE 250: Performed by: SURGERY

## 2023-05-01 PROCEDURE — 82962 GLUCOSE BLOOD TEST: CPT

## 2023-05-01 PROCEDURE — 25000003 PHARM REV CODE 250: Performed by: NURSE PRACTITIONER

## 2023-05-01 PROCEDURE — 87088 URINE BACTERIA CULTURE: CPT | Performed by: NURSE PRACTITIONER

## 2023-05-01 PROCEDURE — 63600175 PHARM REV CODE 636 W HCPCS: Performed by: NURSE PRACTITIONER

## 2023-05-01 PROCEDURE — 87077 CULTURE AEROBIC IDENTIFY: CPT | Performed by: NURSE PRACTITIONER

## 2023-05-01 PROCEDURE — 87086 URINE CULTURE/COLONY COUNT: CPT | Performed by: NURSE PRACTITIONER

## 2023-05-01 PROCEDURE — 36415 COLL VENOUS BLD VENIPUNCTURE: CPT | Performed by: NURSE PRACTITIONER

## 2023-05-01 PROCEDURE — 99285 EMERGENCY DEPT VISIT HI MDM: CPT | Mod: 25

## 2023-05-01 PROCEDURE — 36415 COLL VENOUS BLD VENIPUNCTURE: CPT | Performed by: SURGERY

## 2023-05-01 PROCEDURE — 85730 THROMBOPLASTIN TIME PARTIAL: CPT | Performed by: SURGERY

## 2023-05-01 PROCEDURE — 83690 ASSAY OF LIPASE: CPT | Performed by: NURSE PRACTITIONER

## 2023-05-01 PROCEDURE — 93010 EKG 12-LEAD: ICD-10-PCS | Mod: ,,, | Performed by: INTERNAL MEDICINE

## 2023-05-01 PROCEDURE — 96376 TX/PRO/DX INJ SAME DRUG ADON: CPT

## 2023-05-01 PROCEDURE — 83605 ASSAY OF LACTIC ACID: CPT | Performed by: SURGERY

## 2023-05-01 PROCEDURE — 84484 ASSAY OF TROPONIN QUANT: CPT | Performed by: NURSE PRACTITIONER

## 2023-05-01 PROCEDURE — 81000 URINALYSIS NONAUTO W/SCOPE: CPT | Performed by: NURSE PRACTITIONER

## 2023-05-01 RX ORDER — SODIUM CHLORIDE 9 MG/ML
1000 INJECTION, SOLUTION INTRAVENOUS
Status: COMPLETED | OUTPATIENT
Start: 2023-05-01 | End: 2023-05-01

## 2023-05-01 RX ORDER — ONDANSETRON 2 MG/ML
4 INJECTION INTRAMUSCULAR; INTRAVENOUS
Status: COMPLETED | OUTPATIENT
Start: 2023-05-01 | End: 2023-05-01

## 2023-05-01 RX ORDER — MORPHINE SULFATE 2 MG/ML
4 INJECTION, SOLUTION INTRAMUSCULAR; INTRAVENOUS
Status: COMPLETED | OUTPATIENT
Start: 2023-05-01 | End: 2023-05-01

## 2023-05-01 RX ORDER — CYPROHEPTADINE HYDROCHLORIDE 4 MG/1
4 TABLET ORAL 3 TIMES DAILY
Qty: 90 TABLET | Refills: 2 | Status: ON HOLD | OUTPATIENT
Start: 2023-05-01 | End: 2023-06-08

## 2023-05-01 RX ORDER — MORPHINE SULFATE 2 MG/ML
2 INJECTION, SOLUTION INTRAMUSCULAR; INTRAVENOUS
Status: COMPLETED | OUTPATIENT
Start: 2023-05-01 | End: 2023-05-01

## 2023-05-01 RX ADMIN — SODIUM CHLORIDE 1000 ML: 9 INJECTION, SOLUTION INTRAVENOUS at 01:05

## 2023-05-01 RX ADMIN — PIPERACILLIN SODIUM AND TAZOBACTAM SODIUM 3.38 G: 3; .375 INJECTION, POWDER, LYOPHILIZED, FOR SOLUTION INTRAVENOUS at 02:05

## 2023-05-01 RX ADMIN — MORPHINE SULFATE 4 MG: 2 INJECTION, SOLUTION INTRAMUSCULAR; INTRAVENOUS at 10:05

## 2023-05-01 RX ADMIN — MORPHINE SULFATE 4 MG: 2 INJECTION, SOLUTION INTRAMUSCULAR; INTRAVENOUS at 05:05

## 2023-05-01 RX ADMIN — MORPHINE SULFATE 2 MG: 2 INJECTION, SOLUTION INTRAMUSCULAR; INTRAVENOUS at 12:05

## 2023-05-01 RX ADMIN — ONDANSETRON HYDROCHLORIDE 4 MG: 2 SOLUTION INTRAMUSCULAR; INTRAVENOUS at 10:05

## 2023-05-01 RX ADMIN — PIPERACILLIN AND TAZOBACTAM 4.5 G: 4; .5 INJECTION, POWDER, LYOPHILIZED, FOR SOLUTION INTRAVENOUS; PARENTERAL at 11:05

## 2023-05-01 NOTE — TELEPHONE ENCOUNTER
In the interim of speaking with kamille and calling her back after speaking with dr kaur---patient went to ED.     Notified her [facility].    Nurse told kamille kaur will be able to follow along in chart.      Message routed to dr kaur

## 2023-05-01 NOTE — ASSESSMENT & PLAN NOTE
Recently diagnosed pancreatic neoplasm which appears to have enlarged, now presenting with hyperbilirubinemia and obstructive LFTs raising concern for mass effect on biliary system. Transferred to [facility] for AES evaluation for possible intervention.     - NPO on arrival  - hold anticoagulation pending procedure  - continue zosyn for empiric coverage  - AES consult, appreciate assistance  - continue home pain regimen with oxycontin 15 mg BID; oxycodone 5 mg PRN breakthrough  - zofran 8 mg PRN nausea

## 2023-05-01 NOTE — TELEPHONE ENCOUNTER
Spoke with shavonne simental.  She is calling to state her mom in law has no appetite at all. When she does take in food---it is one or two bites and that is all.  As a result patient is very weak.    Shavonne is interested in getting her on an appetite stimulant ----[facility], if agreeable to request.    Nurse informed dil patient may need to have labs drawn as well--to make sure her CMP is not off.      Denies any other symptoms.      Message routed to rosendo tinajero randi

## 2023-05-01 NOTE — PROVIDER TRANSFER
(Physician in Lead of Transfers)  Outside Transfer Note / Regional Referral Center    Upon patient arrival, please contact Oncology on call.    Referring facility: [facility]   Referring provider: SIOMARA NEGRON  Accepting facility: [facility]  Accepting provider: KELLE QUINN  Reason for transfer:  Higher level of care  Transfer diagnosis: pancreatic cancer, biliary obstruction  Transfer specialty requested: Hematology and Oncology, Biliary Service  Transfer specialty notified: Yes  Transfer level: NUMBER 1-5: 2  Bed type requested: Oncology  Isolation status: No active isolations   Admission class or status: IP- Inpatient      Narrative     67-year-old female with history of hypertension, diabetes, endometrial cancer, and pancreatic adenocarcinoma who initiated chemotherapy April 24, 2023 at [facility] ([facility]).  She presented to [facility] on May 1 with worsening jaundice, weakness, body aches, and abdominal discomfort.  Labs showed evidence of decreased serum sodium and increased bilirubin.  Lactic acid was 3.7.  Chest x-ray had no acute abnormality, but abdominal ultrasound showed a pancreatic head mass increased in size with associated intra and extrahepatic biliary ductal dilatation and dilatation of the main pancreatic duct.  In the emergency department she received pain medication, normal saline boluses, and IV Zosyn.  Case discussed with Biliary Service at [facility] and subsequently with Oncology at [facility].  Will plan transfer to the Medical Oncology service at [facility] with Biliary Service evaluation.    Lipase less than 3, sodium 129, potassium 3.2, chloride 100, CO2 16, BUN 7, creatinine 0.8, glucose 181, calcium 10.7, total bilirubin 10.8, , , white blood cells 7.98, hemoglobin 9.7, hematocrit 29.8, platelets 172, BNP 20, INR 1, D-dimer 1.18, lactic acid 3.7 troponin 0.021  Blood cultures  ordered    Chest x-ray had no acute abnormality.    Abdominal ultrasound showed pancreatic head mass increased in size compared to previous study from March.  Associated intrahepatic and extrahepatic biliary ductal dilatation as well as dilatation of the main pancreatic duct.    EKG showed sinus tachycardia with ventricular rate 128.  Nonspecific ST abnormality.    Objective     Vitals: Temp: 97.7 °F (36.5 °C) (05/01/23 1150)  Pulse: 108 (05/01/23 1400)  Resp: 12 (05/01/23 1400)  BP: (!) 171/85 (05/01/23 1400)  SpO2: 100 % (05/01/23 1400)  Recent Labs: CBC:   Recent Labs   Lab 05/01/23  1233   WBC 7.98   HGB 9.7*   HCT 29.8*        CMP:   Recent Labs   Lab 05/01/23  1233   *   K 3.2*      CO2 16*   *   BUN 7*   CREATININE 0.8   CALCIUM 10.7*   PROT 6.1   ALBUMIN 2.4*   BILITOT 10.8*   ALKPHOS 522*   *   *   ANIONGAP 13     Lactic Acid:   Recent Labs   Lab 05/01/23  1233   LACTATE 3.7*         Instructions    Admit to Oncology Service    IVY Quezada MD  Hospital Medicine Staff  Cell: [phone]

## 2023-05-01 NOTE — ASSESSMENT & PLAN NOTE
Unclear if related to pancreatic cancer or not. Home basal regimen of detemir 14 qHS.     - LDSSI while NPO  - resume detemir once tolerating PO  - titrate to achieve -180

## 2023-05-01 NOTE — ASSESSMENT & PLAN NOTE
On home losartan 100 and atenolol 50 - will hold in setting of possible sepsis, resume as indicated.

## 2023-05-01 NOTE — ED PROVIDER NOTES
Encounter Date: 5/1/2023       History     Chief Complaint   Patient presents with    Weakness     Patient to ER CC of weakness and SOB, states she has pancreatic cancer which she is on chemotherapy for, daughter also reports her jaundice has been getting worse      Chief complaint:  Weakness   67-year-old female with history of hypertension osteoporosis peptic ulcer disease diabetes and pancreatic cancer presents to be evaluated for generalized weakness.  Patient was recently diagnosed with pancreatic mass and cancer and has been undergoing chemo 20s week.  Patient's daughter states chemo was 1 week ago she is progressively weakened since worsening significantly today.  Reports some shortness of breath and cough.  Daughter states that she is also become more yellow in appearance.  Patient reports generalized body aches fatigue.  Denies fever denies chest pain denies palpitations    Review of patient's allergies indicates:  No Known Allergies  Past Medical History:   Diagnosis Date    Anemia, unspecified     Hypertension     Osteoporosis     PUD (peptic ulcer disease)     Type 2 diabetes mellitus without complications      Past Surgical History:   Procedure Laterality Date    CHOLECYSTECTOMY      colonsocopy      2017    ENDOSCOPIC ULTRASOUND OF UPPER GASTROINTESTINAL TRACT N/A 3/31/2023    Procedure: ULTRASOUND, UPPER GI TRACT, ENDOSCOPIC;  Surgeon: Kleber Bolaños MD;  Location: [facility] (2ND FLR);  Service: Endoscopy;  Laterality: N/A;  3/27 - precall attempted, no answer. SG    HYSTERECTOMY  03/08/2023    INSERTION OF TUNNELED CENTRAL VENOUS CATHETER (CVC) WITH SUBCUTANEOUS PORT N/A 4/20/2023    Procedure: INSERTION, SINGLE LUMEN CATHETER WITH PORT, WITH FLUOROSCOPIC GUIDANCE;  Surgeon: Philip Anderson Jr., MD;  Location: [facility] OR [address] [place] [address];  Service: General;  Laterality: N/A;    LAPAROSCOPIC CHOLECYSTECTOMY      LYMPH NODE BIOPSY Bilateral 03/08/2023    Procedure: BIOPSY, PELVIC LYMPH NODE;  Surgeon: Dallas  MD Jeff;  Location: [facility];  Service: OB/GYN;  Laterality: Bilateral;    ROBOTIC HYSTERECTOMY, WITH SALPINGO-OOPHORECTOMY Bilateral 03/08/2023    Procedure: ROBOTIC HYSTERECTOMY,WITH SALPINGO-OOPHORECTOMY;  Surgeon: Dallas Aguilar MD;  Location: [facility];  Service: OB/GYN;  Laterality: Bilateral;    TOTAL KNEE ARTHROPLASTY Left     2016    TOTAL KNEE ARTHROPLASTY Right     2019    VAGINAL DELIVERY      x 2  (one with epidural)     Family History   Problem Relation Age of Onset    Diabetes Mother     Colon cancer Mother     Breast cancer Mother     Hypertension Brother     Hypertension Sister     Breast cancer Sister     Lung cancer Sister     Breast cancer Maternal Aunt     Colon cancer Maternal Aunt     Ovarian cancer Neg Hx      Social History     Tobacco Use    Smoking status: Never    Smokeless tobacco: Never   Substance Use Topics    Alcohol use: Not Currently    Drug use: Never     Review of Systems   Constitutional:  Positive for activity change, appetite change and fatigue. Negative for fever.   Respiratory:  Positive for cough and shortness of breath.    Cardiovascular:  Negative for chest pain and palpitations.   Gastrointestinal:  Positive for abdominal pain. Negative for constipation, diarrhea and vomiting.   Musculoskeletal:  Positive for myalgias.   Skin:  Positive for color change.   Neurological:  Positive for weakness.     Physical Exam     Initial Vitals   BP Pulse Resp Temp SpO2   05/01/23 1150 05/01/23 1150 05/01/23 1150 05/01/23 1150 05/01/23 1151   123/61 (!) 130 (!) 22 97.7 °F (36.5 °C) 100 %      MAP       --                Physical Exam    Nursing note and vitals reviewed.  Constitutional: She appears well-developed and well-nourished.   HENT:   Head: Normocephalic and atraumatic.   Eyes: EOM are normal. Pupils are equal, round, and reactive to light.   Cardiovascular:  Normal rate.     Exam reveals no gallop and no friction rub.       No murmur heard.  Pulmonary/Chest: She has no wheezes.  She has no rhonchi. She has no rales.   Decreased breath sounds throughout, tachypnea     Neurological: She is alert and oriented to person, place, and time.   Skin: Skin is warm and dry. Capillary refill takes less than 2 seconds.   Jaundice   Psychiatric: She has a normal mood and affect. Thought content normal.       ED Course   Procedures  Labs Reviewed   CBC W/ AUTO DIFFERENTIAL - Abnormal; Notable for the following components:       Result Value    RBC 3.12 (*)     Hemoglobin 9.7 (*)     Hematocrit 29.8 (*)     MCH 31.1 (*)     RDW 14.9 (*)     Immature Granulocytes 0.9 (*)     Immature Grans (Abs) 0.07 (*)     Lymph % 15.0 (*)     All other components within normal limits   COMPREHENSIVE METABOLIC PANEL - Abnormal; Notable for the following components:    Sodium 129 (*)     Potassium 3.2 (*)     CO2 16 (*)     Glucose 181 (*)     BUN 7 (*)     Calcium 10.7 (*)     Albumin 2.4 (*)     Total Bilirubin 10.8 (*)     Alkaline Phosphatase 522 (*)      (*)      (*)     All other components within normal limits   D DIMER, QUANTITATIVE - Abnormal; Notable for the following components:    D-Dimer 1.18 (*)     All other components within normal limits   LIPASE - Abnormal; Notable for the following components:    Lipase <3 (*)     All other components within normal limits   LACTIC ACID, PLASMA - Abnormal; Notable for the following components:    Lactate (Lactic Acid) 3.7 (*)     All other components within normal limits    Narrative:       Lactic Acid critical result(s) called and verbal readback obtained   from Dr Perez Horton by [facility] 05/01/2023 13:11   POCT GLUCOSE - Abnormal; Notable for the following components:    POCT Glucose 160 (*)     All other components within normal limits   CULTURE, BLOOD   CULTURE, BLOOD   TROPONIN I   PROTIME-INR   APTT   B-TYPE NATRIURETIC PEPTIDE   URINALYSIS, REFLEX TO URINE CULTURE   POCT GLUCOSE MONITORING CONTINUOUS          Imaging Results              US Abdomen  Limited (Final result)  Result time 05/01/23 14:35:05      Final result by Claudine Pierce MD (05/01/23 14:35:05)                   Impression:      Pancreatic head mass increased in size as compared to the previous study of 03/21/2023.  There is associated intrahepatic and extrahepatic biliary ductal dilatation as well as dilatation of the main pancreatic duct.    Fatty infiltration of the liver..      Electronically signed by: Claudine Pierce MD  Date:    05/01/2023  Time:    14:35               Narrative:    EXAMINATION:  US ABDOMEN LIMITED    CLINICAL HISTORY:  Patient with a known pancreatic adenocarcinoma with bilirubin 10, look for a CBD obstruction;    TECHNIQUE:  Limited ultrasound of the right upper quadrant of the abdomen (including pancreas, liver, gallbladder, common bile duct, and right kidney) was performed.    COMPARISON:  03/21/2023, 03/22/2023    FINDINGS:  Liver: Normal in size. The liver demonstrates fatty infiltration.  No focal hepatic lesions are seen.    Biliary system: Surgically removed.  The common duct is dilated, measuring 1.2 cm. There is intrahepatic biliary ductal dilatation.    Pancreas: There is a mass in the region of the pancreatic head that measures 3.9 x 3.2 x 4.1 cm, appearing increased in size as compared to previous study.  There is pancreatic ductal dilatation measuring up to 6.6 mm.    Spleen: Normal in size measuring 10.5 cmwith homogeneous echogenicity.    Vascular: The portions of the aorta, vena cava, and portal vein appear free of acute abnormality.                                       X-Ray Chest AP Portable (Final result)  Result time 05/01/23 12:35:12      Final result by Claudine Pierce MD (05/01/23 12:35:12)                   Impression:      No acute abnormality.      Electronically signed by: Claudine Pierce MD  Date:    05/01/2023  Time:    12:35               Narrative:    EXAMINATION:  XR CHEST AP PORTABLE    CLINICAL HISTORY:  Shortness of  breath    TECHNIQUE:  Single frontal view of the chest was performed.    COMPARISON:  04/20/2023    FINDINGS:  The lungs are clear with normal appearance of pulmonary vasculature. No pleural effusion. No evident pneumothorax.    The cardiac silhouette is normal in size. The hilar and mediastinal contours are unremarkable.Right-sided Port-A-Cath.    Bones are intact.                                       Medications   morphine injection 2 mg (2 mg Intravenous Given 5/1/23 1240)   0.9%  NaCl infusion (1,000 mLs Intravenous New Bag 5/1/23 1327)   0.9%  NaCl infusion (1,000 mLs Intravenous New Bag 5/1/23 1328)   piperacillin-tazobactam (ZOSYN) 3.375 g in dextrose 5 % in water (D5W) 5 % 50 mL IVPB (MB+) (0 g Intravenous Stopped 5/1/23 1439)     Medical Decision Making:   Differential Diagnosis:   Weakness sepsis, anemia, jaundice,  Clinical Tests:   Lab Tests: Reviewed  Radiological Study: Reviewed  ED Management:  Patient with increased weakness and fatigue chemo last week   Patient does have marked jaundice   Elevated bili of 10.8 Grossly elevated LFTs  Patient was noted to have elevated D-dimer CT ordered  Patient's ultrasound showed biliary ductal dilatation as well as increase in pancreatic mass will transfer to [facility] for further management care    Other:   I have discussed this case with another health care provider.                        Clinical Impression:   Final diagnoses:  [R53.1] Weakness  [R06.02] Shortness of breath  [K83.1] Bile obstruction (Primary)        ED Disposition Condition    Transfer to Another Facility Stable                Keena Garces NP  05/01/23 5617

## 2023-05-01 NOTE — TELEPHONE ENCOUNTER
"----- Message from Ruthann Pardo sent at 5/1/2023 10:31 AM CDT -----  Consult/Advisory:       Name Of Caller: Radha (daughter in law)       Contact Preference?: [phone]       Provider Name: Jacinto       Does patient feel the need to be seen today? No       What is the nature of the call?: Calling to speak w/ nurse.in regards to pts current condition. Daughter in law stated pt is very  weak, and hasnt been eating as much.          Additional Notes:  "Thank you for all that you do for our patients"                                           "

## 2023-05-01 NOTE — ASSESSMENT & PLAN NOTE
Reportedly having increased SOB in clinic with Dr. Casey prescribing albuterol inhaler PRN.     - albuterol inhaler PRN

## 2023-05-01 NOTE — HPI
67-year-old female with history of hypertension, diabetes, endometrial cancer, and pancreatic adenocarcinoma who initiated chemotherapy April 24, 2023 at [facility] ([facility]). She presented to [facility] on May 1 with worsening jaundice, weakness, body aches, and abdominal discomfort. She was afebrile and hemodynamically stable. Labs showed hyponatremia (129) and hyperbilirubinemia (10.8), transaminitis (, ), elevated lactic acid (3.7). Abdominal ultrasound showed a pancreatic head mass increased in size with associated intra and extrahepatic biliary ductal dilatation and dilatation of the main pancreatic duct. Given fluid bolus and started on zosyn. Transferred to [facility] medical oncology for AES evaluation.     On arrival to [facility],     Oncologic history, per Dr. Casey clinic visit 4/21:  Ms Masterson is a 68 yo woman with HTN, T2DM, pathologic T1a endometrial cancer s/p surgery on 3/8/23, PUD, fatty liver, osteoporosis, who initially saw me on 4/5/23 for further management of pancreatic adenocarcinoma. She was admitted to ICU for DKA in March 2023. CT A/P 3/22/23 showed  a 3.4 cm mass centered in the pancreatic head/uncinate process with associated downstream dilatation of the main pancreatic duct.  Imaging findings are highly concerning for pancreatic adenocarcinoma.  Adjacent prominent peripancreatic lymph nodes are seen.  There is some stranding of the fat about the SMV for approximately 180°.  The portal vein, SMV and splenic vein are patent. Fatty infiltration of the liver. CT chest 3/20/23: No evidence for pulmonary embolus or other acute intrathoracic process. Hepatic steatosis.  Cholecystectomy. She underwent upper EUS biopsy on 3/31/23. Pathology showed adenocarcinoma, MMR intact. She presents today for further evaluation. Strong family history of breast cancer, endometrial cancer and colon cancer. She is a little weak after surgery but active at home and is  still working.   Family history:  Mother: Breast cancer (middle age)  Aunt maternal (2): cancer (unknown)  Sister (3): breast cancer, lung cancer (smoking). Other sister: colon cancer (older diagnosis 60's). Third sister (breast cancer) and endometrial cancer  Brother:  finger bone cancer  Discussed perioperative FOLFIRINOX  2. FOLFIRINOX to be started on 4/24/23.

## 2023-05-02 ENCOUNTER — HOSPITAL ENCOUNTER (INPATIENT)
Facility: HOSPITAL | Age: 68
LOS: 1 days | Discharge: HOME | DRG: 445 | End: 2023-05-03
Attending: HOSPITALIST | Admitting: HOSPITALIST
Payer: COMMERCIAL

## 2023-05-02 ENCOUNTER — ANESTHESIA EVENT (OUTPATIENT)
Dept: ENDOSCOPY | Facility: HOSPITAL | Age: 68
DRG: 445 | End: 2023-05-02
Payer: COMMERCIAL

## 2023-05-02 ENCOUNTER — ANESTHESIA (OUTPATIENT)
Dept: ENDOSCOPY | Facility: HOSPITAL | Age: 68
DRG: 445 | End: 2023-05-02
Payer: COMMERCIAL

## 2023-05-02 DIAGNOSIS — K86.89 DILATION OF PANCREATIC DUCT: Primary | ICD-10-CM

## 2023-05-02 DIAGNOSIS — R07.9 CHEST PAIN: ICD-10-CM

## 2023-05-02 DIAGNOSIS — K83.1 BILIARY TRACT OBSTRUCTION: ICD-10-CM

## 2023-05-02 PROBLEM — E87.6 HYPOKALEMIA: Status: ACTIVE | Noted: 2023-05-02

## 2023-05-02 PROBLEM — E78.5 HYPERLIPIDEMIA: Status: RESOLVED | Noted: 2023-03-21 | Resolved: 2023-05-02

## 2023-05-02 LAB
ALBUMIN SERPL BCP-MCNC: 2 G/DL (ref 3.5–5.2)
ALBUMIN SERPL BCP-MCNC: 2 G/DL (ref 3.5–5.2)
ALP SERPL-CCNC: 441 U/L (ref 55–135)
ALP SERPL-CCNC: 450 U/L (ref 55–135)
ALT SERPL W/O P-5'-P-CCNC: 175 U/L (ref 10–44)
ALT SERPL W/O P-5'-P-CCNC: 181 U/L (ref 10–44)
ANION GAP SERPL CALC-SCNC: 11 MMOL/L (ref 8–16)
ANION GAP SERPL CALC-SCNC: 9 MMOL/L (ref 8–16)
ANISOCYTOSIS BLD QL SMEAR: ABNORMAL
ANISOCYTOSIS BLD QL SMEAR: SLIGHT
AST SERPL-CCNC: 202 U/L (ref 10–40)
AST SERPL-CCNC: 216 U/L (ref 10–40)
BASO STIPL BLD QL SMEAR: ABNORMAL
BASOPHILS # BLD AUTO: 0.02 K/UL (ref 0–0.2)
BASOPHILS # BLD AUTO: ABNORMAL K/UL (ref 0–0.2)
BASOPHILS NFR BLD: 0.5 % (ref 0–1.9)
BASOPHILS NFR BLD: 1 % (ref 0–1.9)
BILIRUB SERPL-MCNC: 10.4 MG/DL (ref 0.1–1)
BILIRUB SERPL-MCNC: 8.4 MG/DL (ref 0.1–1)
BUN SERPL-MCNC: 5 MG/DL (ref 8–23)
BUN SERPL-MCNC: 6 MG/DL (ref 8–23)
CALCIUM SERPL-MCNC: 9.8 MG/DL (ref 8.7–10.5)
CALCIUM SERPL-MCNC: 9.9 MG/DL (ref 8.7–10.5)
CHLORIDE SERPL-SCNC: 105 MMOL/L (ref 95–110)
CHLORIDE SERPL-SCNC: 107 MMOL/L (ref 95–110)
CO2 SERPL-SCNC: 15 MMOL/L (ref 23–29)
CO2 SERPL-SCNC: 17 MMOL/L (ref 23–29)
CREAT SERPL-MCNC: 0.6 MG/DL (ref 0.5–1.4)
CREAT SERPL-MCNC: 0.6 MG/DL (ref 0.5–1.4)
DACRYOCYTES BLD QL SMEAR: ABNORMAL
DIFFERENTIAL METHOD: ABNORMAL
DIFFERENTIAL METHOD: ABNORMAL
DOHLE BOD BLD QL SMEAR: PRESENT
EOSINOPHIL # BLD AUTO: 0 K/UL (ref 0–0.5)
EOSINOPHIL # BLD AUTO: ABNORMAL K/UL (ref 0–0.5)
EOSINOPHIL NFR BLD: 0 % (ref 0–8)
EOSINOPHIL NFR BLD: 1 % (ref 0–8)
ERYTHROCYTE [DISTWIDTH] IN BLOOD BY AUTOMATED COUNT: 14.9 % (ref 11.5–14.5)
ERYTHROCYTE [DISTWIDTH] IN BLOOD BY AUTOMATED COUNT: 15 % (ref 11.5–14.5)
EST. GFR  (NO RACE VARIABLE): >60 ML/MIN/1.73 M^2
EST. GFR  (NO RACE VARIABLE): >60 ML/MIN/1.73 M^2
GIANT PLATELETS BLD QL SMEAR: PRESENT
GLUCOSE SERPL-MCNC: 148 MG/DL (ref 70–110)
GLUCOSE SERPL-MCNC: 175 MG/DL (ref 70–110)
HCT VFR BLD AUTO: 25 % (ref 37–48.5)
HCT VFR BLD AUTO: 25.3 % (ref 37–48.5)
HGB BLD-MCNC: 7.8 G/DL (ref 12–16)
HGB BLD-MCNC: 8.1 G/DL (ref 12–16)
HYPOCHROMIA BLD QL SMEAR: ABNORMAL
HYPOCHROMIA BLD QL SMEAR: ABNORMAL
IMM GRANULOCYTES # BLD AUTO: 0.16 K/UL (ref 0–0.04)
IMM GRANULOCYTES # BLD AUTO: ABNORMAL K/UL (ref 0–0.04)
IMM GRANULOCYTES NFR BLD AUTO: 4.1 % (ref 0–0.5)
IMM GRANULOCYTES NFR BLD AUTO: ABNORMAL % (ref 0–0.5)
INR PPP: 1 (ref 0.8–1.2)
LIPASE SERPL-CCNC: 7 U/L (ref 4–60)
LYMPHOCYTES # BLD AUTO: 1.1 K/UL (ref 1–4.8)
LYMPHOCYTES # BLD AUTO: ABNORMAL K/UL (ref 1–4.8)
LYMPHOCYTES NFR BLD: 23 % (ref 18–48)
LYMPHOCYTES NFR BLD: 28.7 % (ref 18–48)
MCH RBC QN AUTO: 30.5 PG (ref 27–31)
MCH RBC QN AUTO: 31.2 PG (ref 27–31)
MCHC RBC AUTO-ENTMCNC: 31.2 G/DL (ref 32–36)
MCHC RBC AUTO-ENTMCNC: 32 G/DL (ref 32–36)
MCV RBC AUTO: 97 FL (ref 82–98)
MCV RBC AUTO: 98 FL (ref 82–98)
METAMYELOCYTES NFR BLD MANUAL: 1 %
MONOCYTES # BLD AUTO: 0.7 K/UL (ref 0.3–1)
MONOCYTES # BLD AUTO: ABNORMAL K/UL (ref 0.3–1)
MONOCYTES NFR BLD: 16.8 % (ref 4–15)
MONOCYTES NFR BLD: 8 % (ref 4–15)
MYELOCYTES NFR BLD MANUAL: 1 %
NEUTROPHILS # BLD AUTO: 1.9 K/UL (ref 1.8–7.7)
NEUTROPHILS NFR BLD: 48.9 % (ref 38–73)
NEUTROPHILS NFR BLD: 55 % (ref 38–73)
NEUTS BAND NFR BLD MANUAL: 11 %
NRBC BLD-RTO: 0 /100 WBC
NRBC BLD-RTO: 0 /100 WBC
OVALOCYTES BLD QL SMEAR: ABNORMAL
PLATELET # BLD AUTO: 127 K/UL (ref 150–450)
PLATELET # BLD AUTO: 129 K/UL (ref 150–450)
PLATELET BLD QL SMEAR: ABNORMAL
PLATELET BLD QL SMEAR: ABNORMAL
PMV BLD AUTO: 11.7 FL (ref 9.2–12.9)
PMV BLD AUTO: 12 FL (ref 9.2–12.9)
POCT GLUCOSE: 136 MG/DL (ref 70–110)
POCT GLUCOSE: 158 MG/DL (ref 70–110)
POCT GLUCOSE: 190 MG/DL (ref 70–110)
POCT GLUCOSE: 201 MG/DL (ref 70–110)
POCT GLUCOSE: 276 MG/DL (ref 70–110)
POIKILOCYTOSIS BLD QL SMEAR: SLIGHT
POIKILOCYTOSIS BLD QL SMEAR: SLIGHT
POLYCHROMASIA BLD QL SMEAR: ABNORMAL
POLYCHROMASIA BLD QL SMEAR: ABNORMAL
POTASSIUM SERPL-SCNC: 3.4 MMOL/L (ref 3.5–5.1)
POTASSIUM SERPL-SCNC: 3.7 MMOL/L (ref 3.5–5.1)
PROT SERPL-MCNC: 5 G/DL (ref 6–8.4)
PROT SERPL-MCNC: 5.1 G/DL (ref 6–8.4)
PROTHROMBIN TIME: 10.8 SEC (ref 9–12.5)
RBC # BLD AUTO: 2.56 M/UL (ref 4–5.4)
RBC # BLD AUTO: 2.6 M/UL (ref 4–5.4)
SCHISTOCYTES BLD QL SMEAR: ABNORMAL
SCHISTOCYTES BLD QL SMEAR: PRESENT
SODIUM SERPL-SCNC: 131 MMOL/L (ref 136–145)
SODIUM SERPL-SCNC: 133 MMOL/L (ref 136–145)
SPHEROCYTES BLD QL SMEAR: ABNORMAL
SPHEROCYTES BLD QL SMEAR: ABNORMAL
TARGETS BLD QL SMEAR: ABNORMAL
TOXIC GRANULES BLD QL SMEAR: PRESENT
TOXIC GRANULES BLD QL SMEAR: PRESENT
TROPONIN I SERPL DL<=0.01 NG/ML-MCNC: 0.01 NG/ML (ref 0–0.03)
WBC # BLD AUTO: 3.94 K/UL (ref 3.9–12.7)
WBC # BLD AUTO: 5.15 K/UL (ref 3.9–12.7)

## 2023-05-02 PROCEDURE — 93005 ELECTROCARDIOGRAM TRACING: CPT

## 2023-05-02 PROCEDURE — 43274 ERCP DUCT STENT PLACEMENT: CPT | Mod: ,,, | Performed by: INTERNAL MEDICINE

## 2023-05-02 PROCEDURE — 63600175 PHARM REV CODE 636 W HCPCS

## 2023-05-02 PROCEDURE — 85007 BL SMEAR W/DIFF WBC COUNT: CPT

## 2023-05-02 PROCEDURE — 25000003 PHARM REV CODE 250: Performed by: STUDENT IN AN ORGANIZED HEALTH CARE EDUCATION/TRAINING PROGRAM

## 2023-05-02 PROCEDURE — D9220A PRA ANESTHESIA: ICD-10-PCS | Mod: ANES,,, | Performed by: ANESTHESIOLOGY

## 2023-05-02 PROCEDURE — 94761 N-INVAS EAR/PLS OXIMETRY MLT: CPT

## 2023-05-02 PROCEDURE — 99222 1ST HOSP IP/OBS MODERATE 55: CPT | Mod: 25,,, | Performed by: INTERNAL MEDICINE

## 2023-05-02 PROCEDURE — C1769 GUIDE WIRE: HCPCS | Performed by: INTERNAL MEDICINE

## 2023-05-02 PROCEDURE — D9220A PRA ANESTHESIA: Mod: ANES,,, | Performed by: ANESTHESIOLOGY

## 2023-05-02 PROCEDURE — 80053 COMPREHEN METABOLIC PANEL: CPT | Mod: 91 | Performed by: STUDENT IN AN ORGANIZED HEALTH CARE EDUCATION/TRAINING PROGRAM

## 2023-05-02 PROCEDURE — 25000003 PHARM REV CODE 250: Performed by: NURSE ANESTHETIST, CERTIFIED REGISTERED

## 2023-05-02 PROCEDURE — 74328 X-RAY BILE DUCT ENDOSCOPY: CPT | Mod: 26,,, | Performed by: INTERNAL MEDICINE

## 2023-05-02 PROCEDURE — 20600001 HC STEP DOWN PRIVATE ROOM

## 2023-05-02 PROCEDURE — 84484 ASSAY OF TROPONIN QUANT: CPT | Performed by: STUDENT IN AN ORGANIZED HEALTH CARE EDUCATION/TRAINING PROGRAM

## 2023-05-02 PROCEDURE — 82962 GLUCOSE BLOOD TEST: CPT | Performed by: INTERNAL MEDICINE

## 2023-05-02 PROCEDURE — 80053 COMPREHEN METABOLIC PANEL: CPT

## 2023-05-02 PROCEDURE — D9220A PRA ANESTHESIA: Mod: CRNA,,, | Performed by: NURSE ANESTHETIST, CERTIFIED REGISTERED

## 2023-05-02 PROCEDURE — 85027 COMPLETE CBC AUTOMATED: CPT

## 2023-05-02 PROCEDURE — 74328 X-RAY BILE DUCT ENDOSCOPY: CPT | Mod: TC | Performed by: INTERNAL MEDICINE

## 2023-05-02 PROCEDURE — 93010 EKG 12-LEAD: ICD-10-PCS | Mod: ,,, | Performed by: INTERNAL MEDICINE

## 2023-05-02 PROCEDURE — 99223 PR INITIAL HOSPITAL CARE,LEVL III: ICD-10-PCS | Mod: ,,, | Performed by: HOSPITALIST

## 2023-05-02 PROCEDURE — 43274 ERCP DUCT STENT PLACEMENT: CPT | Performed by: INTERNAL MEDICINE

## 2023-05-02 PROCEDURE — 93010 ELECTROCARDIOGRAM REPORT: CPT | Mod: ,,, | Performed by: INTERNAL MEDICINE

## 2023-05-02 PROCEDURE — 99222 PR INITIAL HOSPITAL CARE,LEVL II: ICD-10-PCS | Mod: 25,,, | Performed by: INTERNAL MEDICINE

## 2023-05-02 PROCEDURE — 25000003 PHARM REV CODE 250: Performed by: HOSPITALIST

## 2023-05-02 PROCEDURE — 37000009 HC ANESTHESIA EA ADD 15 MINS: Performed by: INTERNAL MEDICINE

## 2023-05-02 PROCEDURE — 37000008 HC ANESTHESIA 1ST 15 MINUTES: Performed by: INTERNAL MEDICINE

## 2023-05-02 PROCEDURE — 43274 PR ERCP W/STENT PLCMNT BILIARY/PANCREATIC DUCT: ICD-10-PCS | Mod: ,,, | Performed by: INTERNAL MEDICINE

## 2023-05-02 PROCEDURE — C1874 STENT, COATED/COV W/DEL SYS: HCPCS | Performed by: INTERNAL MEDICINE

## 2023-05-02 PROCEDURE — 83690 ASSAY OF LIPASE: CPT | Performed by: STUDENT IN AN ORGANIZED HEALTH CARE EDUCATION/TRAINING PROGRAM

## 2023-05-02 PROCEDURE — 25500020 PHARM REV CODE 255: Performed by: INTERNAL MEDICINE

## 2023-05-02 PROCEDURE — D9220A PRA ANESTHESIA: ICD-10-PCS | Mod: CRNA,,, | Performed by: NURSE ANESTHETIST, CERTIFIED REGISTERED

## 2023-05-02 PROCEDURE — 99223 1ST HOSP IP/OBS HIGH 75: CPT | Mod: ,,, | Performed by: HOSPITALIST

## 2023-05-02 PROCEDURE — 25000003 PHARM REV CODE 250

## 2023-05-02 PROCEDURE — 85610 PROTHROMBIN TIME: CPT

## 2023-05-02 PROCEDURE — 74328 PR  X-RAY FOR BILE DUCT ENDOSCOPY: ICD-10-PCS | Mod: 26,,, | Performed by: INTERNAL MEDICINE

## 2023-05-02 PROCEDURE — 27201674 HC SPHINCTERTOME: Performed by: INTERNAL MEDICINE

## 2023-05-02 PROCEDURE — 85025 COMPLETE CBC W/AUTO DIFF WBC: CPT | Performed by: STUDENT IN AN ORGANIZED HEALTH CARE EDUCATION/TRAINING PROGRAM

## 2023-05-02 PROCEDURE — 63600175 PHARM REV CODE 636 W HCPCS: Performed by: NURSE ANESTHETIST, CERTIFIED REGISTERED

## 2023-05-02 RX ORDER — FENTANYL CITRATE 50 UG/ML
25 INJECTION, SOLUTION INTRAMUSCULAR; INTRAVENOUS EVERY 5 MIN PRN
Status: DISCONTINUED | OUTPATIENT
Start: 2023-05-02 | End: 2023-05-02 | Stop reason: HOSPADM

## 2023-05-02 RX ORDER — GLUCAGON 1 MG
1 KIT INJECTION
Status: DISCONTINUED | OUTPATIENT
Start: 2023-05-02 | End: 2023-05-03 | Stop reason: HOSPADM

## 2023-05-02 RX ORDER — POTASSIUM CHLORIDE 20 MEQ/1
40 TABLET, EXTENDED RELEASE ORAL ONCE
Status: COMPLETED | OUTPATIENT
Start: 2023-05-02 | End: 2023-05-02

## 2023-05-02 RX ORDER — ONDANSETRON 2 MG/ML
4 INJECTION INTRAMUSCULAR; INTRAVENOUS EVERY 8 HOURS PRN
Status: DISCONTINUED | OUTPATIENT
Start: 2023-05-02 | End: 2023-05-03 | Stop reason: HOSPADM

## 2023-05-02 RX ORDER — IBUPROFEN 200 MG
24 TABLET ORAL
Status: DISCONTINUED | OUTPATIENT
Start: 2023-05-02 | End: 2023-05-02

## 2023-05-02 RX ORDER — LIDOCAINE HYDROCHLORIDE 20 MG/ML
INJECTION INTRAVENOUS
Status: DISCONTINUED | OUTPATIENT
Start: 2023-05-02 | End: 2023-05-02

## 2023-05-02 RX ORDER — INSULIN ASPART 100 [IU]/ML
1-10 INJECTION, SOLUTION INTRAVENOUS; SUBCUTANEOUS
Status: DISCONTINUED | OUTPATIENT
Start: 2023-05-03 | End: 2023-05-03 | Stop reason: HOSPADM

## 2023-05-02 RX ORDER — SODIUM CHLORIDE 0.9 % (FLUSH) 0.9 %
10 SYRINGE (ML) INJECTION EVERY 12 HOURS PRN
Status: DISCONTINUED | OUTPATIENT
Start: 2023-05-02 | End: 2023-05-03 | Stop reason: HOSPADM

## 2023-05-02 RX ORDER — NALOXONE HCL 0.4 MG/ML
0.02 VIAL (ML) INJECTION
Status: DISCONTINUED | OUTPATIENT
Start: 2023-05-02 | End: 2023-05-03 | Stop reason: HOSPADM

## 2023-05-02 RX ORDER — IBUPROFEN 200 MG
24 TABLET ORAL
Status: DISCONTINUED | OUTPATIENT
Start: 2023-05-03 | End: 2023-05-02

## 2023-05-02 RX ORDER — PROPOFOL 10 MG/ML
VIAL (ML) INTRAVENOUS
Status: DISCONTINUED | OUTPATIENT
Start: 2023-05-02 | End: 2023-05-02

## 2023-05-02 RX ORDER — HYDROCODONE BITARTRATE AND ACETAMINOPHEN 5; 325 MG/1; MG/1
1 TABLET ORAL EVERY 6 HOURS PRN
Status: DISCONTINUED | OUTPATIENT
Start: 2023-05-02 | End: 2023-05-03 | Stop reason: HOSPADM

## 2023-05-02 RX ORDER — DEXTROSE 40 %
15 GEL (GRAM) ORAL
Status: DISCONTINUED | OUTPATIENT
Start: 2023-05-02 | End: 2023-05-02

## 2023-05-02 RX ORDER — ONDANSETRON 2 MG/ML
4 INJECTION INTRAMUSCULAR; INTRAVENOUS DAILY PRN
Status: DISCONTINUED | OUTPATIENT
Start: 2023-05-02 | End: 2023-05-02 | Stop reason: HOSPADM

## 2023-05-02 RX ORDER — PROPOFOL 10 MG/ML
VIAL (ML) INTRAVENOUS CONTINUOUS PRN
Status: DISCONTINUED | OUTPATIENT
Start: 2023-05-02 | End: 2023-05-02

## 2023-05-02 RX ORDER — IBUPROFEN 200 MG
16 TABLET ORAL
Status: DISCONTINUED | OUTPATIENT
Start: 2023-05-02 | End: 2023-05-02

## 2023-05-02 RX ORDER — DEXTROSE 40 %
15 GEL (GRAM) ORAL
Status: DISCONTINUED | OUTPATIENT
Start: 2023-05-03 | End: 2023-05-03 | Stop reason: HOSPADM

## 2023-05-02 RX ORDER — MUPIROCIN 20 MG/G
OINTMENT TOPICAL 2 TIMES DAILY
Status: DISCONTINUED | OUTPATIENT
Start: 2023-05-02 | End: 2023-05-03 | Stop reason: HOSPADM

## 2023-05-02 RX ORDER — DEXTROSE 40 %
30 GEL (GRAM) ORAL
Status: DISCONTINUED | OUTPATIENT
Start: 2023-05-03 | End: 2023-05-03 | Stop reason: HOSPADM

## 2023-05-02 RX ORDER — PROCHLORPERAZINE EDISYLATE 5 MG/ML
5 INJECTION INTRAMUSCULAR; INTRAVENOUS EVERY 6 HOURS PRN
Status: DISCONTINUED | OUTPATIENT
Start: 2023-05-02 | End: 2023-05-03 | Stop reason: HOSPADM

## 2023-05-02 RX ORDER — SODIUM CHLORIDE 0.9 % (FLUSH) 0.9 %
10 SYRINGE (ML) INJECTION
Status: DISCONTINUED | OUTPATIENT
Start: 2023-05-02 | End: 2023-05-02 | Stop reason: HOSPADM

## 2023-05-02 RX ORDER — IBUPROFEN 200 MG
16 TABLET ORAL
Status: DISCONTINUED | OUTPATIENT
Start: 2023-05-03 | End: 2023-05-02

## 2023-05-02 RX ORDER — HEPARIN 100 UNIT/ML
300 SYRINGE INTRAVENOUS
Status: DISCONTINUED | OUTPATIENT
Start: 2023-05-02 | End: 2023-05-03 | Stop reason: HOSPADM

## 2023-05-02 RX ORDER — HEPARIN SODIUM 5000 [USP'U]/ML
5000 INJECTION, SOLUTION INTRAVENOUS; SUBCUTANEOUS EVERY 8 HOURS
Status: DISCONTINUED | OUTPATIENT
Start: 2023-05-02 | End: 2023-05-02

## 2023-05-02 RX ORDER — HYDROMORPHONE HYDROCHLORIDE 1 MG/ML
1 INJECTION, SOLUTION INTRAMUSCULAR; INTRAVENOUS; SUBCUTANEOUS EVERY 4 HOURS PRN
Status: DISCONTINUED | OUTPATIENT
Start: 2023-05-02 | End: 2023-05-03 | Stop reason: HOSPADM

## 2023-05-02 RX ORDER — LOSARTAN POTASSIUM 50 MG/1
100 TABLET ORAL DAILY
Status: DISCONTINUED | OUTPATIENT
Start: 2023-05-02 | End: 2023-05-03 | Stop reason: HOSPADM

## 2023-05-02 RX ORDER — DEXTROSE 40 %
30 GEL (GRAM) ORAL
Status: DISCONTINUED | OUTPATIENT
Start: 2023-05-02 | End: 2023-05-02

## 2023-05-02 RX ORDER — GLUCAGON 1 MG
1 KIT INJECTION
Status: DISCONTINUED | OUTPATIENT
Start: 2023-05-02 | End: 2023-05-02

## 2023-05-02 RX ADMIN — HYDROCODONE BITARTRATE AND ACETAMINOPHEN 1 TABLET: 5; 325 TABLET ORAL at 11:05

## 2023-05-02 RX ADMIN — ALUMINUM HYDROXIDE, MAGNESIUM HYDROXIDE, AND DIMETHICONE 10 ML: 400; 400; 40 SUSPENSION ORAL at 10:05

## 2023-05-02 RX ADMIN — HYDROCODONE BITARTRATE AND ACETAMINOPHEN 1 TABLET: 5; 325 TABLET ORAL at 04:05

## 2023-05-02 RX ADMIN — HYDROMORPHONE HYDROCHLORIDE 1 MG: 1 INJECTION, SOLUTION INTRAMUSCULAR; INTRAVENOUS; SUBCUTANEOUS at 04:05

## 2023-05-02 RX ADMIN — PIPERACILLIN SODIUM AND TAZOBACTAM SODIUM 4.5 G: 4; .5 INJECTION, POWDER, FOR SOLUTION INTRAVENOUS at 08:05

## 2023-05-02 RX ADMIN — HEPARIN SODIUM 5000 UNITS: 5000 INJECTION INTRAVENOUS; SUBCUTANEOUS at 05:05

## 2023-05-02 RX ADMIN — LOSARTAN POTASSIUM 100 MG: 50 TABLET, FILM COATED ORAL at 08:05

## 2023-05-02 RX ADMIN — ALUMINUM HYDROXIDE, MAGNESIUM HYDROXIDE, AND DIMETHICONE 10 ML: 400; 400; 40 SUSPENSION ORAL at 08:05

## 2023-05-02 RX ADMIN — PROPOFOL 50 MG: 10 INJECTION, EMULSION INTRAVENOUS at 12:05

## 2023-05-02 RX ADMIN — PIPERACILLIN SODIUM AND TAZOBACTAM SODIUM 4.5 G: 4; .5 INJECTION, POWDER, FOR SOLUTION INTRAVENOUS at 03:05

## 2023-05-02 RX ADMIN — MUPIROCIN: 20 OINTMENT TOPICAL at 08:05

## 2023-05-02 RX ADMIN — HYDROMORPHONE HYDROCHLORIDE 1 MG: 1 INJECTION, SOLUTION INTRAMUSCULAR; INTRAVENOUS; SUBCUTANEOUS at 08:05

## 2023-05-02 RX ADMIN — SODIUM CHLORIDE: 9 INJECTION, SOLUTION INTRAVENOUS at 12:05

## 2023-05-02 RX ADMIN — POTASSIUM CHLORIDE 40 MEQ: 1500 TABLET, EXTENDED RELEASE ORAL at 03:05

## 2023-05-02 RX ADMIN — PIPERACILLIN SODIUM AND TAZOBACTAM SODIUM 4.5 G: 4; .5 INJECTION, POWDER, FOR SOLUTION INTRAVENOUS at 11:05

## 2023-05-02 RX ADMIN — PROPOFOL 200 MCG/KG/MIN: 10 INJECTION, EMULSION INTRAVENOUS at 12:05

## 2023-05-02 RX ADMIN — LIDOCAINE HYDROCHLORIDE 60 MG: 20 INJECTION INTRAVENOUS at 12:05

## 2023-05-02 NOTE — H&P
[facility] [place])  Hematology/Oncology  H&P    Patient Name: Kaity Masterson  MRN: [id number]  Admission Date: 5/2/2023  Code Status: Full Code   Attending Provider: Rosa Ramos MD  Primary Care Physician: Marcia Dewey NP  Principal Problem:Dilation of pancreatic duct    Subjective:     HPI: Kaity Masterson is a 67-year-old female with history of hypertension, diabetes, endometrial cancer, and pancreatic adenocarcinoma who initiated chemotherapy April 24, 2023 at [facility] ([facility]).  She presented to [facility] on 5/1/23 with worsening jaundice, weakness, body aches, and abdominal discomfort.  In the emergency department she received pain medication, normal saline boluses, and IV Zosyn.     Labs notable for lipase less than 3, sodium 129, potassium 3.2, chloride 100, CO2 16, BUN 7, creatinine 0.8, glucose 181, calcium 10.7, total bilirubin 10.8, , , white blood cells 7.98, hemoglobin 9.7, hematocrit 29.8, platelets 172, BNP 20, INR 1, D-dimer 1.18, lactic acid 3.7 troponin 0.021.  Blood cultures ordered.  EKG showed sinus tachycardia with ventricular rate 128.  Nonspecific ST abnormality.  Chest x-ray had no acute abnormality.  Abdominal ultrasound showed pancreatic head mass increased in size compared to previous study from March.  Associated intrahepatic and extrahepatic biliary ductal dilatation as well as dilatation of the main pancreatic duct.    The patient was transferred to [facility] for AES evaluation.    Oncologic History:  1. Ms Masterson is a 66 yo woman with HTN, T2DM, pathologic T1a endometrial cancer s/p surgery on 3/8/23, PUD, fatty liver, osteoporosis, who initially saw Dr. Casey on 4/5/23 for further management of pancreatic adenocarcinoma. She was admitted to ICU for DKA in March 2023. CT A/P 3/22/23 showed  a 3.4 cm mass centered in the pancreatic head/uncinate process with associated downstream dilatation of the main  pancreatic duct.  Imaging findings are highly concerning for pancreatic adenocarcinoma.  Adjacent prominent peripancreatic lymph nodes are seen.  There is some stranding of the fat about the SMV for approximately 180°.  The portal vein, SMV and splenic vein are patent. Fatty infiltration of the liver. CT chest 3/20/23: No evidence for pulmonary embolus or other acute intrathoracic process. Hepatic steatosis.  Cholecystectomy. She underwent upper EUS biopsy on 3/31/23. Pathology showed adenocarcinoma, MMR intact. She presents today for further evaluation. Strong family history of breast cancer, endometrial cancer and colon cancer. She is a little weak after surgery but active at home and is still working.   Family history:  Mother: Breast cancer (middle age)  Aunt maternal (2): cancer (unknown)  Sister (3): breast cancer, lung cancer (smoking). Other sister: colon cancer (older diagnosis 60's). Third sister (breast cancer) and endometrial cancer  Brother:  finger bone cancer  Discussed perioperative FOLFIRINOX  2. FOLFIRINOX to be started on 4/24/23.          Oncology Treatment Plan:   OP PANC mFOLFIRINOX Q2W    Medications:  Continuous Infusions:  Scheduled Meds:   heparin (porcine)  5,000 Units Subcutaneous Q8H    piperacillin-tazobactam (ZOSYN) IVPB  4.5 g Intravenous Q8H    potassium chloride  40 mEq Oral Once     PRN Meds:dextrose 10%, dextrose 10%, dextrose, dextrose, glucagon (human recombinant), HYDROcodone-acetaminophen, HYDROmorphone, naloxone, ondansetron, prochlorperazine, sodium chloride 0.9%     Review of patient's allergies indicates:  No Known Allergies     Past Medical History:   Diagnosis Date    Anemia, unspecified     Hypertension     Osteoporosis     PUD (peptic ulcer disease)     Type 2 diabetes mellitus without complications      Past Surgical History:   Procedure Laterality Date    CHOLECYSTECTOMY      colonsocopy      2017    ENDOSCOPIC ULTRASOUND OF UPPER GASTROINTESTINAL TRACT  N/A 3/31/2023    Procedure: ULTRASOUND, UPPER GI TRACT, ENDOSCOPIC;  Surgeon: Kleber Bolaños MD;  Location: [facility] ENDO (2ND FLR);  Service: Endoscopy;  Laterality: N/A;  3/27 - precall attempted, no answer. SG    HYSTERECTOMY  03/08/2023    INSERTION OF TUNNELED CENTRAL VENOUS CATHETER (CVC) WITH SUBCUTANEOUS PORT N/A 4/20/2023    Procedure: INSERTION, SINGLE LUMEN CATHETER WITH PORT, WITH FLUOROSCOPIC GUIDANCE;  Surgeon: Philip Anderson Jr., MD;  Location: [facility] OR 2ND FLR;  Service: General;  Laterality: N/A;    LAPAROSCOPIC CHOLECYSTECTOMY      LYMPH NODE BIOPSY Bilateral 03/08/2023    Procedure: BIOPSY, PELVIC LYMPH NODE;  Surgeon: Dallas Aguilar MD;  Location: [facility];  Service: OB/GYN;  Laterality: Bilateral;    ROBOTIC HYSTERECTOMY, WITH SALPINGO-OOPHORECTOMY Bilateral 03/08/2023    Procedure: ROBOTIC HYSTERECTOMY,WITH SALPINGO-OOPHORECTOMY;  Surgeon: Dallas Aguilar MD;  Location: [facility];  Service: OB/GYN;  Laterality: Bilateral;    TOTAL KNEE ARTHROPLASTY Left     2016    TOTAL KNEE ARTHROPLASTY Right     2019    VAGINAL DELIVERY      x 2  (one with epidural)     Family History       Problem Relation (Age of Onset)    Breast cancer Mother, Sister, Maternal Aunt    Colon cancer Mother, Maternal Aunt    Diabetes Mother    Hypertension Brother, Sister    Lung cancer Sister          Tobacco Use    Smoking status: Never    Smokeless tobacco: Never   Substance and Sexual Activity    Alcohol use: Not Currently    Drug use: Never    Sexual activity: Not Currently     Partners: Male       Review of Systems   Constitutional:  Positive for appetite change and fatigue. Negative for chills and fever.   HENT:  Negative for mouth sores and sore throat.    Eyes:  Negative for visual disturbance.   Respiratory:  Negative for cough, shortness of breath and wheezing.    Cardiovascular:  Negative for chest pain, palpitations and leg swelling.   Gastrointestinal:  Negative for abdominal distention, abdominal pain,  constipation, diarrhea, nausea and vomiting.   Genitourinary:  Negative for dysuria, flank pain and frequency.   Musculoskeletal:  Positive for back pain. Negative for arthralgias and myalgias.   Skin:  Positive for color change. Negative for rash.   Allergic/Immunologic: Positive for immunocompromised state.   Neurological:  Negative for dizziness, light-headedness and headaches.   Psychiatric/Behavioral:  Negative for confusion and decreased concentration.    Objective:     Vital Signs (Most Recent):  Temp: 98.1 °F (36.7 °C) (05/02/23 0111)  Pulse: 95 (05/02/23 0111)  Resp: 16 (05/02/23 0111)  BP: 115/71 (05/02/23 0111)  SpO2: 99 % (05/02/23 0111) Vital Signs (24h Range):  Temp:  [97.7 °F (36.5 °C)-98.1 °F (36.7 °C)] 98.1 °F (36.7 °C)  Pulse:  [] 95  Resp:  [12-22] 16  SpO2:  [99 %-100 %] 99 %  BP: (115-171)/(57-85) 115/71     Weight: 69.7 kg (153 lb 10.6 oz)  Body mass index is 26.38 kg/m².  Body surface area is 1.77 meters squared.    No intake or output data in the 24 hours ending 05/02/23 0314    Physical Exam  Vitals reviewed.   Constitutional:       General: She is not in acute distress.     Appearance: She is ill-appearing.   HENT:      Head: Normocephalic and atraumatic.      Right Ear: External ear normal.      Left Ear: External ear normal.      Nose: Nose normal.      Mouth/Throat:      Mouth: Mucous membranes are moist.      Pharynx: Oropharynx is clear.   Eyes:      General: Scleral icterus present.   Cardiovascular:      Rate and Rhythm: Normal rate and regular rhythm.      Pulses: Normal pulses.      Heart sounds: Normal heart sounds. No murmur heard.    No friction rub. No gallop.   Pulmonary:      Effort: Pulmonary effort is normal. No respiratory distress.      Breath sounds: Normal breath sounds. No wheezing or rales.   Abdominal:      General: Bowel sounds are normal. There is no distension.      Palpations: Abdomen is soft.      Tenderness: There is no abdominal tenderness. There is no  guarding.   Musculoskeletal:      Cervical back: Normal range of motion.      Right lower leg: No edema.      Left lower leg: No edema.   Skin:     Coloration: Skin is jaundiced.   Neurological:      General: No focal deficit present.      Mental Status: She is alert and oriented to person, place, and time.   Psychiatric:         Mood and Affect: Mood normal.         Behavior: Behavior normal.       Significant Labs:   CBC:   Recent Labs   Lab 05/01/23  1233   WBC 7.98   HGB 9.7*   HCT 29.8*      , CMP:   Recent Labs   Lab 05/01/23  1233   *   K 3.2*      CO2 16*   *   BUN 7*   CREATININE 0.8   CALCIUM 10.7*   PROT 6.1   ALBUMIN 2.4*   BILITOT 10.8*   ALKPHOS 522*   *   *   ANIONGAP 13   , Coagulation:   Recent Labs   Lab 05/01/23  1233   INR 1.0   APTT 22.6   , Urine Studies:   Recent Labs   Lab 05/01/23  1751   COLORU Yellow   APPEARANCEUA Clear   PHUR 7.0   SPECGRAV 1.010   PROTEINUA Negative   GLUCUA Negative   KETONESU Negative   BILIRUBINUA 3+*   OCCULTUA Negative   NITRITE Positive*   UROBILINOGEN Negative   LEUKOCYTESUR Trace*   RBCUA 0   WBCUA 12*   BACTERIA Many*   SQUAMEPITHEL 1   , and All pertinent labs from the last 24 hours have been reviewed.    Diagnostic Results:  U/S: Pancreatic head mass increased in size as compared to the previous study of 03/21/2023.  There is associated intrahepatic and extrahepatic biliary ductal dilatation as well as dilatation of the main pancreatic duct.  I have reviewed and interpreted all pertinent imaging results/findings within the past 24 hours.    Assessment/Plan:     * Dilation of pancreatic duct  Kaity Masterson is a 67-year-old female with history of hypertension, diabetes, endometrial cancer, and pancreatic adenocarcinoma who initiated chemotherapy April 24, 2023 at [facility] ([facility]) who presented to [facility] on 5/1/23 with worsening jaundice, weakness, body aches, and abdominal  discomfort.  Abdominal ultrasound showed pancreatic head mass increased in size compared to previous study from March.  Associated intrahepatic and extrahepatic biliary ductal dilatation as well as dilatation of the main pancreatic duct secondary to malignancy.    Received IVF and Zosyn.  Afebrile and HDS.  Blood and urine cultures NGTD.    - Continue Zosyn  - NPO  - Zofran PRN  - AES consult, appreciate recs    Hypokalemia  K 3.2 on admit. Cardiac risk factor.    - Replete to goal K > 4  - EKG for new or worsening chest pain    Type 2 diabetes mellitus without complications  A1c 10.5 3/21/23.    - POCT q6h  - When no longer NPO, LDSSI with POCT TIDWM and diabetic diet    Malignant neoplasm of head of pancreas  66 yo woman with HTN, T2DM, pathologic T1a endometrial cancer s/p surgery on 3/8/23, PUD, fatty liver, osteoporosis, who initially saw Dr. Casey on 4/5/23 for further management of pancreatic adenocarcinoma. She was admitted to ICU for DKA in March 2023. CT A/P 3/22/23 showed  a 3.4 cm mass centered in the pancreatic head/uncinate process with associated downstream dilatation of the main pancreatic duct.  Imaging findings are highly concerning for pancreatic adenocarcinoma.  Adjacent prominent peripancreatic lymph nodes are seen.  There is some stranding of the fat about the SMV for approximately 180°.  The portal vein, SMV and splenic vein are patent. Fatty infiltration of the liver. CT chest 3/20/23: No evidence for pulmonary embolus or other acute intrathoracic process. Hepatic steatosis.  Cholecystectomy. She underwent upper EUS biopsy on 3/31/23. Pathology showed adenocarcinoma, MMR intact. She presents today for further evaluation. Strong family history of breast cancer, endometrial cancer and colon cancer. She is a little weak after surgery but active at home and is still working.   Family history:  Mother: Breast cancer (middle age)  Aunt maternal (2): cancer (unknown)  Sister (3): breast cancer, lung  cancer (smoking). Other sister: colon cancer (older diagnosis 60's). Third sister (breast cancer) and endometrial cancer  Brother:  finger bone cancer  Discussed perioperative FOLFIRINOX  2. FOLFIRINOX to be started on 4/24/23.     Primary hypertension  /71 on admit.    - Hold antihypertensives for now, resume when clinically appropriate        Jerzy Gonzalez MD  Hematology/Oncology  [facility] - Oncology ([facility])

## 2023-05-02 NOTE — CARE UPDATE
Called to bedside to evaluate patient for lower back pain and pain under her breasts. Patient appears more somnolent at time of exam. Hypertensive and afebrile with normal oxygen saturation. No pain on abdominal exam, normal bowel sounds. Patient felt some relief with dilaudid 1 mg. Will get repeat cbc, cmp, lipase, ekg, and troponin. Further recommendations pending results of initial evaluation. Will continue to monitor.

## 2023-05-02 NOTE — CONSULTS
[facility]  Advanced Endoscopy Service  Consult Note    Patient Name: Kaity Masterson  MRN: [id number]  Admission Date: 5/2/2023  Hospital Length of Stay: 0 days  Code Status: Full Code   Attending Provider: Rosa Ramos MD   Consulting Provider: Valentine Martin MD  Principal Problem:Dilation of pancreatic duct    Inpatient consult to Advanced Endoscopy Service (AES)  Consult performed by: Valentine Martin MD  Consult ordered by: Jerzy Gonzalez MD      Subjective:     HPI: Kaity Masterson is a 67 y.o. female with history of pancreatic CA on chemo who presents to the hospital for jaundice, fatigue and shortness of breath for 3-4 days.  The patient reports that for the past few days she has been feeling really fatigued, short of breath, complaining of nausea and early satiety, decreased appetite. She has not been eating much and has become really week. Denies abdominal pain. Last chemo April 24.   On admission, she was found to have abnormal liver chemistries with elevated bilirubin 10.8, AlkPh 522,  . U/S showed increased pancreatic mass enlargement and intra/extrehepatic biliary dilation as well as PD dilation.        Objective:     Vitals:    05/02/23 0729   BP: (!) 141/68   Pulse: 91   Resp: 18   Temp: 98.8 °F (37.1 °C)         Constitutional:  not in acute distress and well developed  Eyes: conjunctiva clear and sclera icteric  GI: soft, non-tender, without masses or organomegaly  Skin: jaundice present  Psychiatric: mood and affect are within normal limits, pt is a good historian; no memory problems were noted    Significant Labs:  Reviewed the following pertinent laboratory tests: see HPI.     Significant Imaging:  Imaging reviewed: EUS. Interpretation: pancreatic head mass      Assessment/Plan:     Kaity Masterson is a 67 y.o. female with history of pancreatic CA on chemo who presents to the hospital for jaundice, fatigue and shortness of breath for 3-4 days.    Problem  List:  Biliary obstruction  Pancreatic cancer    The patient presents with biliary obstruction likely from pancreatic head mass. Will need ERCP which we will plan on today.  As far as her fatigue and other symptoms, these may be chemotherapy related as well as dehydration and decreased PO intake.    Recommendations:  - ERCP today  - Keep NPO  - Hold anticoagulation  - ensure Hgb>7, plts>50 the day of the procedure      Thank you for involving us in the care of Kaity Masterson. Please call with any additional questions, concerns or changes in the patient's clinical status. We will continue to follow.     Valentine Martin MD  GI and Hepatology Fellow, PGY-VI  [facility]

## 2023-05-02 NOTE — NURSING TRANSFER
Nursing Transfer Note      5/2/2023     Reason patient is being transferred: post procedure    Transfer To: 808    Transfer via stretcher    Transfer with IV pump    Transported by patient transport    Medicines sent: none    Any special needs or follow-up needed: routine    Chart send with patient: Yes    Notified: patient declined family notification at this time d/t family already present in inpatient room    Patient reassessed at: 5/2/2023 2:00 PM

## 2023-05-02 NOTE — ASSESSMENT & PLAN NOTE
K 3.2 on admit. Cardiac risk factor.    - Replete to goal K > 4  - EKG for new or worsening chest pain

## 2023-05-02 NOTE — PLAN OF CARE
"Patient had ERCP today. Patient's SBP this AM prior to ERCP was 130s-140s. Then after patient was sent to procedure, SBP 170s-190s. Patient denied headaches and vision changes. Patient arrived back to unit around 1500. Around 1600, patient reported increased back pain, rating pain 9/10. Patient became restless and stated she was feeling weak. She was moaning and grimacing. She had impending doom, stating "I don't feel right". She became nauseous and stated she also had pain under her breasts. She became tachypneic with labored breathing. Dr. Mensah was called to bedside to evaluate. RN had given 1 mg IV Dilaudid prior to Dr. Mensah arriving. STAT labs and EKG done. Back and under breast pain, nausea, tachypnea, and weakness resolved with dilaudid administration. Will continue to monitor.       Problem: Adult Inpatient Plan of Care  Goal: Plan of Care Review  Outcome: Ongoing, Progressing     "

## 2023-05-02 NOTE — HPI
Kaity Masterson is a 67-year-old female with history of hypertension, diabetes, endometrial cancer, and pancreatic adenocarcinoma who initiated chemotherapy April 24, 2023 at [facility] ([facility]).  She presented to [facility] on 5/1/23 with worsening jaundice, weakness, body aches, and abdominal discomfort.  In the emergency department she received pain medication, normal saline boluses, and IV Zosyn.     Labs notable for lipase less than 3, sodium 129, potassium 3.2, chloride 100, CO2 16, BUN 7, creatinine 0.8, glucose 181, calcium 10.7, total bilirubin 10.8, , , white blood cells 7.98, hemoglobin 9.7, hematocrit 29.8, platelets 172, BNP 20, INR 1, D-dimer 1.18, lactic acid 3.7 troponin 0.021.  Blood cultures ordered.  EKG showed sinus tachycardia with ventricular rate 128.  Nonspecific ST abnormality.  Chest x-ray had no acute abnormality.  Abdominal ultrasound showed pancreatic head mass increased in size compared to previous study from March.  Associated intrahepatic and extrahepatic biliary ductal dilatation as well as dilatation of the main pancreatic duct.    The patient was transferred to [facility] for AES evaluation.    Oncologic History:  1. Ms Masterson is a 66 yo woman with HTN, T2DM, pathologic T1a endometrial cancer s/p surgery on 3/8/23, PUD, fatty liver, osteoporosis, who initially saw Dr. Casey on 4/5/23 for further management of pancreatic adenocarcinoma. She was admitted to ICU for DKA in March 2023. CT A/P 3/22/23 showed  a 3.4 cm mass centered in the pancreatic head/uncinate process with associated downstream dilatation of the main pancreatic duct.  Imaging findings are highly concerning for pancreatic adenocarcinoma.  Adjacent prominent peripancreatic lymph nodes are seen.  There is some stranding of the fat about the SMV for approximately 180°.  The portal vein, SMV and splenic vein are patent. Fatty infiltration of the liver. CT chest  3/20/23: No evidence for pulmonary embolus or other acute intrathoracic process. Hepatic steatosis.  Cholecystectomy. She underwent upper EUS biopsy on 3/31/23. Pathology showed adenocarcinoma, MMR intact. She presents today for further evaluation. Strong family history of breast cancer, endometrial cancer and colon cancer. She is a little weak after surgery but active at home and is still working.   Family history:  Mother: Breast cancer (middle age)  Aunt maternal (2): cancer (unknown)  Sister (3): breast cancer, lung cancer (smoking). Other sister: colon cancer (older diagnosis 60's). Third sister (breast cancer) and endometrial cancer  Brother:  finger bone cancer  Discussed perioperative FOLFIRINOX  2. FOLFIRINOX to be started on 4/24/23.

## 2023-05-02 NOTE — ASSESSMENT & PLAN NOTE
Kaity Masterson is a 67-year-old female with history of hypertension, diabetes, endometrial cancer, and pancreatic adenocarcinoma who initiated chemotherapy April 24, 2023 at [facility] ([facility]) who presented to [facility] on 5/1/23 with worsening jaundice, weakness, body aches, and abdominal discomfort.  Abdominal ultrasound showed pancreatic head mass increased in size compared to previous study from March.  Associated intrahepatic and extrahepatic biliary ductal dilatation as well as dilatation of the main pancreatic duct secondary to malignancy.    Received IVF and Zosyn.  Afebrile and HDS.  Blood and urine cultures NGTD.    - Continue Zosyn  - NPO  - Zofran PRN  - AES consult, appreciate recs

## 2023-05-02 NOTE — ED NOTES
EMS HERE TO TRANSPORT PATIENT TO [facility]. PATIENT ON EMS STRETCHER, BREATHS EQUAL AND UNLABORED. PATIENT IN NO ACUTE DISTRESS. REPORT CALLED TO [facility], MELISSA THOMAS. BELONGINGS GIVEN TO FAMILY.

## 2023-05-02 NOTE — SUBJECTIVE & OBJECTIVE
Oncology Treatment Plan:   OP PANC mFOLFIRINOX Q2W    Medications:  Continuous Infusions:  Scheduled Meds:   heparin (porcine)  5,000 Units Subcutaneous Q8H    piperacillin-tazobactam (ZOSYN) IVPB  4.5 g Intravenous Q8H    potassium chloride  40 mEq Oral Once     PRN Meds:dextrose 10%, dextrose 10%, dextrose, dextrose, glucagon (human recombinant), HYDROcodone-acetaminophen, HYDROmorphone, naloxone, ondansetron, prochlorperazine, sodium chloride 0.9%     Review of patient's allergies indicates:  No Known Allergies     Past Medical History:   Diagnosis Date    Anemia, unspecified     Hypertension     Osteoporosis     PUD (peptic ulcer disease)     Type 2 diabetes mellitus without complications      Past Surgical History:   Procedure Laterality Date    CHOLECYSTECTOMY      colonsocopy      2017    ENDOSCOPIC ULTRASOUND OF UPPER GASTROINTESTINAL TRACT N/A 3/31/2023    Procedure: ULTRASOUND, UPPER GI TRACT, ENDOSCOPIC;  Surgeon: Kleber Bolaños MD;  Location: [facility] (2ND FLR);  Service: Endoscopy;  Laterality: N/A;  3/27 - precall attempted, no answer. SG    HYSTERECTOMY  03/08/2023    INSERTION OF TUNNELED CENTRAL VENOUS CATHETER (CVC) WITH SUBCUTANEOUS PORT N/A 4/20/2023    Procedure: INSERTION, SINGLE LUMEN CATHETER WITH PORT, WITH FLUOROSCOPIC GUIDANCE;  Surgeon: Philip Anderson Jr., MD;  Location: [address] FLR;  Service: General;  Laterality: N/A;    LAPAROSCOPIC CHOLECYSTECTOMY      LYMPH NODE BIOPSY Bilateral 03/08/2023    Procedure: BIOPSY, PELVIC LYMPH NODE;  Surgeon: Dallas gAuilar MD;  Location: [facility];  Service: OB/GYN;  Laterality: Bilateral;    ROBOTIC HYSTERECTOMY, WITH SALPINGO-OOPHORECTOMY Bilateral 03/08/2023    Procedure: ROBOTIC HYSTERECTOMY,WITH SALPINGO-OOPHORECTOMY;  Surgeon: Dallas Aguilar MD;  Location: [facility];  Service: OB/GYN;  Laterality: Bilateral;    TOTAL KNEE ARTHROPLASTY Left     2016    TOTAL KNEE ARTHROPLASTY Right     2019    VAGINAL DELIVERY      x 2  (one with epidural)      Family History       Problem Relation (Age of Onset)    Breast cancer Mother, Sister, Maternal Aunt    Colon cancer Mother, Maternal Aunt    Diabetes Mother    Hypertension Brother, Sister    Lung cancer Sister          Tobacco Use    Smoking status: Never    Smokeless tobacco: Never   Substance and Sexual Activity    Alcohol use: Not Currently    Drug use: Never    Sexual activity: Not Currently     Partners: Male       Review of Systems   Constitutional:  Positive for appetite change and fatigue. Negative for chills and fever.   HENT:  Negative for mouth sores and sore throat.    Eyes:  Negative for visual disturbance.   Respiratory:  Negative for cough, shortness of breath and wheezing.    Cardiovascular:  Negative for chest pain, palpitations and leg swelling.   Gastrointestinal:  Negative for abdominal distention, abdominal pain, constipation, diarrhea, nausea and vomiting.   Genitourinary:  Negative for dysuria, flank pain and frequency.   Musculoskeletal:  Positive for back pain. Negative for arthralgias and myalgias.   Skin:  Positive for color change. Negative for rash.   Allergic/Immunologic: Positive for immunocompromised state.   Neurological:  Negative for dizziness, light-headedness and headaches.   Psychiatric/Behavioral:  Negative for confusion and decreased concentration.    Objective:     Vital Signs (Most Recent):  Temp: 98.1 °F (36.7 °C) (05/02/23 0111)  Pulse: 95 (05/02/23 0111)  Resp: 16 (05/02/23 0111)  BP: 115/71 (05/02/23 0111)  SpO2: 99 % (05/02/23 0111) Vital Signs (24h Range):  Temp:  [97.7 °F (36.5 °C)-98.1 °F (36.7 °C)] 98.1 °F (36.7 °C)  Pulse:  [] 95  Resp:  [12-22] 16  SpO2:  [99 %-100 %] 99 %  BP: (115-171)/(57-85) 115/71     Weight: 69.7 kg (153 lb 10.6 oz)  Body mass index is 26.38 kg/m².  Body surface area is 1.77 meters squared.    No intake or output data in the 24 hours ending 05/02/23 0314    Physical Exam  Vitals reviewed.   Constitutional:       General: She is  not in acute distress.     Appearance: She is ill-appearing.   HENT:      Head: Normocephalic and atraumatic.      Right Ear: External ear normal.      Left Ear: External ear normal.      Nose: Nose normal.      Mouth/Throat:      Mouth: Mucous membranes are moist.      Pharynx: Oropharynx is clear.   Eyes:      General: Scleral icterus present.   Cardiovascular:      Rate and Rhythm: Normal rate and regular rhythm.      Pulses: Normal pulses.      Heart sounds: Normal heart sounds. No murmur heard.    No friction rub. No gallop.   Pulmonary:      Effort: Pulmonary effort is normal. No respiratory distress.      Breath sounds: Normal breath sounds. No wheezing or rales.   Abdominal:      General: Bowel sounds are normal. There is no distension.      Palpations: Abdomen is soft.      Tenderness: There is no abdominal tenderness. There is no guarding.   Musculoskeletal:      Cervical back: Normal range of motion.      Right lower leg: No edema.      Left lower leg: No edema.   Skin:     Coloration: Skin is jaundiced.   Neurological:      General: No focal deficit present.      Mental Status: She is alert and oriented to person, place, and time.   Psychiatric:         Mood and Affect: Mood normal.         Behavior: Behavior normal.       Significant Labs:   CBC:   Recent Labs   Lab 05/01/23  1233   WBC 7.98   HGB 9.7*   HCT 29.8*      , CMP:   Recent Labs   Lab 05/01/23  1233   *   K 3.2*      CO2 16*   *   BUN 7*   CREATININE 0.8   CALCIUM 10.7*   PROT 6.1   ALBUMIN 2.4*   BILITOT 10.8*   ALKPHOS 522*   *   *   ANIONGAP 13   , Coagulation:   Recent Labs   Lab 05/01/23  1233   INR 1.0   APTT 22.6   , Urine Studies:   Recent Labs   Lab 05/01/23  1751   COLORU Yellow   APPEARANCEUA Clear   PHUR 7.0   SPECGRAV 1.010   PROTEINUA Negative   GLUCUA Negative   KETONESU Negative   BILIRUBINUA 3+*   OCCULTUA Negative   NITRITE Positive*   UROBILINOGEN Negative   LEUKOCYTESUR Trace*    RBCUA 0   WBCUA 12*   BACTERIA Many*   SQUAMEPITHEL 1   , and All pertinent labs from the last 24 hours have been reviewed.    Diagnostic Results:  U/S: Pancreatic head mass increased in size as compared to the previous study of 03/21/2023.  There is associated intrahepatic and extrahepatic biliary ductal dilatation as well as dilatation of the main pancreatic duct.  I have reviewed and interpreted all pertinent imaging results/findings within the past 24 hours.

## 2023-05-02 NOTE — PROVATION PATIENT INSTRUCTIONS
Discharge Summary/Instructions after an Endoscopic Procedure  Patient Name: Kaity Masterson  Patient MRN: [id number]  Patient YOB: 1955  Tuesday, May 2, 2023  Jerry Vargas MD  Dear patient,  As a result of recent federal legislation (The Federal Cures Act), you may   receive lab or pathology results from your procedure in your [facility]   account before your physician is able to contact you. Your physician or   their representative will relay the results to you with their   recommendations at their soonest availability.  Thank you,  RESTRICTIONS:  During your procedure today, you received medications for sedation.  These   medications may affect your judgment, balance and coordination.  Therefore,   for 24 hours, you have the following restrictions:   - DO NOT drive a car, operate machinery, make legal/financial decisions,   sign important papers or drink alcohol.    ACTIVITY:  Today: no heavy lifting, straining or running due to procedural   sedation/anesthesia.  The following day: return to full activity including work.  DIET:  Eat and drink normally unless instructed otherwise.     TREATMENT FOR COMMON SIDE EFFECTS:  - Mild abdominal pain, nausea, belching, bloating or excessive gas:  rest,   eat lightly and use a heating pad.  - Sore Throat: treat with throat lozenges and/or gargle with warm salt   water.  - Because air was used during the procedure, expelling large amounts of air   from your rectum or belching is normal.  - If a bowel prep was taken, you may not have a bowel movement for 1-3 days.    This is normal.  SYMPTOMS TO WATCH FOR AND REPORT TO YOUR PHYSICIAN:  1. Abdominal pain or bloating, other than gas cramps.  2. Chest pain.  3. Back pain.  4. Signs of infection such as: chills or fever occurring within 24 hours   after the procedure.  5. Rectal bleeding, which would show as bright red, maroon, or black stools.   (A tablespoon of blood from the rectum is not serious, especially if    hemorrhoids are present.)  6. Vomiting.  7. Weakness or dizziness.  GO DIRECTLY TO THE NEAREST EMERGENCY ROOM IF YOU HAVE ANY OF THE FOLLOWING:      Difficulty breathing              Chills and/or fever over 101 F   Persistent vomiting and/or vomiting blood   Severe abdominal pain   Severe chest pain   Black, tarry stools   Bleeding- more than one tablespoon   Any other symptom or condition that you feel may need urgent attention  Your doctor recommends these additional instructions:  If any biopsies were taken, your doctors clinic will contact you in 1 to 2   weeks with any results.  - [facility]   - Resume previous diet.   - Continue present medications.   - Return to referring physician as previously scheduled.  For questions, problems or results please call your physician - Jerry Vargas MD at Work:  [phone].  [facility], EMERGENCY ROOM PHONE NUMBER: [phone]  IF A COMPLICATION OR EMERGENCY SITUATION ARISES AND YOU ARE UNABLE TO REACH   YOUR PHYSICIAN - GO DIRECTLY TO THE EMERGENCY ROOM.  Jerry Vargas MD  5/2/2023 12:59:43 PM  This report has been verified and signed electronically.  Dear patient,  As a result of recent federal legislation (The Federal Cures Act), you may   receive lab or pathology results from your procedure in your [facility]   account before your physician is able to contact you. Your physician or   their representative will relay the results to you with their   recommendations at their soonest availability.  Thank you,  PROVATION

## 2023-05-02 NOTE — TRANSFER OF CARE
Anesthesia Transfer of Care Note    Patient: Kaity Masterson    Procedure(s) Performed: Procedure(s) (LRB):  ERCP (ENDOSCOPIC RETROGRADE CHOLANGIOPANCREATOGRAPHY) (N/A)    Patient location: PACU    Anesthesia Type: general    Transport from OR: Transported from OR on 6-10 L/min O2 by face mask with adequate spontaneous ventilation    Post pain: adequate analgesia    Post assessment: no apparent anesthetic complications and tolerated procedure well    Post vital signs: stable    Level of consciousness: awake    Nausea/Vomiting: no nausea/vomiting    Complications: none    Transfer of care protocol was followed      Last vitals:   Visit Vitals  BP (!) 177/85 (BP Location: Right arm, Patient Position: Lying)   Pulse 85   Temp 36.6 °C (97.9 °F) (Temporal)   Resp 16   Wt 69.7 kg (153 lb 10.6 oz)   SpO2 100%   Breastfeeding No   BMI 26.38 kg/m²

## 2023-05-02 NOTE — ANESTHESIA PREPROCEDURE EVALUATION
05/02/2023  Kaity Masterson is a 67 y.o., female     Patient Active Problem List   Diagnosis    S/P robot-assisted TLH/BSO/Lymph node dissection/cystoscopy     Malignant neoplasm of endometrium    Diabetic ketoacidosis without coma associated with type 2 diabetes mellitus    Primary hypertension    Dyspnea on exertion    Fatty liver    Pancreatic mass    Malignant neoplasm of head of pancreas    Type 2 diabetes mellitus without complications    Hypokalemia    Dilation of pancreatic duct     .      Pre-op Assessment    I have reviewed the Patient Summary Reports.     I have reviewed the Nursing Notes. I have reviewed the NPO Status.      Review of Systems  Anesthesia Hx:  No problems with previous Anesthesia    Social:  Non-Smoker    Cardiovascular:   Exercise tolerance: poor Hypertension Non-ambulatory since recent surgeries; prior was working as a head    Pulmonary:   Shortness of breath    Hepatic/GI:   PUD,    Endocrine:   Diabetes, well controlled        Physical Exam  General: Well nourished, Cooperative, Alert and Oriented    Airway:  Mallampati: II   Mouth Opening: Normal  TM Distance: Normal  Tongue: Normal  Neck ROM: Normal ROM    Dental:  Intact        Anesthesia Plan  Type of Anesthesia, risks & benefits discussed:    Anesthesia Type: Gen ETT, Gen Natural Airway  Intra-op Monitoring Plan: Standard ASA Monitors  Post Op Pain Control Plan: multimodal analgesia  Induction:  IV  Informed Consent: Informed consent signed with the Patient and all parties understand the risks and agree with anesthesia plan.  All questions answered.   ASA Score: 3  Day of Surgery Review of History & Physical: H&P Update referred to the surgeon/provider.    Ready For Surgery From Anesthesia Perspective.     .

## 2023-05-02 NOTE — ASSESSMENT & PLAN NOTE
68 yo woman with HTN, T2DM, pathologic T1a endometrial cancer s/p surgery on 3/8/23, PUD, fatty liver, osteoporosis, who initially saw Dr. Casey on 4/5/23 for further management of pancreatic adenocarcinoma. She was admitted to ICU for DKA in March 2023. CT A/P 3/22/23 showed  a 3.4 cm mass centered in the pancreatic head/uncinate process with associated downstream dilatation of the main pancreatic duct.  Imaging findings are highly concerning for pancreatic adenocarcinoma.  Adjacent prominent peripancreatic lymph nodes are seen.  There is some stranding of the fat about the SMV for approximately 180°.  The portal vein, SMV and splenic vein are patent. Fatty infiltration of the liver. CT chest 3/20/23: No evidence for pulmonary embolus or other acute intrathoracic process. Hepatic steatosis.  Cholecystectomy. She underwent upper EUS biopsy on 3/31/23. Pathology showed adenocarcinoma, MMR intact. She presents today for further evaluation. Strong family history of breast cancer, endometrial cancer and colon cancer. She is a little weak after surgery but active at home and is still working.   Family history:  Mother: Breast cancer (middle age)  Aunt maternal (2): cancer (unknown)  Sister (3): breast cancer, lung cancer (smoking). Other sister: colon cancer (older diagnosis 60's). Third sister (breast cancer) and endometrial cancer  Brother:  finger bone cancer  Discussed perioperative FOLFIRINOX  2. FOLFIRINOX to be started on 4/24/23.

## 2023-05-03 VITALS
OXYGEN SATURATION: 94 % | TEMPERATURE: 97 F | HEART RATE: 130 BPM | WEIGHT: 153.69 LBS | SYSTOLIC BLOOD PRESSURE: 172 MMHG | DIASTOLIC BLOOD PRESSURE: 81 MMHG | BODY MASS INDEX: 26.38 KG/M2 | RESPIRATION RATE: 19 BRPM

## 2023-05-03 PROBLEM — N30.00 ACUTE CYSTITIS WITHOUT HEMATURIA: Status: ACTIVE | Noted: 2023-05-03

## 2023-05-03 LAB
ALBUMIN SERPL BCP-MCNC: 1.9 G/DL (ref 3.5–5.2)
ALP SERPL-CCNC: 410 U/L (ref 55–135)
ALT SERPL W/O P-5'-P-CCNC: 150 U/L (ref 10–44)
ANION GAP SERPL CALC-SCNC: 4 MMOL/L (ref 8–16)
ANISOCYTOSIS BLD QL SMEAR: SLIGHT
AST SERPL-CCNC: 131 U/L (ref 10–40)
BASO STIPL BLD QL SMEAR: ABNORMAL
BASOPHILS NFR BLD: 0 % (ref 0–1.9)
BILIRUB SERPL-MCNC: 5.5 MG/DL (ref 0.1–1)
BUN SERPL-MCNC: 6 MG/DL (ref 8–23)
CALCIUM SERPL-MCNC: 9.9 MG/DL (ref 8.7–10.5)
CHLORIDE SERPL-SCNC: 105 MMOL/L (ref 95–110)
CO2 SERPL-SCNC: 21 MMOL/L (ref 23–29)
CREAT SERPL-MCNC: 0.7 MG/DL (ref 0.5–1.4)
DACRYOCYTES BLD QL SMEAR: ABNORMAL
DIFFERENTIAL METHOD: ABNORMAL
DOHLE BOD BLD QL SMEAR: PRESENT
EOSINOPHIL NFR BLD: 2 % (ref 0–8)
ERYTHROCYTE [DISTWIDTH] IN BLOOD BY AUTOMATED COUNT: 15 % (ref 11.5–14.5)
EST. GFR  (NO RACE VARIABLE): >60 ML/MIN/1.73 M^2
GLUCOSE SERPL-MCNC: 253 MG/DL (ref 70–110)
HCT VFR BLD AUTO: 25 % (ref 37–48.5)
HGB BLD-MCNC: 7.6 G/DL (ref 12–16)
IMM GRANULOCYTES # BLD AUTO: ABNORMAL K/UL (ref 0–0.04)
IMM GRANULOCYTES NFR BLD AUTO: ABNORMAL % (ref 0–0.5)
LYMPHOCYTES NFR BLD: 26 % (ref 18–48)
MAGNESIUM SERPL-MCNC: 1.7 MG/DL (ref 1.6–2.6)
MCH RBC QN AUTO: 31.1 PG (ref 27–31)
MCHC RBC AUTO-ENTMCNC: 30.4 G/DL (ref 32–36)
MCV RBC AUTO: 103 FL (ref 82–98)
MONOCYTES NFR BLD: 13 % (ref 4–15)
NEUTROPHILS NFR BLD: 53 % (ref 38–73)
NEUTS BAND NFR BLD MANUAL: 6 %
NRBC BLD-RTO: 1 /100 WBC
OVALOCYTES BLD QL SMEAR: ABNORMAL
PHOSPHATE SERPL-MCNC: 2.3 MG/DL (ref 2.7–4.5)
PLATELET # BLD AUTO: 119 K/UL (ref 150–450)
PLATELET BLD QL SMEAR: ABNORMAL
PMV BLD AUTO: 12.1 FL (ref 9.2–12.9)
POCT GLUCOSE: 232 MG/DL (ref 70–110)
POIKILOCYTOSIS BLD QL SMEAR: SLIGHT
POTASSIUM SERPL-SCNC: 3.8 MMOL/L (ref 3.5–5.1)
PROT SERPL-MCNC: 4.9 G/DL (ref 6–8.4)
RBC # BLD AUTO: 2.44 M/UL (ref 4–5.4)
SODIUM SERPL-SCNC: 130 MMOL/L (ref 136–145)
SPHEROCYTES BLD QL SMEAR: ABNORMAL
TOXIC GRANULES BLD QL SMEAR: PRESENT
WBC # BLD AUTO: 4.71 K/UL (ref 3.9–12.7)

## 2023-05-03 PROCEDURE — 83735 ASSAY OF MAGNESIUM: CPT | Performed by: STUDENT IN AN ORGANIZED HEALTH CARE EDUCATION/TRAINING PROGRAM

## 2023-05-03 PROCEDURE — 99239 PR HOSPITAL DISCHARGE DAY,>30 MIN: ICD-10-PCS | Mod: ,,, | Performed by: HOSPITALIST

## 2023-05-03 PROCEDURE — 63600175 PHARM REV CODE 636 W HCPCS: Performed by: STUDENT IN AN ORGANIZED HEALTH CARE EDUCATION/TRAINING PROGRAM

## 2023-05-03 PROCEDURE — 63600175 PHARM REV CODE 636 W HCPCS

## 2023-05-03 PROCEDURE — 80053 COMPREHEN METABOLIC PANEL: CPT

## 2023-05-03 PROCEDURE — 25000003 PHARM REV CODE 250

## 2023-05-03 PROCEDURE — 84100 ASSAY OF PHOSPHORUS: CPT | Performed by: STUDENT IN AN ORGANIZED HEALTH CARE EDUCATION/TRAINING PROGRAM

## 2023-05-03 PROCEDURE — 94761 N-INVAS EAR/PLS OXIMETRY MLT: CPT

## 2023-05-03 PROCEDURE — 85007 BL SMEAR W/DIFF WBC COUNT: CPT | Performed by: STUDENT IN AN ORGANIZED HEALTH CARE EDUCATION/TRAINING PROGRAM

## 2023-05-03 PROCEDURE — 85027 COMPLETE CBC AUTOMATED: CPT | Performed by: STUDENT IN AN ORGANIZED HEALTH CARE EDUCATION/TRAINING PROGRAM

## 2023-05-03 PROCEDURE — 99239 HOSP IP/OBS DSCHRG MGMT >30: CPT | Mod: ,,, | Performed by: HOSPITALIST

## 2023-05-03 PROCEDURE — 25000003 PHARM REV CODE 250: Performed by: STUDENT IN AN ORGANIZED HEALTH CARE EDUCATION/TRAINING PROGRAM

## 2023-05-03 RX ORDER — HYDROCODONE BITARTRATE AND ACETAMINOPHEN 5; 325 MG/1; MG/1
1 TABLET ORAL EVERY 6 HOURS PRN
Qty: 8 TABLET | Refills: 0 | Status: SHIPPED | OUTPATIENT
Start: 2023-05-03 | End: 2023-05-03 | Stop reason: SDUPTHER

## 2023-05-03 RX ORDER — CIPROFLOXACIN 500 MG/1
500 TABLET ORAL 2 TIMES DAILY
Qty: 10 TABLET | Refills: 0 | Status: SHIPPED | OUTPATIENT
Start: 2023-05-03 | End: 2023-05-08

## 2023-05-03 RX ORDER — HYDROCODONE BITARTRATE AND ACETAMINOPHEN 5; 325 MG/1; MG/1
1 TABLET ORAL EVERY 6 HOURS PRN
Qty: 20 TABLET | Refills: 0 | Status: SHIPPED | OUTPATIENT
Start: 2023-05-03 | End: 2023-05-08

## 2023-05-03 RX ADMIN — PIPERACILLIN SODIUM AND TAZOBACTAM SODIUM 4.5 G: 4; .5 INJECTION, POWDER, FOR SOLUTION INTRAVENOUS at 03:05

## 2023-05-03 RX ADMIN — LOSARTAN POTASSIUM 100 MG: 50 TABLET, FILM COATED ORAL at 09:05

## 2023-05-03 RX ADMIN — INSULIN ASPART 4 UNITS: 100 INJECTION, SOLUTION INTRAVENOUS; SUBCUTANEOUS at 09:05

## 2023-05-03 RX ADMIN — MUPIROCIN: 20 OINTMENT TOPICAL at 09:05

## 2023-05-03 RX ADMIN — HEPARIN 300 UNITS: 100 SYRINGE at 12:05

## 2023-05-03 RX ADMIN — ALUMINUM HYDROXIDE, MAGNESIUM HYDROXIDE, AND DIMETHICONE 10 ML: 400; 400; 40 SUSPENSION ORAL at 09:05

## 2023-05-03 NOTE — PROGRESS NOTES
Patient is AAOX4. UP with assistance. Port A cath de assessed, flush performed, heparin lock done. Patient discharged with her belongings by wheelchair with her family member.

## 2023-05-03 NOTE — ASSESSMENT & PLAN NOTE
GNR on urine culture > 100,ooo cfu/ml    Discharging on ciprofloxacin 500mg q12h for 5 day course (total 7 days with intra-hospital zosyn x 2 days)

## 2023-05-03 NOTE — HOSPITAL COURSE
Patient evaluatedby [facility], who placed one covered metal stent in the CBD. Patient initially on Zosyn while hospitalized. BCX and urine cultures grew gram negative rods>100,000. Afebrile throughout hospitalization. Discharged after improvement in total bilirubin (from 8.4 day of stent placement to 5.5.). instructed to follow up in oncology clinic before appt next week. Recommended that patient be started on the chemo care companion program to monitor vitals at home. Discharged with 5 day course of ciprofloxacin to cover UTI.

## 2023-05-03 NOTE — DISCHARGE SUMMARY
[facility] ([facility])  Hematology/Oncology  Discharge Summary      Patient Name: Kaity Masterson  MRN: [id number]  Admission Date: 5/2/2023  Hospital Length of Stay: 1 days  Discharge Date and Time:  05/03/2023 9:35 AM  Attending Physician: Rosa Ramos MD   Discharging Provider: Marc Welsh MD  Primary Care Provider: Marcia Dewey NP    HPI: Kaity Masterson is a 67-year-old female with history of hypertension, diabetes, endometrial cancer, and pancreatic adenocarcinoma who initiated chemotherapy April 24, 2023 at [facility] (FOLFIRINOX).  She presented to [facility] on 5/1/23 with worsening jaundice, weakness, body aches, and abdominal discomfort.  In the emergency department she received pain medication, normal saline boluses, and IV Zosyn.     Labs notable for lipase less than 3, sodium 129, potassium 3.2, chloride 100, CO2 16, BUN 7, creatinine 0.8, glucose 181, calcium 10.7, total bilirubin 10.8, , , white blood cells 7.98, hemoglobin 9.7, hematocrit 29.8, platelets 172, BNP 20, INR 1, D-dimer 1.18, lactic acid 3.7 troponin 0.021.  Blood cultures ordered.  EKG showed sinus tachycardia with ventricular rate 128.  Nonspecific ST abnormality.  Chest x-ray had no acute abnormality.  Abdominal ultrasound showed pancreatic head mass increased in size compared to previous study from March.  Associated intrahepatic and extrahepatic biliary ductal dilatation as well as dilatation of the main pancreatic duct.    The patient was transferred to [facility] for AES evaluation.    Oncologic History:  1. Ms Masterson is a 68 yo woman with HTN, T2DM, pathologic T1a endometrial cancer s/p surgery on 3/8/23, PUD, fatty liver, osteoporosis, who initially saw Dr. Casey on 4/5/23 for further management of pancreatic adenocarcinoma. She was admitted to ICU for DKA in March 2023. CT A/P 3/22/23 showed  a 3.4 cm mass centered in the pancreatic head/uncinate process  with associated downstream dilatation of the main pancreatic duct.  Imaging findings are highly concerning for pancreatic adenocarcinoma.  Adjacent prominent peripancreatic lymph nodes are seen.  There is some stranding of the fat about the SMV for approximately 180°.  The portal vein, SMV and splenic vein are patent. Fatty infiltration of the liver. CT chest 3/20/23: No evidence for pulmonary embolus or other acute intrathoracic process. Hepatic steatosis.  Cholecystectomy. She underwent upper EUS biopsy on 3/31/23. Pathology showed adenocarcinoma, MMR intact. She presents today for further evaluation. Strong family history of breast cancer, endometrial cancer and colon cancer. She is a little weak after surgery but active at home and is still working.   Family history:  Mother: Breast cancer (middle age)  Aunt maternal (2): cancer (unknown)  Sister (3): breast cancer, lung cancer (smoking). Other sister: colon cancer (older diagnosis 60's). Third sister (breast cancer) and endometrial cancer  Brother:  finger bone cancer  Discussed perioperative FOLFIRINOX  2. FOLFIRINOX to be started on 4/24/23.         Procedure(s) (LRB):  ERCP (ENDOSCOPIC RETROGRADE CHOLANGIOPANCREATOGRAPHY) (N/A)     Hospital Course: Patient evaluatedby AES, who placed one covered metal stent in the CBD. Patient initially on Zosyn while hospitalized. BCX and urine cultures grew gram negative rods>100,000. Afebrile throughout hospitalization. Discharged after improvement in total bilirubin (from 8.4 day of stent placement to 5.5.). instructed to follow up in oncology clinic before appt next week for repeat labs, to ensure that total bilirubin will be in range for full folfirinox treatment. Recommended that patient be started on the chemo care companion program to monitor vitals at home. Discharged with 5 day course of ciprofloxacin to cover UTI.       Goals of Care Treatment Preferences:  Code Status: Full Code      Consults:   Consults (From  admission, onward)        Status Ordering Provider     Inpatient consult to [facility] ([facility])  Once        Provider:  (Not yet assigned)    Completed FANNY MATOS        Vitals:    05/02/23 2037 05/02/23 2352 05/03/23 0339 05/03/23 0742   BP: (!) 192/92 (!) 148/70 (!) 159/74 (!) 152/68   BP Location:    Left arm   Patient Position: Lying Lying Lying Lying   Pulse: 91 94 89 100   Resp: 16 18 16 18   Temp: 98.2 °F (36.8 °C) 97.8 °F (36.6 °C) 98.3 °F (36.8 °C) 97.8 °F (36.6 °C)   TempSrc: Oral Oral Oral Oral   SpO2: 98% 96% 96% 98%   Weight:         Physical Exam  Vitals reviewed.   Constitutional:       General: She is not in acute distress.     Appearance: She is ill-appearing.   HENT:      Head: Normocephalic and atraumatic.      Right Ear: External ear normal.      Left Ear: External ear normal.      Nose: Nose normal.      Mouth/Throat:      Mouth: Mucous membranes are moist.      Pharynx: Oropharynx is clear.   Eyes:      General: Scleral icterus present.   Cardiovascular:      Rate and Rhythm: Normal rate and regular rhythm.      Pulses: Normal pulses.      Heart sounds: Normal heart sounds. No murmur heard.    No friction rub. No gallop.   Pulmonary:      Effort: Pulmonary effort is normal. No respiratory distress.      Breath sounds: Normal breath sounds. No wheezing or rales.   Abdominal:      General: Bowel sounds are normal. There is no distension.      Palpations: Abdomen is soft.      Tenderness: There is mild abdominal tenderness. There is no guarding.   Musculoskeletal:      Cervical back: Normal range of motion.      Right lower leg: No edema.      Left lower leg: No edema.   Skin:     Coloration: Skin is jaundiced.   Neurological:      General: No focal deficit present.      Mental Status: She is alert and oriented to person, place, and time.   Psychiatric:         Mood and Affect: Mood normal.         Behavior: Behavior normal.       Significant Diagnostic Studies: Labs:   CMP   Recent  Labs   Lab 05/02/23 0407 05/02/23 1657 05/03/23  0336   * 133* 130*   K 3.4* 3.7 3.8    107 105   CO2 17* 15* 21*   * 175* 253*   BUN 6* 5* 6*   CREATININE 0.6 0.6 0.7   CALCIUM 9.8 9.9 9.9   PROT 5.0* 5.1* 4.9*   ALBUMIN 2.0* 2.0* 1.9*   BILITOT 10.4* 8.4* 5.5*   ALKPHOS 450* 441* 410*   * 202* 131*   * 181* 150*   ANIONGAP 9 11 4*    and CBC   Recent Labs   Lab 05/02/23 0407 05/02/23 1657 05/03/23  0336   WBC 5.15 3.94 4.71   HGB 8.1* 7.8* 7.6*   HCT 25.3* 25.0* 25.0*   * 127* 119*     Microbiology:   Blood Culture   Lab Results   Component Value Date    LABBLOO No Growth to date 05/01/2023    LABBLOO No Growth to date 05/01/2023    and Urine Culture    Lab Results   Component Value Date    LABURIN (A) 05/01/2023     GRAM NEGATIVE PRAKASH  >100,000 cfu/ml  Identification and susceptibility pending         Pending Diagnostic Studies:     None        Final Active Diagnoses:    Diagnosis Date Noted POA    PRINCIPAL PROBLEM:  Dilation of pancreatic duct [K86.89] 05/02/2023 Yes    Acute cystitis without hematuria [N30.00] 05/03/2023 Unknown    Hypokalemia [E87.6] 05/02/2023 Yes    Type 2 diabetes mellitus without complications [E11.9]  Yes    Malignant neoplasm of head of pancreas [C25.0] 04/05/2023 Yes    Primary hypertension [I10] 03/21/2023 Yes     Chronic      Problems Resolved During this Admission:    Diagnosis Date Noted Date Resolved POA    Hyperlipidemia [E78.5] 03/21/2023 05/02/2023 Yes      Discharged Condition: good    Disposition: Home or Self Care    Follow Up:    Patient Instructions:   No discharge procedures on file.  Medications:  Reconciled Home Medications:      Medication List      START taking these medications    ciprofloxacin HCl 500 MG tablet  Commonly known as: CIPRO  Take 1 tablet (500 mg total) by mouth 2 (two) times daily. for 5 days     HYDROcodone-acetaminophen 5-325 mg per tablet  Commonly known as: NORCO  Take 1 tablet by mouth every 6  (six) hours as needed for Pain.        CONTINUE taking these medications    albuterol 90 mcg/actuation inhaler  Commonly known as: VENTOLIN HFA  Inhale 2 puffs into the lungs every 6 (six) hours as needed for Wheezing or Shortness of Breath. Rescue     alendronate 35 MG tablet  Commonly known as: FOSAMAX  Take 35 mg by mouth every 7 days.     ARTHRITIS PAIN RELIEF (ACETAM) 650 MG Tbsr  Generic drug: acetaminophen  Take 1 tablet (650 mg total) by mouth every 6 (six) hours. Alternate with ibuprofen so you are taking something every 6 hours     aspirin 81 MG EC tablet  Commonly known as: ECOTRIN  Take 1 tablet by mouth once daily.     atenoloL 50 MG tablet  Commonly known as: TENORMIN  Take 50 mg by mouth once daily. am     cyproheptadine 4 mg tablet  Commonly known as: PERIACTIN  Take 1 tablet (4 mg total) by mouth 3 (three) times daily.     esomeprazole 40 MG capsule  Commonly known as: NEXIUM  esomeprazole magnesium 40 mg capsule,delayed release   TAKE 1 CAPSULE BY MOUTH EVERY DAY     famotidine 20 MG tablet  Commonly known as: PEPCID  Take 20 mg by mouth 2 (two) times daily.     gabapentin 100 MG capsule  Commonly known as: NEURONTIN  gabapentin 100 mg capsule   Take 1 capsule 3 times a day by oral route as directed.     insulin detemir U-100 (Levemir) 100 unit/mL (3 mL) Inpn pen  Inject 14 Units into the skin every evening.     losartan 100 MG tablet  Commonly known as: COZAAR  Take 100 mg by mouth.     morphine 15 MG 12 hr tablet  Commonly known as: MS CONTIN  Take 1 tablet (15 mg total) by mouth nightly.     naloxone 4 mg/actuation Spry  Commonly known as: NARCAN  4mg by nasal route as needed for opioid overdose; may repeat every 2-3 minutes in alternating nostrils until medical help arrives. Call 911     ondansetron 8 MG Tbdl  Commonly known as: ZOFRAN-ODT  Take 1 tablet (8 mg total) by mouth every 8 (eight) hours as needed (nausea).     ONETOUCH DELICA LANCETS 33 gauge Misc  Generic drug: lancets  Apply  "topically daily as needed.     ONETOUCH ULTRA TEST Strp  Generic drug: blood sugar diagnostic  TEST DAILY AND AS NEEDED     ONETOUCH ULTRA2 METER Misc  Generic drug: blood-glucose meter  TEST DAILY AND AS NEEDED     * oxyCODONE 5 MG immediate release tablet  Commonly known as: ROXICODONE  Take 1 tablet (5 mg total) by mouth every 6 (six) hours as needed for Pain.     * oxyCODONE 5 MG immediate release tablet  Commonly known as: ROXICODONE  Take 1 tablet (5 mg total) by mouth every 4 (four) hours as needed for Pain.     oxyCODONE-acetaminophen 7.5-325 mg per tablet  Commonly known as: PERCOCET  7.5 tablets.     pen needle, diabetic 31 gauge x 3/16" Ndle  30 each by Misc.(Non-Drug; Combo Route) route every evening.     pramipexole 0.25 MG tablet  Commonly known as: MIRAPEX  0.25 mg every evening.     promethazine 25 MG tablet  Commonly known as: PHENERGAN  Take 1 tablet (25 mg total) by mouth every 6 (six) hours as needed for Nausea.     rosuvastatin 10 MG tablet  Commonly known as: CRESTOR  Take 10 mg by mouth.         * This list has 2 medication(s) that are the same as other medications prescribed for you. Read the directions carefully, and ask your doctor or other care provider to review them with you.                Marc Welsh MD  Hematology/Oncology  [facility] ([facility])      "

## 2023-05-03 NOTE — PROGRESS NOTES
AES Follow-up Note     Kaity Masterson was seen and examined.   1 Day Post-Op s/p ERCP   No abdominal discomfort. Tolerating diet.    Vitals stable. Afebrile.     Lab Results   Component Value Date     (H) 05/03/2023     (H) 05/03/2023    ALKPHOS 410 (H) 05/03/2023    BILITOT 5.5 (H) 05/03/2023    BILITOT 8.4 (H) 05/02/2023    BILITOT 10.4 (H) 05/02/2023       No sx/sx of post-ERCP pancreatitis or other procedural complications.   AES will sign off. Please call if any questions/concerns.  Patient will be contacted with follow-up information.     Valentine Martin MD  Gastroenterology & Hepatology Fellow

## 2023-05-03 NOTE — ANESTHESIA POSTPROCEDURE EVALUATION
Anesthesia Post Evaluation    Patient: Kaity Masterson    Procedure(s) Performed: Procedure(s) (LRB):  ERCP (ENDOSCOPIC RETROGRADE CHOLANGIOPANCREATOGRAPHY) (N/A)    Final Anesthesia Type: general      Patient location during evaluation: PACU  Patient participation: Yes- Able to Participate  Level of consciousness: awake and alert  Post-procedure vital signs: reviewed and stable  Pain management: adequate  Airway patency: patent    PONV status at discharge: No PONV  Anesthetic complications: no      Cardiovascular status: blood pressure returned to baseline  Respiratory status: unassisted  Hydration status: euvolemic  Follow-up not needed.          Vitals Value Taken Time   /68 05/03/23 0742   Temp 36.6 °C (97.8 °F) 05/03/23 0742   Pulse 100 05/03/23 0742   Resp 18 05/03/23 0742   SpO2 98 % 05/03/23 0742         Event Time   Out of Recovery 14:48:03         Pain/Carlos Score: Pain Rating Prior to Med Admin: 7 (5/2/2023  8:52 PM)  Pain Rating Post Med Admin: 0 (5/2/2023  9:44 PM)  Carlos Score: 9 (5/2/2023  1:15 PM)

## 2023-05-03 NOTE — PROGRESS NOTES
Pt was hypertensive @ 2037, Dr. Zimmerman called and ordered pt's previously held Losartan to be given immediately and pt was also given a dose of Dilaudid for pain. Pressures returned WDL by 2300. Will continue to monitor.      05/02/23 2037   Vital Signs   Temp 98.2 °F (36.8 °C)   Temp Source Oral   Pulse 91   Heart Rate Source Monitor   Resp 16   SpO2 98 %   BP (!) 192/92   MAP (mmHg) 126   Patient Position Lying

## 2023-05-03 NOTE — ASSESSMENT & PLAN NOTE
66 yo woman with HTN, T2DM, pathologic T1a endometrial cancer s/p surgery on 3/8/23, PUD, fatty liver, osteoporosis, who initially saw Dr. Casey on 4/5/23 for further management of pancreatic adenocarcinoma. She was admitted to ICU for DKA in March 2023. CT A/P 3/22/23 showed  a 3.4 cm mass centered in the pancreatic head/uncinate process with associated downstream dilatation of the main pancreatic duct.  Imaging findings are highly concerning for pancreatic adenocarcinoma.  Adjacent prominent peripancreatic lymph nodes are seen.  There is some stranding of the fat about the SMV for approximately 180°.  The portal vein, SMV and splenic vein are patent. Fatty infiltration of the liver. CT chest 3/20/23: No evidence for pulmonary embolus or other acute intrathoracic process. Hepatic steatosis.  Cholecystectomy. She underwent upper EUS biopsy on 3/31/23. Pathology showed adenocarcinoma, MMR intact. She presents today for further evaluation. Strong family history of breast cancer, endometrial cancer and colon cancer. She is a little weak after surgery but active at home and is still working.   Family history:  Mother: Breast cancer (middle age)  Aunt maternal (2): cancer (unknown)  Sister (3): breast cancer, lung cancer (smoking). Other sister: colon cancer (older diagnosis 60's). Third sister (breast cancer) and endometrial cancer  Brother:  finger bone cancer  Discussed perioperative FOLFIRINOX  2. FOLFIRINOX to be started on 4/24/23.

## 2023-05-03 NOTE — ASSESSMENT & PLAN NOTE
Kaity Masterson is a 67-year-old female with history of hypertension, diabetes, endometrial cancer, and pancreatic adenocarcinoma who initiated chemotherapy April 24, 2023 at [facility] (FOLFIRINOX) who presented to [facility] on 5/1/23 with worsening jaundice, weakness, body aches, and abdominal discomfort.  Abdominal ultrasound showed pancreatic head mass increased in size compared to previous study from March.  Associated intrahepatic and extrahepatic biliary ductal dilatation as well as dilatation of the main pancreatic duct secondary to malignancy.    Received IVF and Zosyn.  Afebrile and HDS.  Blood and urine cultures NGTD.    - discontinue Zosyn  - s/p ERCP with one covered metal stent placed in CBD.  - total bilirubin improved to 5.5 (from 8.4), with improvement in jaundice and pain.  - Zofran PRN  - AES consult, appreciate recs

## 2023-05-04 ENCOUNTER — LAB VISIT (OUTPATIENT)
Dept: LAB | Facility: HOSPITAL | Age: 68
End: 2023-05-04
Attending: INTERNAL MEDICINE
Payer: COMMERCIAL

## 2023-05-04 DIAGNOSIS — R63.0 ANOREXIA: ICD-10-CM

## 2023-05-04 LAB
ALBUMIN SERPL BCP-MCNC: 2.4 G/DL (ref 3.5–5.2)
ALP SERPL-CCNC: 409 U/L (ref 55–135)
ALT SERPL W/O P-5'-P-CCNC: 139 U/L (ref 10–44)
ANION GAP SERPL CALC-SCNC: 9 MMOL/L (ref 8–16)
AST SERPL-CCNC: 120 U/L (ref 10–40)
BILIRUB SERPL-MCNC: 4.7 MG/DL (ref 0.1–1)
BUN SERPL-MCNC: 3 MG/DL (ref 8–23)
CALCIUM SERPL-MCNC: 10.9 MG/DL (ref 8.7–10.5)
CHLORIDE SERPL-SCNC: 104 MMOL/L (ref 95–110)
CO2 SERPL-SCNC: 21 MMOL/L (ref 23–29)
CREAT SERPL-MCNC: 0.7 MG/DL (ref 0.5–1.4)
EST. GFR  (NO RACE VARIABLE): >60 ML/MIN/1.73 M^2
GLUCOSE SERPL-MCNC: 140 MG/DL (ref 70–110)
POTASSIUM SERPL-SCNC: 3.1 MMOL/L (ref 3.5–5.1)
PROT SERPL-MCNC: 6.1 G/DL (ref 6–8.4)
SODIUM SERPL-SCNC: 134 MMOL/L (ref 136–145)

## 2023-05-04 PROCEDURE — 36415 COLL VENOUS BLD VENIPUNCTURE: CPT | Performed by: INTERNAL MEDICINE

## 2023-05-04 PROCEDURE — 80053 COMPREHEN METABOLIC PANEL: CPT | Performed by: INTERNAL MEDICINE

## 2023-05-05 LAB — BACTERIA UR CULT: ABNORMAL

## 2023-05-06 LAB
BACTERIA BLD CULT: NORMAL
BACTERIA BLD CULT: NORMAL

## 2023-05-08 ENCOUNTER — LAB VISIT (OUTPATIENT)
Dept: LAB | Facility: HOSPITAL | Age: 68
End: 2023-05-08
Payer: COMMERCIAL

## 2023-05-08 ENCOUNTER — OFFICE VISIT (OUTPATIENT)
Dept: HEMATOLOGY/ONCOLOGY | Facility: CLINIC | Age: 68
End: 2023-05-08
Payer: COMMERCIAL

## 2023-05-08 VITALS
RESPIRATION RATE: 18 BRPM | SYSTOLIC BLOOD PRESSURE: 110 MMHG | HEIGHT: 64 IN | DIASTOLIC BLOOD PRESSURE: 68 MMHG | TEMPERATURE: 98 F | BODY MASS INDEX: 24.81 KG/M2 | OXYGEN SATURATION: 99 % | WEIGHT: 145.31 LBS

## 2023-05-08 DIAGNOSIS — E11.9 TYPE 2 DIABETES MELLITUS WITHOUT COMPLICATION, WITH LONG-TERM CURRENT USE OF INSULIN: ICD-10-CM

## 2023-05-08 DIAGNOSIS — C25.0 MALIGNANT NEOPLASM OF HEAD OF PANCREAS: Primary | ICD-10-CM

## 2023-05-08 DIAGNOSIS — K83.1 OBSTRUCTIVE JAUNDICE DUE TO CANCER: ICD-10-CM

## 2023-05-08 DIAGNOSIS — I10 PRIMARY HYPERTENSION: Chronic | ICD-10-CM

## 2023-05-08 DIAGNOSIS — Z79.4 TYPE 2 DIABETES MELLITUS WITHOUT COMPLICATION, WITH LONG-TERM CURRENT USE OF INSULIN: ICD-10-CM

## 2023-05-08 DIAGNOSIS — Z79.899 IMMUNODEFICIENCY SECONDARY TO CHEMOTHERAPY: ICD-10-CM

## 2023-05-08 DIAGNOSIS — C80.1 OBSTRUCTIVE JAUNDICE DUE TO CANCER: ICD-10-CM

## 2023-05-08 DIAGNOSIS — C25.0 MALIGNANT NEOPLASM OF HEAD OF PANCREAS: ICD-10-CM

## 2023-05-08 DIAGNOSIS — T45.1X5A IMMUNODEFICIENCY SECONDARY TO CHEMOTHERAPY: ICD-10-CM

## 2023-05-08 DIAGNOSIS — D84.821 IMMUNODEFICIENCY SECONDARY TO CHEMOTHERAPY: ICD-10-CM

## 2023-05-08 DIAGNOSIS — D49.9 IMMUNODEFICIENCY SECONDARY TO NEOPLASM: ICD-10-CM

## 2023-05-08 DIAGNOSIS — D84.81 IMMUNODEFICIENCY SECONDARY TO NEOPLASM: ICD-10-CM

## 2023-05-08 DIAGNOSIS — C54.1 MALIGNANT NEOPLASM OF ENDOMETRIUM: ICD-10-CM

## 2023-05-08 LAB
ALBUMIN SERPL BCP-MCNC: 2.3 G/DL (ref 3.5–5.2)
ALP SERPL-CCNC: 273 U/L (ref 55–135)
ALT SERPL W/O P-5'-P-CCNC: 83 U/L (ref 10–44)
ANION GAP SERPL CALC-SCNC: 5 MMOL/L (ref 8–16)
AST SERPL-CCNC: 85 U/L (ref 10–40)
BILIRUB SERPL-MCNC: 3.9 MG/DL (ref 0.1–1)
BUN SERPL-MCNC: 5 MG/DL (ref 8–23)
CALCIUM SERPL-MCNC: 10.3 MG/DL (ref 8.7–10.5)
CANCER AG19-9 SERPL-ACNC: 89.9 U/ML (ref 0–40)
CHLORIDE SERPL-SCNC: 111 MMOL/L (ref 95–110)
CO2 SERPL-SCNC: 21 MMOL/L (ref 23–29)
CREAT SERPL-MCNC: 0.7 MG/DL (ref 0.5–1.4)
ERYTHROCYTE [DISTWIDTH] IN BLOOD BY AUTOMATED COUNT: 16.7 % (ref 11.5–14.5)
EST. GFR  (NO RACE VARIABLE): >60 ML/MIN/1.73 M^2
GLUCOSE SERPL-MCNC: 163 MG/DL (ref 70–110)
HCT VFR BLD AUTO: 29.8 % (ref 37–48.5)
HGB BLD-MCNC: 9 G/DL (ref 12–16)
IMM GRANULOCYTES # BLD AUTO: 0.12 K/UL (ref 0–0.04)
MCH RBC QN AUTO: 31.5 PG (ref 27–31)
MCHC RBC AUTO-ENTMCNC: 30.2 G/DL (ref 32–36)
MCV RBC AUTO: 104 FL (ref 82–98)
NEUTROPHILS # BLD AUTO: 5.3 K/UL (ref 1.8–7.7)
PLATELET # BLD AUTO: 345 K/UL (ref 150–450)
PMV BLD AUTO: 11.2 FL (ref 9.2–12.9)
POTASSIUM SERPL-SCNC: 4.6 MMOL/L (ref 3.5–5.1)
PROT SERPL-MCNC: 5.5 G/DL (ref 6–8.4)
RBC # BLD AUTO: 2.86 M/UL (ref 4–5.4)
SODIUM SERPL-SCNC: 137 MMOL/L (ref 136–145)
WBC # BLD AUTO: 8.26 K/UL (ref 3.9–12.7)

## 2023-05-08 PROCEDURE — 99999 PR PBB SHADOW E&M-EST. PATIENT-LVL V: ICD-10-PCS | Mod: PBBFAC,,, | Performed by: INTERNAL MEDICINE

## 2023-05-08 PROCEDURE — 4010F ACE/ARB THERAPY RXD/TAKEN: CPT | Mod: CPTII,S$GLB,, | Performed by: INTERNAL MEDICINE

## 2023-05-08 PROCEDURE — 3008F BODY MASS INDEX DOCD: CPT | Mod: CPTII,S$GLB,, | Performed by: INTERNAL MEDICINE

## 2023-05-08 PROCEDURE — 36415 COLL VENOUS BLD VENIPUNCTURE: CPT | Performed by: INTERNAL MEDICINE

## 2023-05-08 PROCEDURE — 1111F PR DISCHARGE MEDS RECONCILED W/ CURRENT OUTPATIENT MED LIST: ICD-10-PCS | Mod: CPTII,S$GLB,, | Performed by: INTERNAL MEDICINE

## 2023-05-08 PROCEDURE — 80053 COMPREHEN METABOLIC PANEL: CPT | Performed by: INTERNAL MEDICINE

## 2023-05-08 PROCEDURE — 1160F RVW MEDS BY RX/DR IN RCRD: CPT | Mod: CPTII,S$GLB,, | Performed by: INTERNAL MEDICINE

## 2023-05-08 PROCEDURE — 99215 PR OFFICE/OUTPT VISIT, EST, LEVL V, 40-54 MIN: ICD-10-PCS | Mod: S$GLB,,, | Performed by: INTERNAL MEDICINE

## 2023-05-08 PROCEDURE — 1101F PT FALLS ASSESS-DOCD LE1/YR: CPT | Mod: CPTII,S$GLB,, | Performed by: INTERNAL MEDICINE

## 2023-05-08 PROCEDURE — 1160F PR REVIEW ALL MEDS BY PRESCRIBER/CLIN PHARMACIST DOCUMENTED: ICD-10-PCS | Mod: CPTII,S$GLB,, | Performed by: INTERNAL MEDICINE

## 2023-05-08 PROCEDURE — 3078F PR MOST RECENT DIASTOLIC BLOOD PRESSURE < 80 MM HG: ICD-10-PCS | Mod: CPTII,S$GLB,, | Performed by: INTERNAL MEDICINE

## 2023-05-08 PROCEDURE — 3288F FALL RISK ASSESSMENT DOCD: CPT | Mod: CPTII,S$GLB,, | Performed by: INTERNAL MEDICINE

## 2023-05-08 PROCEDURE — 3074F PR MOST RECENT SYSTOLIC BLOOD PRESSURE < 130 MM HG: ICD-10-PCS | Mod: CPTII,S$GLB,, | Performed by: INTERNAL MEDICINE

## 2023-05-08 PROCEDURE — 1111F DSCHRG MED/CURRENT MED MERGE: CPT | Mod: CPTII,S$GLB,, | Performed by: INTERNAL MEDICINE

## 2023-05-08 PROCEDURE — 3046F HEMOGLOBIN A1C LEVEL >9.0%: CPT | Mod: CPTII,S$GLB,, | Performed by: INTERNAL MEDICINE

## 2023-05-08 PROCEDURE — 99215 OFFICE O/P EST HI 40 MIN: CPT | Mod: S$GLB,,, | Performed by: INTERNAL MEDICINE

## 2023-05-08 PROCEDURE — 3008F PR BODY MASS INDEX (BMI) DOCUMENTED: ICD-10-PCS | Mod: CPTII,S$GLB,, | Performed by: INTERNAL MEDICINE

## 2023-05-08 PROCEDURE — 86301 IMMUNOASSAY TUMOR CA 19-9: CPT | Performed by: INTERNAL MEDICINE

## 2023-05-08 PROCEDURE — 4010F PR ACE/ARB THEARPY RXD/TAKEN: ICD-10-PCS | Mod: CPTII,S$GLB,, | Performed by: INTERNAL MEDICINE

## 2023-05-08 PROCEDURE — 1159F PR MEDICATION LIST DOCUMENTED IN MEDICAL RECORD: ICD-10-PCS | Mod: CPTII,S$GLB,, | Performed by: INTERNAL MEDICINE

## 2023-05-08 PROCEDURE — 99999 PR PBB SHADOW E&M-EST. PATIENT-LVL V: CPT | Mod: PBBFAC,,, | Performed by: INTERNAL MEDICINE

## 2023-05-08 PROCEDURE — 1101F PR PT FALLS ASSESS DOC 0-1 FALLS W/OUT INJ PAST YR: ICD-10-PCS | Mod: CPTII,S$GLB,, | Performed by: INTERNAL MEDICINE

## 2023-05-08 PROCEDURE — 1159F MED LIST DOCD IN RCRD: CPT | Mod: CPTII,S$GLB,, | Performed by: INTERNAL MEDICINE

## 2023-05-08 PROCEDURE — 3078F DIAST BP <80 MM HG: CPT | Mod: CPTII,S$GLB,, | Performed by: INTERNAL MEDICINE

## 2023-05-08 PROCEDURE — 3288F PR FALLS RISK ASSESSMENT DOCUMENTED: ICD-10-PCS | Mod: CPTII,S$GLB,, | Performed by: INTERNAL MEDICINE

## 2023-05-08 PROCEDURE — 85027 COMPLETE CBC AUTOMATED: CPT | Performed by: INTERNAL MEDICINE

## 2023-05-08 PROCEDURE — 3046F PR MOST RECENT HEMOGLOBIN A1C LEVEL > 9.0%: ICD-10-PCS | Mod: CPTII,S$GLB,, | Performed by: INTERNAL MEDICINE

## 2023-05-08 PROCEDURE — 3074F SYST BP LT 130 MM HG: CPT | Mod: CPTII,S$GLB,, | Performed by: INTERNAL MEDICINE

## 2023-05-08 NOTE — PROGRESS NOTES
PROGRESS NOTE    Subjective:       Patient ID: Kaity Masterson is a 67 y.o. female.    Chief Complaint: follow up for borderline resectable pancreatic adenocarcinoma, MMR-proficient    Diagnosis:  Borderline resectable pancreatic adenocarcinoma, MMR proficient  UGT1A1/DPYD normal metabolizer    Tempus: KRAS G12D. Germline mutation: MUTYH mutation    Oncologic History:  1.Ms Masterson is a 66 yo woman with HTN, T2DM, pathologic T1a endometrial cancer s/p surgery on 3/8/23, PUD, fatty liver, osteoporosis, who initially saw me on 4/5/23 for further management of pancreatic adenocarcinoma. She was admitted to ICU for DKA in March 2023. CT A/P 3/22/23 showed  a 3.4 cm mass centered in the pancreatic head/uncinate process with associated downstream dilatation of the main pancreatic duct.  Imaging findings are highly concerning for pancreatic adenocarcinoma.  Adjacent prominent peripancreatic lymph nodes are seen.  There is some stranding of the fat about the SMV for approximately 180°.  The portal vein, SMV and splenic vein are patent. Fatty infiltration of the liver. CT chest 3/20/23: No evidence for pulmonary embolus or other acute intrathoracic process. Hepatic steatosis.  Cholecystectomy. She underwent upper EUS biopsy on 3/31/23. Pathology showed adenocarcinoma, MMR intact. She presents today for further evaluation. Strong family history of breast cancer, endometrial cancer and colon cancer. She is a little weak after surgery but active at home and is still working.   Family history:  Mother: Breast cancer (middle age)  Aunt maternal (2): cancer (unknown)  Sister (3): breast cancer, lung cancer (smoking). Other sister: colon cancer (older diagnosis 60's). Third sister (breast cancer) and endometrial cancer  Brother:  finger bone cancer  Discussed perioperative FOLFIRINOX  2. FOLFIRINOX to be started on 4/24/23. Bilirubin was elevated. Started FOLFOX on 4/24/23.  Patient was very sick after chemo, hospitalized for obstructive jaundice, s/p ERCP with stent placement.     Interval History:   Ms Masterson returns for follow up. She was very sick after FOLFOX, could not eat and exhausted. Hospitalized for obstructive jaundice, s/p ERCP with stent placement. She has made up her mind that she will not take any more chemo.     ECO    ROS:   A ten-point system review is obtained and negative except for what was stated in the Interval History.     Physical Examination:   Vital signs reviewed.   General: well hydrated, well developed. In a wheelchair  HEENT: normocephalic, PERRLA, EOMI, mild +icteric sclerae, oropharynx clear  Neck: supple, no JVD, thyromegaly, cervical or supraclavicular lymphadenopathy  Lungs: clear breath sounds bilaterally, no wheezing, rales, or rhonchi  Heart: RRR, no M/R/G  Abdomen: soft, no tenderness, non-distended, no hepatosplenomegaly, mass, or hernia. BS present  Extremities: no clubbing, cyanosis, or edema  Skin: no rash, ulcer, or open wounds. Mildly jaundiced  Neuro: alert and oriented x 4, no focal neuro deficit  Psych: appropriate mood and affect    Objective:     Laboratory Data:  Labs reviewed. Bilirubin 3.9    Imaging Data:  CT A/P 3/23/23:  Impression:     There is a 3.4 cm mass centered in the pancreatic head/uncinate process with associated downstream dilatation of the main pancreatic duct.  Imaging findings are highly concerning for pancreatic adenocarcinoma.  Adjacent prominent peripancreatic lymph nodes are seen.  There is some stranding of the fat about the SMV for approximately 180°.  The portal vein, SMV and splenic vein are patent.     Fatty infiltration of the liver.     Constipation.    CT chest 3/20/23:  Impression:     No evidence for pulmonary embolus or other acute intrathoracic process.     Hepatic steatosis.  Cholecystectomy.       Assessment and Plan:     1. Malignant neoplasm of head of pancreas    2. Malignant neoplasm of  endometrium    3. Immunodeficiency secondary to neoplasm    4. Immunodeficiency secondary to chemotherapy    5. Type 2 diabetes mellitus without complication, with long-term current use of insulin    6. Primary hypertension    7. Obstructive jaundice due to cancer        1-4  - Ms Masterson is a 68 yo woman with borderline resectable adenocarcinoma of the pancreatic head, MMR-proficient. Tempus showed somatic KRAS G12D. Germline mutation: MUTYH mutation . Strong family history of cancer  - FOLFIRINOX to be started on 4/24/23. Bilirubin was elevated. Started FOLFOX on 4/24/23. Patient was very sick after chemo, hospitalized for obstructive jaundice, s/p ERCP with stent placement.   - Patient has decided that she does not want to take any more chemotherapy. We had a long discussion. Discussed she has a potentially curable pancreatic cancer, but curable only if she gets surgery and 6 months of periop chemo. Patient is determined she does not want any chemo. Discussed the natural history and prognosis of pancreatic cancer. Discussed hospice. She wants to get hospice at home. But need to see PCP to adjust insulin dose first. They will let us know when they are ready for hospice  - reviewed germline MUTYH mutation. Daughter-in-law wants to see genetics and get her kids tested. She has four children. Message sent to genetics.     5.  - f/u with PCP    6.  - BP controlled  - c/w current medication    7.  - s/p ERCP. Bilirubin improving    Follow-up:     Follow up prn  Knows to call in the interval if any problems arise.    I spent 55 minutes reviewing the chart, interpreting laboratory result and imaging data, coordinating patient's care, with at least 50% of the time on face-to-face counseling.       Electronically signed by Carolina Valdez for Scheduling    Med Onc Chart Routing      Follow up with physician No follow up needed. please cancel her appointments. patient wants to get hospice.   Follow up with [facility]     Infusion scheduling note    Injection scheduling note    Labs    Imaging    Pharmacy appointment    Other referrals       Treatment Plan Information   OP PANC mFOLFIRINOX Q2W   Carolina Casey MD   Upcoming Treatment Dates - OP PANC mFOLFIRINOX Q2W    5/8/2023       Chemotherapy       oxaliplatin (ELOXATIN) 65 mg/m2 = 117 mg in dextrose 5 % (D5W) 523.4 mL chemo infusion       leucovorin calcium 400 mg/m2 = 720 mg in dextrose 5 % (D5W) 250 mL infusion       irinotecan (CAMPTOSAR) 150 mg/m2 = 270 mg in sodium chloride 0.9% 513.5 mL chemo infusion       fluorouracil (ADRUCIL) 2,400 mg/m2 = 4,320 mg in sodium chloride 0.9% 100 mL chemo infusion       Supportive Care       atropine injection 0.4 mg       Antiemetics       aprepitant (CINVANTI) injection 130 mg       palonosetron (ALOXI) 0.25 mg with Dexamethasone (DECADRON) 12 mg in NS 50 mL IVPB  5/22/2023       Chemotherapy       oxaliplatin (ELOXATIN) 65 mg/m2 = 117 mg in dextrose 5 % (D5W) 523.4 mL chemo infusion       leucovorin calcium 400 mg/m2 = 720 mg in dextrose 5 % (D5W) 250 mL infusion       irinotecan (CAMPTOSAR) 150 mg/m2 = 270 mg in sodium chloride 0.9% 513.5 mL chemo infusion       fluorouracil (ADRUCIL) 2,400 mg/m2 = 4,320 mg in sodium chloride 0.9% 100 mL chemo infusion       Supportive Care       atropine injection 0.4 mg       Antiemetics       aprepitant (CINVANTI) injection 130 mg       palonosetron (ALOXI) 0.25 mg with Dexamethasone (DECADRON) 12 mg in NS 50 mL IVPB  6/5/2023       Chemotherapy       oxaliplatin (ELOXATIN) 65 mg/m2 = 117 mg in dextrose 5 % (D5W) 523.4 mL chemo infusion       leucovorin calcium 400 mg/m2 = 720 mg in dextrose 5 % (D5W) 250 mL infusion       irinotecan (CAMPTOSAR) 150 mg/m2 = 270 mg in sodium chloride 0.9% 513.5 mL chemo infusion       fluorouracil (ADRUCIL) 2,400 mg/m2 = 4,320 mg in sodium chloride 0.9% 100 mL chemo infusion       Supportive Care       atropine injection 0.4 mg       Antiemetics       aprepitant  (CINVANTI) injection 130 mg       palonosetron (ALOXI) 0.25 mg with Dexamethasone (DECADRON) 12 mg in NS 50 mL IVPB  6/19/2023       Chemotherapy       oxaliplatin (ELOXATIN) 65 mg/m2 = 117 mg in dextrose 5 % (D5W) 523.4 mL chemo infusion       leucovorin calcium 400 mg/m2 = 720 mg in dextrose 5 % (D5W) 250 mL infusion       irinotecan (CAMPTOSAR) 150 mg/m2 = 270 mg in sodium chloride 0.9% 513.5 mL chemo infusion       fluorouracil (ADRUCIL) 2,400 mg/m2 = 4,320 mg in sodium chloride 0.9% 100 mL chemo infusion       Supportive Care       atropine injection 0.4 mg       Antiemetics       aprepitant (CINVANTI) injection 130 mg       palonosetron (ALOXI) 0.25 mg with Dexamethasone (DECADRON) 12 mg in NS 50 mL IVPB

## 2023-05-11 ENCOUNTER — TELEPHONE (OUTPATIENT)
Dept: HEMATOLOGY/ONCOLOGY | Facility: CLINIC | Age: 68
End: 2023-05-11
Payer: COMMERCIAL

## 2023-05-11 NOTE — TELEPHONE ENCOUNTER
"----- Message from Ruthann Pardo sent at 5/11/2023  3:46 PM CDT -----  Regarding: Chemo  Contact: Terra (Daughter)    Pt's daughter requesting call back in regards to chemo. Pt would like to do one more chemo.   Please call and adv       Confirmed contact below:   Contact Name: Terra Masterson  Phone Number: [phone]               Additional Notes:  "Thank you for all that you do for our patients"                                           "

## 2023-05-11 NOTE — TELEPHONE ENCOUNTER
Spoke with daughter---  She is calling to state patient wants one more treatment.    Nurse will speak with dr kaur---and be back in touch with daughter.        Message routed to dr kaur/rosendo (please let me know what to schedule)

## 2023-05-12 DIAGNOSIS — C25.0 MALIGNANT NEOPLASM OF HEAD OF PANCREAS: Primary | ICD-10-CM

## 2023-05-12 LAB
DNA RANGE(S) EXAMINED NAR: NORMAL
GENE DIS ANL INTERP-IMP: POSITIVE
GENE DIS ASSESSED: NORMAL
GENE MUT TESTED BLD/T: 1.1 M/MB
MSI CA SPEC-IMP: NORMAL
REASON FOR STUDY: NORMAL
TEMPUS FUSIONADDENDUM: NORMAL
TEMPUS LCA: NORMAL
TEMPUS PORTAL: NORMAL
TEMPUS TRIAL1: NORMAL
TEMPUS TRIAL2: NORMAL
TEMPUS TRIAL3: NORMAL
TEMPUS TRIALCOUNT: 3

## 2023-05-12 NOTE — PLAN OF CARE
DISCONTINUE ON PATHWAY REGIMEN - Pancreatic Adenocarcinoma    [id number]        Oxaliplatin (Eloxatin)       Leucovorin       Irinotecan (Camptosar)       Fluorouracil           Additional Orders: The use of growth factor was mandated in some, but   not all mFOLFIRINOX studies; please follow institutional protocol/physician   discretion regarding use of growth factor.    **Always confirm dose/schedule in your pharmacy ordering system**    REASON: Toxicities / Adverse Event  PRIOR TREATMENT: [id number]  TREATMENT RESPONSE: Unable to Evaluate    START OFF PATHWAY REGIMEN - Pancreatic Adenocarcinoma            Oxaliplatin (Eloxatin)       Leucovorin       Irinotecan (Camptosar)       Fluorouracil           Additional Orders: The use of growth factor was mandated in some, but   not all mFOLFIRINOX studies; please follow institutional protocol/physician   discretion regarding use of growth factor.    **Always confirm dose/schedule in your pharmacy ordering system**    Patient Characteristics:  Preoperative, M0 (Clinical Staging), Borderline Resectable, PS = 0,1, BRCA1/2   and PALB2 Mutation Absent/Unknown  Therapeutic Status: Preoperative, M0 (Clinical Staging)  AJCC T Category: cT2  AJCC N Category: cN0  Resectability Status: Borderline Resectable  AJCC M Category: cM0  AJCC 8 Stage Grouping: IB  ECOG Performance Status: 1  BRCA1/2 Mutation Status: Awaiting Test Results  PALB2 Mutation Status: Awaiting Test Results  Intent of Therapy:  Curative Intent, Discussed with Patient

## 2023-05-18 ENCOUNTER — TELEPHONE (OUTPATIENT)
Dept: HEMATOLOGY/ONCOLOGY | Facility: CLINIC | Age: 68
End: 2023-05-18
Payer: COMMERCIAL

## 2023-05-18 NOTE — TELEPHONE ENCOUNTER
Spoke with daughter. She is calling to state her mom has been taking periactin for about 3 weeks with no change in appetite. She is asking for something different to be sent to [facility] on file.    Also, she is requesting a referral for a dietician to help give guidance.      Message routed to marquita

## 2023-05-18 NOTE — TELEPHONE ENCOUNTER
"----- Message from Ruthann Pardo sent at 5/18/2023  3:04 PM CDT -----  Name Of Caller:  Radha Nova Preference:  [phone]    Pt's daughter returning call to office.            Additional Notes:  "Thank you for all that you do for our patients                                                            "

## 2023-05-18 NOTE — TELEPHONE ENCOUNTER
----- Message from Tremontana Chevalier sent at 5/18/2023  2:11 PM CDT -----  Regarding: pt advice  Consult/Advisory    Name Of Caller:  Radha      Contact Preference:  [phone]    Nature of call:  caller wants to spk with nurse in Dr. Casey's office re her apatite low and med is not working

## 2023-05-19 ENCOUNTER — TELEPHONE (OUTPATIENT)
Dept: HEMATOLOGY/ONCOLOGY | Facility: CLINIC | Age: 68
End: 2023-05-19
Payer: COMMERCIAL

## 2023-05-19 DIAGNOSIS — R63.0 ANOREXIA: Primary | ICD-10-CM

## 2023-05-19 RX ORDER — DRONABINOL 2.5 MG/1
2.5 CAPSULE ORAL
Qty: 60 CAPSULE | Refills: 2 | Status: SHIPPED | OUTPATIENT
Start: 2023-05-19

## 2023-05-25 ENCOUNTER — LAB VISIT (OUTPATIENT)
Dept: LAB | Facility: HOSPITAL | Age: 68
End: 2023-05-25
Payer: COMMERCIAL

## 2023-05-25 ENCOUNTER — OFFICE VISIT (OUTPATIENT)
Dept: HEMATOLOGY/ONCOLOGY | Facility: CLINIC | Age: 68
End: 2023-05-25
Payer: COMMERCIAL

## 2023-05-25 ENCOUNTER — INFUSION (OUTPATIENT)
Dept: INFUSION THERAPY | Facility: HOSPITAL | Age: 68
End: 2023-05-25
Payer: COMMERCIAL

## 2023-05-25 VITALS
SYSTOLIC BLOOD PRESSURE: 110 MMHG | TEMPERATURE: 98 F | OXYGEN SATURATION: 100 % | HEART RATE: 114 BPM | WEIGHT: 144.5 LBS | RESPIRATION RATE: 20 BRPM | HEIGHT: 64 IN | BODY MASS INDEX: 24.67 KG/M2 | DIASTOLIC BLOOD PRESSURE: 76 MMHG

## 2023-05-25 VITALS
WEIGHT: 144.5 LBS | RESPIRATION RATE: 18 BRPM | HEIGHT: 64 IN | SYSTOLIC BLOOD PRESSURE: 103 MMHG | DIASTOLIC BLOOD PRESSURE: 65 MMHG | BODY MASS INDEX: 24.67 KG/M2 | HEART RATE: 120 BPM

## 2023-05-25 DIAGNOSIS — D84.821 IMMUNODEFICIENCY SECONDARY TO CHEMOTHERAPY: ICD-10-CM

## 2023-05-25 DIAGNOSIS — Z79.4 TYPE 2 DIABETES MELLITUS WITHOUT COMPLICATION, WITH LONG-TERM CURRENT USE OF INSULIN: ICD-10-CM

## 2023-05-25 DIAGNOSIS — D49.9 IMMUNODEFICIENCY SECONDARY TO NEOPLASM: ICD-10-CM

## 2023-05-25 DIAGNOSIS — K83.1 OBSTRUCTIVE JAUNDICE DUE TO CANCER: ICD-10-CM

## 2023-05-25 DIAGNOSIS — D84.81 IMMUNODEFICIENCY SECONDARY TO NEOPLASM: ICD-10-CM

## 2023-05-25 DIAGNOSIS — C54.1 MALIGNANT NEOPLASM OF ENDOMETRIUM: ICD-10-CM

## 2023-05-25 DIAGNOSIS — T45.1X5A IMMUNODEFICIENCY SECONDARY TO CHEMOTHERAPY: ICD-10-CM

## 2023-05-25 DIAGNOSIS — E11.9 TYPE 2 DIABETES MELLITUS WITHOUT COMPLICATION, WITH LONG-TERM CURRENT USE OF INSULIN: ICD-10-CM

## 2023-05-25 DIAGNOSIS — E80.6 HYPERBILIRUBINEMIA: ICD-10-CM

## 2023-05-25 DIAGNOSIS — C25.0 MALIGNANT NEOPLASM OF HEAD OF PANCREAS: Primary | ICD-10-CM

## 2023-05-25 DIAGNOSIS — R63.0 ANOREXIA: ICD-10-CM

## 2023-05-25 DIAGNOSIS — C80.1 OBSTRUCTIVE JAUNDICE DUE TO CANCER: ICD-10-CM

## 2023-05-25 DIAGNOSIS — I10 PRIMARY HYPERTENSION: ICD-10-CM

## 2023-05-25 DIAGNOSIS — C25.0 MALIGNANT NEOPLASM OF HEAD OF PANCREAS: ICD-10-CM

## 2023-05-25 DIAGNOSIS — Z79.899 IMMUNODEFICIENCY SECONDARY TO CHEMOTHERAPY: ICD-10-CM

## 2023-05-25 LAB
ALBUMIN SERPL BCP-MCNC: 2.5 G/DL (ref 3.5–5.2)
ALP SERPL-CCNC: 125 U/L (ref 55–135)
ALT SERPL W/O P-5'-P-CCNC: 21 U/L (ref 10–44)
ANION GAP SERPL CALC-SCNC: 9 MMOL/L (ref 8–16)
AST SERPL-CCNC: 35 U/L (ref 10–40)
BILIRUB SERPL-MCNC: 2 MG/DL (ref 0.1–1)
BUN SERPL-MCNC: 10 MG/DL (ref 8–23)
CALCIUM SERPL-MCNC: 10.3 MG/DL (ref 8.7–10.5)
CANCER AG19-9 SERPL-ACNC: 102 U/ML (ref 0–40)
CHLORIDE SERPL-SCNC: 110 MMOL/L (ref 95–110)
CO2 SERPL-SCNC: 20 MMOL/L (ref 23–29)
CREAT SERPL-MCNC: 0.8 MG/DL (ref 0.5–1.4)
ERYTHROCYTE [DISTWIDTH] IN BLOOD BY AUTOMATED COUNT: 16.3 % (ref 11.5–14.5)
EST. GFR  (NO RACE VARIABLE): >60 ML/MIN/1.73 M^2
GLUCOSE SERPL-MCNC: 247 MG/DL (ref 70–110)
HCT VFR BLD AUTO: 31.1 % (ref 37–48.5)
HGB BLD-MCNC: 9.9 G/DL (ref 12–16)
IMM GRANULOCYTES # BLD AUTO: 0.01 K/UL (ref 0–0.04)
MCH RBC QN AUTO: 32 PG (ref 27–31)
MCHC RBC AUTO-ENTMCNC: 31.8 G/DL (ref 32–36)
MCV RBC AUTO: 101 FL (ref 82–98)
NEUTROPHILS # BLD AUTO: 4.4 K/UL (ref 1.8–7.7)
PLATELET # BLD AUTO: 370 K/UL (ref 150–450)
PMV BLD AUTO: 10.9 FL (ref 9.2–12.9)
POTASSIUM SERPL-SCNC: 4.4 MMOL/L (ref 3.5–5.1)
PROT SERPL-MCNC: 5.3 G/DL (ref 6–8.4)
RBC # BLD AUTO: 3.09 M/UL (ref 4–5.4)
SODIUM SERPL-SCNC: 139 MMOL/L (ref 136–145)
WBC # BLD AUTO: 6.72 K/UL (ref 3.9–12.7)

## 2023-05-25 PROCEDURE — 25000003 PHARM REV CODE 250: Performed by: REGISTERED NURSE

## 2023-05-25 PROCEDURE — 99215 PR OFFICE/OUTPT VISIT, EST, LEVL V, 40-54 MIN: ICD-10-PCS | Mod: S$GLB,,, | Performed by: REGISTERED NURSE

## 2023-05-25 PROCEDURE — 4010F ACE/ARB THERAPY RXD/TAKEN: CPT | Mod: CPTII,S$GLB,, | Performed by: REGISTERED NURSE

## 2023-05-25 PROCEDURE — 99999 PR PBB SHADOW E&M-EST. PATIENT-LVL V: CPT | Mod: PBBFAC,,, | Performed by: REGISTERED NURSE

## 2023-05-25 PROCEDURE — 86301 IMMUNOASSAY TUMOR CA 19-9: CPT | Performed by: INTERNAL MEDICINE

## 2023-05-25 PROCEDURE — 63600175 PHARM REV CODE 636 W HCPCS: Performed by: INTERNAL MEDICINE

## 2023-05-25 PROCEDURE — 36415 COLL VENOUS BLD VENIPUNCTURE: CPT | Performed by: INTERNAL MEDICINE

## 2023-05-25 PROCEDURE — 1101F PR PT FALLS ASSESS DOC 0-1 FALLS W/OUT INJ PAST YR: ICD-10-PCS | Mod: CPTII,S$GLB,, | Performed by: REGISTERED NURSE

## 2023-05-25 PROCEDURE — 99999 PR PBB SHADOW E&M-EST. PATIENT-LVL V: ICD-10-PCS | Mod: PBBFAC,,, | Performed by: REGISTERED NURSE

## 2023-05-25 PROCEDURE — 3078F PR MOST RECENT DIASTOLIC BLOOD PRESSURE < 80 MM HG: ICD-10-PCS | Mod: CPTII,S$GLB,, | Performed by: REGISTERED NURSE

## 2023-05-25 PROCEDURE — 1101F PT FALLS ASSESS-DOCD LE1/YR: CPT | Mod: CPTII,S$GLB,, | Performed by: REGISTERED NURSE

## 2023-05-25 PROCEDURE — 3074F PR MOST RECENT SYSTOLIC BLOOD PRESSURE < 130 MM HG: ICD-10-PCS | Mod: CPTII,S$GLB,, | Performed by: REGISTERED NURSE

## 2023-05-25 PROCEDURE — 80053 COMPREHEN METABOLIC PANEL: CPT | Performed by: INTERNAL MEDICINE

## 2023-05-25 PROCEDURE — 3046F PR MOST RECENT HEMOGLOBIN A1C LEVEL > 9.0%: ICD-10-PCS | Mod: CPTII,S$GLB,, | Performed by: REGISTERED NURSE

## 2023-05-25 PROCEDURE — 1126F AMNT PAIN NOTED NONE PRSNT: CPT | Mod: CPTII,S$GLB,, | Performed by: REGISTERED NURSE

## 2023-05-25 PROCEDURE — 85027 COMPLETE CBC AUTOMATED: CPT | Performed by: INTERNAL MEDICINE

## 2023-05-25 PROCEDURE — 1111F DSCHRG MED/CURRENT MED MERGE: CPT | Mod: CPTII,S$GLB,, | Performed by: REGISTERED NURSE

## 2023-05-25 PROCEDURE — 1111F PR DISCHARGE MEDS RECONCILED W/ CURRENT OUTPATIENT MED LIST: ICD-10-PCS | Mod: CPTII,S$GLB,, | Performed by: REGISTERED NURSE

## 2023-05-25 PROCEDURE — 3288F PR FALLS RISK ASSESSMENT DOCUMENTED: ICD-10-PCS | Mod: CPTII,S$GLB,, | Performed by: REGISTERED NURSE

## 2023-05-25 PROCEDURE — 4010F PR ACE/ARB THEARPY RXD/TAKEN: ICD-10-PCS | Mod: CPTII,S$GLB,, | Performed by: REGISTERED NURSE

## 2023-05-25 PROCEDURE — 1126F PR PAIN SEVERITY QUANTIFIED, NO PAIN PRESENT: ICD-10-PCS | Mod: CPTII,S$GLB,, | Performed by: REGISTERED NURSE

## 2023-05-25 PROCEDURE — 96413 CHEMO IV INFUSION 1 HR: CPT

## 2023-05-25 PROCEDURE — 25000003 PHARM REV CODE 250: Performed by: INTERNAL MEDICINE

## 2023-05-25 PROCEDURE — 3008F PR BODY MASS INDEX (BMI) DOCUMENTED: ICD-10-PCS | Mod: CPTII,S$GLB,, | Performed by: REGISTERED NURSE

## 2023-05-25 PROCEDURE — 3046F HEMOGLOBIN A1C LEVEL >9.0%: CPT | Mod: CPTII,S$GLB,, | Performed by: REGISTERED NURSE

## 2023-05-25 PROCEDURE — 96417 CHEMO IV INFUS EACH ADDL SEQ: CPT

## 2023-05-25 PROCEDURE — 96415 CHEMO IV INFUSION ADDL HR: CPT

## 2023-05-25 PROCEDURE — 96416 CHEMO PROLONG INFUSE W/PUMP: CPT

## 2023-05-25 PROCEDURE — 99215 OFFICE O/P EST HI 40 MIN: CPT | Mod: S$GLB,,, | Performed by: REGISTERED NURSE

## 2023-05-25 PROCEDURE — 96368 THER/DIAG CONCURRENT INF: CPT

## 2023-05-25 PROCEDURE — 3288F FALL RISK ASSESSMENT DOCD: CPT | Mod: CPTII,S$GLB,, | Performed by: REGISTERED NURSE

## 2023-05-25 PROCEDURE — 96375 TX/PRO/DX INJ NEW DRUG ADDON: CPT

## 2023-05-25 PROCEDURE — 3078F DIAST BP <80 MM HG: CPT | Mod: CPTII,S$GLB,, | Performed by: REGISTERED NURSE

## 2023-05-25 PROCEDURE — 96367 TX/PROPH/DG ADDL SEQ IV INF: CPT

## 2023-05-25 PROCEDURE — 3008F BODY MASS INDEX DOCD: CPT | Mod: CPTII,S$GLB,, | Performed by: REGISTERED NURSE

## 2023-05-25 PROCEDURE — 3074F SYST BP LT 130 MM HG: CPT | Mod: CPTII,S$GLB,, | Performed by: REGISTERED NURSE

## 2023-05-25 RX ORDER — DIPHENHYDRAMINE HYDROCHLORIDE 50 MG/ML
50 INJECTION INTRAMUSCULAR; INTRAVENOUS ONCE AS NEEDED
Status: DISCONTINUED | OUTPATIENT
Start: 2023-05-25 | End: 2023-05-25 | Stop reason: HOSPADM

## 2023-05-25 RX ORDER — DIPHENHYDRAMINE HYDROCHLORIDE 50 MG/ML
50 INJECTION INTRAMUSCULAR; INTRAVENOUS ONCE AS NEEDED
Status: CANCELLED | OUTPATIENT
Start: 2023-05-25

## 2023-05-25 RX ORDER — EPINEPHRINE 0.3 MG/.3ML
0.3 INJECTION SUBCUTANEOUS ONCE AS NEEDED
Status: CANCELLED | OUTPATIENT
Start: 2023-05-25

## 2023-05-25 RX ORDER — SODIUM CHLORIDE 0.9 % (FLUSH) 0.9 %
10 SYRINGE (ML) INJECTION
Status: DISCONTINUED | OUTPATIENT
Start: 2023-05-25 | End: 2023-05-25 | Stop reason: HOSPADM

## 2023-05-25 RX ORDER — SODIUM CHLORIDE 0.9 % (FLUSH) 0.9 %
10 SYRINGE (ML) INJECTION
Status: CANCELLED | OUTPATIENT
Start: 2023-05-25

## 2023-05-25 RX ORDER — EPINEPHRINE 0.3 MG/.3ML
0.3 INJECTION SUBCUTANEOUS ONCE AS NEEDED
Status: DISCONTINUED | OUTPATIENT
Start: 2023-05-25 | End: 2023-05-25 | Stop reason: HOSPADM

## 2023-05-25 RX ORDER — ATROPINE SULFATE 0.4 MG/ML
0.4 INJECTION, SOLUTION ENDOTRACHEAL; INTRAMEDULLARY; INTRAMUSCULAR; INTRAVENOUS; SUBCUTANEOUS ONCE AS NEEDED
Status: DISCONTINUED | OUTPATIENT
Start: 2023-05-25 | End: 2023-05-25 | Stop reason: HOSPADM

## 2023-05-25 RX ORDER — HEPARIN 100 UNIT/ML
500 SYRINGE INTRAVENOUS
Status: CANCELLED | OUTPATIENT
Start: 2023-05-27

## 2023-05-25 RX ORDER — HEPARIN 100 UNIT/ML
500 SYRINGE INTRAVENOUS
Status: DISCONTINUED | OUTPATIENT
Start: 2023-05-25 | End: 2023-05-25 | Stop reason: HOSPADM

## 2023-05-25 RX ORDER — SODIUM CHLORIDE 0.9 % (FLUSH) 0.9 %
10 SYRINGE (ML) INJECTION
Status: CANCELLED | OUTPATIENT
Start: 2023-05-27

## 2023-05-25 RX ORDER — ONDANSETRON 2 MG/ML
8 INJECTION INTRAMUSCULAR; INTRAVENOUS ONCE AS NEEDED
Status: CANCELLED
Start: 2023-05-27

## 2023-05-25 RX ORDER — HEPARIN 100 UNIT/ML
500 SYRINGE INTRAVENOUS
Status: CANCELLED | OUTPATIENT
Start: 2023-05-25

## 2023-05-25 RX ORDER — ATROPINE SULFATE 0.4 MG/ML
0.4 INJECTION, SOLUTION ENDOTRACHEAL; INTRAMEDULLARY; INTRAMUSCULAR; INTRAVENOUS; SUBCUTANEOUS ONCE AS NEEDED
Status: CANCELLED | OUTPATIENT
Start: 2023-05-25

## 2023-05-25 RX ADMIN — OXALIPLATIN 147 MG: 5 INJECTION, SOLUTION INTRAVENOUS at 11:05

## 2023-05-25 RX ADMIN — SODIUM CHLORIDE 1000 ML: 9 INJECTION, SOLUTION INTRAVENOUS at 09:05

## 2023-05-25 RX ADMIN — APREPITANT 130 MG: 130 INJECTION, EMULSION INTRAVENOUS at 10:05

## 2023-05-25 RX ADMIN — DEXAMETHASONE SODIUM PHOSPHATE 0.25 MG: 4 INJECTION, SOLUTION INTRA-ARTICULAR; INTRALESIONAL; INTRAMUSCULAR; INTRAVENOUS; SOFT TISSUE at 10:05

## 2023-05-25 RX ADMIN — LEUCOVORIN CALCIUM 690 MG: 350 INJECTION, POWDER, LYOPHILIZED, FOR SOLUTION INTRAMUSCULAR; INTRAVENOUS at 11:05

## 2023-05-25 RX ADMIN — DEXTROSE: 50 INJECTION, SOLUTION INTRAVENOUS at 11:05

## 2023-05-25 RX ADMIN — FLUOROURACIL 3460 MG: 50 INJECTION, SOLUTION INTRAVENOUS at 01:05

## 2023-05-25 NOTE — NURSING
PT tolerated Oxaliplatin and Leucovorin infusion well. Patient also received 1L NS bolus which she tolerated well. No adverse reaction noted. Patient has 5FU infusing via pump to her port. 5FU is actively infusing as evidenced by the reservoir volume counting down. All ports secure. Pt education reinforced on Oxaliplatin, Leucovorin, and 5FU side effects, what to expect and when to contact the doctor. Patient instructed to return to the clinic on 5/27/23 at 10:30/11 for a pump d/c. Pt verbalized understanding.

## 2023-05-25 NOTE — PROGRESS NOTES
PROGRESS NOTE    Subjective:       Patient ID: Kaity Masterson is a 67 y.o. female.    Chief Complaint: follow up for borderline resectable pancreatic adenocarcinoma, MMR-proficient    Diagnosis:  Borderline resectable pancreatic adenocarcinoma, MMR proficient  UGT1A1/DPYD normal metabolizer    Tempus: KRAS G12D. Germline mutation: MUTYH mutation    Oncologic History:  1.Ms Masterson is a 66 yo woman with HTN, T2DM, pathologic T1a endometrial cancer s/p surgery on 3/8/23, PUD, fatty liver, osteoporosis, who initially saw me on 4/5/23 for further management of pancreatic adenocarcinoma. She was admitted to ICU for DKA in March 2023. CT A/P 3/22/23 showed  a 3.4 cm mass centered in the pancreatic head/uncinate process with associated downstream dilatation of the main pancreatic duct.  Imaging findings are highly concerning for pancreatic adenocarcinoma.  Adjacent prominent peripancreatic lymph nodes are seen.  There is some stranding of the fat about the SMV for approximately 180°.  The portal vein, SMV and splenic vein are patent. Fatty infiltration of the liver. CT chest 3/20/23: No evidence for pulmonary embolus or other acute intrathoracic process. Hepatic steatosis.  Cholecystectomy. She underwent upper EUS biopsy on 3/31/23. Pathology showed adenocarcinoma, MMR intact. She presents today for further evaluation. Strong family history of breast cancer, endometrial cancer and colon cancer. She is a little weak after surgery but active at home and is still working.   Family history:  Mother: Breast cancer (middle age)  Aunt maternal (2): cancer (unknown)  Sister (3): breast cancer, lung cancer (smoking). Other sister: colon cancer (older diagnosis 60's). Third sister (breast cancer) and endometrial cancer  Brother:  finger bone cancer  Discussed perioperative FOLFIRINOX  2. FOLFIRINOX to be started on 4/24/23. Bilirubin was elevated. Started FOLFOX on 4/24/23.  "Patient was very sick after chemo, hospitalized for obstructive jaundice, s/p ERCP with stent placement.     Interval History:   Ms Masterson returns for follow up. She was initially against continuing with chemotherapy after being very sick and requiring admission after cycle 1. After further consideration, she wants to try at least 1 more cycle to see how she tolerates the treatment. Since her last visit, she has had 1 fall though did not hit her head. Reports dizziness and decreased PO intake. She is still taking the periactin as they have not yet picked up her marinol. Her biggest complaint with food is taste as she states everything tastes like cardboard. She has been nauseated and taking PRN antiemetics with relief, though she has had a few episodes of vomiting. She has been having diarrhea as well, but feels this is more related to the "detox" drink that her grandson made for her.     ECO    ROS:   A ten-point system review is obtained and negative except for what was stated in the Interval History.     Physical Examination:   Vital signs reviewed.   General: well hydrated, well developed. In a wheelchair  HEENT: normocephalic, PERRLA, EOMI, mild +icteric sclerae, oropharynx clear  Neck: supple, no JVD, thyromegaly, cervical or supraclavicular lymphadenopathy  Lungs: clear breath sounds bilaterally, no wheezing, rales, or rhonchi  Heart: RRR, no M/R/G  Abdomen: soft, no tenderness, non-distended, no hepatosplenomegaly, mass, or hernia. BS present  Extremities: no clubbing, cyanosis, or edema  Skin: no rash, ulcer, or open wounds. Mildly jaundiced  Neuro: alert and oriented x 4, no focal neuro deficit  Psych: appropriate mood and affect    Objective:     Laboratory Data:  Labs reviewed. Bilirubin 2.0    Imaging Data:  CT A/P 3/23/23:  Impression:     There is a 3.4 cm mass centered in the pancreatic head/uncinate process with associated downstream dilatation of the main pancreatic duct.  Imaging findings are " highly concerning for pancreatic adenocarcinoma.  Adjacent prominent peripancreatic lymph nodes are seen.  There is some stranding of the fat about the SMV for approximately 180°.  The portal vein, SMV and splenic vein are patent.     Fatty infiltration of the liver.     Constipation.    CT chest 3/20/23:  Impression:     No evidence for pulmonary embolus or other acute intrathoracic process.     Hepatic steatosis.  Cholecystectomy.       Assessment and Plan:     1. Malignant neoplasm of head of pancreas    2. Malignant neoplasm of endometrium    3. Immunodeficiency secondary to neoplasm    4. Immunodeficiency secondary to chemotherapy    5. Type 2 diabetes mellitus without complication, with long-term current use of insulin    6. Primary hypertension    7. Obstructive jaundice due to cancer    8. Hyperbilirubinemia    9. Anorexia      1-4  - Ms Masterson is a 66 yo woman with borderline resectable adenocarcinoma of the pancreatic head, MMR-proficient. Tempus showed somatic KRAS G12D. Germline mutation: MUTYH mutation . Strong family history of cancer  - FOLFIRINOX to be started on 4/24/23. Bilirubin was elevated. Started FOLFOX on 4/24/23. Patient was very sick after chemo, hospitalized for obstructive jaundice, s/p ERCP with stent placement.   - Patient has decided that she does not want to take any more chemotherapy. We had a long discussion at last visit. Discussed she has a potentially curable pancreatic cancer, but curable only if she gets surgery and 6 months of periop chemo. Patient is determined she does not want any chemo. Discussed the natural history and prognosis of pancreatic cancer. Discussed hospice. She wants to get hospice at home. But need to see PCP to adjust insulin dose first. They will let us know when they are ready for hospice  - reviewed germline MUTYH mutation. Daughter-in-law wants to see genetics and get her kids tested. She has four children. Message sent to [employer].   - She has  reconsidered chemo and wants to try at least one more cycle of treatment. Labs reviewed and acceptable to proceed with cycle 2 today. Will dose reduce 5-FU to 2000 mg/m2 starting with cycle 2. Will add IVF as well given decreased PO intake. Encouraged to  marinol if periactin is not helping, as well as use imodium PRN for diarrhea.   - RTC in 2 weeks for potential cycle 3 pending tolerance of cycle 2.     5.  - f/u with PCP    6.  - BP controlled  - c/w current medication    7.  - s/p ERCP. Bilirubin improving, 2.0 today.     Follow-up:     RTC in 2 weeks.     Patient is in agreement with the proposed treatment plan. All questions were answered to the patient's satisfaction. Pt knows to call clinic if anything is needed before the next clinic visit.    Patient discussed with collaborating physician, Dr. Kaur.    At least 40 minutes were spent today on this encounter including face to face time with the patient, data gathering/interpretation and documentation.       Zena Padilla, MSN, APRN, [facility]  Hematology and Medical Oncology  Clinical Nurse Specialist to Dr. Parks, Dr. Waldron & Dr. Kaur    Electronically signed by Zena Padilla    Route Chart for Scheduling    Med Onc Chart Routing      Follow up with physician 2 weeks. rtc in 2 weeks with labs (CBC,CMP,Ca19-9) to see dr. kaur in clinic for infusion   Follow up with [facility] 4 weeks. rtc in 4 weeks with labs (CBC,CMP,Ca19-9) to see [facility] in clinic for infusion   Infusion scheduling note treatment +/- IVF every 2 weeks with pump d/c on day 3   Injection scheduling note    Labs CBC, CMP and CA 19-9   Scheduling:  Preferred lab:  Lab interval: every 2 weeks     Imaging    Pharmacy appointment    Other referrals       Treatment Plan Information   OP PANC mFOLFIRINOX Q2W   Carolina Kaur MD   Upcoming Treatment Dates - OP PANC mFOLFIRINOX Q2W    5/25/2023       Chemotherapy       oxaliplatin (ELOXATIN) 85 mg/m2 = 147 mg in dextrose 5 % (D5W) 529.4 mL chemo  infusion       leucovorin calcium 400 mg/m2 = 690 mg in dextrose 5 % (D5W) 250 mL infusion       fluorouracil (ADRUCIL) 2,400 mg/m2 = 4,150 mg in sodium chloride 0.9% 100 mL chemo infusion       Supportive Care       atropine injection 0.4 mg       Antiemetics       palonosetron (ALOXI) 0.25 mg with Dexamethasone (DECADRON) 12 mg in NS 50 mL IVPB       aprepitant (CINVANTI) injection 130 mg  6/8/2023       Chemotherapy       oxaliplatin (ELOXATIN) 85 mg/m2 = 147 mg in dextrose 5 % (D5W) 529.4 mL chemo infusion       leucovorin calcium 400 mg/m2 = 690 mg in dextrose 5 % (D5W) 250 mL infusion       fluorouracil (ADRUCIL) 2,400 mg/m2 = 4,150 mg in sodium chloride 0.9% 100 mL chemo infusion       Supportive Care       atropine injection 0.4 mg       Antiemetics       palonosetron (ALOXI) 0.25 mg with Dexamethasone (DECADRON) 12 mg in NS 50 mL IVPB       aprepitant (CINVANTI) injection 130 mg  6/22/2023       Chemotherapy       oxaliplatin (ELOXATIN) 85 mg/m2 = 147 mg in dextrose 5 % (D5W) 529.4 mL chemo infusion       leucovorin calcium 400 mg/m2 = 690 mg in dextrose 5 % (D5W) 250 mL infusion       irinotecan (CAMPTOSAR) 150 mg/m2 = 260 mg in sodium chloride 0.9% 513 mL chemo infusion       fluorouracil (ADRUCIL) 2,400 mg/m2 = 4,150 mg in sodium chloride 0.9% 100 mL chemo infusion       Supportive Care       atropine injection 0.4 mg       Antiemetics       aprepitant (CINVANTI) injection 130 mg       palonosetron (ALOXI) 0.25 mg with Dexamethasone (DECADRON) 12 mg in NS 50 mL IVPB  7/6/2023       Chemotherapy       oxaliplatin (ELOXATIN) 85 mg/m2 = 147 mg in dextrose 5 % (D5W) 529.4 mL chemo infusion       leucovorin calcium 400 mg/m2 = 690 mg in dextrose 5 % (D5W) 250 mL infusion       irinotecan (CAMPTOSAR) 150 mg/m2 = 260 mg in sodium chloride 0.9% 513 mL chemo infusion       fluorouracil (ADRUCIL) 2,400 mg/m2 = 4,150 mg in sodium chloride 0.9% 100 mL chemo infusion       Supportive Care       atropine injection 0.4  mg       Antiemetics       aprepitant (CINVANTI) injection 130 mg       palonosetron (ALOXI) 0.25 mg with Dexamethasone (DECADRON) 12 mg in NS 50 mL IVPB

## 2023-05-27 ENCOUNTER — INFUSION (OUTPATIENT)
Dept: INFUSION THERAPY | Facility: HOSPITAL | Age: 68
End: 2023-05-27
Payer: COMMERCIAL

## 2023-05-27 VITALS
HEART RATE: 72 BPM | SYSTOLIC BLOOD PRESSURE: 142 MMHG | RESPIRATION RATE: 18 BRPM | DIASTOLIC BLOOD PRESSURE: 70 MMHG | TEMPERATURE: 98 F

## 2023-05-27 DIAGNOSIS — C25.0 MALIGNANT NEOPLASM OF HEAD OF PANCREAS: Primary | ICD-10-CM

## 2023-05-27 PROCEDURE — 63600175 PHARM REV CODE 636 W HCPCS: Performed by: INTERNAL MEDICINE

## 2023-05-27 PROCEDURE — 25000003 PHARM REV CODE 250: Performed by: REGISTERED NURSE

## 2023-05-27 PROCEDURE — 25000003 PHARM REV CODE 250: Performed by: INTERNAL MEDICINE

## 2023-05-27 PROCEDURE — A4216 STERILE WATER/SALINE, 10 ML: HCPCS | Performed by: INTERNAL MEDICINE

## 2023-05-27 PROCEDURE — 96360 HYDRATION IV INFUSION INIT: CPT

## 2023-05-27 RX ORDER — HEPARIN 100 UNIT/ML
500 SYRINGE INTRAVENOUS
Status: DISCONTINUED | OUTPATIENT
Start: 2023-05-27 | End: 2023-05-27 | Stop reason: HOSPADM

## 2023-05-27 RX ORDER — SODIUM CHLORIDE 0.9 % (FLUSH) 0.9 %
10 SYRINGE (ML) INJECTION
Status: DISCONTINUED | OUTPATIENT
Start: 2023-05-27 | End: 2023-05-27 | Stop reason: HOSPADM

## 2023-05-27 RX ORDER — ONDANSETRON 2 MG/ML
8 INJECTION INTRAMUSCULAR; INTRAVENOUS ONCE AS NEEDED
Status: DISCONTINUED | OUTPATIENT
Start: 2023-05-27 | End: 2023-05-27 | Stop reason: HOSPADM

## 2023-05-27 RX ADMIN — SODIUM CHLORIDE 1000 ML: 9 INJECTION, SOLUTION INTRAVENOUS at 11:05

## 2023-05-27 RX ADMIN — HEPARIN 500 UNITS: 100 SYRINGE at 12:05

## 2023-05-27 RX ADMIN — Medication 10 ML: at 12:05

## 2023-05-27 NOTE — NURSING
1057  Pt here for 1L NS, pump d/c, port flush, accompanied by daughter and granddaughter, no new complaints or concerns at present; CADD infusion completed, pt tolerated; discussed treatment for today, all questions answered and pt agrees to proceed

## 2023-05-27 NOTE — PLAN OF CARE
1219  Infusion completed, pt tolerated; pt instructed to remain well hydrated; discussed when to contact MD, when to report to ER; next appt not yet scheduled, pt and daughter verbalized understanding of all discussed

## 2023-05-29 DIAGNOSIS — C25.0 MALIGNANT NEOPLASM OF HEAD OF PANCREAS: Primary | ICD-10-CM

## 2023-06-06 ENCOUNTER — TELEPHONE (OUTPATIENT)
Dept: HEMATOLOGY/ONCOLOGY | Facility: CLINIC | Age: 68
End: 2023-06-06
Payer: COMMERCIAL

## 2023-06-07 ENCOUNTER — OFFICE VISIT (OUTPATIENT)
Dept: HEMATOLOGY/ONCOLOGY | Facility: CLINIC | Age: 68
End: 2023-06-07
Payer: COMMERCIAL

## 2023-06-07 ENCOUNTER — LAB VISIT (OUTPATIENT)
Dept: LAB | Facility: HOSPITAL | Age: 68
End: 2023-06-07
Attending: INTERNAL MEDICINE
Payer: COMMERCIAL

## 2023-06-07 DIAGNOSIS — K83.1 OBSTRUCTIVE JAUNDICE DUE TO CANCER: ICD-10-CM

## 2023-06-07 DIAGNOSIS — C25.0 MALIGNANT NEOPLASM OF HEAD OF PANCREAS: Primary | ICD-10-CM

## 2023-06-07 DIAGNOSIS — D49.9 IMMUNODEFICIENCY SECONDARY TO NEOPLASM: ICD-10-CM

## 2023-06-07 DIAGNOSIS — E11.9 TYPE 2 DIABETES MELLITUS WITHOUT COMPLICATION, WITH LONG-TERM CURRENT USE OF INSULIN: ICD-10-CM

## 2023-06-07 DIAGNOSIS — Z79.4 TYPE 2 DIABETES MELLITUS WITHOUT COMPLICATION, WITH LONG-TERM CURRENT USE OF INSULIN: ICD-10-CM

## 2023-06-07 DIAGNOSIS — E80.6 HYPERBILIRUBINEMIA: ICD-10-CM

## 2023-06-07 DIAGNOSIS — C80.1 OBSTRUCTIVE JAUNDICE DUE TO CANCER: ICD-10-CM

## 2023-06-07 DIAGNOSIS — C54.1 MALIGNANT NEOPLASM OF ENDOMETRIUM: ICD-10-CM

## 2023-06-07 DIAGNOSIS — I10 PRIMARY HYPERTENSION: ICD-10-CM

## 2023-06-07 DIAGNOSIS — C25.0 MALIGNANT NEOPLASM OF HEAD OF PANCREAS: ICD-10-CM

## 2023-06-07 DIAGNOSIS — T45.1X5A IMMUNODEFICIENCY SECONDARY TO CHEMOTHERAPY: ICD-10-CM

## 2023-06-07 DIAGNOSIS — D84.81 IMMUNODEFICIENCY SECONDARY TO NEOPLASM: ICD-10-CM

## 2023-06-07 DIAGNOSIS — Z79.899 IMMUNODEFICIENCY SECONDARY TO CHEMOTHERAPY: ICD-10-CM

## 2023-06-07 DIAGNOSIS — D84.821 IMMUNODEFICIENCY SECONDARY TO CHEMOTHERAPY: ICD-10-CM

## 2023-06-07 LAB
ALBUMIN SERPL BCP-MCNC: 2.6 G/DL (ref 3.5–5.2)
ALP SERPL-CCNC: 87 U/L (ref 55–135)
ALT SERPL W/O P-5'-P-CCNC: 22 U/L (ref 10–44)
ANION GAP SERPL CALC-SCNC: 12 MMOL/L (ref 8–16)
AST SERPL-CCNC: 36 U/L (ref 10–40)
BASOPHILS # BLD AUTO: 0.03 K/UL (ref 0–0.2)
BASOPHILS NFR BLD: 0.4 % (ref 0–1.9)
BILIRUB SERPL-MCNC: 1.3 MG/DL (ref 0.1–1)
BUN SERPL-MCNC: 8 MG/DL (ref 8–23)
CALCIUM SERPL-MCNC: 10.4 MG/DL (ref 8.7–10.5)
CANCER AG19-9 SERPL-ACNC: 79.1 U/ML (ref 0–40)
CHLORIDE SERPL-SCNC: 107 MMOL/L (ref 95–110)
CO2 SERPL-SCNC: 17 MMOL/L (ref 23–29)
CREAT SERPL-MCNC: 0.8 MG/DL (ref 0.5–1.4)
DIFFERENTIAL METHOD: ABNORMAL
EOSINOPHIL # BLD AUTO: 0.1 K/UL (ref 0–0.5)
EOSINOPHIL NFR BLD: 1.1 % (ref 0–8)
ERYTHROCYTE [DISTWIDTH] IN BLOOD BY AUTOMATED COUNT: 14.6 % (ref 11.5–14.5)
EST. GFR  (NO RACE VARIABLE): >60 ML/MIN/1.73 M^2
GLUCOSE SERPL-MCNC: 187 MG/DL (ref 70–110)
HCT VFR BLD AUTO: 31.7 % (ref 37–48.5)
HGB BLD-MCNC: 10.2 G/DL (ref 12–16)
IMM GRANULOCYTES # BLD AUTO: 0.02 K/UL (ref 0–0.04)
IMM GRANULOCYTES NFR BLD AUTO: 0.3 % (ref 0–0.5)
LYMPHOCYTES # BLD AUTO: 1.8 K/UL (ref 1–4.8)
LYMPHOCYTES NFR BLD: 26.1 % (ref 18–48)
MCH RBC QN AUTO: 32.6 PG (ref 27–31)
MCHC RBC AUTO-ENTMCNC: 32.2 G/DL (ref 32–36)
MCV RBC AUTO: 101 FL (ref 82–98)
MONOCYTES # BLD AUTO: 0.5 K/UL (ref 0.3–1)
MONOCYTES NFR BLD: 7.4 % (ref 4–15)
NEUTROPHILS # BLD AUTO: 4.6 K/UL (ref 1.8–7.7)
NEUTROPHILS NFR BLD: 64.7 % (ref 38–73)
NRBC BLD-RTO: 0 /100 WBC
PLATELET # BLD AUTO: 246 K/UL (ref 150–450)
PMV BLD AUTO: 10.4 FL (ref 9.2–12.9)
POTASSIUM SERPL-SCNC: 3.6 MMOL/L (ref 3.5–5.1)
PROT SERPL-MCNC: 5.4 G/DL (ref 6–8.4)
RBC # BLD AUTO: 3.13 M/UL (ref 4–5.4)
SODIUM SERPL-SCNC: 136 MMOL/L (ref 136–145)
WBC # BLD AUTO: 7.04 K/UL (ref 3.9–12.7)

## 2023-06-07 PROCEDURE — 80053 COMPREHEN METABOLIC PANEL: CPT | Performed by: INTERNAL MEDICINE

## 2023-06-07 PROCEDURE — 3046F HEMOGLOBIN A1C LEVEL >9.0%: CPT | Mod: CPTII,95,, | Performed by: INTERNAL MEDICINE

## 2023-06-07 PROCEDURE — 36415 COLL VENOUS BLD VENIPUNCTURE: CPT | Performed by: INTERNAL MEDICINE

## 2023-06-07 PROCEDURE — 1159F PR MEDICATION LIST DOCUMENTED IN MEDICAL RECORD: ICD-10-PCS | Mod: CPTII,95,, | Performed by: INTERNAL MEDICINE

## 2023-06-07 PROCEDURE — 99215 PR OFFICE/OUTPT VISIT, EST, LEVL V, 40-54 MIN: ICD-10-PCS | Mod: 95,,, | Performed by: INTERNAL MEDICINE

## 2023-06-07 PROCEDURE — 85025 COMPLETE CBC W/AUTO DIFF WBC: CPT | Performed by: INTERNAL MEDICINE

## 2023-06-07 PROCEDURE — 1160F RVW MEDS BY RX/DR IN RCRD: CPT | Mod: CPTII,95,, | Performed by: INTERNAL MEDICINE

## 2023-06-07 PROCEDURE — 1160F PR REVIEW ALL MEDS BY PRESCRIBER/CLIN PHARMACIST DOCUMENTED: ICD-10-PCS | Mod: CPTII,95,, | Performed by: INTERNAL MEDICINE

## 2023-06-07 PROCEDURE — 4010F PR ACE/ARB THEARPY RXD/TAKEN: ICD-10-PCS | Mod: CPTII,95,, | Performed by: INTERNAL MEDICINE

## 2023-06-07 PROCEDURE — 1159F MED LIST DOCD IN RCRD: CPT | Mod: CPTII,95,, | Performed by: INTERNAL MEDICINE

## 2023-06-07 PROCEDURE — 3046F PR MOST RECENT HEMOGLOBIN A1C LEVEL > 9.0%: ICD-10-PCS | Mod: CPTII,95,, | Performed by: INTERNAL MEDICINE

## 2023-06-07 PROCEDURE — 99215 OFFICE O/P EST HI 40 MIN: CPT | Mod: 95,,, | Performed by: INTERNAL MEDICINE

## 2023-06-07 PROCEDURE — 4010F ACE/ARB THERAPY RXD/TAKEN: CPT | Mod: CPTII,95,, | Performed by: INTERNAL MEDICINE

## 2023-06-07 PROCEDURE — 86301 IMMUNOASSAY TUMOR CA 19-9: CPT | Performed by: INTERNAL MEDICINE

## 2023-06-07 RX ORDER — EPINEPHRINE 0.3 MG/.3ML
0.3 INJECTION SUBCUTANEOUS ONCE AS NEEDED
Status: CANCELLED | OUTPATIENT
Start: 2023-06-08

## 2023-06-07 RX ORDER — SODIUM CHLORIDE 9 MG/ML
INJECTION, SOLUTION INTRAVENOUS
Status: CANCELLED
Start: 2023-06-08

## 2023-06-07 RX ORDER — HEPARIN 100 UNIT/ML
500 SYRINGE INTRAVENOUS
Status: CANCELLED | OUTPATIENT
Start: 2023-06-23

## 2023-06-07 RX ORDER — SODIUM CHLORIDE 0.9 % (FLUSH) 0.9 %
10 SYRINGE (ML) INJECTION
Status: CANCELLED | OUTPATIENT
Start: 2023-06-08

## 2023-06-07 RX ORDER — ATROPINE SULFATE 0.4 MG/ML
0.4 INJECTION, SOLUTION ENDOTRACHEAL; INTRAMEDULLARY; INTRAMUSCULAR; INTRAVENOUS; SUBCUTANEOUS ONCE AS NEEDED
Status: CANCELLED | OUTPATIENT
Start: 2023-06-08

## 2023-06-07 RX ORDER — DIPHENHYDRAMINE HYDROCHLORIDE 50 MG/ML
50 INJECTION INTRAMUSCULAR; INTRAVENOUS ONCE AS NEEDED
Status: CANCELLED | OUTPATIENT
Start: 2023-06-08

## 2023-06-07 RX ORDER — HEPARIN 100 UNIT/ML
500 SYRINGE INTRAVENOUS
Status: CANCELLED | OUTPATIENT
Start: 2023-06-08

## 2023-06-07 RX ORDER — SODIUM CHLORIDE 0.9 % (FLUSH) 0.9 %
10 SYRINGE (ML) INJECTION
Status: CANCELLED | OUTPATIENT
Start: 2023-06-23

## 2023-06-07 RX ORDER — SODIUM CHLORIDE 9 MG/ML
INJECTION, SOLUTION INTRAVENOUS
Status: CANCELLED
Start: 2023-06-23

## 2023-06-07 NOTE — PROGRESS NOTES
The patient location is: home  The chief complaint leading to consultation is: follow up for pancreatic cancer    Visit type: audiovisual    Face to Face time with patient: 15 min  45 minutes of total time spent on the encounter, which includes face to face time and non-face to face time preparing to see the patient (eg, review of tests), Obtaining and/or reviewing separately obtained history, Documenting clinical information in the electronic or other health record, Independently interpreting results (not separately reported) and communicating results to the patient/family/caregiver, or Care coordination (not separately reported).         Each patient to whom he or she provides medical services by telemedicine is:  (1) informed of the relationship between the physician and patient and the respective role of any other health care provider with respect to management of the patient; and (2) notified that he or she may decline to receive medical services by telemedicine and may withdraw from such care at any time.    Notes:                                                              PROGRESS NOTE    Subjective:       Patient ID: Kaity Masterson is a 67 y.o. female.    Chief Complaint: follow up for borderline resectable pancreatic adenocarcinoma, MMR-proficient    Diagnosis:  Borderline resectable pancreatic adenocarcinoma, MMR proficient  UGT1A1/DPYD normal metabolizer    Tempus: KRAS G12D. Germline mutation: MUTYH mutation    Oncologic History:  1.Ms Masterson is a 68 yo woman with HTN, T2DM, pathologic T1a endometrial cancer s/p surgery on 3/8/23, PUD, fatty liver, osteoporosis, who initially saw me on 4/5/23 for further management of pancreatic adenocarcinoma. She was admitted to ICU for DKA in March 2023. CT A/P 3/22/23 showed  a 3.4 cm mass centered in the pancreatic head/uncinate process with associated downstream dilatation of the main pancreatic duct.  Imaging findings are highly concerning for pancreatic  adenocarcinoma.  Adjacent prominent peripancreatic lymph nodes are seen.  There is some stranding of the fat about the SMV for approximately 180°.  The portal vein, SMV and splenic vein are patent. Fatty infiltration of the liver. CT chest 3/20/23: No evidence for pulmonary embolus or other acute intrathoracic process. Hepatic steatosis.  Cholecystectomy. She underwent upper EUS biopsy on 3/31/23. Pathology showed adenocarcinoma, MMR intact. She presents today for further evaluation. Strong family history of breast cancer, endometrial cancer and colon cancer. She is a little weak after surgery but active at home and is still working.   Family history:  Mother: Breast cancer (middle age)  Aunt maternal (2): cancer (unknown)  Sister (3): breast cancer, lung cancer (smoking). Other sister: colon cancer (older diagnosis 60's). Third sister (breast cancer) and endometrial cancer  Brother:  finger bone cancer  Discussed perioperative FOLFIRINOX  2. FOLFIRINOX to be started on 23. Bilirubin was elevated. Started FOLFOX on 23. Patient was very sick after chemo, hospitalized for obstructive jaundice, s/p ERCP with stent placement. On visit 23, patient decided not to take any more chemo and opted for hospice. She later changed her mind and resumed FOLFOX cycle 2 on 23.     Interval History:   Ms Masterson returns for follow up. She tolerated cycle 2 FOLFOX okay. Had a few days when she was not well. But IVF on days 1 and 3 helped her a lot. She is willing to continue with chemo.     ECO    ROS:   A ten-point system review is obtained and negative except for what was stated in the Interval History.     Physical Examination:   General: well hydrated, well developed.  HEENT: normocephalic, PERRLA, EOMI, anicteric sclerae  Neuro: alert and oriented x 4  Psych: appropriate mood and affect    Objective:     Laboratory Data:  Labs reviewed. Bilirubin 1.3    Imaging Data:  CT A/P 3/23/23:  Impression:     There  is a 3.4 cm mass centered in the pancreatic head/uncinate process with associated downstream dilatation of the main pancreatic duct.  Imaging findings are highly concerning for pancreatic adenocarcinoma.  Adjacent prominent peripancreatic lymph nodes are seen.  There is some stranding of the fat about the SMV for approximately 180°.  The portal vein, SMV and splenic vein are patent.     Fatty infiltration of the liver.     Constipation.    CT chest 3/20/23:  Impression:     No evidence for pulmonary embolus or other acute intrathoracic process.     Hepatic steatosis.  Cholecystectomy.       Assessment and Plan:     1. Malignant neoplasm of head of pancreas    2. Immunodeficiency secondary to neoplasm    3. Immunodeficiency secondary to chemotherapy    4. Malignant neoplasm of endometrium    5. Hyperbilirubinemia    6. Obstructive jaundice due to cancer    7. Type 2 diabetes mellitus without complication, with long-term current use of insulin    8. Primary hypertension      1-3  - Ms Masterson is a 68 yo woman with borderline resectable adenocarcinoma of the pancreatic head, MMR-proficient. Tempus showed somatic KRAS G12D. Germline mutation: MUTYH mutation . Strong family history of cancer  - FOLFIRINOX to be started on 4/24/23. Bilirubin was elevated. Started FOLFOX on 4/24/23. Patient was very sick after chemo, hospitalized for obstructive jaundice, s/p ERCP with stent placement. On visit 5/8/23, patient decided not to take any more chemo and opted for hospice. She later changed her mind and resumed FOLFOX cycle 2 on 5/25/23.   - doing well. Tolerated cycle 2 FOLFOX okay. Discussed increasing 5FU from 2000 mg/m2 to 2400 mg/m2. Patient understands and agrees with the plan.   - cycle 3 FOLFOX tomorrow  - return in 2 weeks for cycle 4 FOLFOX. Will decide on irinotecan in the future based on bilirubin and tolerance  - previously reviewed germline MUTYH mutation. Daughter-in-law wants to see genetics and get her kids  tested. She has four children. Message sent to genetics.     4.  - f/u with gyn onc    5.6  - s/p ERCP. Bilirubin improving    7.  - BS controlled  - c/w current medication    8.  - monitor BP    Follow-up:     Follow up in 2 weeks  Knows to call in the interval if any problems arise.      Electronically signed by Carolina Casey    Route Chart for Scheduling    Med Onc Chart Routing  Urgent    Follow up with physician 4 weeks and 6 weeks. please change patient's visit on 7/20 to 7/19 with me. get CBC, CMP, CA 19-9, CT C/A/P at [facility] on 7/18, see me on 7/19 then get chemo. keep the other weeks' appointments   Follow up with [facility]    Infusion scheduling note   FOLFOX every 2 weeks, remove pump day 3   Injection scheduling note    Labs CBC, CMP and CA 19-9   Scheduling:  Preferred lab:  Lab interval:     Imaging CT chest abdomen pelvis   on 7/18   Pharmacy appointment    Other referrals        Treatment Plan Information   OP PANC mFOLFIRINOX Q2W   Carolina Casey MD   Upcoming Treatment Dates - OP PANC mFOLFIRINOX Q2W    6/8/2023       Chemotherapy       oxaliplatin (ELOXATIN) 85 mg/m2 = 147 mg in dextrose 5 % (D5W) 529.4 mL chemo infusion       leucovorin calcium 400 mg/m2 = 690 mg in dextrose 5 % (D5W) 250 mL infusion       fluorouracil (ADRUCIL) 2,000 mg/m2 = 3,460 mg in sodium chloride 0.9% 100 mL chemo infusion       Supportive Care       atropine injection 0.4 mg       Antiemetics       aprepitant (CINVANTI) injection 130 mg       palonosetron (ALOXI) 0.25 mg with Dexamethasone (DECADRON) 12 mg in NS 50 mL IVPB  6/22/2023       Chemotherapy       oxaliplatin (ELOXATIN) 85 mg/m2 = 147 mg in dextrose 5 % (D5W) 529.4 mL chemo infusion       leucovorin calcium 400 mg/m2 = 690 mg in dextrose 5 % (D5W) 250 mL infusion       irinotecan (CAMPTOSAR) 150 mg/m2 = 260 mg in sodium chloride 0.9% 513 mL chemo infusion       fluorouracil (ADRUCIL) 2,000 mg/m2 = 3,460 mg in sodium chloride 0.9% 100 mL chemo infusion       Supportive  Care       atropine injection 0.4 mg       Antiemetics       aprepitant (CINVANTI) injection 130 mg       palonosetron (ALOXI) 0.25 mg with Dexamethasone (DECADRON) 12 mg in NS 50 mL IVPB  7/6/2023       Chemotherapy       oxaliplatin (ELOXATIN) 85 mg/m2 = 147 mg in dextrose 5 % (D5W) 529.4 mL chemo infusion       leucovorin calcium 400 mg/m2 = 690 mg in dextrose 5 % (D5W) 250 mL infusion       irinotecan (CAMPTOSAR) 150 mg/m2 = 260 mg in sodium chloride 0.9% 513 mL chemo infusion       fluorouracil (ADRUCIL) 2,000 mg/m2 = 3,460 mg in sodium chloride 0.9% 100 mL chemo infusion       Supportive Care       atropine injection 0.4 mg       Antiemetics       aprepitant (CINVANTI) injection 130 mg       palonosetron (ALOXI) 0.25 mg with Dexamethasone (DECADRON) 12 mg in NS 50 mL IVPB  7/20/2023       Chemotherapy       oxaliplatin (ELOXATIN) 85 mg/m2 = 147 mg in dextrose 5 % (D5W) 529.4 mL chemo infusion       leucovorin calcium 400 mg/m2 = 690 mg in dextrose 5 % (D5W) 250 mL infusion       irinotecan (CAMPTOSAR) 150 mg/m2 = 260 mg in sodium chloride 0.9% 513 mL chemo infusion       fluorouracil (ADRUCIL) 2,000 mg/m2 = 3,460 mg in sodium chloride 0.9% 100 mL chemo infusion       Supportive Care       atropine injection 0.4 mg       Antiemetics       aprepitant (CINVANTI) injection 130 mg       palonosetron (ALOXI) 0.25 mg with Dexamethasone (DECADRON) 12 mg in NS 50 mL IVPB

## 2023-06-08 ENCOUNTER — INFUSION (OUTPATIENT)
Dept: INFUSION THERAPY | Facility: HOSPITAL | Age: 68
End: 2023-06-08
Payer: COMMERCIAL

## 2023-06-08 ENCOUNTER — HOSPITAL ENCOUNTER (INPATIENT)
Facility: HOSPITAL | Age: 68
LOS: 2 days | Discharge: HOME | DRG: 690 | End: 2023-06-11
Attending: EMERGENCY MEDICINE | Admitting: INTERNAL MEDICINE
Payer: COMMERCIAL

## 2023-06-08 DIAGNOSIS — C25.0 MALIGNANT NEOPLASM OF HEAD OF PANCREAS: ICD-10-CM

## 2023-06-08 DIAGNOSIS — Z85.07 HISTORY OF PANCREATIC CANCER: Primary | ICD-10-CM

## 2023-06-08 DIAGNOSIS — N30.00 ACUTE CYSTITIS WITHOUT HEMATURIA: ICD-10-CM

## 2023-06-08 DIAGNOSIS — R07.9 CHEST PAIN: ICD-10-CM

## 2023-06-08 DIAGNOSIS — R53.81 DEBILITY: ICD-10-CM

## 2023-06-08 DIAGNOSIS — R42 DIZZINESS: ICD-10-CM

## 2023-06-08 DIAGNOSIS — I95.9 HYPOTENSION, UNSPECIFIED HYPOTENSION TYPE: ICD-10-CM

## 2023-06-08 DIAGNOSIS — E87.20 LACTIC ACIDOSIS: ICD-10-CM

## 2023-06-08 DIAGNOSIS — N30.01 ACUTE CYSTITIS WITH HEMATURIA: ICD-10-CM

## 2023-06-08 PROBLEM — R79.89 ELEVATED LACTIC ACID LEVEL: Status: ACTIVE | Noted: 2023-06-08

## 2023-06-08 LAB
ABO + RH BLD: NORMAL
ALBUMIN SERPL BCP-MCNC: 2.4 G/DL (ref 3.5–5.2)
ALLENS TEST: ABNORMAL
ALP SERPL-CCNC: 87 U/L (ref 55–135)
ALT SERPL W/O P-5'-P-CCNC: 18 U/L (ref 10–44)
ANION GAP SERPL CALC-SCNC: 10 MMOL/L (ref 8–16)
AST SERPL-CCNC: 33 U/L (ref 10–40)
BACTERIA #/AREA URNS AUTO: ABNORMAL /HPF
BASOPHILS # BLD AUTO: 0.02 K/UL (ref 0–0.2)
BASOPHILS NFR BLD: 0.4 % (ref 0–1.9)
BILIRUB SERPL-MCNC: 1.2 MG/DL (ref 0.1–1)
BILIRUB UR QL STRIP: NEGATIVE
BLD GP AB SCN CELLS X3 SERPL QL: NORMAL
BUN SERPL-MCNC: 9 MG/DL (ref 8–23)
CALCIUM SERPL-MCNC: 10.1 MG/DL (ref 8.7–10.5)
CHLORIDE SERPL-SCNC: 107 MMOL/L (ref 95–110)
CK SERPL-CCNC: 19 U/L (ref 20–180)
CLARITY UR REFRACT.AUTO: ABNORMAL
CO2 SERPL-SCNC: 16 MMOL/L (ref 23–29)
COLOR UR AUTO: YELLOW
CREAT SERPL-MCNC: 0.9 MG/DL (ref 0.5–1.4)
DELSYS: ABNORMAL
DIFFERENTIAL METHOD: ABNORMAL
EOSINOPHIL # BLD AUTO: 0 K/UL (ref 0–0.5)
EOSINOPHIL NFR BLD: 0.2 % (ref 0–8)
ERYTHROCYTE [DISTWIDTH] IN BLOOD BY AUTOMATED COUNT: 14.3 % (ref 11.5–14.5)
EST. GFR  (NO RACE VARIABLE): >60 ML/MIN/1.73 M^2
GLUCOSE SERPL-MCNC: 271 MG/DL (ref 70–110)
GLUCOSE UR QL STRIP: ABNORMAL
HCO3 UR-SCNC: 15.3 MMOL/L (ref 24–28)
HCT VFR BLD AUTO: 28.2 % (ref 37–48.5)
HGB BLD-MCNC: 9.2 G/DL (ref 12–16)
HGB UR QL STRIP: ABNORMAL
HYALINE CASTS UR QL AUTO: 20 /LPF
IMM GRANULOCYTES # BLD AUTO: 0.01 K/UL (ref 0–0.04)
IMM GRANULOCYTES NFR BLD AUTO: 0.2 % (ref 0–0.5)
KETONES UR QL STRIP: NEGATIVE
LACTATE SERPL-SCNC: 6.6 MMOL/L (ref 0.5–2.2)
LACTATE SERPL-SCNC: 7.1 MMOL/L (ref 0.5–2.2)
LDH SERPL L TO P-CCNC: 176 U/L (ref 110–260)
LDH SERPL L TO P-CCNC: 7.33 MMOL/L (ref 0.5–2.2)
LDH SERPL L TO P-CCNC: 7.72 MMOL/L (ref 0.5–2.2)
LDH SERPL L TO P-CCNC: 7.75 MMOL/L (ref 0.5–2.2)
LEUKOCYTE ESTERASE UR QL STRIP: ABNORMAL
LIPASE SERPL-CCNC: <4 U/L (ref 4–60)
LYMPHOCYTES # BLD AUTO: 0.8 K/UL (ref 1–4.8)
LYMPHOCYTES NFR BLD: 14.1 % (ref 18–48)
MCH RBC QN AUTO: 32.3 PG (ref 27–31)
MCHC RBC AUTO-ENTMCNC: 32.6 G/DL (ref 32–36)
MCV RBC AUTO: 99 FL (ref 82–98)
MICROSCOPIC COMMENT: ABNORMAL
MODE: ABNORMAL
MONOCYTES # BLD AUTO: 0.4 K/UL (ref 0.3–1)
MONOCYTES NFR BLD: 7.8 % (ref 4–15)
NEUTROPHILS # BLD AUTO: 4.2 K/UL (ref 1.8–7.7)
NEUTROPHILS NFR BLD: 77.3 % (ref 38–73)
NITRITE UR QL STRIP: POSITIVE
NON-SQ EPI CELLS #/AREA URNS AUTO: 0 /HPF
NRBC BLD-RTO: 0 /100 WBC
PCO2 BLDA: 25.9 MMHG (ref 35–45)
PH SMN: 7.38 [PH] (ref 7.35–7.45)
PH UR STRIP: 6 [PH] (ref 5–8)
PLATELET # BLD AUTO: 176 K/UL (ref 150–450)
PMV BLD AUTO: 10.7 FL (ref 9.2–12.9)
PO2 BLDA: 18 MMHG (ref 40–60)
POC BE: -10 MMOL/L
POC SATURATED O2: 28 % (ref 95–100)
POC TCO2: 16 MMOL/L (ref 24–29)
POTASSIUM SERPL-SCNC: 3.9 MMOL/L (ref 3.5–5.1)
PROT SERPL-MCNC: 4.8 G/DL (ref 6–8.4)
PROT UR QL STRIP: ABNORMAL
RBC # BLD AUTO: 2.85 M/UL (ref 4–5.4)
RBC #/AREA URNS AUTO: 4 /HPF (ref 0–4)
SAMPLE: ABNORMAL
SARS-COV-2 RDRP RESP QL NAA+PROBE: NEGATIVE
SITE: ABNORMAL
SODIUM SERPL-SCNC: 133 MMOL/L (ref 136–145)
SP GR UR STRIP: 1.01 (ref 1–1.03)
SPECIMEN OUTDATE: NORMAL
SQUAMOUS #/AREA URNS AUTO: 7 /HPF
URN SPEC COLLECT METH UR: ABNORMAL
WBC # BLD AUTO: 5.48 K/UL (ref 3.9–12.7)
WBC #/AREA URNS AUTO: >100 /HPF (ref 0–5)

## 2023-06-08 PROCEDURE — 99291 CRITICAL CARE FIRST HOUR: CPT | Mod: GC,,, | Performed by: EMERGENCY MEDICINE

## 2023-06-08 PROCEDURE — 85025 COMPLETE CBC W/AUTO DIFF WBC: CPT | Performed by: STUDENT IN AN ORGANIZED HEALTH CARE EDUCATION/TRAINING PROGRAM

## 2023-06-08 PROCEDURE — G0378 HOSPITAL OBSERVATION PER HR: HCPCS

## 2023-06-08 PROCEDURE — 93010 ELECTROCARDIOGRAM REPORT: CPT | Mod: ,,, | Performed by: INTERNAL MEDICINE

## 2023-06-08 PROCEDURE — 93010 EKG 12-LEAD: ICD-10-PCS | Mod: ,,, | Performed by: INTERNAL MEDICINE

## 2023-06-08 PROCEDURE — 63600175 PHARM REV CODE 636 W HCPCS: Performed by: STUDENT IN AN ORGANIZED HEALTH CARE EDUCATION/TRAINING PROGRAM

## 2023-06-08 PROCEDURE — 86900 BLOOD TYPING SEROLOGIC ABO: CPT | Performed by: STUDENT IN AN ORGANIZED HEALTH CARE EDUCATION/TRAINING PROGRAM

## 2023-06-08 PROCEDURE — 99291 PR CRITICAL CARE, E/M 30-74 MINUTES: ICD-10-PCS | Mod: GC,,, | Performed by: EMERGENCY MEDICINE

## 2023-06-08 PROCEDURE — U0002 COVID-19 LAB TEST NON-CDC: HCPCS | Performed by: STUDENT IN AN ORGANIZED HEALTH CARE EDUCATION/TRAINING PROGRAM

## 2023-06-08 PROCEDURE — 25000003 PHARM REV CODE 250: Performed by: STUDENT IN AN ORGANIZED HEALTH CARE EDUCATION/TRAINING PROGRAM

## 2023-06-08 PROCEDURE — 96372 THER/PROPH/DIAG INJ SC/IM: CPT | Performed by: STUDENT IN AN ORGANIZED HEALTH CARE EDUCATION/TRAINING PROGRAM

## 2023-06-08 PROCEDURE — 99900035 HC TECH TIME PER 15 MIN (STAT)

## 2023-06-08 PROCEDURE — 87077 CULTURE AEROBIC IDENTIFY: CPT | Performed by: STUDENT IN AN ORGANIZED HEALTH CARE EDUCATION/TRAINING PROGRAM

## 2023-06-08 PROCEDURE — 83690 ASSAY OF LIPASE: CPT | Performed by: EMERGENCY MEDICINE

## 2023-06-08 PROCEDURE — 96360 HYDRATION IV INFUSION INIT: CPT | Mod: 59

## 2023-06-08 PROCEDURE — 99285 EMERGENCY DEPT VISIT HI MDM: CPT | Mod: 25

## 2023-06-08 PROCEDURE — 96365 THER/PROPH/DIAG IV INF INIT: CPT

## 2023-06-08 PROCEDURE — 87186 SC STD MICRODIL/AGAR DIL: CPT | Performed by: STUDENT IN AN ORGANIZED HEALTH CARE EDUCATION/TRAINING PROGRAM

## 2023-06-08 PROCEDURE — 87040 BLOOD CULTURE FOR BACTERIA: CPT | Performed by: STUDENT IN AN ORGANIZED HEALTH CARE EDUCATION/TRAINING PROGRAM

## 2023-06-08 PROCEDURE — 80053 COMPREHEN METABOLIC PANEL: CPT | Performed by: STUDENT IN AN ORGANIZED HEALTH CARE EDUCATION/TRAINING PROGRAM

## 2023-06-08 PROCEDURE — 83605 ASSAY OF LACTIC ACID: CPT | Performed by: STUDENT IN AN ORGANIZED HEALTH CARE EDUCATION/TRAINING PROGRAM

## 2023-06-08 PROCEDURE — 36415 COLL VENOUS BLD VENIPUNCTURE: CPT | Performed by: STUDENT IN AN ORGANIZED HEALTH CARE EDUCATION/TRAINING PROGRAM

## 2023-06-08 PROCEDURE — 83605 ASSAY OF LACTIC ACID: CPT | Mod: 91

## 2023-06-08 PROCEDURE — 63600175 PHARM REV CODE 636 W HCPCS: Performed by: EMERGENCY MEDICINE

## 2023-06-08 PROCEDURE — 83615 LACTATE (LD) (LDH) ENZYME: CPT | Performed by: INTERNAL MEDICINE

## 2023-06-08 PROCEDURE — 81001 URINALYSIS AUTO W/SCOPE: CPT | Performed by: STUDENT IN AN ORGANIZED HEALTH CARE EDUCATION/TRAINING PROGRAM

## 2023-06-08 PROCEDURE — 82803 BLOOD GASES ANY COMBINATION: CPT

## 2023-06-08 PROCEDURE — 96361 HYDRATE IV INFUSION ADD-ON: CPT

## 2023-06-08 PROCEDURE — 87088 URINE BACTERIA CULTURE: CPT | Performed by: STUDENT IN AN ORGANIZED HEALTH CARE EDUCATION/TRAINING PROGRAM

## 2023-06-08 PROCEDURE — 82550 ASSAY OF CK (CPK): CPT | Performed by: STUDENT IN AN ORGANIZED HEALTH CARE EDUCATION/TRAINING PROGRAM

## 2023-06-08 PROCEDURE — 83605 ASSAY OF LACTIC ACID: CPT

## 2023-06-08 PROCEDURE — 87086 URINE CULTURE/COLONY COUNT: CPT | Performed by: STUDENT IN AN ORGANIZED HEALTH CARE EDUCATION/TRAINING PROGRAM

## 2023-06-08 PROCEDURE — 93005 ELECTROCARDIOGRAM TRACING: CPT

## 2023-06-08 PROCEDURE — 96367 TX/PROPH/DG ADDL SEQ IV INF: CPT

## 2023-06-08 RX ORDER — ASPIRIN 81 MG/1
81 TABLET ORAL DAILY
Status: DISCONTINUED | OUTPATIENT
Start: 2023-06-09 | End: 2023-06-12 | Stop reason: HOSPADM

## 2023-06-08 RX ORDER — METOCLOPRAMIDE HYDROCHLORIDE 5 MG/ML
10 INJECTION INTRAMUSCULAR; INTRAVENOUS
Status: DISCONTINUED | OUTPATIENT
Start: 2023-06-08 | End: 2023-06-08

## 2023-06-08 RX ORDER — ACETAMINOPHEN 500 MG
1000 TABLET ORAL
Status: DISCONTINUED | OUTPATIENT
Start: 2023-06-08 | End: 2023-06-08

## 2023-06-08 RX ORDER — POLYETHYLENE GLYCOL 3350 17 G/17G
17 POWDER, FOR SOLUTION ORAL DAILY
Status: DISCONTINUED | OUTPATIENT
Start: 2023-06-09 | End: 2023-06-12 | Stop reason: HOSPADM

## 2023-06-08 RX ORDER — NALOXONE HCL 0.4 MG/ML
0.02 VIAL (ML) INJECTION
Status: DISCONTINUED | OUTPATIENT
Start: 2023-06-08 | End: 2023-06-12 | Stop reason: HOSPADM

## 2023-06-08 RX ORDER — FAMOTIDINE 20 MG/1
20 TABLET, FILM COATED ORAL 2 TIMES DAILY
Status: DISCONTINUED | OUTPATIENT
Start: 2023-06-08 | End: 2023-06-12 | Stop reason: HOSPADM

## 2023-06-08 RX ORDER — DRONABINOL 2.5 MG/1
2.5 CAPSULE ORAL
Status: DISCONTINUED | OUTPATIENT
Start: 2023-06-08 | End: 2023-06-12 | Stop reason: HOSPADM

## 2023-06-08 RX ORDER — ENOXAPARIN SODIUM 100 MG/ML
40 INJECTION SUBCUTANEOUS EVERY 24 HOURS
Status: DISCONTINUED | OUTPATIENT
Start: 2023-06-08 | End: 2023-06-12 | Stop reason: HOSPADM

## 2023-06-08 RX ORDER — METFORMIN HYDROCHLORIDE 500 MG/1
500 TABLET ORAL
COMMUNITY

## 2023-06-08 RX ORDER — LIDOCAINE AND PRILOCAINE 25; 25 MG/G; MG/G
CREAM TOPICAL
COMMUNITY

## 2023-06-08 RX ORDER — PRAMIPEXOLE DIHYDROCHLORIDE 0.12 MG/1
0.25 TABLET ORAL NIGHTLY
Status: DISCONTINUED | OUTPATIENT
Start: 2023-06-08 | End: 2023-06-12 | Stop reason: HOSPADM

## 2023-06-08 RX ORDER — ALBUTEROL SULFATE 90 UG/1
2 AEROSOL, METERED RESPIRATORY (INHALATION) EVERY 6 HOURS PRN
Status: DISCONTINUED | OUTPATIENT
Start: 2023-06-08 | End: 2023-06-12 | Stop reason: HOSPADM

## 2023-06-08 RX ORDER — MORPHINE SULFATE 15 MG/1
15 TABLET, FILM COATED, EXTENDED RELEASE ORAL NIGHTLY
Status: DISCONTINUED | OUTPATIENT
Start: 2023-06-08 | End: 2023-06-08

## 2023-06-08 RX ORDER — ATORVASTATIN CALCIUM 40 MG/1
40 TABLET, FILM COATED ORAL DAILY
Status: DISCONTINUED | OUTPATIENT
Start: 2023-06-09 | End: 2023-06-12 | Stop reason: HOSPADM

## 2023-06-08 RX ORDER — GLUCAGON 1 MG
1 KIT INJECTION
Status: DISCONTINUED | OUTPATIENT
Start: 2023-06-08 | End: 2023-06-12 | Stop reason: HOSPADM

## 2023-06-08 RX ORDER — SODIUM CHLORIDE, SODIUM LACTATE, POTASSIUM CHLORIDE, CALCIUM CHLORIDE 600; 310; 30; 20 MG/100ML; MG/100ML; MG/100ML; MG/100ML
INJECTION, SOLUTION INTRAVENOUS CONTINUOUS
Status: DISCONTINUED | OUTPATIENT
Start: 2023-06-08 | End: 2023-06-09

## 2023-06-08 RX ORDER — OXYCODONE HYDROCHLORIDE 5 MG/1
5 TABLET ORAL EVERY 4 HOURS PRN
Status: DISCONTINUED | OUTPATIENT
Start: 2023-06-08 | End: 2023-06-12 | Stop reason: HOSPADM

## 2023-06-08 RX ORDER — INSULIN LISPRO 100 [IU]/ML
6-10 INJECTION, SOLUTION INTRAVENOUS; SUBCUTANEOUS
COMMUNITY

## 2023-06-08 RX ORDER — ONDANSETRON 8 MG/1
8 TABLET, ORALLY DISINTEGRATING ORAL EVERY 8 HOURS PRN
Status: DISCONTINUED | OUTPATIENT
Start: 2023-06-08 | End: 2023-06-12 | Stop reason: HOSPADM

## 2023-06-08 RX ORDER — SODIUM CHLORIDE 0.9 % (FLUSH) 0.9 %
10 SYRINGE (ML) INJECTION EVERY 12 HOURS PRN
Status: DISCONTINUED | OUTPATIENT
Start: 2023-06-08 | End: 2023-06-12 | Stop reason: HOSPADM

## 2023-06-08 RX ADMIN — VANCOMYCIN HYDROCHLORIDE 1000 MG: 1 INJECTION, POWDER, LYOPHILIZED, FOR SOLUTION INTRAVENOUS at 01:06

## 2023-06-08 RX ADMIN — SODIUM CHLORIDE, POTASSIUM CHLORIDE, SODIUM LACTATE AND CALCIUM CHLORIDE 1000 ML: 600; 310; 30; 20 INJECTION, SOLUTION INTRAVENOUS at 11:06

## 2023-06-08 RX ADMIN — ENOXAPARIN SODIUM 40 MG: 40 INJECTION SUBCUTANEOUS at 06:06

## 2023-06-08 RX ADMIN — SODIUM CHLORIDE, POTASSIUM CHLORIDE, SODIUM LACTATE AND CALCIUM CHLORIDE 1000 ML: 600; 310; 30; 20 INJECTION, SOLUTION INTRAVENOUS at 01:06

## 2023-06-08 RX ADMIN — PIPERACILLIN AND TAZOBACTAM 4.5 G: 4; .5 INJECTION, POWDER, LYOPHILIZED, FOR SOLUTION INTRAVENOUS; PARENTERAL at 01:06

## 2023-06-08 RX ADMIN — OXYCODONE HYDROCHLORIDE 5 MG: 5 TABLET ORAL at 09:06

## 2023-06-08 RX ADMIN — FAMOTIDINE 20 MG: 20 TABLET ORAL at 09:06

## 2023-06-08 RX ADMIN — DRONABINOL 2.5 MG: 2.5 CAPSULE ORAL at 08:06

## 2023-06-08 NOTE — ASSESSMENT & PLAN NOTE
Patient noted to have elevated lactic acid up to 7. Possibly due to UTI and leucovorin.  Trend lactic acid.

## 2023-06-08 NOTE — PLAN OF CARE
Pt arrived AAOx3, unable to get Vital signs including O2 sats. Pt looked cyanotic, had chills, afebrile, jaundiced and SOB. Dr Casey stated to send to ED. Jose transported. Rashmi DASILVA called ahead with report.

## 2023-06-08 NOTE — ED TRIAGE NOTES
"Pt went to have chemotherapy and states " they could not get a temperature, pulse ox or blood pressure " and was sent to the ED   PT has hx of pancreatic cancer  "

## 2023-06-08 NOTE — ED NOTES
Pt remains on cardiac monitor and pulse oximetry   Family at bedside  Pt awaiting room assignment for admission

## 2023-06-08 NOTE — Clinical Note
Diagnosis: History of pancreatic cancer [[id number]]   Admitting Provider:: SAMIR HARRIS [[id number]]   Future Attending Provider: TRANG TAPIA [[id number]]   Bed request comments: 8th floor please   Reason for IP Medical Treatment  (Clinical interventions that can only be accomplished in the IP setting? ) :: Sepsis, UTI   I certify that Inpatient services for greater than or equal to 2 midnights are medically necessary:: Yes   Plans for Post-Acute care--if anticipated (pick the single best option):: A. No post acute care anticipated at this time

## 2023-06-08 NOTE — HPI
Ms. Kaity Masterson is a 67 year old woman with pancreatic cancer, DM II (last A1C 10.5), HTN, endometrial cancer who presented from clinic. She was hypotensive and O2 sats could not be obtained. She reports having some episodes of dizziness and lightheadedness at home and shortness of breath with exertion over the past few days. During these episodes she asks family for assistance. Otherwise she says that she feels fine and reports no HA, chest pain, SOB, N/V/D, LE swelling.     In the ED, she was given 2L IVF and started on CTX given a UA concerning for UTI. Afebrile and hemodynamically stable with normo to hypertension on RA. Lactate 7.1 with decrease to 6.6 after 2L IVF, blood and urine cultures in process. CXR without acute processes. Admitted to Med Onc service for UTI treatment and management of elevated lactate.     Oncologic History (per Dr. Casey's note):  1.Ms Masterson is a 66 yo woman with HTN, T2DM, pathologic T1a endometrial cancer s/p surgery on 3/8/23, PUD, fatty liver, osteoporosis, who initially saw me on 4/5/23 for further management of pancreatic adenocarcinoma. She was admitted to ICU for DKA in March 2023. CT A/P 3/22/23 showed  a 3.4 cm mass centered in the pancreatic head/uncinate process with associated downstream dilatation of the main pancreatic duct.  Imaging findings are highly concerning for pancreatic adenocarcinoma.  Adjacent prominent peripancreatic lymph nodes are seen.  There is some stranding of the fat about the SMV for approximately 180°.  The portal vein, SMV and splenic vein are patent. Fatty infiltration of the liver. CT chest 3/20/23: No evidence for pulmonary embolus or other acute intrathoracic process. Hepatic steatosis.  Cholecystectomy. She underwent upper EUS biopsy on 3/31/23. Pathology showed adenocarcinoma, MMR intact. She presents today for further evaluation. Strong family history of breast cancer, endometrial cancer and colon cancer. She is a little weak after  surgery but active at home and is still working.   Family history:  Mother: Breast cancer (middle age)  Aunt maternal (2): cancer (unknown)  Sister (3): breast cancer, lung cancer (smoking). Other sister: colon cancer (older diagnosis 60's). Third sister (breast cancer) and endometrial cancer  Brother:  finger bone cancer  Discussed perioperative FOLFIRINOX  2. FOLFIRINOX to be started on 4/24/23. Bilirubin was elevated. Started FOLFOX on 4/24/23. Patient was very sick after chemo, hospitalized for obstructive jaundice, s/p ERCP with stent placement. On visit 5/8/23, patient decided not to take any more chemo and opted for hospice. She later changed her mind and resumed FOLFOX cycle 2 on 5/25/23.

## 2023-06-08 NOTE — ED NOTES
Pt identifiers Kaity Masterson were checked and are correct   Pt refused to wear a hospital gown   LOC: The patient is awake, alert, aware of environment with an appropriate affect. Oriented x4 , speaking appropriately  APPEARANCE: Pt resting comfortably, in no acute distress, pt is clean and well groomed, clothing properly fastened  SKIN: Skin warm, dry and intact, normal skin turgor, moist mucus membranes  RESPIRATORY: Airway is open and patent, respirations are spontaneous, even and unlabored, normal effort and rate  CARDIAC: Normal rate and rhythm, no peripheral edema noted, capillary refill < 3 seconds, bilateral radial pulses 2+  ABDOMEN: Soft, nontender, nondistended. Bowel sounds present to all four quad of abd on auscultation  NEUROLOGIC: PERRL, facial expression is symmetrical, patient moving all extremities spontaneously, normal sensation in all extremities when touched with a finger.  Follows all commands appropriately  MUSCULOSKELETAL: No obvious deformities.

## 2023-06-08 NOTE — ASSESSMENT & PLAN NOTE
Follows with Dr. Casey. Has received 2 cycles of FOLFOX. Close follow up upon discharge.   Routing refill request to provider for review/approval because:  Drug not on the [facility] refill protocol     [place] ([place] reviewed:  not reviewed/not due - last done on 2/28/20    [place]  [email or web address]

## 2023-06-08 NOTE — ED PROVIDER NOTES
Encounter Date: 6/8/2023       History     Chief Complaint   Patient presents with    Referral     Patient advised to come to the ER from chemo clinic due to clinic personnel being unable to obtain VS on the patient. Patient is currently being treated for pancreatic cancer and was presenting for her chemo tx today. Patient is A&Ox4 in triage--reports dizziness upon standing for one day.     Patient is a 67-year-old female with a past medical history of hypertension, insulin-dependent diabetes and pancreatic cancer on chemotherapy presenting to the ED for low blood pressure.  She was seen at her Heme-Onc clinic today, where she was scheduled to have her 3rd session of chemotherapy.  However, they noted that it was difficult to measure a blood pressure on her, and were therefore concerned that it was low.  The patient had been complaining of a fatigue, a continues to state that she does not know why she is in the ER as she feels fine.  She denies any fevers/chills, chest pain, shortness of breath, nausea, vomiting or diarrhea.     Review of patient's allergies indicates:  No Known Allergies  Past Medical History:   Diagnosis Date    Anemia, unspecified     Cancer     Hypertension     Osteoporosis     PUD (peptic ulcer disease)     Type 2 diabetes mellitus without complications      Past Surgical History:   Procedure Laterality Date    CHOLECYSTECTOMY      colonsocopy      2017    ENDOSCOPIC ULTRASOUND OF UPPER GASTROINTESTINAL TRACT N/A 3/31/2023    Procedure: ULTRASOUND, UPPER GI TRACT, ENDOSCOPIC;  Surgeon: Kleber Bolaños MD;  Location: [facility] ([address] [place] [address]);  Service: Endoscopy;  Laterality: N/A;  3/27 - precall attempted, no answer. SG    ERCP N/A 5/2/2023    Procedure: ERCP (ENDOSCOPIC RETROGRADE CHOLANGIOPANCREATOGRAPHY);  Surgeon: Jerry Vargas MD;  Location: [facility] ([address] [place] [address]);  Service: Endoscopy;  Laterality: N/A;    HYSTERECTOMY  03/08/2023    INSERTION OF TUNNELED CENTRAL VENOUS CATHETER (CVC) WITH  SUBCUTANEOUS PORT N/A 4/20/2023    Procedure: INSERTION, SINGLE LUMEN CATHETER WITH PORT, WITH FLUOROSCOPIC GUIDANCE;  Surgeon: Philip Anderson Jr., MD;  Location: [address];  Service: General;  Laterality: N/A;    LAPAROSCOPIC CHOLECYSTECTOMY      LYMPH NODE BIOPSY Bilateral 03/08/2023    Procedure: BIOPSY, PELVIC LYMPH NODE;  Surgeon: Dallas Aguilar MD;  Location: [facility];  Service: OB/GYN;  Laterality: Bilateral;    ROBOTIC HYSTERECTOMY, WITH SALPINGO-OOPHORECTOMY Bilateral 03/08/2023    Procedure: ROBOTIC HYSTERECTOMY,WITH SALPINGO-OOPHORECTOMY;  Surgeon: Dallas Aguilar MD;  Location: [facility];  Service: OB/GYN;  Laterality: Bilateral;    TOTAL KNEE ARTHROPLASTY Left     2016    TOTAL KNEE ARTHROPLASTY Right     2019    VAGINAL DELIVERY      x 2  (one with epidural)     Family History   Problem Relation Age of Onset    Diabetes Mother     Colon cancer Mother     Breast cancer Mother     Hypertension Sister     Breast cancer Sister     Lung cancer Sister     Hypertension Brother     Breast cancer Maternal Aunt     Colon cancer Maternal Aunt     Ovarian cancer Neg Hx      Social History     Tobacco Use    Smoking status: Never     Passive exposure: Never    Smokeless tobacco: Never   Substance Use Topics    Alcohol use: Not Currently    Drug use: Never     Physical Exam     Initial Vitals   BP Pulse Resp Temp SpO2   06/08/23 1133 06/08/23 0944 06/08/23 0944 06/08/23 0944 06/08/23 0944   (!) 132/97 94 (!) 21 97.9 °F (36.6 °C) 100 %      MAP       --                Physical Exam    Nursing note and vitals reviewed.  Constitutional: She appears well-developed and well-nourished. She is not diaphoretic. No distress.   Chronically ill-appearing.  Speaking full sentences.  No acute distress.   HENT:   Head: Normocephalic and atraumatic.   Right Ear: External ear normal.   Left Ear: External ear normal.   Neck: Neck supple.   Cardiovascular:  Normal rate, regular rhythm, normal heart sounds and intact distal pulses.            Pulmonary/Chest: Breath sounds normal. No respiratory distress. She has no wheezes. She has no rhonchi. She has no rales.   Abdominal: Abdomen is soft. She exhibits no distension. There is no abdominal tenderness. There is no rebound and no guarding.   Musculoskeletal:      Cervical back: Neck supple.     Neurological: She is alert and oriented to person, place, and time. GCS score is 15. GCS eye subscore is 4. GCS verbal subscore is 5. GCS motor subscore is 6.   Skin: Skin is warm. Capillary refill takes less than 2 seconds. No rash noted.   Appears pale.   Psychiatric: She has a normal mood and affect.       ED Course   Procedures  Labs Reviewed   CBC W/ AUTO DIFFERENTIAL - Abnormal; Notable for the following components:       Result Value    RBC 2.85 (*)     Hemoglobin 9.2 (*)     Hematocrit 28.2 (*)     MCV 99 (*)     MCH 32.3 (*)     Lymph # 0.8 (*)     Gran % 77.3 (*)     Lymph % 14.1 (*)     All other components within normal limits   COMPREHENSIVE METABOLIC PANEL - Abnormal; Notable for the following components:    Sodium 133 (*)     CO2 16 (*)     Glucose 271 (*)     Total Protein 4.8 (*)     Albumin 2.4 (*)     Total Bilirubin 1.2 (*)     All other components within normal limits   ISTAT LACTATE - Abnormal; Notable for the following components:    POC Lactate 7.72 (*)     All other components within normal limits   ISTAT LACTATE - Abnormal; Notable for the following components:    POC Lactate 7.75 (*)     All other components within normal limits   CULTURE, BLOOD   CULTURE, BLOOD   LIPASE   URINALYSIS, REFLEX TO URINE CULTURE   SARS-COV-2 RNA AMPLIFICATION, QUAL   TYPE & SCREEN     EKG Readings: (Independently Interpreted)   Initial Reading: No STEMI. Previous EKG: Compared with most recent EKG Rhythm: Sinus Arrhythmia. Heart Rate: 81. Ectopy: No Ectopy. Conduction: Normal.   ECG Results              EKG 12-lead (Final result)  Result time 06/08/23 15:29:38      Final result by Interface, Lab In  "[facility] (06/08/23 15:29:38)                   Narrative:    Test Reason : R42,    Vent. Rate : 081 BPM     Atrial Rate : 081 BPM     P-R Int : 166 ms          QRS Dur : 082 ms      QT Int : 390 ms       P-R-T Axes : 079 034 018 degrees     QTc Int : 453 ms    Sinus rhythm with Premature atrial complexes with Aberrant conduction  Otherwise normal ECG  When compared with ECG of 02-MAY-2023 17:34,  Aberrant conduction is now Present  Confirmed by Gomez Man MD ([id number]) on 6/8/2023 3:29:28 PM    Referred By: AYESHAERR   SELF           Confirmed By:Gomez Man MD                                  Imaging Results              X-Ray Chest AP Portable (Final result)  Result time 06/08/23 10:48:05      Final result by Padilla Gomez MD (06/08/23 10:48:05)                   Impression:      No acute cardiopulmonary finding identified on this single view.      Electronically signed by: Padilla Gomez MD  Date:    06/08/2023  Time:    10:48               Narrative:    EXAMINATION:  XR CHEST AP PORTABLE    CLINICAL HISTORY:  Provided history is "Sepsis;  ".    TECHNIQUE:  One view of the chest.    COMPARISON:  05/01/2023.    FINDINGS:  Cardiomediastinal silhouette is stable.  Right-sided port catheter is present with the tip overlying the SVC.  No confluent area of consolidation.  No large pleural effusion.  No pneumothorax.  No detrimental change when compared with the prior study.                                    X-Rays:   Independently Interpreted Readings:   Chest X-Ray: Normal heart size.  No infiltrates.  No acute abnormalities.   Medications   lactated ringers bolus 1,000 mL (1,000 mLs Intravenous Incomplete 6/8/23 1337)   lactated ringers bolus 1,000 mL (0 mLs Intravenous Stopped 6/8/23 1258)   vancomycin (VANCOCIN) 1,000 mg in dextrose 5 % (D5W) 250 mL IVPB (Vial-Mate) (1,000 mg Intravenous New Bag 6/8/23 1359)   piperacillin-tazobactam (ZOSYN) 4.5 g in dextrose 5 % in water (D5W) 5 % 100 mL IVPB (MB+) (0 g " "Intravenous Stopped 6/8/23 0879)     Medical Decision Making:   History:   I obtained history from: someone other than patient.  Old Medical Records: I decided to obtain old medical records.  Old Records Summarized: records from clinic visits.  Initial Assessment:   Emergent evaluation of low blood pressure.  She is afebrile, and hypotensive with initial pressures in the 80s systolic.  Differential Diagnosis:   Sepsis, bacteremia, doubt pneumonia, doubt UTI, orthostasis/hypovolemia, electrolyte abnormality  Clinical Tests:   Lab Tests: Ordered and Reviewed  Radiological Study: Ordered and Reviewed  Medical Tests: Ordered and Reviewed  Sepsis Perfusion Assessment: "I attest a sepsis perfusion exam was performed within 6 hours of sepsis, severe sepsis, or septic shock presentation, following fluid resuscitation."  ED Management:  Blood pressure did improve prior to administration of IVF.  Labs show a consistently, significantly elevated lactate at 7 on repeat. Broad spectrum abx given, though unclear infective focus. The patient was signed out to the oncoming team at shift change pending repeat lactate following 2nd fluid bolus.  Anticipate admission to hematology/oncology's service.           ED Course as of 06/08/23 1532   Thu Jun 08, 2023   1108 ATTENDING PHYSICIAN ATTESTATION  I have repeated the key portions of the resident's history and physical face to face with the patient, reviewed and agree with the resident medical documentation except as noted below, and supervised and managed the medical care of the patient.     History and exam as noted above.  Here with inability to obtain blood pressure at clinic.  Patient is complaining fatigue but is otherwise at baseline.  No chest pain, shortness of breath, abdominal pain, fevers, cough, sore throat, vomiting, diarrhea.  Family does endorse some decreased p.o. intake.    On exam no acute distress, tired appearing, pale, her right chest not signs of infection.  " Heart, lung, abdominal exams are benign.    Initially unable to obtain blood pressure but upon ED evaluation blood pressure was 87/51.  Concern for dehydration versus anemia, given her pale appearance.  She denies any bleeding or dark stools.  Infection is also possible.  No obvious sources on history or physical.    Disposition based on ED workup and patient's symptomatology although have a very low threshold for admission given her age, comorbid status and hypotension.    Jimmy Killian MD  Department of Emergency Medicine   [BA]      ED Course User Index  [BA] Jimmy Killian MD                 Clinical Impression:   Final diagnoses:  [R42] Dizziness  [Z85.07] History of pancreatic cancer (Primary)  [E87.20] Lactic acidosis  [I95.9] Hypotension, unspecified hypotension type               Jose Luis Sharma MD  Resident  06/08/23 8619

## 2023-06-08 NOTE — ASSESSMENT & PLAN NOTE
Patient presented from clinic with hypotension, improved with IVF. UA suggestive of UTI.    Continue ceftriaxone. Follow urine culture.  Consider broadening if patient clinically decompensates.

## 2023-06-08 NOTE — PROVIDER PROGRESS NOTES - EMERGENCY DEPT.
"Encounter Date: 6/8/2023    ED Physician Progress Notes        Physician Note:   Pt signed out to my care pending repeat lactate admission to the hospital. She was sent from clinic today for low BP, which improved after IVF. Lactate continues to be significantly elevated.  Broad-spectrum antibiotics initiated by prior team to cover for possible sepsis.    Urinalysis consistent with infection.  She continues to have elevated lactate after 2 L of fluid.  Patient had leucovorin for chemotherapy which is known to cause type B lactic acidosis.  I suspect this in combination with lactic acidosis from urinary infection is causing this lab abnormality.  CPK and LDH added on.  Discussed with hematology oncology who will assume care for the patient inpatient for there evaluation, monitoring and treatment.     At the time of admission patient is hemodynamically stable.  She is without complaints at this time.  No abdominal pain.  Abdomen is soft and nontender.    Imaging Results          X-Ray Chest AP Portable (Final result)  Result time 06/08/23 10:48:05    Final result by Padilla Gomez MD (06/08/23 10:48:05)                 Impression:      No acute cardiopulmonary finding identified on this single view.      Electronically signed by: Padilla Gomez MD  Date:    06/08/2023  Time:    10:48             Narrative:    EXAMINATION:  XR CHEST AP PORTABLE    CLINICAL HISTORY:  Provided history is "Sepsis;  ".    TECHNIQUE:  One view of the chest.    COMPARISON:  05/01/2023.    FINDINGS:  Cardiomediastinal silhouette is stable.  Right-sided port catheter is   present with the tip overlying the SVC.  No confluent area of   consolidation.  No large pleural effusion.  No pneumothorax.  No   detrimental change when compared with the prior study.                                "

## 2023-06-09 LAB
ALBUMIN SERPL BCP-MCNC: 1.9 G/DL (ref 3.5–5.2)
ALP SERPL-CCNC: 78 U/L (ref 55–135)
ALT SERPL W/O P-5'-P-CCNC: 14 U/L (ref 10–44)
ANION GAP SERPL CALC-SCNC: 6 MMOL/L (ref 8–16)
AST SERPL-CCNC: 27 U/L (ref 10–40)
BASOPHILS # BLD AUTO: 0.02 K/UL (ref 0–0.2)
BASOPHILS NFR BLD: 0.7 % (ref 0–1.9)
BILIRUB SERPL-MCNC: 1 MG/DL (ref 0.1–1)
BUN SERPL-MCNC: 7 MG/DL (ref 8–23)
CALCIUM SERPL-MCNC: 9.1 MG/DL (ref 8.7–10.5)
CHLORIDE SERPL-SCNC: 111 MMOL/L (ref 95–110)
CO2 SERPL-SCNC: 22 MMOL/L (ref 23–29)
CREAT SERPL-MCNC: 0.6 MG/DL (ref 0.5–1.4)
DIFFERENTIAL METHOD: ABNORMAL
EOSINOPHIL # BLD AUTO: 0.1 K/UL (ref 0–0.5)
EOSINOPHIL NFR BLD: 3.2 % (ref 0–8)
ERYTHROCYTE [DISTWIDTH] IN BLOOD BY AUTOMATED COUNT: 14 % (ref 11.5–14.5)
EST. GFR  (NO RACE VARIABLE): >60 ML/MIN/1.73 M^2
GLUCOSE SERPL-MCNC: 87 MG/DL (ref 70–110)
HCT VFR BLD AUTO: 23.5 % (ref 37–48.5)
HGB BLD-MCNC: 7.8 G/DL (ref 12–16)
IMM GRANULOCYTES # BLD AUTO: 0 K/UL (ref 0–0.04)
IMM GRANULOCYTES NFR BLD AUTO: 0 % (ref 0–0.5)
LACTATE SERPL-SCNC: 2.8 MMOL/L (ref 0.5–2.2)
LACTATE SERPL-SCNC: 3.8 MMOL/L (ref 0.5–2.2)
LACTATE SERPL-SCNC: 4.1 MMOL/L (ref 0.5–2.2)
LACTATE SERPL-SCNC: 4.1 MMOL/L (ref 0.5–2.2)
LACTATE SERPL-SCNC: 4.3 MMOL/L (ref 0.5–2.2)
LYMPHOCYTES # BLD AUTO: 1.1 K/UL (ref 1–4.8)
LYMPHOCYTES NFR BLD: 39.6 % (ref 18–48)
MCH RBC QN AUTO: 32 PG (ref 27–31)
MCHC RBC AUTO-ENTMCNC: 33.2 G/DL (ref 32–36)
MCV RBC AUTO: 96 FL (ref 82–98)
MONOCYTES # BLD AUTO: 0.4 K/UL (ref 0.3–1)
MONOCYTES NFR BLD: 13.1 % (ref 4–15)
NEUTROPHILS # BLD AUTO: 1.2 K/UL (ref 1.8–7.7)
NEUTROPHILS NFR BLD: 43.4 % (ref 38–73)
NRBC BLD-RTO: 0 /100 WBC
PLATELET # BLD AUTO: 131 K/UL (ref 150–450)
PMV BLD AUTO: 10.7 FL (ref 9.2–12.9)
POCT GLUCOSE: 167 MG/DL (ref 70–110)
POCT GLUCOSE: 188 MG/DL (ref 70–110)
POCT GLUCOSE: 196 MG/DL (ref 70–110)
POCT GLUCOSE: 98 MG/DL (ref 70–110)
POTASSIUM SERPL-SCNC: 3.5 MMOL/L (ref 3.5–5.1)
PROT SERPL-MCNC: 4 G/DL (ref 6–8.4)
RBC # BLD AUTO: 2.44 M/UL (ref 4–5.4)
SODIUM SERPL-SCNC: 139 MMOL/L (ref 136–145)
WBC # BLD AUTO: 2.83 K/UL (ref 3.9–12.7)

## 2023-06-09 PROCEDURE — 83605 ASSAY OF LACTIC ACID: CPT | Mod: 91 | Performed by: STUDENT IN AN ORGANIZED HEALTH CARE EDUCATION/TRAINING PROGRAM

## 2023-06-09 PROCEDURE — 83605 ASSAY OF LACTIC ACID: CPT | Performed by: STUDENT IN AN ORGANIZED HEALTH CARE EDUCATION/TRAINING PROGRAM

## 2023-06-09 PROCEDURE — 99223 1ST HOSP IP/OBS HIGH 75: CPT | Mod: ,,, | Performed by: INTERNAL MEDICINE

## 2023-06-09 PROCEDURE — 99233 SBSQ HOSP IP/OBS HIGH 50: CPT | Mod: ,,, | Performed by: STUDENT IN AN ORGANIZED HEALTH CARE EDUCATION/TRAINING PROGRAM

## 2023-06-09 PROCEDURE — 99233 PR SUBSEQUENT HOSPITAL CARE,LEVL III: ICD-10-PCS | Mod: ,,, | Performed by: STUDENT IN AN ORGANIZED HEALTH CARE EDUCATION/TRAINING PROGRAM

## 2023-06-09 PROCEDURE — 63600175 PHARM REV CODE 636 W HCPCS: Performed by: STUDENT IN AN ORGANIZED HEALTH CARE EDUCATION/TRAINING PROGRAM

## 2023-06-09 PROCEDURE — 99223 PR INITIAL HOSPITAL CARE,LEVL III: ICD-10-PCS | Mod: ,,, | Performed by: INTERNAL MEDICINE

## 2023-06-09 PROCEDURE — 25000003 PHARM REV CODE 250

## 2023-06-09 PROCEDURE — 63600175 PHARM REV CODE 636 W HCPCS

## 2023-06-09 PROCEDURE — 36415 COLL VENOUS BLD VENIPUNCTURE: CPT | Performed by: STUDENT IN AN ORGANIZED HEALTH CARE EDUCATION/TRAINING PROGRAM

## 2023-06-09 PROCEDURE — 20600001 HC STEP DOWN PRIVATE ROOM

## 2023-06-09 PROCEDURE — 96360 HYDRATION IV INFUSION INIT: CPT | Mod: 59

## 2023-06-09 PROCEDURE — 85025 COMPLETE CBC W/AUTO DIFF WBC: CPT | Performed by: STUDENT IN AN ORGANIZED HEALTH CARE EDUCATION/TRAINING PROGRAM

## 2023-06-09 PROCEDURE — 80053 COMPREHEN METABOLIC PANEL: CPT | Performed by: STUDENT IN AN ORGANIZED HEALTH CARE EDUCATION/TRAINING PROGRAM

## 2023-06-09 PROCEDURE — 25000003 PHARM REV CODE 250: Performed by: STUDENT IN AN ORGANIZED HEALTH CARE EDUCATION/TRAINING PROGRAM

## 2023-06-09 RX ORDER — LEVOFLOXACIN 750 MG/1
750 TABLET ORAL DAILY
Status: DISCONTINUED | OUTPATIENT
Start: 2023-06-09 | End: 2023-06-09

## 2023-06-09 RX ORDER — SODIUM CHLORIDE, SODIUM LACTATE, POTASSIUM CHLORIDE, CALCIUM CHLORIDE 600; 310; 30; 20 MG/100ML; MG/100ML; MG/100ML; MG/100ML
INJECTION, SOLUTION INTRAVENOUS CONTINUOUS
Status: ACTIVE | OUTPATIENT
Start: 2023-06-09 | End: 2023-06-10

## 2023-06-09 RX ADMIN — OXYCODONE HYDROCHLORIDE 5 MG: 5 TABLET ORAL at 08:06

## 2023-06-09 RX ADMIN — ATORVASTATIN CALCIUM 40 MG: 40 TABLET, FILM COATED ORAL at 08:06

## 2023-06-09 RX ADMIN — SODIUM CHLORIDE, POTASSIUM CHLORIDE, SODIUM LACTATE AND CALCIUM CHLORIDE: 600; 310; 30; 20 INJECTION, SOLUTION INTRAVENOUS at 12:06

## 2023-06-09 RX ADMIN — FAMOTIDINE 20 MG: 20 TABLET ORAL at 08:06

## 2023-06-09 RX ADMIN — LEVOFLOXACIN 750 MG: 750 TABLET, FILM COATED ORAL at 08:06

## 2023-06-09 RX ADMIN — PRAMIPEXOLE DIHYDROCHLORIDE 0.25 MG: 0.12 TABLET ORAL at 08:06

## 2023-06-09 RX ADMIN — ASPIRIN 81 MG: 81 TABLET, COATED ORAL at 08:06

## 2023-06-09 RX ADMIN — DRONABINOL 2.5 MG: 2.5 CAPSULE ORAL at 08:06

## 2023-06-09 RX ADMIN — SODIUM CHLORIDE, POTASSIUM CHLORIDE, SODIUM LACTATE AND CALCIUM CHLORIDE: 600; 310; 30; 20 INJECTION, SOLUTION INTRAVENOUS at 09:06

## 2023-06-09 RX ADMIN — SODIUM CHLORIDE, POTASSIUM CHLORIDE, SODIUM LACTATE AND CALCIUM CHLORIDE 1000 ML: 600; 310; 30; 20 INJECTION, SOLUTION INTRAVENOUS at 10:06

## 2023-06-09 RX ADMIN — SODIUM CHLORIDE, POTASSIUM CHLORIDE, SODIUM LACTATE AND CALCIUM CHLORIDE: 600; 310; 30; 20 INJECTION, SOLUTION INTRAVENOUS at 07:06

## 2023-06-09 RX ADMIN — ERTAPENEM 1 G: 1 INJECTION INTRAMUSCULAR; INTRAVENOUS at 11:06

## 2023-06-09 RX ADMIN — DRONABINOL 2.5 MG: 2.5 CAPSULE ORAL at 05:06

## 2023-06-09 RX ADMIN — ENOXAPARIN SODIUM 40 MG: 40 INJECTION SUBCUTANEOUS at 05:06

## 2023-06-09 NOTE — ASSESSMENT & PLAN NOTE
Patient presented from clinic with hypotension, improved with IVF. UA suggestive of UTI, urine cx with GNR, speciation pending.  S/p CTX in ED.    Transition to Levqauin po based on prior urine cultures on 05/2023 with ESBL Klebsiella. Follow urine culture and tailor abx accordingly  Consider broadening if patient clinically decompensates.    -Ertapenem 1g q24h per ID  -ID on board   -f/u urine cx

## 2023-06-09 NOTE — PHARMACY MED REC
"Admission Medication History     The home medication history was taken by oNelle Juarez.    You may go to "Admission" then "Reconcile Home Medications" tabs to review and/or act upon these items.     The home medication list has been updated by the Pharmacy department.   Please read ALL comments highlighted in yellow.   Please address this information as you see fit.    Feel free to contact us if you have any questions or require assistance.      The medications listed below were removed from the home medication list. Please reorder if appropriate:  Patient reports no longer taking the following medication(s):  LEVEMIR 100 UNIT/ML FLEXTOUCH   GABAPENTIN 100 MG CAPSULE  OXYCODONE-ACETAMINOPHEN 7.5-325 MG TABLET  CYPROHEPTADINE 4 MG TABLET      Medications listed below were obtained from: Patient/family  PTA Medications   Medication Sig    alendronate (FOSAMAX) 35 MG tablet   Take 35 mg by mouth every 7 days.    aspirin (ECOTRIN) 81 MG EC tablet   Take 1 tablet by mouth once daily.    atenoloL (TENORMIN) 50 MG tablet   Take 50 mg by mouth once daily. am    droNABinol (MARINOL) 2.5 MG capsule   Take 1 capsule (2.5 mg total) by mouth 2 (two) times daily before meals.    esomeprazole (NEXIUM) 40 MG capsule     esomeprazole magnesium 40 mg capsule,delayed release   TAKE 1 CAPSULE BY MOUTH EVERY DAY    famotidine (PEPCID) 20 MG tablet   Take 20 mg by mouth 2 (two) times daily.    insulin lispro 100 unit/mL injection   Inject 6-10 Units into the skin 3 (three) times daily before meals.    LIDOcaine-prilocaine (EMLA) cream   Apply topically as needed.    losartan (COZAAR) 100 MG tablet   Take 100 mg by mouth once daily.    metFORMIN (GLUCOPHAGE) 500 MG tablet   Take 500 mg by mouth daily with breakfast.    morphine (MS CONTIN) 15 MG 12 hr tablet   Take 1 tablet (15 mg total) by mouth nightly.    ondansetron (ZOFRAN-ODT) 8 MG TbDL   Take 1 tablet (8 mg total) by mouth every 8 (eight) hours as needed (nausea).    ONETOUCH " "DELICA LANCETS 33 gauge Misc   Apply topically daily as needed.    ONETOUCH ULTRA TEST Strp   TEST DAILY AND AS NEEDED    ONETOUCH ULTRA2 METER Misc   TEST DAILY AND AS NEEDED    oxyCODONE (ROXICODONE) 5 MG immediate release tablet   Take 1 tablet (5 mg total) by mouth every 6 (six) hours as needed for Pain.    pen needle, diabetic 31 gauge x 3/16" Ndle   30 each by Misc.(Non-Drug; Combo Route) route every evening.    pramipexole (MIRAPEX) 0.25 MG tablet   Take 0.25 mg by mouth every evening.    promethazine (PHENERGAN) 25 MG tablet   Take 1 tablet (25 mg total) by mouth every 6 (six) hours as needed for Nausea.    rosuvastatin (CRESTOR) 10 MG tablet   Take 10 mg by mouth every evening.    acetaminophen (TYLENOL) 650 MG TbSR       Take 1 tablet (650 mg total) by mouth every 6 (six) hours. Alternate with ibuprofen so you are taking something every 6 hours (Patient not taking: Reported on 6/8/2023)    albuterol (VENTOLIN HFA) 90 mcg/actuation inhaler       Inhale 2 puffs into the lungs every 6 (six) hours as needed for Wheezing or Shortness of Breath. Rescue (Patient not taking: Reported on 6/8/2023)    naloxone (NARCAN) 4 mg/actuation Spry 4mg by nasal route as needed for opioid overdose; may repeat every 2-3 minutes in alternating nostrils until medical help arrives. Call 911       Noelle Juarez  EXT [phone]                  .        "

## 2023-06-09 NOTE — H&P
[facility]on manpreet - Oncology ([facility])  Hematology/Oncology  H&P    Patient Name: Kaity Masterson  MRN: [id number]  Admission Date: 6/8/2023  Code Status: Full Code   Attending Provider: Luz Marina Butler MD  Primary Care Physician: Marcia Borrego NP  Principal Problem:Acute cystitis without hematuria    Subjective:     HPI: Ms. Kaity Masterson is a 67 year old woman with pancreatic cancer, DM II (last A1C 10.5), HTN, endometrial cancer who presented from clinic. She was hypotensive and O2 sats could not be obtained. She reports having some episodes of dizziness and lightheadedness at home and shortness of breath with exertion over the past few days. During these episodes she asks family for assistance. Otherwise she says that she feels fine and reports no HA, chest pain, SOB, N/V/D, LE swelling.     In the ED, she was given 2L IVF and started on CTX given a UA concerning for UTI. Afebrile and hemodynamically stable with normo to hypertension on RA. Lactate 7.1 with decrease to 6.6 after 2L IVF, blood and urine cultures in process. CXR without acute processes. Admitted to Med Onc service for UTI treatment and management of elevated lactate.     Oncologic History (per Dr. Casey's note):  1.Ms Masterson is a 68 yo woman with HTN, T2DM, pathologic T1a endometrial cancer s/p surgery on 3/8/23, PUD, fatty liver, osteoporosis, who initially saw me on 4/5/23 for further management of pancreatic adenocarcinoma. She was admitted to ICU for DKA in March 2023. CT A/P 3/22/23 showed  a 3.4 cm mass centered in the pancreatic head/uncinate process with associated downstream dilatation of the main pancreatic duct.  Imaging findings are highly concerning for pancreatic adenocarcinoma.  Adjacent prominent peripancreatic lymph nodes are seen.  There is some stranding of the fat about the SMV for approximately 180°.  The portal vein, SMV and splenic vein are patent. Fatty infiltration of the liver. CT chest 3/20/23: No evidence for pulmonary  embolus or other acute intrathoracic process. Hepatic steatosis.  Cholecystectomy. She underwent upper EUS biopsy on 3/31/23. Pathology showed adenocarcinoma, MMR intact. She presents today for further evaluation. Strong family history of breast cancer, endometrial cancer and colon cancer. She is a little weak after surgery but active at home and is still working.   Family history:  Mother: Breast cancer (middle age)  Aunt maternal (2): cancer (unknown)  Sister (3): breast cancer, lung cancer (smoking). Other sister: colon cancer (older diagnosis 60's). Third sister (breast cancer) and endometrial cancer  Brother:  finger bone cancer  Discussed perioperative FOLFIRINOX  2. FOLFIRINOX to be started on 4/24/23. Bilirubin was elevated. Started FOLFOX on 4/24/23. Patient was very sick after chemo, hospitalized for obstructive jaundice, s/p ERCP with stent placement. On visit 5/8/23, patient decided not to take any more chemo and opted for hospice. She later changed her mind and resumed FOLFOX cycle 2 on 5/25/23.        Oncology Treatment Plan:   OP PANC mFOLFIRINOX Q2W    Medications:  Continuous Infusions:  Scheduled Meds:   [START ON 6/9/2023] aspirin  81 mg Oral Daily    [START ON 6/9/2023] atorvastatin  40 mg Oral Daily    [START ON 6/9/2023] cefTRIAXone (ROCEPHIN) IVPB  1 g Intravenous Q24H    droNABinol  2.5 mg Oral BID AC    enoxparin  40 mg Subcutaneous Daily    famotidine  20 mg Oral BID    [START ON 6/9/2023] polyethylene glycol  17 g Oral Daily    pramipexole  0.25 mg Oral QHS     PRN Meds:albuterol, glucagon (human recombinant), naloxone, ondansetron, oxyCODONE, sodium chloride 0.9%     Review of patient's allergies indicates:  No Known Allergies     Past Medical History:   Diagnosis Date    Anemia, unspecified     Cancer     Hypertension     Osteoporosis     PUD (peptic ulcer disease)     Type 2 diabetes mellitus without complications      Past Surgical History:   Procedure Laterality Date     CHOLECYSTECTOMY      colonsocopy      2017    ENDOSCOPIC ULTRASOUND OF UPPER GASTROINTESTINAL TRACT N/A 3/31/2023    Procedure: ULTRASOUND, UPPER GI TRACT, ENDOSCOPIC;  Surgeon: Kleber Bolaños MD;  Location: [facility] ENDO (2ND FLR);  Service: Endoscopy;  Laterality: N/A;  3/27 - precall attempted, no answer. SG    ERCP N/A 5/2/2023    Procedure: ERCP (ENDOSCOPIC RETROGRADE CHOLANGIOPANCREATOGRAPHY);  Surgeon: Jerry Vargas MD;  Location: [facility] ENDO (2ND FLR);  Service: Endoscopy;  Laterality: N/A;    HYSTERECTOMY  03/08/2023    INSERTION OF TUNNELED CENTRAL VENOUS CATHETER (CVC) WITH SUBCUTANEOUS PORT N/A 4/20/2023    Procedure: INSERTION, SINGLE LUMEN CATHETER WITH PORT, WITH FLUOROSCOPIC GUIDANCE;  Surgeon: Philip Anderson Jr., MD;  Location: [facility] OR 2ND FLR;  Service: General;  Laterality: N/A;    LAPAROSCOPIC CHOLECYSTECTOMY      LYMPH NODE BIOPSY Bilateral 03/08/2023    Procedure: BIOPSY, PELVIC LYMPH NODE;  Surgeon: Dallas Aguilar MD;  Location: [facility];  Service: OB/GYN;  Laterality: Bilateral;    ROBOTIC HYSTERECTOMY, WITH SALPINGO-OOPHORECTOMY Bilateral 03/08/2023    Procedure: ROBOTIC HYSTERECTOMY,WITH SALPINGO-OOPHORECTOMY;  Surgeon: Dallas Aguilar MD;  Location: [facility];  Service: OB/GYN;  Laterality: Bilateral;    TOTAL KNEE ARTHROPLASTY Left     2016    TOTAL KNEE ARTHROPLASTY Right     2019    VAGINAL DELIVERY      x 2  (one with epidural)     Family History       Problem Relation (Age of Onset)    Breast cancer Mother, Sister, Maternal Aunt    Colon cancer Mother, Maternal Aunt    Diabetes Mother    Hypertension Sister, Brother    Lung cancer Sister          Tobacco Use    Smoking status: Never     Passive exposure: Never    Smokeless tobacco: Never   Substance and Sexual Activity    Alcohol use: Not Currently    Drug use: Never    Sexual activity: Not Currently     Partners: Male       Review of Systems   Constitutional:  Negative for activity change, appetite change and fever.   Respiratory:  Positive  for shortness of breath. Negative for cough and wheezing.    Cardiovascular:  Negative for chest pain and leg swelling.   Gastrointestinal:  Negative for abdominal pain, constipation, diarrhea, nausea and vomiting.   Genitourinary:  Negative for dysuria.   Skin:  Negative for rash.   Neurological:  Positive for dizziness and light-headedness. Negative for headaches.   Objective:     Vital Signs (Most Recent):  Temp: 98.5 °F (36.9 °C) (06/08/23 2128)  Pulse: 77 (06/08/23 2128)  Resp: 16 (06/08/23 2128)  BP: 118/61 (06/08/23 2128)  SpO2: 99 % (06/08/23 2128) Vital Signs (24h Range):  Temp:  [97.6 °F (36.4 °C)-98.5 °F (36.9 °C)] 98.5 °F (36.9 °C)  Pulse:  [74-94] 77  Resp:  [16-49] 16  SpO2:  [99 %-100 %] 99 %  BP: (116-158)/(60-97) 118/61     Weight: 63.7 kg (140 lb 6.9 oz)  Body mass index is 24.11 kg/m².  Body surface area is 1.7 meters squared.      Intake/Output Summary (Last 24 hours) at 6/8/2023 2134  Last data filed at 6/8/2023 1529  Gross per 24 hour   Intake 2266.55 ml   Output --   Net 2266.55 ml        Physical Exam  Vitals and nursing note reviewed.   HENT:      Head: Normocephalic and atraumatic.      Mouth/Throat:      Mouth: Mucous membranes are moist.      Pharynx: Oropharynx is clear.   Eyes:      Extraocular Movements: Extraocular movements intact.      Conjunctiva/sclera: Conjunctivae normal.   Cardiovascular:      Rate and Rhythm: Normal rate and regular rhythm.      Pulses: Normal pulses.   Pulmonary:      Effort: Pulmonary effort is normal. No respiratory distress.      Breath sounds: No wheezing or rales.   Abdominal:      Palpations: Abdomen is soft.      Tenderness: There is no abdominal tenderness. There is no guarding.   Musculoskeletal:      Right lower leg: No edema.      Left lower leg: No edema.   Skin:     General: Skin is warm and dry.   Neurological:      General: No focal deficit present.      Mental Status: She is alert and oriented to person, place, and time.   Psychiatric:          Mood and Affect: Mood normal.        Significant Labs:   All pertinent labs from the last 24 hours have been reviewed.    Diagnostic Results:  I have reviewed all pertinent imaging results/findings within the past 24 hours.    Assessment/Plan:     * Acute cystitis without hematuria  Patient presented from clinic with hypotension, improved with IVF. UA suggestive of UTI.  S/p CTX in ED.    Transition to Levqauin po based on prior urine cultures on 05/2023 with ESBL Klebsiella. Follow urine culture and tailor abx accordingly  Consider broadening if patient clinically decompensates.    Elevated lactic acid level  Patient noted to have elevated lactic acid up to 7. Possibly due to UTI and leucovorin.  Trend lactic acid.    Type 2 diabetes mellitus without complications  On insulin aspart TIDWM at home.  POCT glucose checks AC/HS  BG goal 140-180    Malignant neoplasm of head of pancreas  Follows with Dr. Casey. Has received 2 cycles of FOLFOX. Close follow up upon discharge.    Primary hypertension  Holding home meds in setting of normotension and infection. Resume when appropriate.        Kelvin Winter MD  Hematology/Oncology  [facility] - Oncology ([facility])

## 2023-06-09 NOTE — ED NOTES
Received pt AAO and in NAD.  Pt breathing E/U on room air.  Call bell in reach with bed rails up x2.  Pt placed on continuous cardiac, O2, and BP monitoring.  Pt and family advised of plan of care to include all test and procedures. Patient stable at this time; will continue with plan of care.

## 2023-06-09 NOTE — CONSULTS
Patient admitted to Medical Oncology service. H&P to follow.    Emperatriz Lewis MD   Hematology and Oncology Fellow, PGY IV

## 2023-06-09 NOTE — PLAN OF CARE
Plan of care reviewed with patient and family.  Fall precautions maintained, side rails up x2, call light in reach, bed in low position and locked, nonskid socks on, free from falls. Patient  received bolus of iv fluids this morning.  Accuchecks ac and hs.  On telemetry.  Tolerating a diabetic diet without difficulty.  Voiding without difficulty.  No compaints voiced.

## 2023-06-09 NOTE — SUBJECTIVE & OBJECTIVE
Oncology Treatment Plan:   OP PANC mFOLFIRINOX Q2W    Medications:  Continuous Infusions:  Scheduled Meds:   [START ON 6/9/2023] aspirin  81 mg Oral Daily    [START ON 6/9/2023] atorvastatin  40 mg Oral Daily    [START ON 6/9/2023] cefTRIAXone (ROCEPHIN) IVPB  1 g Intravenous Q24H    droNABinol  2.5 mg Oral BID AC    enoxparin  40 mg Subcutaneous Daily    famotidine  20 mg Oral BID    [START ON 6/9/2023] polyethylene glycol  17 g Oral Daily    pramipexole  0.25 mg Oral QHS     PRN Meds:albuterol, glucagon (human recombinant), naloxone, ondansetron, oxyCODONE, sodium chloride 0.9%     Review of patient's allergies indicates:  No Known Allergies     Past Medical History:   Diagnosis Date    Anemia, unspecified     Cancer     Hypertension     Osteoporosis     PUD (peptic ulcer disease)     Type 2 diabetes mellitus without complications      Past Surgical History:   Procedure Laterality Date    CHOLECYSTECTOMY      colonsocopy      2017    ENDOSCOPIC ULTRASOUND OF UPPER GASTROINTESTINAL TRACT N/A 3/31/2023    Procedure: ULTRASOUND, UPPER GI TRACT, ENDOSCOPIC;  Surgeon: Kleber Bolaños MD;  Location: [facility] ([address] [place] [address]);  Service: Endoscopy;  Laterality: N/A;  3/27 - precall attempted, no answer. SG    ERCP N/A 5/2/2023    Procedure: ERCP (ENDOSCOPIC RETROGRADE CHOLANGIOPANCREATOGRAPHY);  Surgeon: Jerry Vargas MD;  Location: [facility] (2ND FLR);  Service: Endoscopy;  Laterality: N/A;    HYSTERECTOMY  03/08/2023    INSERTION OF TUNNELED CENTRAL VENOUS CATHETER (CVC) WITH SUBCUTANEOUS PORT N/A 4/20/2023    Procedure: INSERTION, SINGLE LUMEN CATHETER WITH PORT, WITH FLUOROSCOPIC GUIDANCE;  Surgeon: Philip Anderson Jr., MD;  Location: [facility] 2ND FLR;  Service: General;  Laterality: N/A;    LAPAROSCOPIC CHOLECYSTECTOMY      LYMPH NODE BIOPSY Bilateral 03/08/2023    Procedure: BIOPSY, PELVIC LYMPH NODE;  Surgeon: Dallas Aguilar MD;  Location: [facility];  Service: OB/GYN;  Laterality: Bilateral;    ROBOTIC HYSTERECTOMY, WITH  SALPINGO-OOPHORECTOMY Bilateral 03/08/2023    Procedure: ROBOTIC HYSTERECTOMY,WITH SALPINGO-OOPHORECTOMY;  Surgeon: Dallas Aguilar MD;  Location: [facility];  Service: OB/GYN;  Laterality: Bilateral;    TOTAL KNEE ARTHROPLASTY Left     2016    TOTAL KNEE ARTHROPLASTY Right     2019    VAGINAL DELIVERY      x 2  (one with epidural)     Family History       Problem Relation (Age of Onset)    Breast cancer Mother, Sister, Maternal Aunt    Colon cancer Mother, Maternal Aunt    Diabetes Mother    Hypertension Sister, Brother    Lung cancer Sister          Tobacco Use    Smoking status: Never     Passive exposure: Never    Smokeless tobacco: Never   Substance and Sexual Activity    Alcohol use: Not Currently    Drug use: Never    Sexual activity: Not Currently     Partners: Male       Review of Systems   Constitutional:  Negative for activity change, appetite change and fever.   Respiratory:  Positive for shortness of breath. Negative for cough and wheezing.    Cardiovascular:  Negative for chest pain and leg swelling.   Gastrointestinal:  Negative for abdominal pain, constipation, diarrhea, nausea and vomiting.   Genitourinary:  Negative for dysuria.   Skin:  Negative for rash.   Neurological:  Positive for dizziness and light-headedness. Negative for headaches.   Objective:     Vital Signs (Most Recent):  Temp: 98.5 °F (36.9 °C) (06/08/23 2128)  Pulse: 77 (06/08/23 2128)  Resp: 16 (06/08/23 2128)  BP: 118/61 (06/08/23 2128)  SpO2: 99 % (06/08/23 2128) Vital Signs (24h Range):  Temp:  [97.6 °F (36.4 °C)-98.5 °F (36.9 °C)] 98.5 °F (36.9 °C)  Pulse:  [74-94] 77  Resp:  [16-49] 16  SpO2:  [99 %-100 %] 99 %  BP: (116-158)/(60-97) 118/61     Weight: 63.7 kg (140 lb 6.9 oz)  Body mass index is 24.11 kg/m².  Body surface area is 1.7 meters squared.      Intake/Output Summary (Last 24 hours) at 6/8/2023 2134  Last data filed at 6/8/2023 1529  Gross per 24 hour   Intake 2266.55 ml   Output --   Net 2266.55 ml        Physical  Exam  Vitals and nursing note reviewed.   HENT:      Head: Normocephalic and atraumatic.      Mouth/Throat:      Mouth: Mucous membranes are moist.      Pharynx: Oropharynx is clear.   Eyes:      Extraocular Movements: Extraocular movements intact.      Conjunctiva/sclera: Conjunctivae normal.   Cardiovascular:      Rate and Rhythm: Normal rate and regular rhythm.      Pulses: Normal pulses.   Pulmonary:      Effort: Pulmonary effort is normal. No respiratory distress.      Breath sounds: No wheezing or rales.   Abdominal:      Palpations: Abdomen is soft.      Tenderness: There is no abdominal tenderness. There is no guarding.   Musculoskeletal:      Right lower leg: No edema.      Left lower leg: No edema.   Skin:     General: Skin is warm and dry.   Neurological:      General: No focal deficit present.      Mental Status: She is alert and oriented to person, place, and time.   Psychiatric:         Mood and Affect: Mood normal.        Significant Labs:   All pertinent labs from the last 24 hours have been reviewed.    Diagnostic Results:  I have reviewed all pertinent imaging results/findings within the past 24 hours.

## 2023-06-09 NOTE — NURSING
Nurses Note -- 4 Eyes      6/8/2023   10:33 PM      Skin assessed during: Admit      [] No Altered Skin Integrity Present    []Prevention Measures Documented      [x] Yes- Altered Skin Integrity Present or Discovered   [] LDA Added if Not in Epic (Describe Wound)   [x] New Altered Skin Integrity was Present on Admit and Documented in LDA   [] Wound Image Taken    Wound Care Consulted? No    Attending Nurse:  Kenyetta Welsh RN     Second RN/Staff Member:  MELISSA Domínguez

## 2023-06-09 NOTE — PLAN OF CARE
Patient A&Ox4, admitted from ED for elevated lactate/ observation for UTI treatment. Admitted to Med/ONC sx. Lactate improved overnight. Vitals remained stable throughout the duration of my shift. Pt remains asymptomatic. C/o chronic pain that is controlled with PRN medications. No acute events overnight. Discharge planning is ongoing.

## 2023-06-09 NOTE — PROGRESS NOTES
Admit Assessment    Patient Identification  Kaity Masterson   :  1955  Admit Date:  2023  Attending Provider:  Luz Marina Butler MD              Referral:   Pt was admitted to  with a diagnosis of Acute cystitis without hematuria, and was admitted this hospital stay due to Lactic acidosis [E87.20]  Dizziness [R42]  Acute cystitis with hematuria [N30.01]  History of pancreatic cancer [Z85.07]  Chest pain [R07.9]  Hypotension, unspecified hypotension type [I95.9].       is involved was referred to the Social Work Department via social work assessment  Patient presents as a 67 y.o. year old  female.    Persons interviewed : Patient and her daughter-in-law Crystal    Living Situation:      Resides at [address] [place] [address]  [place] [address] [place] [address], phone: [phone] (home).  Lives with her .         Current or Past Agencies and Description of Services/Supplies    DME: own a rollator, bedside commode and shower chair         Home Health: Patient was not active on service prior to hospitalization. They did ask questions about services and I discussed with them the services they provide.       IV Infusion: N/A      Nutrition: Ora    Outpatient Pharmacy:     [facility]/pharmacy #[id number] - [place], [place] - [address]  [address]  [place] [address]  Phone: [phone] Fax: [phone]    [facility] - [place], [place] - [address]  [address]  [place] [address]  Phone: [phone] Fax: [phone]      Patient Preference of agencies include: Patient did not express a preference    Patient/Caregiver informed of right to choose providers or agencies.  Patient provides permission to release any necessary information to [facility] and to Non-[facility] agencies as needed to facilitate patient care, treatment planning, and patient discharge planning.  Written and verbal resources provided.      Coping: Patient very is very frustrated. Expressed  "feeling upset because she was in the hospital again for what she feels "is the same thing again." She is also upset about outstanding bills she has and the attempts she has made to pay them but was told the payment would not be enough. Provided the family the number to  to see if they will qualify for patient assistance.           Adjustment to Diagnosis and Treatment: Appropriate    Emotional/Behavioral/Cognitive Issues: None observed            History/Current Symptoms of Anxiety/Depression: No:   History/Current Substance Use:   Social History     Tobacco Use    Smoking status: Never     Passive exposure: Never    Smokeless tobacco: Never   Substance and Sexual Activity    Alcohol use: Not Currently    Drug use: Never    Sexual activity: Not Currently     Partners: Male       Indications of Abuse/Neglect: No:   Abuse Screen (yes response referral indicated)  Feels Unsafe at Home or Work/School: no  Feels Threatened by Someone: no  Does anyone try to keep you from having contact with others or doing things outside your home?: no  Physical Signs of Abuse Present: no    Financial:  Payer/Plan Subscr  Sex Relation Sub. Ins. ID Effective Group Num   1. BLUE CROSS BL* KATY MANNING P 1955 Female Self [id number]                                    [id number]                            Other identified concerns/needs: NOne at this time    Plan: Return home    Interventions/Referrals: Patient requests transport wheelchair upon discharge. Requested orders from the team  Patient/caregiver engaged in treatment planning process.     providing psychosocial and supportive counseling, resources, education, assistance and discharge planning as appropriate.  Patient/caregiver state understanding of  available resources,  following, remains available.                           "

## 2023-06-09 NOTE — ED NOTES
Telemetry Verification   Patient placed on Telemetry Box  Verified with War Room  Tech    Box # [id number]   Rate 84   Rhythm NSR

## 2023-06-10 LAB
ALBUMIN SERPL BCP-MCNC: 1.9 G/DL (ref 3.5–5.2)
ALP SERPL-CCNC: 78 U/L (ref 55–135)
ALT SERPL W/O P-5'-P-CCNC: 15 U/L (ref 10–44)
ANION GAP SERPL CALC-SCNC: 5 MMOL/L (ref 8–16)
ANISOCYTOSIS BLD QL SMEAR: SLIGHT
AST SERPL-CCNC: 30 U/L (ref 10–40)
BASOPHILS # BLD AUTO: ABNORMAL K/UL (ref 0–0.2)
BASOPHILS NFR BLD: 1 % (ref 0–1.9)
BILIRUB SERPL-MCNC: 0.8 MG/DL (ref 0.1–1)
BUN SERPL-MCNC: 6 MG/DL (ref 8–23)
CALCIUM SERPL-MCNC: 8.7 MG/DL (ref 8.7–10.5)
CHLORIDE SERPL-SCNC: 112 MMOL/L (ref 95–110)
CO2 SERPL-SCNC: 21 MMOL/L (ref 23–29)
CREAT SERPL-MCNC: 0.6 MG/DL (ref 0.5–1.4)
DACRYOCYTES BLD QL SMEAR: ABNORMAL
DIFFERENTIAL METHOD: ABNORMAL
EOSINOPHIL # BLD AUTO: ABNORMAL K/UL (ref 0–0.5)
EOSINOPHIL NFR BLD: 1 % (ref 0–8)
ERYTHROCYTE [DISTWIDTH] IN BLOOD BY AUTOMATED COUNT: 13.8 % (ref 11.5–14.5)
EST. GFR  (NO RACE VARIABLE): >60 ML/MIN/1.73 M^2
GLUCOSE SERPL-MCNC: 113 MG/DL (ref 70–110)
HCT VFR BLD AUTO: 22.1 % (ref 37–48.5)
HGB BLD-MCNC: 7.1 G/DL (ref 12–16)
HYPOCHROMIA BLD QL SMEAR: ABNORMAL
IMM GRANULOCYTES # BLD AUTO: ABNORMAL K/UL (ref 0–0.04)
IMM GRANULOCYTES NFR BLD AUTO: ABNORMAL % (ref 0–0.5)
LACTATE SERPL-SCNC: 2.6 MMOL/L (ref 0.5–2.2)
LACTATE SERPL-SCNC: 2.8 MMOL/L (ref 0.5–2.2)
LACTATE SERPL-SCNC: 3.3 MMOL/L (ref 0.5–2.2)
LACTATE SERPL-SCNC: 3.5 MMOL/L (ref 0.5–2.2)
LYMPHOCYTES # BLD AUTO: ABNORMAL K/UL (ref 1–4.8)
LYMPHOCYTES NFR BLD: 41 % (ref 18–48)
MAGNESIUM SERPL-MCNC: 1.4 MG/DL (ref 1.6–2.6)
MCH RBC QN AUTO: 31.3 PG (ref 27–31)
MCHC RBC AUTO-ENTMCNC: 32.1 G/DL (ref 32–36)
MCV RBC AUTO: 97 FL (ref 82–98)
METAMYELOCYTES NFR BLD MANUAL: 1 %
MONOCYTES # BLD AUTO: ABNORMAL K/UL (ref 0.3–1)
MONOCYTES NFR BLD: 11 % (ref 4–15)
MYELOCYTES NFR BLD MANUAL: 1 %
NEUTROPHILS NFR BLD: 43 % (ref 38–73)
NEUTS BAND NFR BLD MANUAL: 1 %
NRBC BLD-RTO: 0 /100 WBC
OVALOCYTES BLD QL SMEAR: ABNORMAL
PLATELET # BLD AUTO: 123 K/UL (ref 150–450)
PLATELET BLD QL SMEAR: ABNORMAL
PMV BLD AUTO: 10.9 FL (ref 9.2–12.9)
POCT GLUCOSE: 110 MG/DL (ref 70–110)
POCT GLUCOSE: 138 MG/DL (ref 70–110)
POCT GLUCOSE: 149 MG/DL (ref 70–110)
POCT GLUCOSE: 179 MG/DL (ref 70–110)
POIKILOCYTOSIS BLD QL SMEAR: SLIGHT
POLYCHROMASIA BLD QL SMEAR: ABNORMAL
POTASSIUM SERPL-SCNC: 3.6 MMOL/L (ref 3.5–5.1)
PROT SERPL-MCNC: 3.8 G/DL (ref 6–8.4)
RBC # BLD AUTO: 2.27 M/UL (ref 4–5.4)
SODIUM SERPL-SCNC: 138 MMOL/L (ref 136–145)
SPHEROCYTES BLD QL SMEAR: ABNORMAL
WBC # BLD AUTO: 2.01 K/UL (ref 3.9–12.7)

## 2023-06-10 PROCEDURE — 83605 ASSAY OF LACTIC ACID: CPT | Mod: 91 | Performed by: STUDENT IN AN ORGANIZED HEALTH CARE EDUCATION/TRAINING PROGRAM

## 2023-06-10 PROCEDURE — 85027 COMPLETE CBC AUTOMATED: CPT | Performed by: STUDENT IN AN ORGANIZED HEALTH CARE EDUCATION/TRAINING PROGRAM

## 2023-06-10 PROCEDURE — 80053 COMPREHEN METABOLIC PANEL: CPT | Performed by: STUDENT IN AN ORGANIZED HEALTH CARE EDUCATION/TRAINING PROGRAM

## 2023-06-10 PROCEDURE — 83605 ASSAY OF LACTIC ACID: CPT | Performed by: STUDENT IN AN ORGANIZED HEALTH CARE EDUCATION/TRAINING PROGRAM

## 2023-06-10 PROCEDURE — 85007 BL SMEAR W/DIFF WBC COUNT: CPT | Performed by: STUDENT IN AN ORGANIZED HEALTH CARE EDUCATION/TRAINING PROGRAM

## 2023-06-10 PROCEDURE — 96361 HYDRATE IV INFUSION ADD-ON: CPT

## 2023-06-10 PROCEDURE — 63600175 PHARM REV CODE 636 W HCPCS

## 2023-06-10 PROCEDURE — 63600175 PHARM REV CODE 636 W HCPCS: Performed by: STUDENT IN AN ORGANIZED HEALTH CARE EDUCATION/TRAINING PROGRAM

## 2023-06-10 PROCEDURE — 25000003 PHARM REV CODE 250: Performed by: STUDENT IN AN ORGANIZED HEALTH CARE EDUCATION/TRAINING PROGRAM

## 2023-06-10 PROCEDURE — 83735 ASSAY OF MAGNESIUM: CPT | Performed by: INTERNAL MEDICINE

## 2023-06-10 PROCEDURE — 20600001 HC STEP DOWN PRIVATE ROOM

## 2023-06-10 RX ORDER — MAGNESIUM SULFATE HEPTAHYDRATE 40 MG/ML
2 INJECTION, SOLUTION INTRAVENOUS ONCE
Status: COMPLETED | OUTPATIENT
Start: 2023-06-10 | End: 2023-06-10

## 2023-06-10 RX ORDER — SODIUM CHLORIDE, SODIUM LACTATE, POTASSIUM CHLORIDE, CALCIUM CHLORIDE 600; 310; 30; 20 MG/100ML; MG/100ML; MG/100ML; MG/100ML
INJECTION, SOLUTION INTRAVENOUS CONTINUOUS
Status: ACTIVE | OUTPATIENT
Start: 2023-06-10 | End: 2023-06-10

## 2023-06-10 RX ADMIN — DRONABINOL 2.5 MG: 2.5 CAPSULE ORAL at 08:06

## 2023-06-10 RX ADMIN — PRAMIPEXOLE DIHYDROCHLORIDE 0.25 MG: 0.12 TABLET ORAL at 08:06

## 2023-06-10 RX ADMIN — ERTAPENEM 1 G: 1 INJECTION INTRAMUSCULAR; INTRAVENOUS at 10:06

## 2023-06-10 RX ADMIN — ATORVASTATIN CALCIUM 40 MG: 40 TABLET, FILM COATED ORAL at 08:06

## 2023-06-10 RX ADMIN — SODIUM CHLORIDE, POTASSIUM CHLORIDE, SODIUM LACTATE AND CALCIUM CHLORIDE: 600; 310; 30; 20 INJECTION, SOLUTION INTRAVENOUS at 10:06

## 2023-06-10 RX ADMIN — ASPIRIN 81 MG: 81 TABLET, COATED ORAL at 08:06

## 2023-06-10 RX ADMIN — FAMOTIDINE 20 MG: 20 TABLET ORAL at 08:06

## 2023-06-10 RX ADMIN — DRONABINOL 2.5 MG: 2.5 CAPSULE ORAL at 04:06

## 2023-06-10 RX ADMIN — ENOXAPARIN SODIUM 40 MG: 40 INJECTION SUBCUTANEOUS at 04:06

## 2023-06-10 RX ADMIN — MAGNESIUM SULFATE 2 G: 2 INJECTION INTRAVENOUS at 08:06

## 2023-06-10 NOTE — SUBJECTIVE & OBJECTIVE
Interval History: Patient reports that she feels well this morning. Has had no dysuria or urgency.     Oncology Treatment Plan:   OP PANC mFOLFIRINOX Q2W    Medications:  Continuous Infusions:   lactated ringers 125 mL/hr at 06/10/23 1050     Scheduled Meds:   aspirin  81 mg Oral Daily    atorvastatin  40 mg Oral Daily    droNABinol  2.5 mg Oral BID AC    enoxparin  40 mg Subcutaneous Daily    ertapenem (INVANZ) IVPB  1 g Intravenous Q24H    famotidine  20 mg Oral BID    polyethylene glycol  17 g Oral Daily    pramipexole  0.25 mg Oral QHS     PRN Meds:albuterol, glucagon (human recombinant), naloxone, ondansetron, oxyCODONE, sodium chloride 0.9%     Review of Systems   Constitutional:  Negative for chills and fever.   HENT:  Negative for rhinorrhea and sore throat.    Eyes:  Negative for visual disturbance.   Respiratory:  Negative for cough and shortness of breath.    Cardiovascular:  Negative for chest pain and leg swelling.   Gastrointestinal:  Negative for diarrhea, nausea and vomiting.   Genitourinary:  Negative for dysuria.   Musculoskeletal:  Negative for arthralgias and myalgias.   Skin:  Negative for rash.   Neurological:  Negative for light-headedness and headaches.   Psychiatric/Behavioral:  Negative for agitation and confusion.    Objective:     Vital Signs (Most Recent):  Temp: 98.5 °F (36.9 °C) (06/10/23 1307)  Pulse: (!) 112 (06/10/23 1307)  Resp: 18 (06/10/23 1307)  BP: 136/76 (06/10/23 1307)  SpO2: 99 % (06/10/23 1307) Vital Signs (24h Range):  Temp:  [97.9 °F (36.6 °C)-98.9 °F (37.2 °C)] 98.5 °F (36.9 °C)  Pulse:  [] 112  Resp:  [16-20] 18  SpO2:  [94 %-99 %] 99 %  BP: (127-157)/(67-90) 136/76     Weight: 63.7 kg (140 lb 6.9 oz)  Body mass index is 24.11 kg/m².  Body surface area is 1.7 meters squared.      Intake/Output Summary (Last 24 hours) at 6/10/2023 1406  Last data filed at 6/10/2023 1326  Gross per 24 hour   Intake 1220 ml   Output --   Net 1220 ml        Physical Exam  HENT:       Head: Normocephalic and atraumatic.      Right Ear: External ear normal.      Left Ear: External ear normal.      Nose: Nose normal.      Mouth/Throat:      Mouth: Mucous membranes are moist.      Pharynx: Oropharynx is clear.   Eyes:      General: No scleral icterus.     Extraocular Movements: Extraocular movements intact.      Conjunctiva/sclera: Conjunctivae normal.   Cardiovascular:      Rate and Rhythm: Normal rate and regular rhythm.   Pulmonary:      Effort: Pulmonary effort is normal.      Breath sounds: Normal breath sounds. No wheezing or rales.   Abdominal:      General: Abdomen is flat. Bowel sounds are normal. There is no distension.      Palpations: Abdomen is soft.      Tenderness: There is no abdominal tenderness.   Musculoskeletal:         General: Normal range of motion.      Cervical back: Normal range of motion.   Skin:     General: Skin is warm and dry.   Neurological:      General: No focal deficit present.      Mental Status: She is alert.   Psychiatric:         Mood and Affect: Mood normal.         Behavior: Behavior normal.        Significant Labs:   All pertinent labs from the last 24 hours have been reviewed.    Diagnostic Results:  I have reviewed all pertinent imaging results/findings within the past 24 hours.

## 2023-06-10 NOTE — HOSPITAL COURSE
Patient admitted to medical oncology for management. UA suspicious for infection and urine cx with GNR, Ertapenem started and ID consulted given hx of Klebsiella ESBL UTI. Urine cx again growing Klebsiella ESBL. ID recommends PO Cipro for discharge. Patient stable for discharge on oral abx.

## 2023-06-10 NOTE — ASSESSMENT & PLAN NOTE
67-year-old female with history of uncontrolled T2DM, pancreatic cancer on FOLFIRINOX q2 weeks, recent ESBL E coli UTI, presented with septic shock, in setting of pyelonephritis.     Recommendations:  - Continue ertapenem 1g IV q24 hours  - Follow-up urine, blood cultures

## 2023-06-10 NOTE — PROGRESS NOTES
[facility] ([facility])  Hematology/Oncology  Progress Note    Patient Name: Kaity Masterson  Admission Date: 6/8/2023  Hospital Length of Stay: 1 days  Code Status: Full Code     Subjective:     HPI:  Ms. Kaity Masterson is a 67 year old woman with pancreatic cancer, DM II (last A1C 10.5), HTN, endometrial cancer who presented from clinic. She was hypotensive and O2 sats could not be obtained. She reports having some episodes of dizziness and lightheadedness at home and shortness of breath with exertion over the past few days. During these episodes she asks family for assistance. Otherwise she says that she feels fine and reports no HA, chest pain, SOB, N/V/D, LE swelling.     In the ED, she was given 2L IVF and started on CTX given a UA concerning for UTI. Afebrile and hemodynamically stable with normo to hypertension on RA. Lactate 7.1 with decrease to 6.6 after 2L IVF, blood and urine cultures in process. CXR without acute processes. Admitted to Med Onc service for UTI treatment and management of elevated lactate.     Oncologic History (per Dr. Casey's note):  1.Ms Masterson is a 68 yo woman with HTN, T2DM, pathologic T1a endometrial cancer s/p surgery on 3/8/23, PUD, fatty liver, osteoporosis, who initially saw me on 4/5/23 for further management of pancreatic adenocarcinoma. She was admitted to ICU for DKA in March 2023. CT A/P 3/22/23 showed  a 3.4 cm mass centered in the pancreatic head/uncinate process with associated downstream dilatation of the main pancreatic duct.  Imaging findings are highly concerning for pancreatic adenocarcinoma.  Adjacent prominent peripancreatic lymph nodes are seen.  There is some stranding of the fat about the SMV for approximately 180°.  The portal vein, SMV and splenic vein are patent. Fatty infiltration of the liver. CT chest 3/20/23: No evidence for pulmonary embolus or other acute intrathoracic process. Hepatic steatosis.  Cholecystectomy. She underwent upper EUS biopsy  on 3/31/23. Pathology showed adenocarcinoma, MMR intact. She presents today for further evaluation. Strong family history of breast cancer, endometrial cancer and colon cancer. She is a little weak after surgery but active at home and is still working.   Family history:  Mother: Breast cancer (middle age)  Aunt maternal (2): cancer (unknown)  Sister (3): breast cancer, lung cancer (smoking). Other sister: colon cancer (older diagnosis 60's). Third sister (breast cancer) and endometrial cancer  Brother:  finger bone cancer  Discussed perioperative FOLFIRINOX  2. FOLFIRINOX to be started on 4/24/23. Bilirubin was elevated. Started FOLFOX on 4/24/23. Patient was very sick after chemo, hospitalized for obstructive jaundice, s/p ERCP with stent placement. On visit 5/8/23, patient decided not to take any more chemo and opted for hospice. She later changed her mind and resumed FOLFOX cycle 2 on 5/25/23.       Interval History: Patient reports that she feels well this morning. Has had no dysuria or urgency.     Oncology Treatment Plan:   OP PANC mFOLFIRINOX Q2W    Medications:  Continuous Infusions:   lactated ringers 125 mL/hr at 06/10/23 1050     Scheduled Meds:   aspirin  81 mg Oral Daily    atorvastatin  40 mg Oral Daily    droNABinol  2.5 mg Oral BID AC    enoxparin  40 mg Subcutaneous Daily    ertapenem (INVANZ) IVPB  1 g Intravenous Q24H    famotidine  20 mg Oral BID    polyethylene glycol  17 g Oral Daily    pramipexole  0.25 mg Oral QHS     PRN Meds:albuterol, glucagon (human recombinant), naloxone, ondansetron, oxyCODONE, sodium chloride 0.9%     Review of Systems   Constitutional:  Negative for chills and fever.   HENT:  Negative for rhinorrhea and sore throat.    Eyes:  Negative for visual disturbance.   Respiratory:  Negative for cough and shortness of breath.    Cardiovascular:  Negative for chest pain and leg swelling.   Gastrointestinal:  Negative for diarrhea, nausea and vomiting.   Genitourinary:   Negative for dysuria.   Musculoskeletal:  Negative for arthralgias and myalgias.   Skin:  Negative for rash.   Neurological:  Negative for light-headedness and headaches.   Psychiatric/Behavioral:  Negative for agitation and confusion.    Objective:     Vital Signs (Most Recent):  Temp: 98.5 °F (36.9 °C) (06/10/23 1307)  Pulse: (!) 112 (06/10/23 1307)  Resp: 18 (06/10/23 1307)  BP: 136/76 (06/10/23 1307)  SpO2: 99 % (06/10/23 1307) Vital Signs (24h Range):  Temp:  [97.9 °F (36.6 °C)-98.9 °F (37.2 °C)] 98.5 °F (36.9 °C)  Pulse:  [] 112  Resp:  [16-20] 18  SpO2:  [94 %-99 %] 99 %  BP: (127-157)/(67-90) 136/76     Weight: 63.7 kg (140 lb 6.9 oz)  Body mass index is 24.11 kg/m².  Body surface area is 1.7 meters squared.      Intake/Output Summary (Last 24 hours) at 6/10/2023 1406  Last data filed at 6/10/2023 1326  Gross per 24 hour   Intake 1220 ml   Output --   Net 1220 ml        Physical Exam  HENT:      Head: Normocephalic and atraumatic.      Right Ear: External ear normal.      Left Ear: External ear normal.      Nose: Nose normal.      Mouth/Throat:      Mouth: Mucous membranes are moist.      Pharynx: Oropharynx is clear.   Eyes:      General: No scleral icterus.     Extraocular Movements: Extraocular movements intact.      Conjunctiva/sclera: Conjunctivae normal.   Cardiovascular:      Rate and Rhythm: Normal rate and regular rhythm.   Pulmonary:      Effort: Pulmonary effort is normal.      Breath sounds: Normal breath sounds. No wheezing or rales.   Abdominal:      General: Abdomen is flat. Bowel sounds are normal. There is no distension.      Palpations: Abdomen is soft.      Tenderness: There is no abdominal tenderness.   Musculoskeletal:         General: Normal range of motion.      Cervical back: Normal range of motion.   Skin:     General: Skin is warm and dry.   Neurological:      General: No focal deficit present.      Mental Status: She is alert.   Psychiatric:         Mood and Affect: Mood  normal.         Behavior: Behavior normal.        Significant Labs:   All pertinent labs from the last 24 hours have been reviewed.    Diagnostic Results:  I have reviewed all pertinent imaging results/findings within the past 24 hours.    Assessment/Plan:     * Acute cystitis without hematuria  Patient presented from clinic with hypotension, improved with IVF. UA suggestive of UTI, urine cx with GNR, speciation pending.  S/p CTX in ED.    Transition to Levqauin po based on prior urine cultures on 05/2023 with ESBL Klebsiella. Follow urine culture and tailor abx accordingly  Consider broadening if patient clinically decompensates.    -Ertapenem 1g q24h per ID  -ID on board   -f/u urine cx    Elevated lactic acid level  Patient noted to have elevated lactic acid up to 7. Possibly due to UTI and leucovorin.  Trend lactic acid.    Type 2 diabetes mellitus without complications  On insulin aspart TIDWM at home.  POCT glucose checks AC/HS  BG goal 140-180    Malignant neoplasm of head of pancreas  Follows with Dr. Casey. Has received 2 cycles of FOLFOX. Close follow up upon discharge.    Primary hypertension  Holding home meds in setting of normotension and infection. Resume when appropriate.             NELLY AMEZCUA MD  Hematology/Oncology  [facility] - Oncology ([facility])

## 2023-06-10 NOTE — SUBJECTIVE & OBJECTIVE
Past Medical History:   Diagnosis Date    Anemia, unspecified     Cancer     Hypertension     Osteoporosis     PUD (peptic ulcer disease)     Type 2 diabetes mellitus without complications        Past Surgical History:   Procedure Laterality Date    CHOLECYSTECTOMY      colonsocopy      2017    ENDOSCOPIC ULTRASOUND OF UPPER GASTROINTESTINAL TRACT N/A 3/31/2023    Procedure: ULTRASOUND, UPPER GI TRACT, ENDOSCOPIC;  Surgeon: Kleber Bolaños MD;  Location: [facility] ENDO (2ND FLR);  Service: Endoscopy;  Laterality: N/A;  3/27 - precall attempted, no answer. SG    ERCP N/A 5/2/2023    Procedure: ERCP (ENDOSCOPIC RETROGRADE CHOLANGIOPANCREATOGRAPHY);  Surgeon: Jerry Vargas MD;  Location: [facility] ENDO (2ND FLR);  Service: Endoscopy;  Laterality: N/A;    HYSTERECTOMY  03/08/2023    INSERTION OF TUNNELED CENTRAL VENOUS CATHETER (CVC) WITH SUBCUTANEOUS PORT N/A 4/20/2023    Procedure: INSERTION, SINGLE LUMEN CATHETER WITH PORT, WITH FLUOROSCOPIC GUIDANCE;  Surgeon: Philip Anderson Jr., MD;  Location: [facility] 2ND FLR;  Service: General;  Laterality: N/A;    LAPAROSCOPIC CHOLECYSTECTOMY      LYMPH NODE BIOPSY Bilateral 03/08/2023    Procedure: BIOPSY, PELVIC LYMPH NODE;  Surgeon: Dallas Aguilar MD;  Location: [facility];  Service: OB/GYN;  Laterality: Bilateral;    ROBOTIC HYSTERECTOMY, WITH SALPINGO-OOPHORECTOMY Bilateral 03/08/2023    Procedure: ROBOTIC HYSTERECTOMY,WITH SALPINGO-OOPHORECTOMY;  Surgeon: Dallas Aguilar MD;  Location: [facility];  Service: OB/GYN;  Laterality: Bilateral;    TOTAL KNEE ARTHROPLASTY Left     2016    TOTAL KNEE ARTHROPLASTY Right     2019    VAGINAL DELIVERY      x 2  (one with epidural)       Review of patient's allergies indicates:  No Known Allergies    Medications:  Medications Prior to Admission   Medication Sig    alendronate (FOSAMAX) 35 MG tablet Take 35 mg by mouth every 7 days.    aspirin (ECOTRIN) 81 MG EC tablet Take 1 tablet by mouth once daily.    atenoloL (TENORMIN) 50 MG tablet Take 50 mg by  "mouth once daily. am    droNABinol (MARINOL) 2.5 MG capsule Take 1 capsule (2.5 mg total) by mouth 2 (two) times daily before meals.    esomeprazole (NEXIUM) 40 MG capsule esomeprazole magnesium 40 mg capsule,delayed release   TAKE 1 CAPSULE BY MOUTH EVERY DAY    famotidine (PEPCID) 20 MG tablet Take 20 mg by mouth 2 (two) times daily.    insulin lispro 100 unit/mL injection Inject 6-10 Units into the skin 3 (three) times daily before meals.    LIDOcaine-prilocaine (EMLA) cream Apply topically as needed.    losartan (COZAAR) 100 MG tablet Take 100 mg by mouth once daily.    metFORMIN (GLUCOPHAGE) 500 MG tablet Take 500 mg by mouth daily with breakfast.    morphine (MS CONTIN) 15 MG 12 hr tablet Take 1 tablet (15 mg total) by mouth nightly.    ondansetron (ZOFRAN-ODT) 8 MG TbDL Take 1 tablet (8 mg total) by mouth every 8 (eight) hours as needed (nausea).    ONETOUCH DELICA LANCETS 33 gauge Misc Apply topically daily as needed.    ONETOUCH ULTRA TEST Strp TEST DAILY AND AS NEEDED    ONETOUCH ULTRA2 METER Misc TEST DAILY AND AS NEEDED    oxyCODONE (ROXICODONE) 5 MG immediate release tablet Take 1 tablet (5 mg total) by mouth every 6 (six) hours as needed for Pain.    pen needle, diabetic 31 gauge x 3/16" Ndle 30 each by Misc.(Non-Drug; Combo Route) route every evening.    pramipexole (MIRAPEX) 0.25 MG tablet Take 0.25 mg by mouth every evening.    promethazine (PHENERGAN) 25 MG tablet Take 1 tablet (25 mg total) by mouth every 6 (six) hours as needed for Nausea.    rosuvastatin (CRESTOR) 10 MG tablet Take 10 mg by mouth every evening.    acetaminophen (TYLENOL) 650 MG TbSR Take 1 tablet (650 mg total) by mouth every 6 (six) hours. Alternate with ibuprofen so you are taking something every 6 hours (Patient not taking: Reported on 6/8/2023)    albuterol (VENTOLIN HFA) 90 mcg/actuation inhaler Inhale 2 puffs into the lungs every 6 (six) hours as needed for Wheezing or Shortness of Breath. Rescue (Patient not taking: " Reported on 6/8/2023)    naloxone (NARCAN) 4 mg/actuation Spry 4mg by nasal route as needed for opioid overdose; may repeat every 2-3 minutes in alternating nostrils until medical help arrives. Call 911     Antibiotics (From admission, onward)      Start     Stop Route Frequency Ordered    06/09/23 1030  ertapenem (INVANZ) 1 g in sodium chloride 0.9 % 100 mL IVPB (MB+)         -- IV Every 24 hours (non-standard times) 06/09/23 0923          Antifungals (From admission, onward)      None          Antivirals (From admission, onward)      None             Immunization History   Administered Date(s) Administered    COVID-19 Vaccine 11/30/2022       Family History       Problem Relation (Age of Onset)    Breast cancer Mother, Sister, Maternal Aunt    Colon cancer Mother, Maternal Aunt    Diabetes Mother    Hypertension Sister, Brother    Lung cancer Sister          Social History     Socioeconomic History    Marital status:    Tobacco Use    Smoking status: Never     Passive exposure: Never    Smokeless tobacco: Never   Substance and Sexual Activity    Alcohol use: Not Currently    Drug use: Never    Sexual activity: Not Currently     Partners: Male     Review of Systems   Constitutional:  Negative for chills, diaphoresis and fever.   HENT:  Negative for rhinorrhea and sore throat.    Respiratory:  Negative for cough and shortness of breath.    Cardiovascular:  Negative for chest pain and leg swelling.   Gastrointestinal:  Negative for abdominal pain, diarrhea, nausea and vomiting.   Genitourinary:  Negative for dysuria and hematuria.   Musculoskeletal:  Negative for arthralgias and myalgias.   Skin:  Negative for rash.   Neurological:  Negative for headaches.   Objective:     Vital Signs (Most Recent):  Temp: 98.9 °F (37.2 °C) (06/09/23 1916)  Pulse: 104 (06/09/23 1916)  Resp: 16 (06/09/23 2056)  BP: 127/70 (06/09/23 1916)  SpO2: 99 % (06/09/23 1916) Vital Signs (24h Range):  Temp:  [98 °F (36.7 °C)-98.9 °F (37.2  °C)] 98.9 °F (37.2 °C)  Pulse:  [] 104  Resp:  [16-20] 16  SpO2:  [94 %-99 %] 99 %  BP: (118-152)/(62-73) 127/70     Weight: 63.7 kg (140 lb 6.9 oz)  Body mass index is 24.11 kg/m².    Estimated Creatinine Clearance: 76.4 mL/min (based on SCr of 0.6 mg/dL).     Physical Exam  Vitals reviewed.   Constitutional:       General: She is not in acute distress.     Appearance: She is well-developed. She is not diaphoretic.   HENT:      Head: Normocephalic and atraumatic.      Nose: Nose normal.   Eyes:      Conjunctiva/sclera: Conjunctivae normal.   Pulmonary:      Effort: Pulmonary effort is normal. No respiratory distress.   Abdominal:      General: Abdomen is flat. There is no distension.   Musculoskeletal:      Cervical back: Normal range of motion.      Right lower leg: No edema.      Left lower leg: No edema.   Skin:     General: Skin is warm and dry.      Findings: No erythema or rash.   Neurological:      Mental Status: She is alert.   Psychiatric:         Behavior: Behavior normal.        Significant Labs: All pertinent labs within the past 24 hours have been reviewed.    Significant Imaging: I have reviewed all pertinent imaging results/findings within the past 24 hours.

## 2023-06-10 NOTE — PLAN OF CARE
Patient alert and oriented x3, denies pain/discomfort overnight. No acute evnets. IVF continue as ordered. VSS. OOB with assist of one. Discharge planning is ongoing.

## 2023-06-10 NOTE — HPI
67-year-old female with history of uncontrolled T2DM, pancreatic cancer on FOLFIRINOX q2 weeks, presented from clinic with hypotension. Patient reported having lightheadedness, DEVINE. Patient sent to ED for further evaluation. Patient found to have urinalysis positive for leukocytes, nitrites. Patient started empirically on ertapenem IV. Patient now feeling back to her baseline, requesting date for discharge. Denied any fevers, chills, nvd, abdominal pain, dysuria, hematuria.

## 2023-06-10 NOTE — CONSULTS
[facility])  Infectious Disease  Consult Note    Patient Name: Kaity Masterson  MRN: [id number]  Admission Date: 6/8/2023  Hospital Length of Stay: 0 days  Attending Physician: Robina John MD  Primary Care Provider: Marcia Borrego NP     Isolation Status: No active isolations    Patient information was obtained from patient, relative(s), past medical records and ER records.      Inpatient consult to Infectious Diseases  Consult performed by: Catarina Bergeron MD  Consult ordered by: Emperatriz Lewis MD        Assessment/Plan:     Renal/  * Acute cystitis without hematuria  67-year-old female with history of uncontrolled T2DM, pancreatic cancer on FOLFIRINOX q2 weeks, recent ESBL E coli UTI, presented with septic shock, in setting of pyelonephritis.     Recommendations:  - Continue ertapenem 1g IV q24 hours  - Follow-up urine, blood cultures        Thank you for your consult. I will follow-up with patient. Please contact us if you have any additional questions.    Catarina Bergeron MD  Infectious Disease  [facility])    Subjective:     Principal Problem: Acute cystitis without hematuria    HPI: 67-year-old female with history of uncontrolled T2DM, pancreatic cancer on FOLFIRINOX q2 weeks, presented from clinic with hypotension. Patient reported having lightheadedness, DEVINE. Patient sent to ED for further evaluation. Patient found to have urinalysis positive for leukocytes, nitrites. Patient started empirically on ertapenem IV. Patient now feeling back to her baseline, requesting date for discharge. Denied any fevers, chills, nvd, abdominal pain, dysuria, hematuria.      Past Medical History:   Diagnosis Date    Anemia, unspecified     Cancer     Hypertension     Osteoporosis     PUD (peptic ulcer disease)     Type 2 diabetes mellitus without complications        Past Surgical History:   Procedure Laterality Date    CHOLECYSTECTOMY      colonsocopy      2017    ENDOSCOPIC  ULTRASOUND OF UPPER GASTROINTESTINAL TRACT N/A 3/31/2023    Procedure: ULTRASOUND, UPPER GI TRACT, ENDOSCOPIC;  Surgeon: Kleber Bolaños MD;  Location: [facility] ENDO (2ND FLR);  Service: Endoscopy;  Laterality: N/A;  3/27 - precall attempted, no answer. SG    ERCP N/A 5/2/2023    Procedure: ERCP (ENDOSCOPIC RETROGRADE CHOLANGIOPANCREATOGRAPHY);  Surgeon: Jerry Vargas MD;  Location: [facility] ENDO (2ND FLR);  Service: Endoscopy;  Laterality: N/A;    HYSTERECTOMY  03/08/2023    INSERTION OF TUNNELED CENTRAL VENOUS CATHETER (CVC) WITH SUBCUTANEOUS PORT N/A 4/20/2023    Procedure: INSERTION, SINGLE LUMEN CATHETER WITH PORT, WITH FLUOROSCOPIC GUIDANCE;  Surgeon: Philip Anderson Jr., MD;  Location: [facility] OR 2ND FLR;  Service: General;  Laterality: N/A;    LAPAROSCOPIC CHOLECYSTECTOMY      LYMPH NODE BIOPSY Bilateral 03/08/2023    Procedure: BIOPSY, PELVIC LYMPH NODE;  Surgeon: Dallas Aguilar MD;  Location: [facility];  Service: OB/GYN;  Laterality: Bilateral;    ROBOTIC HYSTERECTOMY, WITH SALPINGO-OOPHORECTOMY Bilateral 03/08/2023    Procedure: ROBOTIC HYSTERECTOMY,WITH SALPINGO-OOPHORECTOMY;  Surgeon: Dallas Aguilar MD;  Location: [facility];  Service: OB/GYN;  Laterality: Bilateral;    TOTAL KNEE ARTHROPLASTY Left     2016    TOTAL KNEE ARTHROPLASTY Right     2019    VAGINAL DELIVERY      x 2  (one with epidural)       Review of patient's allergies indicates:  No Known Allergies    Medications:  Medications Prior to Admission   Medication Sig    alendronate (FOSAMAX) 35 MG tablet Take 35 mg by mouth every 7 days.    aspirin (ECOTRIN) 81 MG EC tablet Take 1 tablet by mouth once daily.    atenoloL (TENORMIN) 50 MG tablet Take 50 mg by mouth once daily. am    droNABinol (MARINOL) 2.5 MG capsule Take 1 capsule (2.5 mg total) by mouth 2 (two) times daily before meals.    esomeprazole (NEXIUM) 40 MG capsule esomeprazole magnesium 40 mg capsule,delayed release   TAKE 1 CAPSULE BY MOUTH EVERY DAY    famotidine (PEPCID) 20 MG  "tablet Take 20 mg by mouth 2 (two) times daily.    insulin lispro 100 unit/mL injection Inject 6-10 Units into the skin 3 (three) times daily before meals.    LIDOcaine-prilocaine (EMLA) cream Apply topically as needed.    losartan (COZAAR) 100 MG tablet Take 100 mg by mouth once daily.    metFORMIN (GLUCOPHAGE) 500 MG tablet Take 500 mg by mouth daily with breakfast.    morphine (MS CONTIN) 15 MG 12 hr tablet Take 1 tablet (15 mg total) by mouth nightly.    ondansetron (ZOFRAN-ODT) 8 MG TbDL Take 1 tablet (8 mg total) by mouth every 8 (eight) hours as needed (nausea).    ONETOUCH DELICA LANCETS 33 gauge Misc Apply topically daily as needed.    ONETOUCH ULTRA TEST Strp TEST DAILY AND AS NEEDED    ONETOUCH ULTRA2 METER Misc TEST DAILY AND AS NEEDED    oxyCODONE (ROXICODONE) 5 MG immediate release tablet Take 1 tablet (5 mg total) by mouth every 6 (six) hours as needed for Pain.    pen needle, diabetic 31 gauge x 3/16" Ndle 30 each by Misc.(Non-Drug; Combo Route) route every evening.    pramipexole (MIRAPEX) 0.25 MG tablet Take 0.25 mg by mouth every evening.    promethazine (PHENERGAN) 25 MG tablet Take 1 tablet (25 mg total) by mouth every 6 (six) hours as needed for Nausea.    rosuvastatin (CRESTOR) 10 MG tablet Take 10 mg by mouth every evening.    acetaminophen (TYLENOL) 650 MG TbSR Take 1 tablet (650 mg total) by mouth every 6 (six) hours. Alternate with ibuprofen so you are taking something every 6 hours (Patient not taking: Reported on 6/8/2023)    albuterol (VENTOLIN HFA) 90 mcg/actuation inhaler Inhale 2 puffs into the lungs every 6 (six) hours as needed for Wheezing or Shortness of Breath. Rescue (Patient not taking: Reported on 6/8/2023)    naloxone (NARCAN) 4 mg/actuation Spry 4mg by nasal route as needed for opioid overdose; may repeat every 2-3 minutes in alternating nostrils until medical help arrives. Call 911     Antibiotics (From admission, onward)      Start     Stop Route " Frequency Ordered    06/09/23 1030  ertapenem (INVANZ) 1 g in sodium chloride 0.9 % 100 mL IVPB (MB+)         -- IV Every 24 hours (non-standard times) 06/09/23 0923          Antifungals (From admission, onward)      None          Antivirals (From admission, onward)      None             Immunization History   Administered Date(s) Administered    COVID-19 Vaccine 11/30/2022       Family History       Problem Relation (Age of Onset)    Breast cancer Mother, Sister, Maternal Aunt    Colon cancer Mother, Maternal Aunt    Diabetes Mother    Hypertension Sister, Brother    Lung cancer Sister          Social History     Socioeconomic History    Marital status:    Tobacco Use    Smoking status: Never     Passive exposure: Never    Smokeless tobacco: Never   Substance and Sexual Activity    Alcohol use: Not Currently    Drug use: Never    Sexual activity: Not Currently     Partners: Male     Review of Systems   Constitutional:  Negative for chills, diaphoresis and fever.   HENT:  Negative for rhinorrhea and sore throat.    Respiratory:  Negative for cough and shortness of breath.    Cardiovascular:  Negative for chest pain and leg swelling.   Gastrointestinal:  Negative for abdominal pain, diarrhea, nausea and vomiting.   Genitourinary:  Negative for dysuria and hematuria.   Musculoskeletal:  Negative for arthralgias and myalgias.   Skin:  Negative for rash.   Neurological:  Negative for headaches.   Objective:     Vital Signs (Most Recent):  Temp: 98.9 °F (37.2 °C) (06/09/23 1916)  Pulse: 104 (06/09/23 1916)  Resp: 16 (06/09/23 2056)  BP: 127/70 (06/09/23 1916)  SpO2: 99 % (06/09/23 1916) Vital Signs (24h Range):  Temp:  [98 °F (36.7 °C)-98.9 °F (37.2 °C)] 98.9 °F (37.2 °C)  Pulse:  [] 104  Resp:  [16-20] 16  SpO2:  [94 %-99 %] 99 %  BP: (118-152)/(62-73) 127/70     Weight: 63.7 kg (140 lb 6.9 oz)  Body mass index is 24.11 kg/m².    Estimated Creatinine Clearance: 76.4 mL/min (based on SCr of 0.6  mg/dL).     Physical Exam  Vitals reviewed.   Constitutional:       General: She is not in acute distress.     Appearance: She is well-developed. She is not diaphoretic.   HENT:      Head: Normocephalic and atraumatic.      Nose: Nose normal.   Eyes:      Conjunctiva/sclera: Conjunctivae normal.   Pulmonary:      Effort: Pulmonary effort is normal. No respiratory distress.   Abdominal:      General: Abdomen is flat. There is no distension.   Musculoskeletal:      Cervical back: Normal range of motion.      Right lower leg: No edema.      Left lower leg: No edema.   Skin:     General: Skin is warm and dry.      Findings: No erythema or rash.   Neurological:      Mental Status: She is alert.   Psychiatric:         Behavior: Behavior normal.        Significant Labs: All pertinent labs within the past 24 hours have been reviewed.    Significant Imaging: I have reviewed all pertinent imaging results/findings within the past 24 hours.

## 2023-06-11 VITALS
TEMPERATURE: 98 F | DIASTOLIC BLOOD PRESSURE: 84 MMHG | SYSTOLIC BLOOD PRESSURE: 126 MMHG | HEIGHT: 64 IN | RESPIRATION RATE: 22 BRPM | OXYGEN SATURATION: 100 % | WEIGHT: 140.44 LBS | HEART RATE: 109 BPM | BODY MASS INDEX: 23.98 KG/M2

## 2023-06-11 LAB
ALBUMIN SERPL BCP-MCNC: 2.1 G/DL (ref 3.5–5.2)
ALP SERPL-CCNC: 84 U/L (ref 55–135)
ALT SERPL W/O P-5'-P-CCNC: 19 U/L (ref 10–44)
ANION GAP SERPL CALC-SCNC: 7 MMOL/L (ref 8–16)
ANISOCYTOSIS BLD QL SMEAR: SLIGHT
AST SERPL-CCNC: 36 U/L (ref 10–40)
BACTERIA UR CULT: ABNORMAL
BASOPHILS # BLD AUTO: ABNORMAL K/UL (ref 0–0.2)
BASOPHILS NFR BLD: 0 % (ref 0–1.9)
BILIRUB SERPL-MCNC: 0.9 MG/DL (ref 0.1–1)
BUN SERPL-MCNC: 3 MG/DL (ref 8–23)
CALCIUM SERPL-MCNC: 8.8 MG/DL (ref 8.7–10.5)
CHLORIDE SERPL-SCNC: 111 MMOL/L (ref 95–110)
CO2 SERPL-SCNC: 21 MMOL/L (ref 23–29)
CREAT SERPL-MCNC: 0.6 MG/DL (ref 0.5–1.4)
DIFFERENTIAL METHOD: ABNORMAL
EOSINOPHIL # BLD AUTO: ABNORMAL K/UL (ref 0–0.5)
EOSINOPHIL NFR BLD: 4 % (ref 0–8)
ERYTHROCYTE [DISTWIDTH] IN BLOOD BY AUTOMATED COUNT: 13.9 % (ref 11.5–14.5)
EST. GFR  (NO RACE VARIABLE): >60 ML/MIN/1.73 M^2
GIANT PLATELETS BLD QL SMEAR: PRESENT
GLUCOSE SERPL-MCNC: 91 MG/DL (ref 70–110)
HCT VFR BLD AUTO: 25.7 % (ref 37–48.5)
HGB BLD-MCNC: 8.2 G/DL (ref 12–16)
HYPOCHROMIA BLD QL SMEAR: ABNORMAL
IMM GRANULOCYTES # BLD AUTO: ABNORMAL K/UL (ref 0–0.04)
IMM GRANULOCYTES NFR BLD AUTO: ABNORMAL % (ref 0–0.5)
LACTATE SERPL-SCNC: 3 MMOL/L (ref 0.5–2.2)
LACTATE SERPL-SCNC: 3.3 MMOL/L (ref 0.5–2.2)
LYMPHOCYTES # BLD AUTO: ABNORMAL K/UL (ref 1–4.8)
LYMPHOCYTES NFR BLD: 50 % (ref 18–48)
MAGNESIUM SERPL-MCNC: 1.7 MG/DL (ref 1.6–2.6)
MCH RBC QN AUTO: 32.2 PG (ref 27–31)
MCHC RBC AUTO-ENTMCNC: 31.9 G/DL (ref 32–36)
MCV RBC AUTO: 101 FL (ref 82–98)
METAMYELOCYTES NFR BLD MANUAL: 1 %
MONOCYTES # BLD AUTO: ABNORMAL K/UL (ref 0.3–1)
MONOCYTES NFR BLD: 10 % (ref 4–15)
MYELOCYTES NFR BLD MANUAL: 1 %
NEUTROPHILS NFR BLD: 33 % (ref 38–73)
NEUTS BAND NFR BLD MANUAL: 1 %
NRBC BLD-RTO: 0 /100 WBC
OVALOCYTES BLD QL SMEAR: ABNORMAL
PLATELET # BLD AUTO: 163 K/UL (ref 150–450)
PLATELET BLD QL SMEAR: ABNORMAL
PMV BLD AUTO: 10.9 FL (ref 9.2–12.9)
POCT GLUCOSE: 145 MG/DL (ref 70–110)
POCT GLUCOSE: 98 MG/DL (ref 70–110)
POIKILOCYTOSIS BLD QL SMEAR: SLIGHT
POLYCHROMASIA BLD QL SMEAR: ABNORMAL
POTASSIUM SERPL-SCNC: 3.8 MMOL/L (ref 3.5–5.1)
PROT SERPL-MCNC: 4.1 G/DL (ref 6–8.4)
RBC # BLD AUTO: 2.55 M/UL (ref 4–5.4)
SODIUM SERPL-SCNC: 139 MMOL/L (ref 136–145)
SPHEROCYTES BLD QL SMEAR: ABNORMAL
WBC # BLD AUTO: 2.33 K/UL (ref 3.9–12.7)

## 2023-06-11 PROCEDURE — 83605 ASSAY OF LACTIC ACID: CPT | Performed by: STUDENT IN AN ORGANIZED HEALTH CARE EDUCATION/TRAINING PROGRAM

## 2023-06-11 PROCEDURE — 85007 BL SMEAR W/DIFF WBC COUNT: CPT | Performed by: STUDENT IN AN ORGANIZED HEALTH CARE EDUCATION/TRAINING PROGRAM

## 2023-06-11 PROCEDURE — 85027 COMPLETE CBC AUTOMATED: CPT | Performed by: STUDENT IN AN ORGANIZED HEALTH CARE EDUCATION/TRAINING PROGRAM

## 2023-06-11 PROCEDURE — 80053 COMPREHEN METABOLIC PANEL: CPT | Performed by: STUDENT IN AN ORGANIZED HEALTH CARE EDUCATION/TRAINING PROGRAM

## 2023-06-11 PROCEDURE — 83605 ASSAY OF LACTIC ACID: CPT | Mod: 91 | Performed by: STUDENT IN AN ORGANIZED HEALTH CARE EDUCATION/TRAINING PROGRAM

## 2023-06-11 PROCEDURE — 83735 ASSAY OF MAGNESIUM: CPT

## 2023-06-11 PROCEDURE — 63600175 PHARM REV CODE 636 W HCPCS

## 2023-06-11 PROCEDURE — 63600175 PHARM REV CODE 636 W HCPCS: Performed by: STUDENT IN AN ORGANIZED HEALTH CARE EDUCATION/TRAINING PROGRAM

## 2023-06-11 PROCEDURE — 99239 HOSP IP/OBS DSCHRG MGMT >30: CPT | Mod: ,,, | Performed by: STUDENT IN AN ORGANIZED HEALTH CARE EDUCATION/TRAINING PROGRAM

## 2023-06-11 PROCEDURE — 99239 PR HOSPITAL DISCHARGE DAY,>30 MIN: ICD-10-PCS | Mod: ,,, | Performed by: STUDENT IN AN ORGANIZED HEALTH CARE EDUCATION/TRAINING PROGRAM

## 2023-06-11 PROCEDURE — 25000003 PHARM REV CODE 250: Performed by: STUDENT IN AN ORGANIZED HEALTH CARE EDUCATION/TRAINING PROGRAM

## 2023-06-11 RX ORDER — SODIUM CHLORIDE, SODIUM LACTATE, POTASSIUM CHLORIDE, CALCIUM CHLORIDE 600; 310; 30; 20 MG/100ML; MG/100ML; MG/100ML; MG/100ML
INJECTION, SOLUTION INTRAVENOUS CONTINUOUS
Status: DISCONTINUED | OUTPATIENT
Start: 2023-06-11 | End: 2023-06-12 | Stop reason: HOSPADM

## 2023-06-11 RX ORDER — CIPROFLOXACIN 750 MG/1
750 TABLET, FILM COATED ORAL 2 TIMES DAILY
Qty: 8 TABLET | Refills: 0 | Status: SHIPPED | OUTPATIENT
Start: 2023-06-11 | End: 2023-06-15

## 2023-06-11 RX ADMIN — SODIUM CHLORIDE, POTASSIUM CHLORIDE, SODIUM LACTATE AND CALCIUM CHLORIDE: 600; 310; 30; 20 INJECTION, SOLUTION INTRAVENOUS at 09:06

## 2023-06-11 RX ADMIN — FAMOTIDINE 20 MG: 20 TABLET ORAL at 08:06

## 2023-06-11 RX ADMIN — ERTAPENEM 1 G: 1 INJECTION INTRAMUSCULAR; INTRAVENOUS at 10:06

## 2023-06-11 RX ADMIN — ATORVASTATIN CALCIUM 40 MG: 40 TABLET, FILM COATED ORAL at 08:06

## 2023-06-11 RX ADMIN — DRONABINOL 2.5 MG: 2.5 CAPSULE ORAL at 06:06

## 2023-06-11 RX ADMIN — DRONABINOL 2.5 MG: 2.5 CAPSULE ORAL at 04:06

## 2023-06-11 RX ADMIN — ASPIRIN 81 MG: 81 TABLET, COATED ORAL at 08:06

## 2023-06-11 NOTE — PLAN OF CARE
Patient discharged this evening home.  contacted and a wheelchair is being delivered to patient's home. Patient and daughter instructed on patient's home medications. All questions answered at bedside.       Problem: Adult Inpatient Plan of Care  Goal: Plan of Care Review  Outcome: Met  Goal: Patient-Specific Goal (Individualized)  Outcome: Met  Goal: Absence of Hospital-Acquired Illness or Injury  Outcome: Met  Goal: Optimal Comfort and Wellbeing  Outcome: Met  Goal: Readiness for Transition of Care  Outcome: Met     Problem: Diabetes Comorbidity  Goal: Blood Glucose Level Within Targeted Range  Outcome: Met     Problem: Skin Injury Risk Increased  Goal: Skin Health and Integrity  Outcome: Met     Problem: Impaired Wound Healing  Goal: Optimal Wound Healing  Outcome: Met

## 2023-06-11 NOTE — DISCHARGE SUMMARY
[facility] ([facility])  Hematology/Oncology  Discharge Summary      Patient Name: Kaity Masterson  MRN: [id number]  Admission Date: 6/8/2023  Hospital Length of Stay: 2 days  Discharge Date and Time:  06/11/2023 1:46 PM  Attending Physician: Robina John MD   Discharging Provider: NELLY AMEZCUA MD  Primary Care Provider: Marcia Borrego NP    HPI: Ms. Kaity Masterson is a 67 year old woman with pancreatic cancer, DM II (last A1C 10.5), HTN, endometrial cancer who presented from clinic. She was hypotensive and O2 sats could not be obtained. She reports having some episodes of dizziness and lightheadedness at home and shortness of breath with exertion over the past few days. During these episodes she asks family for assistance. Otherwise she says that she feels fine and reports no HA, chest pain, SOB, N/V/D, LE swelling.     In the ED, she was given 2L IVF and started on CTX given a UA concerning for UTI. Afebrile and hemodynamically stable with normo to hypertension on RA. Lactate 7.1 with decrease to 6.6 after 2L IVF, blood and urine cultures in process. CXR without acute processes. Admitted to Med Onc service for UTI treatment and management of elevated lactate.     Oncologic History (per Dr. Casey's note):  1.Ms Masterson is a 66 yo woman with HTN, T2DM, pathologic T1a endometrial cancer s/p surgery on 3/8/23, PUD, fatty liver, osteoporosis, who initially saw me on 4/5/23 for further management of pancreatic adenocarcinoma. She was admitted to ICU for DKA in March 2023. CT A/P 3/22/23 showed  a 3.4 cm mass centered in the pancreatic head/uncinate process with associated downstream dilatation of the main pancreatic duct.  Imaging findings are highly concerning for pancreatic adenocarcinoma.  Adjacent prominent peripancreatic lymph nodes are seen.  There is some stranding of the fat about the SMV for approximately 180°.  The portal vein, SMV and splenic vein are patent. Fatty infiltration of the liver. CT  chest 3/20/23: No evidence for pulmonary embolus or other acute intrathoracic process. Hepatic steatosis.  Cholecystectomy. She underwent upper EUS biopsy on 3/31/23. Pathology showed adenocarcinoma, MMR intact. She presents today for further evaluation. Strong family history of breast cancer, endometrial cancer and colon cancer. She is a little weak after surgery but active at home and is still working.   Family history:  Mother: Breast cancer (middle age)  Aunt maternal (2): cancer (unknown)  Sister (3): breast cancer, lung cancer (smoking). Other sister: colon cancer (older diagnosis 60's). Third sister (breast cancer) and endometrial cancer  Brother:  finger bone cancer  Discussed perioperative FOLFIRINOX  2. FOLFIRINOX to be started on 4/24/23. Bilirubin was elevated. Started FOLFOX on 4/24/23. Patient was very sick after chemo, hospitalized for obstructive jaundice, s/p ERCP with stent placement. On visit 5/8/23, patient decided not to take any more chemo and opted for hospice. She later changed her mind and resumed FOLFOX cycle 2 on 5/25/23.       * No surgery found *     Hospital Course: Patient admitted to medical oncology for management. UA suspicious for infection and urine cx with GNR, Ertapenem started and ID consulted given hx of Klebsiella ESBL UTI. Urine cx again growing Klebsiella ESBL. ID recommends PO Cipro for discharge. Patient stable for discharge on oral abx.    Physical Exam  HENT:      Head: Normocephalic and atraumatic.      Right Ear: External ear normal.      Left Ear: External ear normal.      Nose: Nose normal.      Mouth/Throat:      Mouth: Mucous membranes are moist.      Pharynx: Oropharynx is clear.   Eyes:      General: No scleral icterus.     Extraocular Movements: Extraocular movements intact.      Conjunctiva/sclera: Conjunctivae normal.   Cardiovascular:      Rate and Rhythm: Normal rate and regular rhythm.   Pulmonary:      Effort: Pulmonary effort is normal.      Breath  sounds: Normal breath sounds. No wheezing or rales.   Abdominal:      General: Abdomen is flat. Bowel sounds are normal. There is no distension.      Palpations: Abdomen is soft.      Tenderness: There is no abdominal tenderness.   Musculoskeletal:         General: Normal range of motion.      Cervical back: Normal range of motion.   Skin:     General: Skin is warm and dry.   Neurological:      General: No focal deficit present.      Mental Status: She is alert.   Psychiatric:         Mood and Affect: Mood normal.         Behavior: Behavior normal.     Goals of Care Treatment Preferences:  Code Status: Full Code      Consults:   Consults (From admission, onward)        Status Ordering Provider     Inpatient consult to Infectious Diseases  Once        Provider:  (Not yet assigned)    Completed SARAH BETH LORENZANA     IP consult to case management  Once        Provider:  (Not yet assigned)    Acknowledged TRANG TAPIA     Inpatient consult to Hematology/Oncology  Once        Provider:  (Not yet assigned)    Completed BENOIT CARR          Significant Diagnostic Studies: Labs: All labs within the past 24 hours have been reviewed  Microbiology:   Urine Culture    Lab Results   Component Value Date    LABURIN (A) 06/08/2023     KLEBSIELLA PNEUMONIAE ESBL  > 100,000 cfu/ml  No other significant isolate         Pending Diagnostic Studies:     Procedure Component Value Units Date/Time    Lactic acid, plasma [[id number]]     Order Status: Sent Lab Status: No result     Specimen: Blood         Final Active Diagnoses:    Diagnosis Date Noted POA    PRINCIPAL PROBLEM:  Acute cystitis without hematuria [N30.00] 05/03/2023 Yes    Elevated lactic acid level [R79.89] 06/08/2023 Yes    Type 2 diabetes mellitus without complications [E11.9]  Yes    Malignant neoplasm of head of pancreas [C25.0] 04/05/2023 Yes    Primary hypertension [I10] 03/21/2023 Yes     Chronic      Problems Resolved During this Admission:      Discharged  "Condition: stable    Disposition: Home or Self Care    Follow Up:    Patient Instructions:      WHEELCHAIR FOR HOME USE   Order Comments: "transport wheelchair     Order Specific Question Answer Comments   Hours in W/C per day: 12    Type of Wheelchair: Standard transport wheelchair   Size(Width): 18"(STD adult)    Leg Support: STD footrests    Lap Belt: Velcro    Accessories: Anti-tippers    Cushion: Basic    Reclining Back No    Height: 5' 4" (1.626 m)    Weight: 63.7 kg (140 lb 6.9 oz)    Length of need (1-99 months): 99    Please check all that apply: Patient's upper body strength is sufficient for propulsion.    Please check all that apply: Patient mobility limitations cannot be sufficiently resolved by the use of other ambulatory therapies.    Please check all that apply: The patient requires the use of a w/c for activities of daily living within the Home.      Medications:  Reconciled Home Medications:      Medication List      START taking these medications    ciprofloxacin HCl 750 MG tablet  Commonly known as: CIPRO  Take 1 tablet (750 mg total) by mouth 2 (two) times daily. for 4 days        CONTINUE taking these medications    alendronate 35 MG tablet  Commonly known as: FOSAMAX  Take 35 mg by mouth every 7 days.     aspirin 81 MG EC tablet  Commonly known as: ECOTRIN  Take 1 tablet by mouth once daily.     atenoloL 50 MG tablet  Commonly known as: TENORMIN  Take 50 mg by mouth once daily. am     droNABinol 2.5 MG capsule  Commonly known as: MARINOL  Take 1 capsule (2.5 mg total) by mouth 2 (two) times daily before meals.     esomeprazole 40 MG capsule  Commonly known as: NEXIUM  esomeprazole magnesium 40 mg capsule,delayed release   TAKE 1 CAPSULE BY MOUTH EVERY DAY     famotidine 20 MG tablet  Commonly known as: PEPCID  Take 20 mg by mouth 2 (two) times daily.     insulin lispro 100 unit/mL injection  Inject 6-10 Units into the skin 3 (three) times daily before meals.     LIDOcaine-prilocaine " "cream  Commonly known as: EMLA  Apply topically as needed.     losartan 100 MG tablet  Commonly known as: COZAAR  Take 100 mg by mouth once daily.     metFORMIN 500 MG tablet  Commonly known as: GLUCOPHAGE  Take 500 mg by mouth daily with breakfast.     morphine 15 MG 12 hr tablet  Commonly known as: MS CONTIN  Take 1 tablet (15 mg total) by mouth nightly.     naloxone 4 mg/actuation Spry  Commonly known as: NARCAN  4mg by nasal route as needed for opioid overdose; may repeat every 2-3 minutes in alternating nostrils until medical help arrives. Call 911     ondansetron 8 MG Tbdl  Commonly known as: ZOFRAN-ODT  Take 1 tablet (8 mg total) by mouth every 8 (eight) hours as needed (nausea).     ONETOUCH DELICA LANCETS 33 gauge Misc  Generic drug: lancets  Apply topically daily as needed.     ONETOUCH ULTRA TEST Strp  Generic drug: blood sugar diagnostic  TEST DAILY AND AS NEEDED     ONETOUCH ULTRA2 METER Misc  Generic drug: blood-glucose meter  TEST DAILY AND AS NEEDED     oxyCODONE 5 MG immediate release tablet  Commonly known as: ROXICODONE  Take 1 tablet (5 mg total) by mouth every 6 (six) hours as needed for Pain.     pen needle, diabetic 31 gauge x 3/16" Ndle  30 each by Misc.(Non-Drug; Combo Route) route every evening.     pramipexole 0.25 MG tablet  Commonly known as: MIRAPEX  Take 0.25 mg by mouth every evening.     promethazine 25 MG tablet  Commonly known as: PHENERGAN  Take 1 tablet (25 mg total) by mouth every 6 (six) hours as needed for Nausea.     rosuvastatin 10 MG tablet  Commonly known as: CRESTOR  Take 10 mg by mouth every evening.        ASK your doctor about these medications    albuterol 90 mcg/actuation inhaler  Commonly known as: VENTOLIN HFA  Inhale 2 puffs into the lungs every 6 (six) hours as needed for Wheezing or Shortness of Breath. Rescue     ARTHRITIS PAIN RELIEF (ACETAM) 650 MG Tbsr  Generic drug: acetaminophen  Take 1 tablet (650 mg total) by mouth every 6 (six) hours. Alternate with " ibuprofen so you are taking something every 6 hours            NELLY AMEZCUA MD  Hematology/Oncology  [facility] ([facility])

## 2023-06-11 NOTE — PLAN OF CARE
No acute events overnight. VSS and remained afebrile. Lactate drawn q4; trending downward from 3.3 to 3.0. Accuchecks ACHS. Telemetry monitored. Pt remained free from injury and falls. Nonskid footwear on with bed in lowest position and locked. Instructed to call prior to getting OOB. Call light within reach and verbalized understanding. Will continue to monitor pt.

## 2023-06-11 NOTE — PLAN OF CARE
Continuous fluids of LR started at 125ml/hr. Q4H lactic acid levels ordered. Lactic acid levels this shift were 2.6, 3.5, and 3.3. Patient denied pain this shift. Poor PO intake today.      Problem: Adult Inpatient Plan of Care  Goal: Plan of Care Review  Outcome: Ongoing, Progressing  Goal: Patient-Specific Goal (Individualized)  Outcome: Ongoing, Progressing

## 2023-06-11 NOTE — PLAN OF CARE
Urine culture with ESBL E coli sensitive to cipro  Okay to discharge on cipro 750 mg PO BID x 4 additional days for total 7 day course    ID will sign off

## 2023-06-11 NOTE — SUBJECTIVE & OBJECTIVE
Interval History: Patient reports that she feels well this morning. Has had no dysuria or urgency.     Oncology Treatment Plan:   OP PANC mFOLFIRINOX Q2W    Medications:  Continuous Infusions:   lactated ringers 100 mL/hr at 06/11/23 0911     Scheduled Meds:   aspirin  81 mg Oral Daily    atorvastatin  40 mg Oral Daily    droNABinol  2.5 mg Oral BID AC    enoxparin  40 mg Subcutaneous Daily    ertapenem (INVANZ) IVPB  1 g Intravenous Q24H    famotidine  20 mg Oral BID    polyethylene glycol  17 g Oral Daily    pramipexole  0.25 mg Oral QHS     PRN Meds:albuterol, glucagon (human recombinant), naloxone, ondansetron, oxyCODONE, sodium chloride 0.9%     Review of Systems   Constitutional:  Negative for chills and fever.   HENT:  Negative for rhinorrhea and sore throat.    Eyes:  Negative for visual disturbance.   Respiratory:  Negative for cough and shortness of breath.    Cardiovascular:  Negative for chest pain and leg swelling.   Gastrointestinal:  Negative for diarrhea, nausea and vomiting.   Genitourinary:  Negative for dysuria.   Musculoskeletal:  Negative for arthralgias and myalgias.   Skin:  Negative for rash.   Neurological:  Negative for light-headedness and headaches.   Psychiatric/Behavioral:  Negative for agitation and confusion.    Objective:     Vital Signs (Most Recent):  Temp: 98.3 °F (36.8 °C) (06/11/23 1230)  Pulse: 93 (06/11/23 1230)  Resp: 18 (06/11/23 1230)  BP: (!) 155/81 (06/11/23 1230)  SpO2: 99 % (06/11/23 1230) Vital Signs (24h Range):  Temp:  [98.1 °F (36.7 °C)-98.5 °F (36.9 °C)] 98.3 °F (36.8 °C)  Pulse:  [] 93  Resp:  [16-18] 18  SpO2:  [94 %-99 %] 99 %  BP: (138-157)/(70-81) 155/81     Weight: 63.7 kg (140 lb 6.9 oz)  Body mass index is 24.11 kg/m².  Body surface area is 1.7 meters squared.      Intake/Output Summary (Last 24 hours) at 6/11/2023 1333  Last data filed at 6/11/2023 1049  Gross per 24 hour   Intake 1605.52 ml   Output --   Net 1605.52 ml          Physical Exam  HENT:       Head: Normocephalic and atraumatic.      Right Ear: External ear normal.      Left Ear: External ear normal.      Nose: Nose normal.      Mouth/Throat:      Mouth: Mucous membranes are moist.      Pharynx: Oropharynx is clear.   Eyes:      General: No scleral icterus.     Extraocular Movements: Extraocular movements intact.      Conjunctiva/sclera: Conjunctivae normal.   Cardiovascular:      Rate and Rhythm: Normal rate and regular rhythm.   Pulmonary:      Effort: Pulmonary effort is normal.      Breath sounds: Normal breath sounds. No wheezing or rales.   Abdominal:      General: Abdomen is flat. Bowel sounds are normal. There is no distension.      Palpations: Abdomen is soft.      Tenderness: There is no abdominal tenderness.   Musculoskeletal:         General: Normal range of motion.      Cervical back: Normal range of motion.   Skin:     General: Skin is warm and dry.   Neurological:      General: No focal deficit present.      Mental Status: She is alert.   Psychiatric:         Mood and Affect: Mood normal.         Behavior: Behavior normal.        Significant Labs:   All pertinent labs from the last 24 hours have been reviewed.    Diagnostic Results:  I have reviewed all pertinent imaging results/findings within the past 24 hours.

## 2023-06-11 NOTE — NURSING
Patient fell this afternoon around 1430. Patient was disconnected from IV fluids and telemetry to get ready for discharge. Patient was up independent in the room. She was reaching down to get her underwear from a cabinet drawer, lost her balance, and fell. She fell onto her buttocks. She did not hit her head. She denies lightheadedness/dizziness. She reports tailbone pain 5/10. VSS post fall. Dr. Myles notified and examined patient. Sacral and coccyx xray ordered and was negative for fracture.

## 2023-06-11 NOTE — PROGRESS NOTES
[facility] - Oncology ([facility])  Hematology/Oncology  Progress Note    Patient Name: Kaity Masterson  Admission Date: 6/8/2023  Hospital Length of Stay: 2 days  Code Status: Full Code     Subjective:     HPI:  Ms. Kaity Masterson is a 67 year old woman with pancreatic cancer, DM II (last A1C 10.5), HTN, endometrial cancer who presented from clinic. She was hypotensive and O2 sats could not be obtained. She reports having some episodes of dizziness and lightheadedness at home and shortness of breath with exertion over the past few days. During these episodes she asks family for assistance. Otherwise she says that she feels fine and reports no HA, chest pain, SOB, N/V/D, LE swelling.     In the ED, she was given 2L IVF and started on CTX given a UA concerning for UTI. Afebrile and hemodynamically stable with normo to hypertension on RA. Lactate 7.1 with decrease to 6.6 after 2L IVF, blood and urine cultures in process. CXR without acute processes. Admitted to Med Onc service for UTI treatment and management of elevated lactate.     Oncologic History (per Dr. Casey's note):  1.Ms Masterson is a 66 yo woman with HTN, T2DM, pathologic T1a endometrial cancer s/p surgery on 3/8/23, PUD, fatty liver, osteoporosis, who initially saw me on 4/5/23 for further management of pancreatic adenocarcinoma. She was admitted to ICU for DKA in March 2023. CT A/P 3/22/23 showed  a 3.4 cm mass centered in the pancreatic head/uncinate process with associated downstream dilatation of the main pancreatic duct.  Imaging findings are highly concerning for pancreatic adenocarcinoma.  Adjacent prominent peripancreatic lymph nodes are seen.  There is some stranding of the fat about the SMV for approximately 180°.  The portal vein, SMV and splenic vein are patent. Fatty infiltration of the liver. CT chest 3/20/23: No evidence for pulmonary embolus or other acute intrathoracic process. Hepatic steatosis.  Cholecystectomy. She underwent upper EUS biopsy  on 3/31/23. Pathology showed adenocarcinoma, MMR intact. She presents today for further evaluation. Strong family history of breast cancer, endometrial cancer and colon cancer. She is a little weak after surgery but active at home and is still working.   Family history:  Mother: Breast cancer (middle age)  Aunt maternal (2): cancer (unknown)  Sister (3): breast cancer, lung cancer (smoking). Other sister: colon cancer (older diagnosis 60's). Third sister (breast cancer) and endometrial cancer  Brother:  finger bone cancer  Discussed perioperative FOLFIRINOX  2. FOLFIRINOX to be started on 4/24/23. Bilirubin was elevated. Started FOLFOX on 4/24/23. Patient was very sick after chemo, hospitalized for obstructive jaundice, s/p ERCP with stent placement. On visit 5/8/23, patient decided not to take any more chemo and opted for hospice. She later changed her mind and resumed FOLFOX cycle 2 on 5/25/23.       Interval History: Patient reports that she feels well this morning. Has had no dysuria or urgency.     Oncology Treatment Plan:   OP PANC mFOLFIRINOX Q2W    Medications:  Continuous Infusions:   lactated ringers 100 mL/hr at 06/11/23 0911     Scheduled Meds:   aspirin  81 mg Oral Daily    atorvastatin  40 mg Oral Daily    droNABinol  2.5 mg Oral BID AC    enoxparin  40 mg Subcutaneous Daily    ertapenem (INVANZ) IVPB  1 g Intravenous Q24H    famotidine  20 mg Oral BID    polyethylene glycol  17 g Oral Daily    pramipexole  0.25 mg Oral QHS     PRN Meds:albuterol, glucagon (human recombinant), naloxone, ondansetron, oxyCODONE, sodium chloride 0.9%     Review of Systems   Constitutional:  Negative for chills and fever.   HENT:  Negative for rhinorrhea and sore throat.    Eyes:  Negative for visual disturbance.   Respiratory:  Negative for cough and shortness of breath.    Cardiovascular:  Negative for chest pain and leg swelling.   Gastrointestinal:  Negative for diarrhea, nausea and vomiting.   Genitourinary:   Negative for dysuria.   Musculoskeletal:  Negative for arthralgias and myalgias.   Skin:  Negative for rash.   Neurological:  Negative for light-headedness and headaches.   Psychiatric/Behavioral:  Negative for agitation and confusion.    Objective:     Vital Signs (Most Recent):  Temp: 98.3 °F (36.8 °C) (06/11/23 1230)  Pulse: 93 (06/11/23 1230)  Resp: 18 (06/11/23 1230)  BP: (!) 155/81 (06/11/23 1230)  SpO2: 99 % (06/11/23 1230) Vital Signs (24h Range):  Temp:  [98.1 °F (36.7 °C)-98.5 °F (36.9 °C)] 98.3 °F (36.8 °C)  Pulse:  [] 93  Resp:  [16-18] 18  SpO2:  [94 %-99 %] 99 %  BP: (138-157)/(70-81) 155/81     Weight: 63.7 kg (140 lb 6.9 oz)  Body mass index is 24.11 kg/m².  Body surface area is 1.7 meters squared.      Intake/Output Summary (Last 24 hours) at 6/11/2023 1333  Last data filed at 6/11/2023 1049  Gross per 24 hour   Intake 1605.52 ml   Output --   Net 1605.52 ml          Physical Exam  HENT:      Head: Normocephalic and atraumatic.      Right Ear: External ear normal.      Left Ear: External ear normal.      Nose: Nose normal.      Mouth/Throat:      Mouth: Mucous membranes are moist.      Pharynx: Oropharynx is clear.   Eyes:      General: No scleral icterus.     Extraocular Movements: Extraocular movements intact.      Conjunctiva/sclera: Conjunctivae normal.   Cardiovascular:      Rate and Rhythm: Normal rate and regular rhythm.   Pulmonary:      Effort: Pulmonary effort is normal.      Breath sounds: Normal breath sounds. No wheezing or rales.   Abdominal:      General: Abdomen is flat. Bowel sounds are normal. There is no distension.      Palpations: Abdomen is soft.      Tenderness: There is no abdominal tenderness.   Musculoskeletal:         General: Normal range of motion.      Cervical back: Normal range of motion.   Skin:     General: Skin is warm and dry.   Neurological:      General: No focal deficit present.      Mental Status: She is alert.   Psychiatric:         Mood and Affect:  Mood normal.         Behavior: Behavior normal.        Significant Labs:   All pertinent labs from the last 24 hours have been reviewed.    Diagnostic Results:  I have reviewed all pertinent imaging results/findings within the past 24 hours.    Assessment/Plan:     * Acute cystitis without hematuria  Patient presented from clinic with hypotension, improved with IVF. UA suggestive of UTI, urine cx with Klebsiella pneumoniae ESBL.  S/p CTX in ED.    -Ertapenem 1g q24h per ID  -ID on board, appreciate recs       Elevated lactic acid level  Patient noted to have elevated lactic acid up to 7. Possibly due to UTI and leucovorin.  Trend lactic acid.    Type 2 diabetes mellitus without complications  On insulin aspart TIDWM at home.  POCT glucose checks AC/HS  BG goal 140-180    Malignant neoplasm of head of pancreas  Follows with Dr. Casey. Has received 2 cycles of FOLFOX. Close follow up upon discharge.    Primary hypertension  Holding home meds in setting of normotension and infection. Resume when appropriate.             NELLY AMEZCUA MD  Hematology/Oncology  [facility] - Oncology ([facility])

## 2023-06-11 NOTE — ASSESSMENT & PLAN NOTE
Patient presented from clinic with hypotension, improved with IVF. UA suggestive of UTI, urine cx with Klebsiella pneumoniae ESBL.  S/p CTX in ED.    -Ertapenem 1g q24h per ID  -ID on board, appreciate recs

## 2023-06-12 NOTE — PROGRESS NOTES
Received call to the on call  around 6 pm. Patient being discharged and in need of wheelchair. Previous wheelchair order was incorrect so order was no able to be processed and authorization obtained. Previous discussion with patient and daughter in law was in regards to getting transport chair vs. Standard wheelchair. Called the daughter in law Radha to discuss. She said they still would like to have the transport chair. Discussed that it may not be covered and gave her the price that was quoted by [facility] which was around $148. She said patient could not afford and would need assistance. Let her know that the standard chair would be covered. They said they do not need the chair to go home tonight and would be fine if they could either pick it up or have it delivered to the home. She will look at the standard chair and transport chair to decide which one they want. She stated that they would like to pursue the transport chair. She will look at both and contact me only if they want to change to the standard.

## 2023-06-13 LAB
BACTERIA BLD CULT: NORMAL
BACTERIA BLD CULT: NORMAL

## 2023-06-22 ENCOUNTER — LAB VISIT (OUTPATIENT)
Dept: LAB | Facility: HOSPITAL | Age: 68
End: 2023-06-22
Payer: COMMERCIAL

## 2023-06-22 ENCOUNTER — OFFICE VISIT (OUTPATIENT)
Dept: HEMATOLOGY/ONCOLOGY | Facility: CLINIC | Age: 68
End: 2023-06-22
Payer: COMMERCIAL

## 2023-06-22 ENCOUNTER — INFUSION (OUTPATIENT)
Dept: INFUSION THERAPY | Facility: HOSPITAL | Age: 68
End: 2023-06-22
Payer: COMMERCIAL

## 2023-06-22 VITALS
BODY MASS INDEX: 24.17 KG/M2 | HEART RATE: 75 BPM | HEIGHT: 64 IN | RESPIRATION RATE: 18 BRPM | WEIGHT: 141.56 LBS | SYSTOLIC BLOOD PRESSURE: 120 MMHG | OXYGEN SATURATION: 100 % | TEMPERATURE: 98 F | DIASTOLIC BLOOD PRESSURE: 70 MMHG

## 2023-06-22 VITALS
TEMPERATURE: 99 F | OXYGEN SATURATION: 100 % | HEIGHT: 64 IN | BODY MASS INDEX: 24.17 KG/M2 | SYSTOLIC BLOOD PRESSURE: 107 MMHG | RESPIRATION RATE: 18 BRPM | WEIGHT: 141.56 LBS | DIASTOLIC BLOOD PRESSURE: 56 MMHG | HEART RATE: 77 BPM

## 2023-06-22 DIAGNOSIS — C25.0 MALIGNANT NEOPLASM OF HEAD OF PANCREAS: Primary | ICD-10-CM

## 2023-06-22 DIAGNOSIS — C25.0 MALIGNANT NEOPLASM OF HEAD OF PANCREAS: ICD-10-CM

## 2023-06-22 DIAGNOSIS — T45.1X5A IMMUNODEFICIENCY SECONDARY TO CHEMOTHERAPY: ICD-10-CM

## 2023-06-22 DIAGNOSIS — C80.1 OBSTRUCTIVE JAUNDICE DUE TO CANCER: ICD-10-CM

## 2023-06-22 DIAGNOSIS — D49.9 IMMUNODEFICIENCY SECONDARY TO NEOPLASM: ICD-10-CM

## 2023-06-22 DIAGNOSIS — K83.1 OBSTRUCTIVE JAUNDICE DUE TO CANCER: ICD-10-CM

## 2023-06-22 DIAGNOSIS — Z79.899 IMMUNODEFICIENCY SECONDARY TO CHEMOTHERAPY: ICD-10-CM

## 2023-06-22 DIAGNOSIS — D84.81 IMMUNODEFICIENCY SECONDARY TO NEOPLASM: ICD-10-CM

## 2023-06-22 DIAGNOSIS — R29.898 WEAKNESS OF BOTH LEGS: ICD-10-CM

## 2023-06-22 DIAGNOSIS — E11.9 TYPE 2 DIABETES MELLITUS WITHOUT COMPLICATION, WITH LONG-TERM CURRENT USE OF INSULIN: ICD-10-CM

## 2023-06-22 DIAGNOSIS — C54.1 MALIGNANT NEOPLASM OF ENDOMETRIUM: ICD-10-CM

## 2023-06-22 DIAGNOSIS — D84.821 IMMUNODEFICIENCY SECONDARY TO CHEMOTHERAPY: ICD-10-CM

## 2023-06-22 DIAGNOSIS — G89.3 CANCER-RELATED PAIN: ICD-10-CM

## 2023-06-22 DIAGNOSIS — E80.6 HYPERBILIRUBINEMIA: ICD-10-CM

## 2023-06-22 DIAGNOSIS — I10 PRIMARY HYPERTENSION: ICD-10-CM

## 2023-06-22 DIAGNOSIS — Z79.4 TYPE 2 DIABETES MELLITUS WITHOUT COMPLICATION, WITH LONG-TERM CURRENT USE OF INSULIN: ICD-10-CM

## 2023-06-22 LAB
ALBUMIN SERPL BCP-MCNC: 2.3 G/DL (ref 3.5–5.2)
ALP SERPL-CCNC: 141 U/L (ref 55–135)
ALT SERPL W/O P-5'-P-CCNC: 27 U/L (ref 10–44)
ANION GAP SERPL CALC-SCNC: 9 MMOL/L (ref 8–16)
AST SERPL-CCNC: 55 U/L (ref 10–40)
BASOPHILS # BLD AUTO: 0.02 K/UL (ref 0–0.2)
BASOPHILS NFR BLD: 0.3 % (ref 0–1.9)
BILIRUB SERPL-MCNC: 1.2 MG/DL (ref 0.1–1)
BUN SERPL-MCNC: 8 MG/DL (ref 8–23)
CALCIUM SERPL-MCNC: 8.9 MG/DL (ref 8.7–10.5)
CANCER AG19-9 SERPL-ACNC: 61.1 U/ML (ref 0–40)
CHLORIDE SERPL-SCNC: 109 MMOL/L (ref 95–110)
CO2 SERPL-SCNC: 22 MMOL/L (ref 23–29)
CREAT SERPL-MCNC: 0.7 MG/DL (ref 0.5–1.4)
DIFFERENTIAL METHOD: ABNORMAL
EOSINOPHIL # BLD AUTO: 0 K/UL (ref 0–0.5)
EOSINOPHIL NFR BLD: 0.5 % (ref 0–8)
ERYTHROCYTE [DISTWIDTH] IN BLOOD BY AUTOMATED COUNT: 14.1 % (ref 11.5–14.5)
EST. GFR  (NO RACE VARIABLE): >60 ML/MIN/1.73 M^2
GLUCOSE SERPL-MCNC: 146 MG/DL (ref 70–110)
HCT VFR BLD AUTO: 30.1 % (ref 37–48.5)
HGB BLD-MCNC: 9.9 G/DL (ref 12–16)
IMM GRANULOCYTES # BLD AUTO: 0.03 K/UL (ref 0–0.04)
IMM GRANULOCYTES NFR BLD AUTO: 0.5 % (ref 0–0.5)
LYMPHOCYTES # BLD AUTO: 1 K/UL (ref 1–4.8)
LYMPHOCYTES NFR BLD: 17.3 % (ref 18–48)
MCH RBC QN AUTO: 32.1 PG (ref 27–31)
MCHC RBC AUTO-ENTMCNC: 32.9 G/DL (ref 32–36)
MCV RBC AUTO: 98 FL (ref 82–98)
MONOCYTES # BLD AUTO: 0.5 K/UL (ref 0.3–1)
MONOCYTES NFR BLD: 8.5 % (ref 4–15)
NEUTROPHILS # BLD AUTO: 4.3 K/UL (ref 1.8–7.7)
NEUTROPHILS NFR BLD: 72.9 % (ref 38–73)
NRBC BLD-RTO: 0 /100 WBC
PLATELET # BLD AUTO: 180 K/UL (ref 150–450)
PMV BLD AUTO: 11.5 FL (ref 9.2–12.9)
POTASSIUM SERPL-SCNC: 4.3 MMOL/L (ref 3.5–5.1)
PROT SERPL-MCNC: 4.6 G/DL (ref 6–8.4)
RBC # BLD AUTO: 3.08 M/UL (ref 4–5.4)
SODIUM SERPL-SCNC: 140 MMOL/L (ref 136–145)
WBC # BLD AUTO: 5.85 K/UL (ref 3.9–12.7)

## 2023-06-22 PROCEDURE — 4010F PR ACE/ARB THEARPY RXD/TAKEN: ICD-10-PCS | Mod: CPTII,S$GLB,, | Performed by: PHYSICIAN ASSISTANT

## 2023-06-22 PROCEDURE — 63600175 PHARM REV CODE 636 W HCPCS: Performed by: INTERNAL MEDICINE

## 2023-06-22 PROCEDURE — 25000003 PHARM REV CODE 250: Performed by: INTERNAL MEDICINE

## 2023-06-22 PROCEDURE — 36415 COLL VENOUS BLD VENIPUNCTURE: CPT | Performed by: INTERNAL MEDICINE

## 2023-06-22 PROCEDURE — 1160F PR REVIEW ALL MEDS BY PRESCRIBER/CLIN PHARMACIST DOCUMENTED: ICD-10-PCS | Mod: CPTII,S$GLB,, | Performed by: PHYSICIAN ASSISTANT

## 2023-06-22 PROCEDURE — 96415 CHEMO IV INFUSION ADDL HR: CPT

## 2023-06-22 PROCEDURE — 96367 TX/PROPH/DG ADDL SEQ IV INF: CPT

## 2023-06-22 PROCEDURE — 3078F DIAST BP <80 MM HG: CPT | Mod: CPTII,S$GLB,, | Performed by: PHYSICIAN ASSISTANT

## 2023-06-22 PROCEDURE — 1159F MED LIST DOCD IN RCRD: CPT | Mod: CPTII,S$GLB,, | Performed by: PHYSICIAN ASSISTANT

## 2023-06-22 PROCEDURE — 99215 PR OFFICE/OUTPT VISIT, EST, LEVL V, 40-54 MIN: ICD-10-PCS | Mod: S$GLB,,, | Performed by: PHYSICIAN ASSISTANT

## 2023-06-22 PROCEDURE — 1159F PR MEDICATION LIST DOCUMENTED IN MEDICAL RECORD: ICD-10-PCS | Mod: CPTII,S$GLB,, | Performed by: PHYSICIAN ASSISTANT

## 2023-06-22 PROCEDURE — 1111F PR DISCHARGE MEDS RECONCILED W/ CURRENT OUTPATIENT MED LIST: ICD-10-PCS | Mod: CPTII,S$GLB,, | Performed by: PHYSICIAN ASSISTANT

## 2023-06-22 PROCEDURE — 3288F FALL RISK ASSESSMENT DOCD: CPT | Mod: CPTII,S$GLB,, | Performed by: PHYSICIAN ASSISTANT

## 2023-06-22 PROCEDURE — 3074F PR MOST RECENT SYSTOLIC BLOOD PRESSURE < 130 MM HG: ICD-10-PCS | Mod: CPTII,S$GLB,, | Performed by: PHYSICIAN ASSISTANT

## 2023-06-22 PROCEDURE — 1111F DSCHRG MED/CURRENT MED MERGE: CPT | Mod: CPTII,S$GLB,, | Performed by: PHYSICIAN ASSISTANT

## 2023-06-22 PROCEDURE — 1160F RVW MEDS BY RX/DR IN RCRD: CPT | Mod: CPTII,S$GLB,, | Performed by: PHYSICIAN ASSISTANT

## 2023-06-22 PROCEDURE — 96375 TX/PRO/DX INJ NEW DRUG ADDON: CPT

## 2023-06-22 PROCEDURE — 1101F PT FALLS ASSESS-DOCD LE1/YR: CPT | Mod: CPTII,S$GLB,, | Performed by: PHYSICIAN ASSISTANT

## 2023-06-22 PROCEDURE — 99215 OFFICE O/P EST HI 40 MIN: CPT | Mod: S$GLB,,, | Performed by: PHYSICIAN ASSISTANT

## 2023-06-22 PROCEDURE — 3074F SYST BP LT 130 MM HG: CPT | Mod: CPTII,S$GLB,, | Performed by: PHYSICIAN ASSISTANT

## 2023-06-22 PROCEDURE — 3046F PR MOST RECENT HEMOGLOBIN A1C LEVEL > 9.0%: ICD-10-PCS | Mod: CPTII,S$GLB,, | Performed by: PHYSICIAN ASSISTANT

## 2023-06-22 PROCEDURE — 3008F PR BODY MASS INDEX (BMI) DOCUMENTED: ICD-10-PCS | Mod: CPTII,S$GLB,, | Performed by: PHYSICIAN ASSISTANT

## 2023-06-22 PROCEDURE — 1126F AMNT PAIN NOTED NONE PRSNT: CPT | Mod: CPTII,S$GLB,, | Performed by: PHYSICIAN ASSISTANT

## 2023-06-22 PROCEDURE — 96361 HYDRATE IV INFUSION ADD-ON: CPT

## 2023-06-22 PROCEDURE — 3046F HEMOGLOBIN A1C LEVEL >9.0%: CPT | Mod: CPTII,S$GLB,, | Performed by: PHYSICIAN ASSISTANT

## 2023-06-22 PROCEDURE — 99999 PR PBB SHADOW E&M-EST. PATIENT-LVL V: CPT | Mod: PBBFAC,,, | Performed by: PHYSICIAN ASSISTANT

## 2023-06-22 PROCEDURE — 99999 PR PBB SHADOW E&M-EST. PATIENT-LVL V: ICD-10-PCS | Mod: PBBFAC,,, | Performed by: PHYSICIAN ASSISTANT

## 2023-06-22 PROCEDURE — 96368 THER/DIAG CONCURRENT INF: CPT

## 2023-06-22 PROCEDURE — 86301 IMMUNOASSAY TUMOR CA 19-9: CPT | Performed by: INTERNAL MEDICINE

## 2023-06-22 PROCEDURE — 3008F BODY MASS INDEX DOCD: CPT | Mod: CPTII,S$GLB,, | Performed by: PHYSICIAN ASSISTANT

## 2023-06-22 PROCEDURE — 1126F PR PAIN SEVERITY QUANTIFIED, NO PAIN PRESENT: ICD-10-PCS | Mod: CPTII,S$GLB,, | Performed by: PHYSICIAN ASSISTANT

## 2023-06-22 PROCEDURE — 3078F PR MOST RECENT DIASTOLIC BLOOD PRESSURE < 80 MM HG: ICD-10-PCS | Mod: CPTII,S$GLB,, | Performed by: PHYSICIAN ASSISTANT

## 2023-06-22 PROCEDURE — 80053 COMPREHEN METABOLIC PANEL: CPT | Performed by: INTERNAL MEDICINE

## 2023-06-22 PROCEDURE — 85025 COMPLETE CBC W/AUTO DIFF WBC: CPT | Performed by: INTERNAL MEDICINE

## 2023-06-22 PROCEDURE — 4010F ACE/ARB THERAPY RXD/TAKEN: CPT | Mod: CPTII,S$GLB,, | Performed by: PHYSICIAN ASSISTANT

## 2023-06-22 PROCEDURE — 1101F PR PT FALLS ASSESS DOC 0-1 FALLS W/OUT INJ PAST YR: ICD-10-PCS | Mod: CPTII,S$GLB,, | Performed by: PHYSICIAN ASSISTANT

## 2023-06-22 PROCEDURE — 96416 CHEMO PROLONG INFUSE W/PUMP: CPT

## 2023-06-22 PROCEDURE — 96413 CHEMO IV INFUSION 1 HR: CPT

## 2023-06-22 PROCEDURE — 3288F PR FALLS RISK ASSESSMENT DOCUMENTED: ICD-10-PCS | Mod: CPTII,S$GLB,, | Performed by: PHYSICIAN ASSISTANT

## 2023-06-22 RX ORDER — SODIUM CHLORIDE 0.9 % (FLUSH) 0.9 %
10 SYRINGE (ML) INJECTION
Status: DISCONTINUED | OUTPATIENT
Start: 2023-06-22 | End: 2023-06-22 | Stop reason: HOSPADM

## 2023-06-22 RX ORDER — DIPHENHYDRAMINE HYDROCHLORIDE 50 MG/ML
50 INJECTION INTRAMUSCULAR; INTRAVENOUS ONCE AS NEEDED
Status: DISCONTINUED | OUTPATIENT
Start: 2023-06-22 | End: 2023-06-22 | Stop reason: HOSPADM

## 2023-06-22 RX ORDER — EPINEPHRINE 0.3 MG/.3ML
0.3 INJECTION SUBCUTANEOUS ONCE AS NEEDED
Status: DISCONTINUED | OUTPATIENT
Start: 2023-06-22 | End: 2023-06-22 | Stop reason: HOSPADM

## 2023-06-22 RX ORDER — ATROPINE SULFATE 0.4 MG/ML
0.4 INJECTION, SOLUTION ENDOTRACHEAL; INTRAMEDULLARY; INTRAMUSCULAR; INTRAVENOUS; SUBCUTANEOUS ONCE AS NEEDED
Status: DISCONTINUED | OUTPATIENT
Start: 2023-06-22 | End: 2023-06-22 | Stop reason: HOSPADM

## 2023-06-22 RX ORDER — HEPARIN 100 UNIT/ML
500 SYRINGE INTRAVENOUS
Status: DISCONTINUED | OUTPATIENT
Start: 2023-06-22 | End: 2023-06-22 | Stop reason: HOSPADM

## 2023-06-22 RX ORDER — SODIUM CHLORIDE 9 MG/ML
INJECTION, SOLUTION INTRAVENOUS
Status: COMPLETED | OUTPATIENT
Start: 2023-06-22 | End: 2023-06-22

## 2023-06-22 RX ADMIN — FLUOROURACIL 4000 MG: 50 INJECTION, SOLUTION INTRAVENOUS at 05:06

## 2023-06-22 RX ADMIN — APREPITANT 130 MG: 130 INJECTION, EMULSION INTRAVENOUS at 02:06

## 2023-06-22 RX ADMIN — LEUCOVORIN CALCIUM 690 MG: 350 INJECTION, POWDER, LYOPHILIZED, FOR SOLUTION INTRAMUSCULAR; INTRAVENOUS at 02:06

## 2023-06-22 RX ADMIN — OXALIPLATIN 147 MG: 5 INJECTION, SOLUTION INTRAVENOUS at 02:06

## 2023-06-22 RX ADMIN — DEXAMETHASONE SODIUM PHOSPHATE 0.25 MG: 4 INJECTION, SOLUTION INTRA-ARTICULAR; INTRALESIONAL; INTRAMUSCULAR; INTRAVENOUS; SOFT TISSUE at 02:06

## 2023-06-22 RX ADMIN — SODIUM CHLORIDE 1000 ML: 9 INJECTION, SOLUTION INTRAVENOUS at 12:06

## 2023-06-22 RX ADMIN — DEXTROSE: 50 INJECTION, SOLUTION INTRAVENOUS at 02:06

## 2023-06-22 NOTE — Clinical Note
Beck Garvin. I have placed  orders for PT. Can we get this set up for her in [place]? Thank you so much

## 2023-06-22 NOTE — PLAN OF CARE
1250  Patient wheeled into treatment area accompanied by daughter.  Transferred from wheel chair to chair with stand by assist.  VSS, assessment done.  Port accessed without issue, flushed, blood return noted, started NS 1 liter bolus as ordered by MD while waiting for Oxaliplatin, Leucovorin, 5FU from pharmacy.  [facility] for safety

## 2023-06-22 NOTE — Clinical Note
Hi team. Can we schedule her for a bilateral lower extremity US in [place], close to her home? I have placed the order. Thank you

## 2023-06-22 NOTE — PLAN OF CARE
1710  Patient completed Oxaliplatin, leucovorin infusion, tolerated well.  CADD connected to port to infuse @ 2.3 cc/hr for 44 hrs so that patient will be able to have completed 100cc of 5FU by 1300 on Saturday 6/24/23.  CADD infusing prior to patient being wheeled off floor by daughter.  Both aware of RTC time of 1300 on Sat. 6/24/23

## 2023-06-22 NOTE — PROGRESS NOTES
PROGRESS NOTE    Subjective:       Patient ID: Kaity Masterson is a 67 y.o. female.    Chief Complaint: follow up for borderline resectable pancreatic adenocarcinoma, MMR-proficient    Diagnosis:  Borderline resectable pancreatic adenocarcinoma, MMR proficient  UGT1A1/DPYD normal metabolizer    Tempus: KRAS G12D. Germline mutation: MUTYH mutation    Oncologic History copied from medical chart:  1.Ms Masterson is a 68 yo woman with HTN, T2DM, pathologic T1a endometrial cancer s/p surgery on 3/8/23, PUD, fatty liver, osteoporosis, who initially saw me on 4/5/23 for further management of pancreatic adenocarcinoma. She was admitted to ICU for DKA in March 2023. CT A/P 3/22/23 showed  a 3.4 cm mass centered in the pancreatic head/uncinate process with associated downstream dilatation of the main pancreatic duct.  Imaging findings are highly concerning for pancreatic adenocarcinoma.  Adjacent prominent peripancreatic lymph nodes are seen.  There is some stranding of the fat about the SMV for approximately 180°.  The portal vein, SMV and splenic vein are patent. Fatty infiltration of the liver. CT chest 3/20/23: No evidence for pulmonary embolus or other acute intrathoracic process. Hepatic steatosis.  Cholecystectomy. She underwent upper EUS biopsy on 3/31/23. Pathology showed adenocarcinoma, MMR intact. She presents today for further evaluation. Strong family history of breast cancer, endometrial cancer and colon cancer. She is a little weak after surgery but active at home and is still working.   Family history:  Mother: Breast cancer (middle age)  Aunt maternal (2): cancer (unknown)  Sister (3): breast cancer, lung cancer (smoking). Other sister: colon cancer (older diagnosis 60's). Third sister (breast cancer) and endometrial cancer  Brother:  finger bone cancer  Discussed perioperative FOLFIRINOX  2. FOLFIRINOX to be started on 4/24/23. Bilirubin was elevated.  Started FOLFOX on 23. Patient was very sick after chemo, hospitalized for obstructive jaundice, s/p ERCP with stent placement. On visit 23, patient decided not to take any more chemo and opted for hospice. She later changed her mind and resumed FOLFOX cycle 2 on 23.     Interval History:   Ms Masterson returns for follow up. She tolerated cycle 2 FOLFOX okay. Previous cycle was delayed due to adverse reaction and presentation during her infusion time. Prior to her chemotherapy infusion, she arrived to the infusion center AAOx3, unable to get Vital signs including O2 sats. Pt looked cyanotic, had chills, afebrile, jaundiced and SOB. Dr Casey stated to send to ED and patient was transferred. She was admitted and stabilized within the hospital. Hospital course listed below.     She returns today for f/u and to resume chemotherapy. She is doing pretty well today. Had a few days when she was not well. But IVF on days 1 and 3 helped her a lot. She is willing to continue with chemo. She is trying to eat better and feels that her appetite is improving.     Hospital Course: Patient admitted to medical oncology for management. UA suspicious for infection and urine cx with GNR, Ertapenem started and ID consulted given hx of Klebsiella ESBL UTI. Urine cx again growing Klebsiella ESBL. ID recommends PO Cipro for discharge. Patient stable for discharge on oral abx.    ECO    ROS:   A ten-point system review is obtained and negative except for what was stated in the Interval History.     Physical Examination:   Vital signs reviewed.   General: well hydrated, well developed. In a wheelchair. Frail appearing.  HEENT: normocephalic, EOMI, mild +icteric sclerae, oropharynx clear  Neck: supple, no JVD  Lungs: clear breath sounds bilaterally, no wheezing, rales, or rhonchi  Heart: RRR, no M/R/G  Abdomen: soft, no tenderness, non-distended. BS present  Extremities: no clubbing, cyanosis. Moderate edema to the LE,  bilateral  Skin: no rash, ulcer, or open wounds.  Neuro: alert and oriented x 4, no focal neuro deficit  Psych: appropriate mood and affect    Objective:     Laboratory Data:  Labs reviewed. Bilirubin 1.2    Imaging Data:  CT A/P 3/23/23:  Impression:     There is a 3.4 cm mass centered in the pancreatic head/uncinate process with associated downstream dilatation of the main pancreatic duct.  Imaging findings are highly concerning for pancreatic adenocarcinoma.  Adjacent prominent peripancreatic lymph nodes are seen.  There is some stranding of the fat about the SMV for approximately 180°.  The portal vein, SMV and splenic vein are patent.     Fatty infiltration of the liver.     Constipation.    CT chest 3/20/23:  Impression:     No evidence for pulmonary embolus or other acute intrathoracic process.     Hepatic steatosis.  Cholecystectomy.       Assessment and Plan:     1. Malignant neoplasm of head of pancreas    2. Immunodeficiency secondary to neoplasm    3. Immunodeficiency secondary to chemotherapy    4. Malignant neoplasm of endometrium    5. Hyperbilirubinemia    6. Obstructive jaundice due to cancer    7. Type 2 diabetes mellitus without complication, with long-term current use of insulin    8. Primary hypertension    9. Cancer-related pain    10. Weakness of both legs        1-3  - Ms Zelalem is a 66 yo woman with borderline resectable adenocarcinoma of the pancreatic head, MMR-proficient. Tempus showed somatic KRAS G12D. Germline mutation: MUTYH mutation . Strong family history of cancer  - FOLFIRINOX to be started on 4/24/23. Bilirubin was elevated. Started FOLFOX on 4/24/23. Patient was very sick after chemo, hospitalized for obstructive jaundice, s/p ERCP with stent placement. On visit 5/8/23, patient decided not to take any more chemo and opted for hospice. She later changed her mind and resumed FOLFOX cycle 2 on 5/25/23.   - doing much better since her admission to the hospital. She is trying to eat  better but reports increased weakness of her legs. She did fall once. Tolerated cycle 2 FOLFOX okay. Discussed increasing 5FU from 2000 mg/m2 to 2400 mg/m2. Patient understands and agrees with the plan.   - cycle 3 FOLFOX today  - return in 2 weeks for cycle 4 FOLFOX. Will decide on irinotecan in the future based on bilirubin and tolerance  - previously reviewed germline MUTYH mutation.     4.  - f/u with gyn onc    5.6  - s/p ERCP. Bilirubin improving    7.  - BS controlled  - c/w current medication    8.  - monitor BP    9,10.   - will place orders for HH for physical therapy  - Will also order US of the lower extremities, bilateral.     Follow-up:     Follow up in 2 weeks  Knows to call in the interval if any problems arise.      Electronically signed by Gloria Bello    Route Chart for Scheduling    Med Onc Chart Routing      Follow up with physician 2 weeks and 4 weeks. See Dr. Casey in 2 weeks for cycle 4 of FOLFOX with pump d/c on day 3. See Dr. Casey in 4 weeks with lab work, CT scan and cycle 5 of FOLFOX.   Follow up with [facility] 6 weeks. See SHE[facility]TON in 6 weeks with lab work and next cycle of chemotherapy   Infusion scheduling note    Injection scheduling note    Labs CBC, CMP and CA 19-9   Scheduling:  Preferred lab:  Lab interval: every 2 weeks     Imaging CT chest abdomen pelvis   Schedule in 4 weeks. THank you   Pharmacy appointment    Other referrals        Treatment Plan Information   OP PANC mFOLFIRINOX Q2W   Carolina Casey MD   Upcoming Treatment Dates - OP PANC mFOLFIRINOX Q2W    7/5/2023       Chemotherapy       oxaliplatin (ELOXATIN) 85 mg/m2 = 147 mg in dextrose 5 % (D5W) 529.4 mL chemo infusion       leucovorin calcium 400 mg/m2 = 690 mg in dextrose 5 % (D5W) 250 mL infusion       irinotecan (CAMPTOSAR) 150 mg/m2 = 260 mg in sodium chloride 0.9% 513 mL chemo infusion       fluorouracil (ADRUCIL) 2,400 mg/m2 = 4,150 mg in sodium chloride 0.9% 100 mL chemo infusion       Supportive Care       atropine injection  0.4 mg       Antiemetics       aprepitant (CINVANTI) injection 130 mg       palonosetron (ALOXI) 0.25 mg with Dexamethasone (DECADRON) 12 mg in NS 50 mL IVPB  7/19/2023       Chemotherapy       oxaliplatin (ELOXATIN) 85 mg/m2 = 147 mg in dextrose 5 % (D5W) 529.4 mL chemo infusion       leucovorin calcium 400 mg/m2 = 690 mg in dextrose 5 % (D5W) 250 mL infusion       irinotecan (CAMPTOSAR) 150 mg/m2 = 260 mg in sodium chloride 0.9% 513 mL chemo infusion       fluorouracil (ADRUCIL) 2,400 mg/m2 = 4,150 mg in sodium chloride 0.9% 100 mL chemo infusion       Supportive Care       atropine injection 0.4 mg       Antiemetics       aprepitant (CINVANTI) injection 130 mg       palonosetron (ALOXI) 0.25 mg with Dexamethasone (DECADRON) 12 mg in NS 50 mL IVPB  8/2/2023       Chemotherapy       oxaliplatin (ELOXATIN) 85 mg/m2 = 147 mg in dextrose 5 % (D5W) 529.4 mL chemo infusion       leucovorin calcium 400 mg/m2 = 690 mg in dextrose 5 % (D5W) 250 mL infusion       irinotecan (CAMPTOSAR) 150 mg/m2 = 260 mg in sodium chloride 0.9% 513 mL chemo infusion       fluorouracil (ADRUCIL) 2,400 mg/m2 = 4,150 mg in sodium chloride 0.9% 100 mL chemo infusion       Supportive Care       atropine injection 0.4 mg       Antiemetics       aprepitant (CINVANTI) injection 130 mg       palonosetron (ALOXI) 0.25 mg with Dexamethasone (DECADRON) 12 mg in NS 50 mL IVPB  8/16/2023       Chemotherapy       oxaliplatin (ELOXATIN) 85 mg/m2 = 147 mg in dextrose 5 % (D5W) 529.4 mL chemo infusion       leucovorin calcium 400 mg/m2 = 690 mg in dextrose 5 % (D5W) 250 mL infusion       irinotecan (CAMPTOSAR) 150 mg/m2 = 260 mg in sodium chloride 0.9% 513 mL chemo infusion       fluorouracil (ADRUCIL) 2,400 mg/m2 = 4,150 mg in sodium chloride 0.9% 100 mL chemo infusion       Supportive Care       atropine injection 0.4 mg       Antiemetics       aprepitant (CINVANTI) injection 130 mg       palonosetron (ALOXI) 0.25 mg with Dexamethasone (DECADRON) 12 mg in  NS 50 mL IVPB

## 2023-06-24 ENCOUNTER — INFUSION (OUTPATIENT)
Dept: INFUSION THERAPY | Facility: HOSPITAL | Age: 68
End: 2023-06-24
Payer: COMMERCIAL

## 2023-06-24 VITALS
RESPIRATION RATE: 18 BRPM | HEART RATE: 70 BPM | SYSTOLIC BLOOD PRESSURE: 130 MMHG | TEMPERATURE: 98 F | DIASTOLIC BLOOD PRESSURE: 78 MMHG | OXYGEN SATURATION: 96 %

## 2023-06-24 DIAGNOSIS — C25.0 MALIGNANT NEOPLASM OF HEAD OF PANCREAS: Primary | ICD-10-CM

## 2023-06-24 PROCEDURE — 63600175 PHARM REV CODE 636 W HCPCS: Performed by: INTERNAL MEDICINE

## 2023-06-24 PROCEDURE — 25000003 PHARM REV CODE 250: Performed by: INTERNAL MEDICINE

## 2023-06-24 PROCEDURE — A4216 STERILE WATER/SALINE, 10 ML: HCPCS | Performed by: INTERNAL MEDICINE

## 2023-06-24 RX ORDER — SODIUM CHLORIDE 9 MG/ML
INJECTION, SOLUTION INTRAVENOUS
Status: DISCONTINUED | OUTPATIENT
Start: 2023-06-24 | End: 2023-06-24 | Stop reason: HOSPADM

## 2023-06-24 RX ORDER — SODIUM CHLORIDE 0.9 % (FLUSH) 0.9 %
10 SYRINGE (ML) INJECTION
Status: DISCONTINUED | OUTPATIENT
Start: 2023-06-24 | End: 2023-06-24 | Stop reason: HOSPADM

## 2023-06-24 RX ORDER — HEPARIN 100 UNIT/ML
500 SYRINGE INTRAVENOUS
Status: DISCONTINUED | OUTPATIENT
Start: 2023-06-24 | End: 2023-06-24 | Stop reason: HOSPADM

## 2023-06-24 RX ADMIN — Medication 10 ML: at 12:06

## 2023-06-24 RX ADMIN — HEPARIN 500 UNITS: 100 SYRINGE at 12:06

## 2023-06-24 NOTE — PLAN OF CARE
Pt tolerated treatment well. Positive blood return noted s/p infusion chemo pump completed and disconnected. Port deaccessed s/p heparin lock. Pt reeducated on sign and symptoms of reaction and instructed to seek medical care if reaction noted. Pt denied AVS, will use MyChart. Pt used wheelchair out of clinic.

## 2023-06-26 ENCOUNTER — DOCUMENTATION ONLY (OUTPATIENT)
Dept: HEMATOLOGY/ONCOLOGY | Facility: CLINIC | Age: 68
End: 2023-06-26
Payer: COMMERCIAL

## 2023-06-26 PROBLEM — E11.10 DIABETIC KETOACIDOSIS WITHOUT COMA ASSOCIATED WITH TYPE 2 DIABETES MELLITUS: Status: RESOLVED | Noted: 2023-03-21 | Resolved: 2023-06-26

## 2023-06-26 NOTE — PROGRESS NOTES
Social Work Consult    Name:Kaity Masterson  : 1955  MRN: [id number]    Referral:Home Health    SW received consult from Gloria Bello PA-C. Patient would benefit from  PT. SW called and left a VM with patient requesting a call back to select an agency. SW number provided.

## 2023-06-27 ENCOUNTER — DOCUMENTATION ONLY (OUTPATIENT)
Dept: HEMATOLOGY/ONCOLOGY | Facility: CLINIC | Age: 68
End: 2023-06-27
Payer: COMMERCIAL

## 2023-06-27 PROCEDURE — G0180 MD CERTIFICATION HHA PATIENT: HCPCS | Mod: ,,, | Performed by: PHYSICIAN ASSISTANT

## 2023-06-27 PROCEDURE — G0180 PR HOME HEALTH MD CERTIFICATION: ICD-10-PCS | Mod: ,,, | Performed by: PHYSICIAN ASSISTANT

## 2023-06-27 NOTE — PROGRESS NOTES
NOMAN spoke to Radha, patient's daughter. She explained that Basilio from [facility] would be visiting the patient today.    SW provided direct number for any other needs.

## 2023-06-29 ENCOUNTER — PATIENT MESSAGE (OUTPATIENT)
Dept: HEMATOLOGY/ONCOLOGY | Facility: CLINIC | Age: 68
End: 2023-06-29
Payer: COMMERCIAL

## 2023-07-03 ENCOUNTER — HOSPITAL ENCOUNTER (OUTPATIENT)
Dept: RADIOLOGY | Facility: HOSPITAL | Age: 68
Discharge: HOME | End: 2023-07-03
Attending: PHYSICIAN ASSISTANT
Payer: COMMERCIAL

## 2023-07-03 DIAGNOSIS — C25.0 MALIGNANT NEOPLASM OF HEAD OF PANCREAS: ICD-10-CM

## 2023-07-03 DIAGNOSIS — R29.898 WEAKNESS OF BOTH LEGS: ICD-10-CM

## 2023-07-03 PROCEDURE — 93970 US LOWER EXTREMITY VEINS BILATERAL: ICD-10-PCS | Mod: 26,,, | Performed by: RADIOLOGY

## 2023-07-03 PROCEDURE — 93970 EXTREMITY STUDY: CPT | Mod: 26,,, | Performed by: RADIOLOGY

## 2023-07-03 PROCEDURE — 93970 EXTREMITY STUDY: CPT | Mod: TC

## 2023-07-05 ENCOUNTER — OFFICE VISIT (OUTPATIENT)
Dept: HEMATOLOGY/ONCOLOGY | Facility: CLINIC | Age: 68
End: 2023-07-05
Payer: COMMERCIAL

## 2023-07-05 ENCOUNTER — INFUSION (OUTPATIENT)
Dept: INFUSION THERAPY | Facility: HOSPITAL | Age: 68
End: 2023-07-05
Payer: COMMERCIAL

## 2023-07-05 VITALS
WEIGHT: 132.19 LBS | BODY MASS INDEX: 22.57 KG/M2 | HEART RATE: 90 BPM | TEMPERATURE: 98 F | OXYGEN SATURATION: 100 % | HEIGHT: 64 IN | SYSTOLIC BLOOD PRESSURE: 126 MMHG | DIASTOLIC BLOOD PRESSURE: 66 MMHG | RESPIRATION RATE: 18 BRPM

## 2023-07-05 VITALS
RESPIRATION RATE: 18 BRPM | DIASTOLIC BLOOD PRESSURE: 86 MMHG | SYSTOLIC BLOOD PRESSURE: 162 MMHG | HEART RATE: 72 BPM | TEMPERATURE: 98 F

## 2023-07-05 DIAGNOSIS — Z79.899 IMMUNODEFICIENCY SECONDARY TO CHEMOTHERAPY: ICD-10-CM

## 2023-07-05 DIAGNOSIS — I10 PRIMARY HYPERTENSION: Chronic | ICD-10-CM

## 2023-07-05 DIAGNOSIS — C54.1 MALIGNANT NEOPLASM OF ENDOMETRIUM: ICD-10-CM

## 2023-07-05 DIAGNOSIS — C25.0 MALIGNANT NEOPLASM OF HEAD OF PANCREAS: Primary | ICD-10-CM

## 2023-07-05 DIAGNOSIS — D84.821 IMMUNODEFICIENCY SECONDARY TO CHEMOTHERAPY: ICD-10-CM

## 2023-07-05 DIAGNOSIS — T45.1X5A IMMUNODEFICIENCY SECONDARY TO CHEMOTHERAPY: ICD-10-CM

## 2023-07-05 DIAGNOSIS — Z98.890 S/P ROBOT-ASSISTED SURGICAL PROCEDURE: ICD-10-CM

## 2023-07-05 DIAGNOSIS — E11.9 TYPE 2 DIABETES MELLITUS WITHOUT COMPLICATION, WITH LONG-TERM CURRENT USE OF INSULIN: ICD-10-CM

## 2023-07-05 DIAGNOSIS — Z79.4 TYPE 2 DIABETES MELLITUS WITHOUT COMPLICATION, WITH LONG-TERM CURRENT USE OF INSULIN: ICD-10-CM

## 2023-07-05 DIAGNOSIS — D84.81 IMMUNODEFICIENCY SECONDARY TO NEOPLASM: ICD-10-CM

## 2023-07-05 DIAGNOSIS — D49.9 IMMUNODEFICIENCY SECONDARY TO NEOPLASM: ICD-10-CM

## 2023-07-05 DIAGNOSIS — M25.551 RIGHT HIP PAIN: ICD-10-CM

## 2023-07-05 PROCEDURE — 99999 PR PBB SHADOW E&M-EST. PATIENT-LVL V: ICD-10-PCS | Mod: PBBFAC,,, | Performed by: INTERNAL MEDICINE

## 2023-07-05 PROCEDURE — 1160F RVW MEDS BY RX/DR IN RCRD: CPT | Mod: CPTII,S$GLB,, | Performed by: INTERNAL MEDICINE

## 2023-07-05 PROCEDURE — 1126F AMNT PAIN NOTED NONE PRSNT: CPT | Mod: CPTII,S$GLB,, | Performed by: INTERNAL MEDICINE

## 2023-07-05 PROCEDURE — 63600175 PHARM REV CODE 636 W HCPCS: Performed by: INTERNAL MEDICINE

## 2023-07-05 PROCEDURE — 25000003 PHARM REV CODE 250: Performed by: INTERNAL MEDICINE

## 2023-07-05 PROCEDURE — 1101F PR PT FALLS ASSESS DOC 0-1 FALLS W/OUT INJ PAST YR: ICD-10-PCS | Mod: CPTII,S$GLB,, | Performed by: INTERNAL MEDICINE

## 2023-07-05 PROCEDURE — 3288F FALL RISK ASSESSMENT DOCD: CPT | Mod: CPTII,S$GLB,, | Performed by: INTERNAL MEDICINE

## 2023-07-05 PROCEDURE — 1126F PR PAIN SEVERITY QUANTIFIED, NO PAIN PRESENT: ICD-10-PCS | Mod: CPTII,S$GLB,, | Performed by: INTERNAL MEDICINE

## 2023-07-05 PROCEDURE — 99215 PR OFFICE/OUTPT VISIT, EST, LEVL V, 40-54 MIN: ICD-10-PCS | Mod: S$GLB,,, | Performed by: INTERNAL MEDICINE

## 2023-07-05 PROCEDURE — 3074F SYST BP LT 130 MM HG: CPT | Mod: CPTII,S$GLB,, | Performed by: INTERNAL MEDICINE

## 2023-07-05 PROCEDURE — 1101F PT FALLS ASSESS-DOCD LE1/YR: CPT | Mod: CPTII,S$GLB,, | Performed by: INTERNAL MEDICINE

## 2023-07-05 PROCEDURE — 96413 CHEMO IV INFUSION 1 HR: CPT

## 2023-07-05 PROCEDURE — 96415 CHEMO IV INFUSION ADDL HR: CPT

## 2023-07-05 PROCEDURE — 3046F HEMOGLOBIN A1C LEVEL >9.0%: CPT | Mod: CPTII,S$GLB,, | Performed by: INTERNAL MEDICINE

## 2023-07-05 PROCEDURE — 1111F DSCHRG MED/CURRENT MED MERGE: CPT | Mod: CPTII,S$GLB,, | Performed by: INTERNAL MEDICINE

## 2023-07-05 PROCEDURE — 1159F PR MEDICATION LIST DOCUMENTED IN MEDICAL RECORD: ICD-10-PCS | Mod: CPTII,S$GLB,, | Performed by: INTERNAL MEDICINE

## 2023-07-05 PROCEDURE — 3078F PR MOST RECENT DIASTOLIC BLOOD PRESSURE < 80 MM HG: ICD-10-PCS | Mod: CPTII,S$GLB,, | Performed by: INTERNAL MEDICINE

## 2023-07-05 PROCEDURE — 99999 PR PBB SHADOW E&M-EST. PATIENT-LVL V: CPT | Mod: PBBFAC,,, | Performed by: INTERNAL MEDICINE

## 2023-07-05 PROCEDURE — 96367 TX/PROPH/DG ADDL SEQ IV INF: CPT

## 2023-07-05 PROCEDURE — 3008F PR BODY MASS INDEX (BMI) DOCUMENTED: ICD-10-PCS | Mod: CPTII,S$GLB,, | Performed by: INTERNAL MEDICINE

## 2023-07-05 PROCEDURE — 96368 THER/DIAG CONCURRENT INF: CPT

## 2023-07-05 PROCEDURE — 4010F ACE/ARB THERAPY RXD/TAKEN: CPT | Mod: CPTII,S$GLB,, | Performed by: INTERNAL MEDICINE

## 2023-07-05 PROCEDURE — 3074F PR MOST RECENT SYSTOLIC BLOOD PRESSURE < 130 MM HG: ICD-10-PCS | Mod: CPTII,S$GLB,, | Performed by: INTERNAL MEDICINE

## 2023-07-05 PROCEDURE — 1159F MED LIST DOCD IN RCRD: CPT | Mod: CPTII,S$GLB,, | Performed by: INTERNAL MEDICINE

## 2023-07-05 PROCEDURE — 3078F DIAST BP <80 MM HG: CPT | Mod: CPTII,S$GLB,, | Performed by: INTERNAL MEDICINE

## 2023-07-05 PROCEDURE — 3008F BODY MASS INDEX DOCD: CPT | Mod: CPTII,S$GLB,, | Performed by: INTERNAL MEDICINE

## 2023-07-05 PROCEDURE — 96375 TX/PRO/DX INJ NEW DRUG ADDON: CPT

## 2023-07-05 PROCEDURE — 3046F PR MOST RECENT HEMOGLOBIN A1C LEVEL > 9.0%: ICD-10-PCS | Mod: CPTII,S$GLB,, | Performed by: INTERNAL MEDICINE

## 2023-07-05 PROCEDURE — 99215 OFFICE O/P EST HI 40 MIN: CPT | Mod: S$GLB,,, | Performed by: INTERNAL MEDICINE

## 2023-07-05 PROCEDURE — 3288F PR FALLS RISK ASSESSMENT DOCUMENTED: ICD-10-PCS | Mod: CPTII,S$GLB,, | Performed by: INTERNAL MEDICINE

## 2023-07-05 PROCEDURE — 4010F PR ACE/ARB THEARPY RXD/TAKEN: ICD-10-PCS | Mod: CPTII,S$GLB,, | Performed by: INTERNAL MEDICINE

## 2023-07-05 PROCEDURE — 1160F PR REVIEW ALL MEDS BY PRESCRIBER/CLIN PHARMACIST DOCUMENTED: ICD-10-PCS | Mod: CPTII,S$GLB,, | Performed by: INTERNAL MEDICINE

## 2023-07-05 PROCEDURE — 96416 CHEMO PROLONG INFUSE W/PUMP: CPT

## 2023-07-05 PROCEDURE — 1111F PR DISCHARGE MEDS RECONCILED W/ CURRENT OUTPATIENT MED LIST: ICD-10-PCS | Mod: CPTII,S$GLB,, | Performed by: INTERNAL MEDICINE

## 2023-07-05 RX ORDER — EPINEPHRINE 0.3 MG/.3ML
0.3 INJECTION SUBCUTANEOUS ONCE AS NEEDED
Status: CANCELLED | OUTPATIENT
Start: 2023-07-05

## 2023-07-05 RX ORDER — SODIUM CHLORIDE 0.9 % (FLUSH) 0.9 %
10 SYRINGE (ML) INJECTION
Status: CANCELLED | OUTPATIENT
Start: 2023-07-07

## 2023-07-05 RX ORDER — SODIUM CHLORIDE 0.9 % (FLUSH) 0.9 %
10 SYRINGE (ML) INJECTION
Status: CANCELLED | OUTPATIENT
Start: 2023-07-05

## 2023-07-05 RX ORDER — HEPARIN 100 UNIT/ML
500 SYRINGE INTRAVENOUS
Status: DISCONTINUED | OUTPATIENT
Start: 2023-07-05 | End: 2023-07-05 | Stop reason: HOSPADM

## 2023-07-05 RX ORDER — EPINEPHRINE 0.3 MG/.3ML
0.3 INJECTION SUBCUTANEOUS ONCE AS NEEDED
Status: DISCONTINUED | OUTPATIENT
Start: 2023-07-05 | End: 2023-07-05 | Stop reason: HOSPADM

## 2023-07-05 RX ORDER — HEPARIN 100 UNIT/ML
500 SYRINGE INTRAVENOUS
Status: CANCELLED | OUTPATIENT
Start: 2023-07-05

## 2023-07-05 RX ORDER — ATROPINE SULFATE 0.4 MG/ML
0.4 INJECTION, SOLUTION ENDOTRACHEAL; INTRAMEDULLARY; INTRAMUSCULAR; INTRAVENOUS; SUBCUTANEOUS ONCE AS NEEDED
Status: CANCELLED | OUTPATIENT
Start: 2023-07-05

## 2023-07-05 RX ORDER — SODIUM CHLORIDE 9 MG/ML
INJECTION, SOLUTION INTRAVENOUS
Start: 2023-07-07 | End: 2023-07-07

## 2023-07-05 RX ORDER — SODIUM CHLORIDE 9 MG/ML
INJECTION, SOLUTION INTRAVENOUS
Status: COMPLETED | OUTPATIENT
Start: 2023-07-05 | End: 2023-07-05

## 2023-07-05 RX ORDER — HEPARIN 100 UNIT/ML
500 SYRINGE INTRAVENOUS
Status: CANCELLED | OUTPATIENT
Start: 2023-07-07

## 2023-07-05 RX ORDER — DIPHENHYDRAMINE HYDROCHLORIDE 50 MG/ML
50 INJECTION INTRAMUSCULAR; INTRAVENOUS ONCE AS NEEDED
Status: DISCONTINUED | OUTPATIENT
Start: 2023-07-05 | End: 2023-07-05 | Stop reason: HOSPADM

## 2023-07-05 RX ORDER — SODIUM CHLORIDE 9 MG/ML
INJECTION, SOLUTION INTRAVENOUS
Status: CANCELLED
Start: 2023-07-05 | End: 2023-07-05

## 2023-07-05 RX ORDER — DIPHENHYDRAMINE HYDROCHLORIDE 50 MG/ML
50 INJECTION INTRAMUSCULAR; INTRAVENOUS ONCE AS NEEDED
Status: CANCELLED | OUTPATIENT
Start: 2023-07-05

## 2023-07-05 RX ORDER — ATROPINE SULFATE 0.4 MG/ML
0.4 INJECTION, SOLUTION ENDOTRACHEAL; INTRAMEDULLARY; INTRAMUSCULAR; INTRAVENOUS; SUBCUTANEOUS ONCE AS NEEDED
Status: DISCONTINUED | OUTPATIENT
Start: 2023-07-05 | End: 2023-07-05 | Stop reason: HOSPADM

## 2023-07-05 RX ORDER — SODIUM CHLORIDE 0.9 % (FLUSH) 0.9 %
10 SYRINGE (ML) INJECTION
Status: DISCONTINUED | OUTPATIENT
Start: 2023-07-05 | End: 2023-07-05 | Stop reason: HOSPADM

## 2023-07-05 RX ADMIN — DEXAMETHASONE SODIUM PHOSPHATE 0.25 MG: 4 INJECTION, SOLUTION INTRA-ARTICULAR; INTRALESIONAL; INTRAMUSCULAR; INTRAVENOUS; SOFT TISSUE at 12:07

## 2023-07-05 RX ADMIN — LEUCOVORIN CALCIUM 690 MG: 350 INJECTION, POWDER, LYOPHILIZED, FOR SOLUTION INTRAMUSCULAR; INTRAVENOUS at 01:07

## 2023-07-05 RX ADMIN — FLUOROURACIL 4000 MG: 50 INJECTION, SOLUTION INTRAVENOUS at 03:07

## 2023-07-05 RX ADMIN — OXALIPLATIN 147 MG: 5 INJECTION, SOLUTION INTRAVENOUS at 01:07

## 2023-07-05 RX ADMIN — SODIUM CHLORIDE: 9 INJECTION, SOLUTION INTRAVENOUS at 11:07

## 2023-07-05 RX ADMIN — DEXTROSE: 50 INJECTION, SOLUTION INTRAVENOUS at 01:07

## 2023-07-05 RX ADMIN — SODIUM CHLORIDE: 9 INJECTION, SOLUTION INTRAVENOUS at 12:07

## 2023-07-05 RX ADMIN — APREPITANT 130 MG: 130 INJECTION, EMULSION INTRAVENOUS at 12:07

## 2023-07-05 NOTE — PLAN OF CARE
1600  Infusion completed, pt tolerated; CADD pump connected, infusing w/out issue; pt instructed to increase water hydration daily, to avoid cold sensation and reason for same; discussed when to contact MD, when to report to ER; pt and daughter-in-law verbalized understanding of all discussed and to report for pump d/c on Friday 7/7 at 1350 (pt will arrive at 1800 r/t transportation and 90 min travel time)

## 2023-07-05 NOTE — PROGRESS NOTES
PROGRESS NOTE    Subjective:       Patient ID: Kaity Masterson is a 68 y.o. female.    Chief Complaint: follow up for borderline resectable pancreatic adenocarcinoma, MMR-proficient    Diagnosis:  Borderline resectable pancreatic adenocarcinoma, MMR proficient  UGT1A1/DPYD normal metabolizer    Tempus: KRAS G12D. Germline mutation: MUTYH mutation    Oncologic History:  1.Ms Masterson is a 66 yo woman with HTN, T2DM, pathologic T1a endometrial cancer s/p surgery on 3/8/23, PUD, fatty liver, osteoporosis, who initially saw me on 4/5/23 for further management of pancreatic adenocarcinoma. She was admitted to ICU for DKA in March 2023. CT A/P 3/22/23 showed  a 3.4 cm mass centered in the pancreatic head/uncinate process with associated downstream dilatation of the main pancreatic duct.  Imaging findings are highly concerning for pancreatic adenocarcinoma.  Adjacent prominent peripancreatic lymph nodes are seen.  There is some stranding of the fat about the SMV for approximately 180°.  The portal vein, SMV and splenic vein are patent. Fatty infiltration of the liver. CT chest 3/20/23: No evidence for pulmonary embolus or other acute intrathoracic process. Hepatic steatosis.  Cholecystectomy. She underwent upper EUS biopsy on 3/31/23. Pathology showed adenocarcinoma, MMR intact. She presents today for further evaluation. Strong family history of breast cancer, endometrial cancer and colon cancer. She is a little weak after surgery but active at home and is still working.   Family history:  Mother: Breast cancer (middle age)  Aunt maternal (2): cancer (unknown)  Sister (3): breast cancer, lung cancer (smoking). Other sister: colon cancer (older diagnosis 60's). Third sister (breast cancer) and endometrial cancer  Brother:  finger bone cancer  Discussed perioperative FOLFIRINOX  2. FOLFIRINOX to be started on 4/24/23. Bilirubin was elevated. Started FOLFOX on  23. Patient was very sick after chemo, hospitalized for obstructive jaundice, s/p ERCP with stent placement. On visit 23, patient decided not to take any more chemo and opted for hospice. She later changed her mind and resumed FOLFOX cycle 2 on 23. S/p 3 cycles of FOLFOX.     Interval History:   Ms Masterson returns for follow up. She tolerated cycle 3 FOLFOX okay. Noted right hip pain.      ECO    ROS:   A ten-point system review is obtained and negative except for what was stated in the Interval History.     Physical Examination:   Vital signs reviewed  General: well hydrated, well developed. In no acute distress  HEENT: normocephalic, PERRLA, EOMI, anicteric sclerae  Neck: soft, supple, no JVD, thyromegaly or lymphadenopathy  Lungs: CTAB, no wheezing/rales/rhonchi  Heart: RRR, no rubs/gallops/murmurs  Abdomen: soft, no tenderness, nondistended, no hepatosplenomegaly  Ext: no clubbing, cyanosis edema  Skin: no rash, ulcer, open wounds  Neuro: alert and oriented x 4  Psych: appropriate mood and affect    Objective:     Laboratory Data:  Labs reviewed. Bilirubin normal    Imaging Data:  CT A/P 3/23/23:  Impression:     There is a 3.4 cm mass centered in the pancreatic head/uncinate process with associated downstream dilatation of the main pancreatic duct.  Imaging findings are highly concerning for pancreatic adenocarcinoma.  Adjacent prominent peripancreatic lymph nodes are seen.  There is some stranding of the fat about the SMV for approximately 180°.  The portal vein, SMV and splenic vein are patent.     Fatty infiltration of the liver.     Constipation.    CT chest 3/20/23:  Impression:     No evidence for pulmonary embolus or other acute intrathoracic process.     Hepatic steatosis.  Cholecystectomy.       Assessment and Plan:     1. Malignant neoplasm of head of pancreas    2. Immunodeficiency secondary to neoplasm    3. Immunodeficiency secondary to chemotherapy    4. Malignant neoplasm of  endometrium    5. S/P robot-assisted TLH/BSO/Lymph node dissection/cystoscopy     6. Primary hypertension    7. Type 2 diabetes mellitus without complication, with long-term current use of insulin    8. Right hip pain      1-3  - Ms Masterson is a 67 yo woman with borderline resectable adenocarcinoma of the pancreatic head, MMR-proficient. Tempus showed somatic KRAS G12D. Germline mutation: MUTYH mutation . Strong family history of cancer  - FOLFIRINOX to be started on 4/24/23. Bilirubin was elevated. Started FOLFOX on 4/24/23. Patient was very sick after chemo, hospitalized for obstructive jaundice, s/p ERCP with stent placement. On visit 5/8/23, patient decided not to take any more chemo and opted for hospice. She later changed her mind and resumed FOLFOX cycle 2 on 5/25/23. S/p 3 cycles of FOLFOX  - reviewed with patient and daughter-in-law that CA 19-9 is coming down very nicely since we started chemo. Will continue with FOLFOX. Cycle 4 today  - return in 2 weeks for cycle 5 with restaging scan prior  - previously reviewed germline MUTYH mutation. Daughter-in-law wants to see genetics and get her kids tested. She has four children. Message sent to genetics.     4.5  - f/u with gyn onc    6.  - BP controlled  - c/w current medication    7.  - BS controlled  - c/w current medication    8.  - will get bone scan     Follow-up:     Follow up in 2 weeks  Knows to call in the interval if any problems arise.      Electronically signed by Carolina Valdez for Scheduling    Med Onc Chart Routing      Follow up with physician . Please add bone scan at [facility] on 7/18. keep other appts. see me on 8/16 with labs at [facility] prior, see me then get chemo   Follow up with [facility]    Infusion scheduling note   FOLFOX every 2 weeks, remove pump day 3   Injection scheduling note    Labs CBC, CMP and CA 19-9   Scheduling:  Preferred lab: [facility]  Lab interval:     Imaging Bone scans   on 7/18 at [facility]   Pharmacy appointment     Other referrals        Treatment Plan Information   OP PANC mFOLFIRINOX Q2W   Carolina Casey MD   Upcoming Treatment Dates - OP PANC mFOLFIRINOX Q2W    7/5/2023       Chemotherapy       oxaliplatin (ELOXATIN) 85 mg/m2 = 147 mg in dextrose 5 % (D5W) 529.4 mL chemo infusion       leucovorin calcium 400 mg/m2 = 690 mg in dextrose 5 % (D5W) 250 mL infusion       irinotecan (CAMPTOSAR) 150 mg/m2 = 260 mg in sodium chloride 0.9% 513 mL chemo infusion       fluorouracil (ADRUCIL) 2,400 mg/m2 = 4,150 mg in sodium chloride 0.9% 100 mL chemo infusion       Supportive Care       atropine injection 0.4 mg       Antiemetics       aprepitant (CINVANTI) injection 130 mg       palonosetron (ALOXI) 0.25 mg with Dexamethasone (DECADRON) 12 mg in NS 50 mL IVPB  7/19/2023       Chemotherapy       oxaliplatin (ELOXATIN) 85 mg/m2 = 147 mg in dextrose 5 % (D5W) 529.4 mL chemo infusion       leucovorin calcium 400 mg/m2 = 690 mg in dextrose 5 % (D5W) 250 mL infusion       irinotecan (CAMPTOSAR) 150 mg/m2 = 260 mg in sodium chloride 0.9% 513 mL chemo infusion       fluorouracil (ADRUCIL) 2,400 mg/m2 = 4,150 mg in sodium chloride 0.9% 100 mL chemo infusion       Supportive Care       atropine injection 0.4 mg       Antiemetics       aprepitant (CINVANTI) injection 130 mg       palonosetron (ALOXI) 0.25 mg with Dexamethasone (DECADRON) 12 mg in NS 50 mL IVPB  8/2/2023       Chemotherapy       oxaliplatin (ELOXATIN) 85 mg/m2 = 147 mg in dextrose 5 % (D5W) 529.4 mL chemo infusion       leucovorin calcium 400 mg/m2 = 690 mg in dextrose 5 % (D5W) 250 mL infusion       irinotecan (CAMPTOSAR) 150 mg/m2 = 260 mg in sodium chloride 0.9% 513 mL chemo infusion       fluorouracil (ADRUCIL) 2,400 mg/m2 = 4,150 mg in sodium chloride 0.9% 100 mL chemo infusion       Supportive Care       atropine injection 0.4 mg       Antiemetics       aprepitant (CINVANTI) injection 130 mg       palonosetron (ALOXI) 0.25 mg with Dexamethasone (DECADRON) 12 mg in NS 50 mL  IVPB  8/16/2023       Chemotherapy       oxaliplatin (ELOXATIN) 85 mg/m2 = 147 mg in dextrose 5 % (D5W) 529.4 mL chemo infusion       leucovorin calcium 400 mg/m2 = 690 mg in dextrose 5 % (D5W) 250 mL infusion       irinotecan (CAMPTOSAR) 150 mg/m2 = 260 mg in sodium chloride 0.9% 513 mL chemo infusion       fluorouracil (ADRUCIL) 2,400 mg/m2 = 4,150 mg in sodium chloride 0.9% 100 mL chemo infusion       Supportive Care       atropine injection 0.4 mg       Antiemetics       aprepitant (CINVANTI) injection 130 mg       palonosetron (ALOXI) 0.25 mg with Dexamethasone (DECADRON) 12 mg in NS 50 mL IVPB

## 2023-07-05 NOTE — NURSING
1148  Pt here for FOLFOX infusion/pump, accompanied by daughter-in-law, no new complaints or concerns at present, reports tolerating treatment; discussed treatment plan for today, all questions answered and pt agrees to proceed

## 2023-07-07 ENCOUNTER — INFUSION (OUTPATIENT)
Dept: INFUSION THERAPY | Facility: HOSPITAL | Age: 68
End: 2023-07-07
Payer: COMMERCIAL

## 2023-07-07 VITALS — RESPIRATION RATE: 18 BRPM | SYSTOLIC BLOOD PRESSURE: 118 MMHG | DIASTOLIC BLOOD PRESSURE: 89 MMHG | HEART RATE: 76 BPM

## 2023-07-07 DIAGNOSIS — C25.0 MALIGNANT NEOPLASM OF HEAD OF PANCREAS: Primary | ICD-10-CM

## 2023-07-07 PROCEDURE — A4216 STERILE WATER/SALINE, 10 ML: HCPCS | Performed by: INTERNAL MEDICINE

## 2023-07-07 PROCEDURE — 63600175 PHARM REV CODE 636 W HCPCS: Performed by: INTERNAL MEDICINE

## 2023-07-07 PROCEDURE — 25000003 PHARM REV CODE 250: Performed by: INTERNAL MEDICINE

## 2023-07-07 RX ORDER — HEPARIN 100 UNIT/ML
500 SYRINGE INTRAVENOUS
Status: DISCONTINUED | OUTPATIENT
Start: 2023-07-07 | End: 2023-07-07 | Stop reason: HOSPADM

## 2023-07-07 RX ORDER — SODIUM CHLORIDE 0.9 % (FLUSH) 0.9 %
10 SYRINGE (ML) INJECTION
Status: DISCONTINUED | OUTPATIENT
Start: 2023-07-07 | End: 2023-07-07 | Stop reason: HOSPADM

## 2023-07-07 RX ADMIN — Medication 10 ML: at 06:07

## 2023-07-07 RX ADMIN — HEPARIN 500 UNITS: 100 SYRINGE at 06:07

## 2023-07-07 NOTE — NURSING
1800 pt here for pump d/c, port flush (scheduled for 1400, pt unable to get IVFs today r/t late arrival), accompanied by granddaughter, no new complaints or concerns, reports tolerating treatment; CADD pump completed, port flushed per protocol; pt instructed to increase water hydration, discussed other sources of hydration and importance of hydration requirement; discussed fall safety r/t weakness/fatigue; pt and granddaughter verbalized understanding of all discussed and when to report next

## 2023-07-18 ENCOUNTER — HOSPITAL ENCOUNTER (INPATIENT)
Facility: HOSPITAL | Age: 68
LOS: 3 days | Discharge: HOME HEALTH | DRG: 690 | End: 2023-07-21
Attending: STUDENT IN AN ORGANIZED HEALTH CARE EDUCATION/TRAINING PROGRAM | Admitting: INTERNAL MEDICINE
Payer: COMMERCIAL

## 2023-07-18 DIAGNOSIS — R53.1 GENERALIZED WEAKNESS: ICD-10-CM

## 2023-07-18 DIAGNOSIS — Z71.89 ADVANCED CARE PLANNING/COUNSELING DISCUSSION: ICD-10-CM

## 2023-07-18 DIAGNOSIS — N39.0 COMPLICATED URINARY TRACT INFECTION: Primary | ICD-10-CM

## 2023-07-18 DIAGNOSIS — N39.0 URINARY TRACT INFECTION: ICD-10-CM

## 2023-07-18 DIAGNOSIS — M25.551 RIGHT HIP PAIN: ICD-10-CM

## 2023-07-18 DIAGNOSIS — K86.89 PANCREATIC MASS: ICD-10-CM

## 2023-07-18 LAB
ALBUMIN SERPL BCP-MCNC: 2.4 G/DL (ref 3.5–5.2)
ALP SERPL-CCNC: 192 U/L (ref 55–135)
ALT SERPL W/O P-5'-P-CCNC: 31 U/L (ref 10–44)
ANION GAP SERPL CALC-SCNC: 15 MMOL/L (ref 8–16)
AST SERPL-CCNC: 88 U/L (ref 10–40)
BACTERIA #/AREA URNS HPF: ABNORMAL /HPF
BASOPHILS # BLD AUTO: 0.02 K/UL (ref 0–0.2)
BASOPHILS NFR BLD: 0.8 % (ref 0–1.9)
BILIRUB SERPL-MCNC: 1 MG/DL (ref 0.1–1)
BILIRUB UR QL STRIP: NEGATIVE
BUN SERPL-MCNC: 8 MG/DL (ref 8–23)
CALCIUM SERPL-MCNC: 10.6 MG/DL (ref 8.7–10.5)
CHLORIDE SERPL-SCNC: 109 MMOL/L (ref 95–110)
CLARITY UR: ABNORMAL
CO2 SERPL-SCNC: 16 MMOL/L (ref 23–29)
COLOR UR: YELLOW
CREAT SERPL-MCNC: 1.1 MG/DL (ref 0.5–1.4)
DIFFERENTIAL METHOD: ABNORMAL
EOSINOPHIL # BLD AUTO: 0 K/UL (ref 0–0.5)
EOSINOPHIL NFR BLD: 0 % (ref 0–8)
ERYTHROCYTE [DISTWIDTH] IN BLOOD BY AUTOMATED COUNT: 17.6 % (ref 11.5–14.5)
EST. GFR  (NO RACE VARIABLE): 54 ML/MIN/1.73 M^2
ESTIMATED AVG GLUCOSE: 120 MG/DL (ref 68–131)
GLUCOSE SERPL-MCNC: 158 MG/DL (ref 70–110)
GLUCOSE UR QL STRIP: NEGATIVE
HBA1C MFR BLD: 5.8 % (ref 4–5.6)
HCT VFR BLD AUTO: 34.4 % (ref 37–48.5)
HGB BLD-MCNC: 11 G/DL (ref 12–16)
HGB UR QL STRIP: ABNORMAL
IMM GRANULOCYTES # BLD AUTO: 0.02 K/UL (ref 0–0.04)
IMM GRANULOCYTES NFR BLD AUTO: 0.8 % (ref 0–0.5)
KETONES UR QL STRIP: NEGATIVE
LACTATE SERPL-SCNC: 2.1 MMOL/L (ref 0.5–2.2)
LACTATE SERPL-SCNC: 8 MMOL/L (ref 0.5–2.2)
LEUKOCYTE ESTERASE UR QL STRIP: ABNORMAL
LYMPHOCYTES # BLD AUTO: 1.2 K/UL (ref 1–4.8)
LYMPHOCYTES NFR BLD: 46.4 % (ref 18–48)
MCH RBC QN AUTO: 31 PG (ref 27–31)
MCHC RBC AUTO-ENTMCNC: 32 G/DL (ref 32–36)
MCV RBC AUTO: 97 FL (ref 82–98)
MICROSCOPIC COMMENT: ABNORMAL
MONOCYTES # BLD AUTO: 0.2 K/UL (ref 0.3–1)
MONOCYTES NFR BLD: 9.1 % (ref 4–15)
NEUTROPHILS # BLD AUTO: 1.1 K/UL (ref 1.8–7.7)
NEUTROPHILS NFR BLD: 42.9 % (ref 38–73)
NITRITE UR QL STRIP: NEGATIVE
NRBC BLD-RTO: 0 /100 WBC
PH UR STRIP: 7 [PH] (ref 5–8)
PLATELET # BLD AUTO: 240 K/UL (ref 150–450)
PMV BLD AUTO: 10.6 FL (ref 9.2–12.9)
POCT GLUCOSE: 180 MG/DL (ref 70–110)
POTASSIUM SERPL-SCNC: 3.8 MMOL/L (ref 3.5–5.1)
PROT SERPL-MCNC: 5.4 G/DL (ref 6–8.4)
PROT UR QL STRIP: NEGATIVE
RBC # BLD AUTO: 3.55 M/UL (ref 4–5.4)
RBC #/AREA URNS HPF: 0 /HPF (ref 0–4)
SODIUM SERPL-SCNC: 140 MMOL/L (ref 136–145)
SP GR UR STRIP: 1.02 (ref 1–1.03)
SQUAMOUS #/AREA URNS HPF: 5 /HPF
T4 FREE SERPL-MCNC: 0.88 NG/DL (ref 0.71–1.51)
TROPONIN I SERPL DL<=0.01 NG/ML-MCNC: 0.03 NG/ML (ref 0–0.03)
TSH SERPL DL<=0.005 MIU/L-ACNC: 8.79 UIU/ML (ref 0.4–4)
URN SPEC COLLECT METH UR: ABNORMAL
UROBILINOGEN UR STRIP-ACNC: NEGATIVE EU/DL
WBC # BLD AUTO: 2.65 K/UL (ref 3.9–12.7)
WBC #/AREA URNS HPF: 30 /HPF (ref 0–5)
WBC CLUMPS URNS QL MICRO: ABNORMAL

## 2023-07-18 PROCEDURE — 96367 TX/PROPH/DG ADDL SEQ IV INF: CPT

## 2023-07-18 PROCEDURE — 96361 HYDRATE IV INFUSION ADD-ON: CPT

## 2023-07-18 PROCEDURE — 99900031 HC PATIENT EDUCATION (STAT)

## 2023-07-18 PROCEDURE — 93005 ELECTROCARDIOGRAM TRACING: CPT

## 2023-07-18 PROCEDURE — 63600175 PHARM REV CODE 636 W HCPCS: Performed by: INTERNAL MEDICINE

## 2023-07-18 PROCEDURE — P9612 CATHETERIZE FOR URINE SPEC: HCPCS

## 2023-07-18 PROCEDURE — 85025 COMPLETE CBC W/AUTO DIFF WBC: CPT | Performed by: STUDENT IN AN ORGANIZED HEALTH CARE EDUCATION/TRAINING PROGRAM

## 2023-07-18 PROCEDURE — 87088 URINE BACTERIA CULTURE: CPT | Performed by: STUDENT IN AN ORGANIZED HEALTH CARE EDUCATION/TRAINING PROGRAM

## 2023-07-18 PROCEDURE — 99285 EMERGENCY DEPT VISIT HI MDM: CPT | Mod: 25

## 2023-07-18 PROCEDURE — 87186 SC STD MICRODIL/AGAR DIL: CPT | Performed by: STUDENT IN AN ORGANIZED HEALTH CARE EDUCATION/TRAINING PROGRAM

## 2023-07-18 PROCEDURE — 96365 THER/PROPH/DIAG IV INF INIT: CPT

## 2023-07-18 PROCEDURE — 84484 ASSAY OF TROPONIN QUANT: CPT | Performed by: STUDENT IN AN ORGANIZED HEALTH CARE EDUCATION/TRAINING PROGRAM

## 2023-07-18 PROCEDURE — 36415 COLL VENOUS BLD VENIPUNCTURE: CPT | Performed by: INTERNAL MEDICINE

## 2023-07-18 PROCEDURE — 81000 URINALYSIS NONAUTO W/SCOPE: CPT | Performed by: STUDENT IN AN ORGANIZED HEALTH CARE EDUCATION/TRAINING PROGRAM

## 2023-07-18 PROCEDURE — 36415 COLL VENOUS BLD VENIPUNCTURE: CPT | Performed by: STUDENT IN AN ORGANIZED HEALTH CARE EDUCATION/TRAINING PROGRAM

## 2023-07-18 PROCEDURE — 80053 COMPREHEN METABOLIC PANEL: CPT | Performed by: STUDENT IN AN ORGANIZED HEALTH CARE EDUCATION/TRAINING PROGRAM

## 2023-07-18 PROCEDURE — 96375 TX/PRO/DX INJ NEW DRUG ADDON: CPT

## 2023-07-18 PROCEDURE — 25000003 PHARM REV CODE 250: Performed by: STUDENT IN AN ORGANIZED HEALTH CARE EDUCATION/TRAINING PROGRAM

## 2023-07-18 PROCEDURE — 25000003 PHARM REV CODE 250: Performed by: INTERNAL MEDICINE

## 2023-07-18 PROCEDURE — 94761 N-INVAS EAR/PLS OXIMETRY MLT: CPT

## 2023-07-18 PROCEDURE — 99900035 HC TECH TIME PER 15 MIN (STAT)

## 2023-07-18 PROCEDURE — 84439 ASSAY OF FREE THYROXINE: CPT | Performed by: STUDENT IN AN ORGANIZED HEALTH CARE EDUCATION/TRAINING PROGRAM

## 2023-07-18 PROCEDURE — 87086 URINE CULTURE/COLONY COUNT: CPT | Performed by: STUDENT IN AN ORGANIZED HEALTH CARE EDUCATION/TRAINING PROGRAM

## 2023-07-18 PROCEDURE — 87077 CULTURE AEROBIC IDENTIFY: CPT | Performed by: STUDENT IN AN ORGANIZED HEALTH CARE EDUCATION/TRAINING PROGRAM

## 2023-07-18 PROCEDURE — 11000001 HC ACUTE MED/SURG PRIVATE ROOM

## 2023-07-18 PROCEDURE — 83605 ASSAY OF LACTIC ACID: CPT | Mod: 91 | Performed by: STUDENT IN AN ORGANIZED HEALTH CARE EDUCATION/TRAINING PROGRAM

## 2023-07-18 PROCEDURE — 27000207 HC ISOLATION

## 2023-07-18 PROCEDURE — 87040 BLOOD CULTURE FOR BACTERIA: CPT | Performed by: STUDENT IN AN ORGANIZED HEALTH CARE EDUCATION/TRAINING PROGRAM

## 2023-07-18 PROCEDURE — 83036 HEMOGLOBIN GLYCOSYLATED A1C: CPT | Performed by: INTERNAL MEDICINE

## 2023-07-18 PROCEDURE — 84443 ASSAY THYROID STIM HORMONE: CPT | Performed by: STUDENT IN AN ORGANIZED HEALTH CARE EDUCATION/TRAINING PROGRAM

## 2023-07-18 PROCEDURE — 93010 EKG 12-LEAD: ICD-10-PCS | Mod: ,,, | Performed by: INTERNAL MEDICINE

## 2023-07-18 PROCEDURE — 63600175 PHARM REV CODE 636 W HCPCS: Performed by: STUDENT IN AN ORGANIZED HEALTH CARE EDUCATION/TRAINING PROGRAM

## 2023-07-18 PROCEDURE — 93010 ELECTROCARDIOGRAM REPORT: CPT | Mod: ,,, | Performed by: INTERNAL MEDICINE

## 2023-07-18 RX ORDER — IBUPROFEN 200 MG
24 TABLET ORAL
Status: DISCONTINUED | OUTPATIENT
Start: 2023-07-18 | End: 2023-07-21 | Stop reason: HOSPADM

## 2023-07-18 RX ORDER — TALC
6 POWDER (GRAM) TOPICAL NIGHTLY PRN
Status: DISCONTINUED | OUTPATIENT
Start: 2023-07-18 | End: 2023-07-21 | Stop reason: HOSPADM

## 2023-07-18 RX ORDER — GLUCAGON 1 MG
1 KIT INJECTION
Status: DISCONTINUED | OUTPATIENT
Start: 2023-07-18 | End: 2023-07-21 | Stop reason: HOSPADM

## 2023-07-18 RX ORDER — ONDANSETRON 8 MG/1
8 TABLET, ORALLY DISINTEGRATING ORAL EVERY 8 HOURS PRN
Status: DISCONTINUED | OUTPATIENT
Start: 2023-07-18 | End: 2023-07-21 | Stop reason: HOSPADM

## 2023-07-18 RX ORDER — ATENOLOL 25 MG/1
50 TABLET ORAL DAILY
Status: DISCONTINUED | OUTPATIENT
Start: 2023-07-19 | End: 2023-07-21 | Stop reason: HOSPADM

## 2023-07-18 RX ORDER — INSULIN ASPART 100 [IU]/ML
0-5 INJECTION, SOLUTION INTRAVENOUS; SUBCUTANEOUS
Status: DISCONTINUED | OUTPATIENT
Start: 2023-07-18 | End: 2023-07-21 | Stop reason: HOSPADM

## 2023-07-18 RX ORDER — SODIUM CHLORIDE 0.9 % (FLUSH) 0.9 %
10 SYRINGE (ML) INJECTION
Status: DISCONTINUED | OUTPATIENT
Start: 2023-07-18 | End: 2023-07-21 | Stop reason: HOSPADM

## 2023-07-18 RX ORDER — ATORVASTATIN CALCIUM 80 MG/1
80 TABLET, FILM COATED ORAL DAILY
Status: DISCONTINUED | OUTPATIENT
Start: 2023-07-19 | End: 2023-07-21 | Stop reason: HOSPADM

## 2023-07-18 RX ORDER — LOSARTAN POTASSIUM 50 MG/1
100 TABLET ORAL DAILY
Status: DISCONTINUED | OUTPATIENT
Start: 2023-07-19 | End: 2023-07-21 | Stop reason: HOSPADM

## 2023-07-18 RX ORDER — OXYCODONE HYDROCHLORIDE 5 MG/1
5 TABLET ORAL EVERY 6 HOURS PRN
Status: DISCONTINUED | OUTPATIENT
Start: 2023-07-18 | End: 2023-07-19

## 2023-07-18 RX ORDER — ACETAMINOPHEN 325 MG/1
650 TABLET ORAL EVERY 6 HOURS PRN
Status: DISCONTINUED | OUTPATIENT
Start: 2023-07-18 | End: 2023-07-21 | Stop reason: HOSPADM

## 2023-07-18 RX ORDER — DRONABINOL 2.5 MG/1
2.5 CAPSULE ORAL
Status: DISCONTINUED | OUTPATIENT
Start: 2023-07-18 | End: 2023-07-21 | Stop reason: HOSPADM

## 2023-07-18 RX ORDER — PANTOPRAZOLE SODIUM 40 MG/1
40 TABLET, DELAYED RELEASE ORAL DAILY
Status: DISCONTINUED | OUTPATIENT
Start: 2023-07-19 | End: 2023-07-21 | Stop reason: HOSPADM

## 2023-07-18 RX ORDER — MUPIROCIN 20 MG/G
OINTMENT TOPICAL 2 TIMES DAILY
Status: DISCONTINUED | OUTPATIENT
Start: 2023-07-18 | End: 2023-07-21 | Stop reason: HOSPADM

## 2023-07-18 RX ORDER — MORPHINE SULFATE 2 MG/ML
2 INJECTION, SOLUTION INTRAMUSCULAR; INTRAVENOUS
Status: COMPLETED | OUTPATIENT
Start: 2023-07-18 | End: 2023-07-18

## 2023-07-18 RX ORDER — IBUPROFEN 200 MG
16 TABLET ORAL
Status: DISCONTINUED | OUTPATIENT
Start: 2023-07-18 | End: 2023-07-21 | Stop reason: HOSPADM

## 2023-07-18 RX ORDER — SODIUM CHLORIDE 9 MG/ML
INJECTION, SOLUTION INTRAVENOUS CONTINUOUS
Status: DISCONTINUED | OUTPATIENT
Start: 2023-07-18 | End: 2023-07-19

## 2023-07-18 RX ORDER — MORPHINE SULFATE 15 MG/1
15 TABLET, FILM COATED, EXTENDED RELEASE ORAL NIGHTLY
Status: DISCONTINUED | OUTPATIENT
Start: 2023-07-18 | End: 2023-07-21 | Stop reason: HOSPADM

## 2023-07-18 RX ORDER — PRAMIPEXOLE DIHYDROCHLORIDE 0.12 MG/1
0.25 TABLET ORAL NIGHTLY
Status: DISCONTINUED | OUTPATIENT
Start: 2023-07-18 | End: 2023-07-21 | Stop reason: HOSPADM

## 2023-07-18 RX ADMIN — SODIUM CHLORIDE 500 ML: 0.9 INJECTION, SOLUTION INTRAVENOUS at 01:07

## 2023-07-18 RX ADMIN — MORPHINE SULFATE 2 MG: 2 INJECTION, SOLUTION INTRAMUSCULAR; INTRAVENOUS at 01:07

## 2023-07-18 RX ADMIN — MEROPENEM 1 G: 1 INJECTION, POWDER, FOR SOLUTION INTRAVENOUS at 05:07

## 2023-07-18 RX ADMIN — VANCOMYCIN HYDROCHLORIDE 1500 MG: 1.5 INJECTION, POWDER, LYOPHILIZED, FOR SOLUTION INTRAVENOUS at 12:07

## 2023-07-18 RX ADMIN — ACETAMINOPHEN 650 MG: 325 TABLET ORAL at 06:07

## 2023-07-18 RX ADMIN — PRAMIPEXOLE DIHYDROCHLORIDE 0.25 MG: 0.12 TABLET ORAL at 08:07

## 2023-07-18 RX ADMIN — MUPIROCIN: 20 OINTMENT TOPICAL at 08:07

## 2023-07-18 RX ADMIN — MORPHINE SULFATE 15 MG: 15 TABLET, EXTENDED RELEASE ORAL at 08:07

## 2023-07-18 RX ADMIN — SODIUM CHLORIDE 1000 ML: 0.9 INJECTION, SOLUTION INTRAVENOUS at 12:07

## 2023-07-18 RX ADMIN — SODIUM CHLORIDE: 9 INJECTION, SOLUTION INTRAVENOUS at 05:07

## 2023-07-18 RX ADMIN — PIPERACILLIN AND TAZOBACTAM 4.5 G: 4; .5 INJECTION, POWDER, LYOPHILIZED, FOR SOLUTION INTRAVENOUS; PARENTERAL at 12:07

## 2023-07-18 NOTE — PLAN OF CARE
07/18/23 1324   ED Admissions Case Approval   ED Admissions Case Approval CM Approved         Chart review complete. Admission criteria MET at INTERMEDIATE level of care. CM sent secure chat to Dr. Sullivan, ED and asked that if patient is admitted, to please consider inpatient order.

## 2023-07-18 NOTE — ED TRIAGE NOTES
Patient to ED per [facility] with c/o nausea and diarrhea x 1 week. AMS x 3 days. Patient reports pain to right flank.

## 2023-07-18 NOTE — ED PROVIDER NOTES
Encounter Date: 7/18/2023       History     Chief Complaint   Patient presents with    Altered Mental Status    Diarrhea     68-year-old female with history of diabetes, hypertension, pancreatic cancer, supposed to have chemotherapy today, presenting with three days of diarrhea, abdominal pain, generalized weakness.  EMS report that they were unable to get an O2 sat or blood pressure on patient however patient is alert and oriented.  Patient denies any nausea vomiting.  Reports some left flank pain.  No dysuria.    Review of patient's allergies indicates:  No Known Allergies  Past Medical History:   Diagnosis Date    Anemia, unspecified     Cancer     Hypertension     Osteoporosis     PUD (peptic ulcer disease)     Type 2 diabetes mellitus without complications      Past Surgical History:   Procedure Laterality Date    CHOLECYSTECTOMY      colonsocopy      2017    ENDOSCOPIC ULTRASOUND OF UPPER GASTROINTESTINAL TRACT N/A 3/31/2023    Procedure: ULTRASOUND, UPPER GI TRACT, ENDOSCOPIC;  Surgeon: Kleber Bolaños MD;  Location: [address]);  Service: Endoscopy;  Laterality: N/A;  3/27 - precall attempted, no answer. SG    ERCP N/A 5/2/2023    Procedure: ERCP (ENDOSCOPIC RETROGRADE CHOLANGIOPANCREATOGRAPHY);  Surgeon: Jerry Vargas MD;  Location: [address]);  Service: Endoscopy;  Laterality: N/A;    HYSTERECTOMY  03/08/2023    INSERTION OF TUNNELED CENTRAL VENOUS CATHETER (CVC) WITH SUBCUTANEOUS PORT N/A 4/20/2023    Procedure: INSERTION, SINGLE LUMEN CATHETER WITH PORT, WITH FLUOROSCOPIC GUIDANCE;  Surgeon: Philip Anderson Jr., MD;  Location: [address];  Service: General;  Laterality: N/A;    LAPAROSCOPIC CHOLECYSTECTOMY      LYMPH NODE BIOPSY Bilateral 03/08/2023    Procedure: BIOPSY, PELVIC LYMPH NODE;  Surgeon: Dallas Aguilar MD;  Location: [facility];  Service: OB/GYN;  Laterality: Bilateral;    ROBOTIC HYSTERECTOMY, WITH SALPINGO-OOPHORECTOMY Bilateral 03/08/2023    Procedure: ROBOTIC  HYSTERECTOMY,WITH SALPINGO-OOPHORECTOMY;  Surgeon: Dallas Aguilar MD;  Location: [facility];  Service: OB/GYN;  Laterality: Bilateral;    TOTAL KNEE ARTHROPLASTY Left     2016    TOTAL KNEE ARTHROPLASTY Right     2019    VAGINAL DELIVERY      x 2  (one with epidural)     Family History   Problem Relation Age of Onset    Diabetes Mother     Colon cancer Mother     Breast cancer Mother     Hypertension Sister     Breast cancer Sister     Lung cancer Sister     Hypertension Brother     Breast cancer Maternal Aunt     Colon cancer Maternal Aunt     Ovarian cancer Neg Hx      Social History     Tobacco Use    Smoking status: Never     Passive exposure: Never    Smokeless tobacco: Never   Substance Use Topics    Alcohol use: Not Currently    Drug use: Never     Review of Systems   Constitutional:  Negative for fever.   HENT:  Negative for sore throat.    Respiratory:  Negative for shortness of breath.    Cardiovascular:  Negative for chest pain.   Gastrointestinal:  Positive for abdominal pain and diarrhea. Negative for nausea and vomiting.   Genitourinary:  Positive for flank pain. Negative for dysuria.   Musculoskeletal:  Negative for back pain.   Skin:  Negative for rash.   Neurological:  Negative for weakness.   Hematological:  Does not bruise/bleed easily.     Physical Exam     Initial Vitals [07/18/23 1146]   BP Pulse Resp Temp SpO2   102/68 98 (!) 22 96.6 °F (35.9 °C) 100 %      MAP       --         Physical Exam    Nursing note and vitals reviewed.  Constitutional: She appears well-developed. She is not diaphoretic. No distress.   HENT:   Head: Normocephalic.   Eyes: Pupils are equal, round, and reactive to light.   Neck:   Normal range of motion.  Cardiovascular:            No murmur heard.  Pulmonary/Chest: No respiratory distress.   Abdominal: Abdomen is soft.   Abdominal tenderness diffusely, but no rebound or guarding.   Musculoskeletal:         General: No edema.      Cervical back: Normal range of motion.      Neurological: She is alert.   Skin: Skin is warm.   Psychiatric: She has a normal mood and affect.       ED Course   Procedures  Labs Reviewed   CBC W/ AUTO DIFFERENTIAL - Abnormal; Notable for the following components:       Result Value    WBC 2.65 (*)     RBC 3.55 (*)     Hemoglobin 11.0 (*)     Hematocrit 34.4 (*)     RDW 17.6 (*)     Immature Granulocytes 0.8 (*)     Gran # (ANC) 1.1 (*)     Mono # 0.2 (*)     All other components within normal limits   COMPREHENSIVE METABOLIC PANEL - Abnormal; Notable for the following components:    CO2 16 (*)     Glucose 158 (*)     Calcium 10.6 (*)     Total Protein 5.4 (*)     Albumin 2.4 (*)     Alkaline Phosphatase 192 (*)     AST 88 (*)     eGFR 54 (*)     All other components within normal limits   TSH - Abnormal; Notable for the following components:    TSH 8.789 (*)     All other components within normal limits   URINALYSIS, REFLEX TO URINE CULTURE - Abnormal; Notable for the following components:    Appearance, UA Hazy (*)     Occult Blood UA Trace (*)     Leukocytes, UA 1+ (*)     All other components within normal limits    Narrative:     Specimen Source->Urine   LACTIC ACID, PLASMA - Abnormal; Notable for the following components:    Lactate (Lactic Acid) 8.0 (*)     All other components within normal limits    Narrative:     Lactic Acid critical result(s) called and verbal readback obtained   from Melissa Dooley RN. by MK1 07/18/2023 12:46   URINALYSIS MICROSCOPIC - Abnormal; Notable for the following components:    WBC, UA 30 (*)     WBC Clumps, UA Moderate (*)     Bacteria Many (*)     All other components within normal limits    Narrative:     Specimen Source->Urine   CULTURE, BLOOD   CULTURE, BLOOD   CULTURE, URINE   TROPONIN I   T4, FREE   LACTIC ACID, PLASMA     EKG Readings: (Independently Interpreted)   Initial Reading: No STEMI. Rhythm: Accelerated Junctional Rhythm. Heart Rate: 91. Ectopy: No Ectopy. Conduction: Normal.     Imaging Results               CT Head Without Contrast (Final result)  Result time 07/18/23 12:25:46      Final result by Claudine Pierce MD (07/18/23 12:25:46)                   Impression:      No acute intracranial findings.    Age-appropriate cerebral volume loss with mild patchy decreased attenuation supratentorial white matter while nonspecific suggestive for chronic ischemic change.    No evidence for acute intracranial hemorrhage.  Clinical correlation and further evaluation as warranted.      Electronically signed by: Claudine Pierce MD  Date:    07/18/2023  Time:    12:25               Narrative:    EXAMINATION:  CT HEAD WITHOUT CONTRAST    CLINICAL HISTORY:  Mental status change, unknown cause;    TECHNIQUE:  Multiple sequential 5 mm axial images of the head without contrast.  Coronal and sagittal reformatted imaging from the axial acquisition.    COMPARISON:  None    FINDINGS:  There is mild age-appropriate generalized cerebral volume loss.  Compensatory enlargement of the ventricle sulci and cisterns without hydrocephalus.  There is no midline shift or mass effect.  There is mild ill-defined decreased attenuation in the supratentorial white matter while nonspecific suggestive for chronic ischemic change.  There is no evidence for acute intracranial hemorrhage or sulcal effacement.  There is no midline shift or mass effect.  Prominent vascular calcifications.  Visualized paranasal sinuses and mastoid air cells are clear..                                       X-Ray Chest AP Portable (Final result)  Result time 07/18/23 12:08:53      Final result by Claudine Pierce MD (07/18/23 12:08:53)                   Impression:      No acute abnormality.      Electronically signed by: Claudine Pierce MD  Date:    07/18/2023  Time:    12:08               Narrative:    EXAMINATION:  XR CHEST AP PORTABLE    CLINICAL HISTORY:  Generalized weakness;    TECHNIQUE:  Single frontal view of the chest was  performed.    COMPARISON:  06/08/2023    FINDINGS:  The lungs are clear with normal appearance of pulmonary vasculature. No pleural effusion. No evident pneumothorax.    The cardiac silhouette is normal in size. The hilar and mediastinal contours are unremarkable.Right-sided Port-A-Cath has its tip in the mid SVC.    Bones are intact.                                       Medications   sodium chloride 0.9% bolus 500 mL 500 mL (500 mLs Intravenous New Bag 7/18/23 1331)   sodium chloride 0.9% bolus 1,000 mL 1,000 mL (0 mLs Intravenous Stopped 7/18/23 1306)   piperacillin-tazobactam (ZOSYN) 4.5 g in dextrose 5 % in water (D5W) 5 % 100 mL IVPB (MB+) (0 g Intravenous Stopped 7/18/23 1236)   vancomycin 1,500 mg in dextrose 5 % (D5W) 250 mL IVPB (Vial-Mate) (1,500 mg Intravenous New Bag 7/18/23 1252)   morphine injection 2 mg (2 mg Intravenous Given 7/18/23 1354)     Medical Decision Making:   Differential Diagnosis:   DDX: Generalized weakness - r/o infection, significant anemia, ACS, arrhythmia, electrolyte abnormality, YONATHAN, metabolic abnormality.  Given history of cancer, will perform CT head to rule out metastasis.  DX: BMP, CBC, EKG. CXR, UA. NCHCT.  TX: Analgesia PRN. Treatment/consult as indicated by studies.   DISPO: Pending studies.              ED Course as of 07/18/23 1430   Tue Jul 18, 2023   1357 Patient has been speaking with family about the possibility of hospice care.  I will admit patient to this hospital, so that she can speak with case management about this possibility, while continuing to treat her urosepsis. [NB]   1430 Shahram Sullivan 2:30 PM  Discussed results, findings, supportive care with patient. Patient verbalized understanding and agreement. Patient given opportunity to ask all questions. Patient being admitted to hospital for further workup.     [NB]      ED Course User Index  [NB] Shahram Sullivan MD                 Clinical Impression:   Final diagnoses:  [R53.1] Generalized  weakness  [N39.0] Urinary tract infection        ED Disposition Condition    Admit Stable                Shahram Sullivan MD  07/18/23 1152       Shahram Sullivan MD  07/18/23 1215       Shahram Sullivan MD  07/18/23 1358       Shahram Sullivan MD  07/18/23 1430

## 2023-07-19 PROBLEM — E87.8 ELECTROLYTE ABNORMALITY: Status: ACTIVE | Noted: 2023-07-19

## 2023-07-19 PROBLEM — M25.551 RIGHT HIP PAIN: Status: ACTIVE | Noted: 2023-07-19

## 2023-07-19 PROBLEM — N39.0 COMPLICATED URINARY TRACT INFECTION: Status: ACTIVE | Noted: 2023-07-19

## 2023-07-19 PROBLEM — Z71.89 ADVANCED CARE PLANNING/COUNSELING DISCUSSION: Status: ACTIVE | Noted: 2023-07-19

## 2023-07-19 LAB
ALBUMIN SERPL BCP-MCNC: 1.8 G/DL (ref 3.5–5.2)
ALP SERPL-CCNC: 133 U/L (ref 55–135)
ALT SERPL W/O P-5'-P-CCNC: 30 U/L (ref 10–44)
ANION GAP SERPL CALC-SCNC: 6 MMOL/L (ref 8–16)
AST SERPL-CCNC: 113 U/L (ref 10–40)
BASOPHILS # BLD AUTO: ABNORMAL K/UL (ref 0–0.2)
BASOPHILS NFR BLD: 0 % (ref 0–1.9)
BILIRUB SERPL-MCNC: 0.7 MG/DL (ref 0.1–1)
BUN SERPL-MCNC: 8 MG/DL (ref 8–23)
CALCIUM SERPL-MCNC: 8.9 MG/DL (ref 8.7–10.5)
CHLORIDE SERPL-SCNC: 116 MMOL/L (ref 95–110)
CO2 SERPL-SCNC: 18 MMOL/L (ref 23–29)
CREAT SERPL-MCNC: 0.7 MG/DL (ref 0.5–1.4)
DIFFERENTIAL METHOD: ABNORMAL
EOSINOPHIL # BLD AUTO: ABNORMAL K/UL (ref 0–0.5)
EOSINOPHIL NFR BLD: 0 % (ref 0–8)
ERYTHROCYTE [DISTWIDTH] IN BLOOD BY AUTOMATED COUNT: 18 % (ref 11.5–14.5)
EST. GFR  (NO RACE VARIABLE): >60 ML/MIN/1.73 M^2
GLUCOSE SERPL-MCNC: 74 MG/DL (ref 70–110)
HCT VFR BLD AUTO: 25.9 % (ref 37–48.5)
HGB BLD-MCNC: 8.3 G/DL (ref 12–16)
IMM GRANULOCYTES # BLD AUTO: ABNORMAL K/UL (ref 0–0.04)
IMM GRANULOCYTES NFR BLD AUTO: ABNORMAL % (ref 0–0.5)
LACTATE SERPL-SCNC: 2.5 MMOL/L (ref 0.5–2.2)
LYMPHOCYTES # BLD AUTO: ABNORMAL K/UL (ref 1–4.8)
LYMPHOCYTES NFR BLD: 42 % (ref 18–48)
MAGNESIUM SERPL-MCNC: 1.5 MG/DL (ref 1.6–2.6)
MCH RBC QN AUTO: 31 PG (ref 27–31)
MCHC RBC AUTO-ENTMCNC: 32 G/DL (ref 32–36)
MCV RBC AUTO: 97 FL (ref 82–98)
MONOCYTES # BLD AUTO: ABNORMAL K/UL (ref 0.3–1)
MONOCYTES NFR BLD: 10 % (ref 4–15)
NEUTROPHILS NFR BLD: 48 % (ref 38–73)
NRBC BLD-RTO: 0 /100 WBC
PLATELET # BLD AUTO: 124 K/UL (ref 150–450)
PLATELET BLD QL SMEAR: ABNORMAL
PMV BLD AUTO: 11 FL (ref 9.2–12.9)
POCT GLUCOSE: 144 MG/DL (ref 70–110)
POCT GLUCOSE: 175 MG/DL (ref 70–110)
POCT GLUCOSE: 236 MG/DL (ref 70–110)
POCT GLUCOSE: 66 MG/DL (ref 70–110)
POCT GLUCOSE: 70 MG/DL (ref 70–110)
POCT GLUCOSE: 88 MG/DL (ref 70–110)
POTASSIUM SERPL-SCNC: 2.9 MMOL/L (ref 3.5–5.1)
PROT SERPL-MCNC: 3.9 G/DL (ref 6–8.4)
RBC # BLD AUTO: 2.68 M/UL (ref 4–5.4)
SODIUM SERPL-SCNC: 140 MMOL/L (ref 136–145)
WBC # BLD AUTO: 1.88 K/UL (ref 3.9–12.7)

## 2023-07-19 PROCEDURE — 25000003 PHARM REV CODE 250: Performed by: STUDENT IN AN ORGANIZED HEALTH CARE EDUCATION/TRAINING PROGRAM

## 2023-07-19 PROCEDURE — 99222 1ST HOSP IP/OBS MODERATE 55: CPT | Mod: ,,, | Performed by: PHYSICIAN ASSISTANT

## 2023-07-19 PROCEDURE — 63600175 PHARM REV CODE 636 W HCPCS: Performed by: STUDENT IN AN ORGANIZED HEALTH CARE EDUCATION/TRAINING PROGRAM

## 2023-07-19 PROCEDURE — 63600175 PHARM REV CODE 636 W HCPCS: Performed by: INTERNAL MEDICINE

## 2023-07-19 PROCEDURE — 25000003 PHARM REV CODE 250: Performed by: INTERNAL MEDICINE

## 2023-07-19 PROCEDURE — 63600175 PHARM REV CODE 636 W HCPCS: Performed by: PHYSICIAN ASSISTANT

## 2023-07-19 PROCEDURE — 94761 N-INVAS EAR/PLS OXIMETRY MLT: CPT

## 2023-07-19 PROCEDURE — 83605 ASSAY OF LACTIC ACID: CPT | Performed by: PHYSICIAN ASSISTANT

## 2023-07-19 PROCEDURE — 85027 COMPLETE CBC AUTOMATED: CPT | Performed by: INTERNAL MEDICINE

## 2023-07-19 PROCEDURE — 99223 1ST HOSP IP/OBS HIGH 75: CPT | Mod: ,,, | Performed by: STUDENT IN AN ORGANIZED HEALTH CARE EDUCATION/TRAINING PROGRAM

## 2023-07-19 PROCEDURE — 27000207 HC ISOLATION

## 2023-07-19 PROCEDURE — 99223 PR INITIAL HOSPITAL CARE,LEVL III: ICD-10-PCS | Mod: ,,, | Performed by: STUDENT IN AN ORGANIZED HEALTH CARE EDUCATION/TRAINING PROGRAM

## 2023-07-19 PROCEDURE — 21400001 HC TELEMETRY ROOM

## 2023-07-19 PROCEDURE — 36415 COLL VENOUS BLD VENIPUNCTURE: CPT | Performed by: INTERNAL MEDICINE

## 2023-07-19 PROCEDURE — 99222 PR INITIAL HOSPITAL CARE,LEVL II: ICD-10-PCS | Mod: ,,, | Performed by: PHYSICIAN ASSISTANT

## 2023-07-19 PROCEDURE — 85007 BL SMEAR W/DIFF WBC COUNT: CPT | Performed by: INTERNAL MEDICINE

## 2023-07-19 PROCEDURE — 83735 ASSAY OF MAGNESIUM: CPT | Performed by: PHYSICIAN ASSISTANT

## 2023-07-19 PROCEDURE — 80053 COMPREHEN METABOLIC PANEL: CPT | Performed by: INTERNAL MEDICINE

## 2023-07-19 PROCEDURE — 36415 COLL VENOUS BLD VENIPUNCTURE: CPT | Performed by: PHYSICIAN ASSISTANT

## 2023-07-19 RX ORDER — DEXTROSE MONOHYDRATE, SODIUM CHLORIDE, AND POTASSIUM CHLORIDE 50; 1.49; 4.5 G/1000ML; G/1000ML; G/1000ML
INJECTION, SOLUTION INTRAVENOUS CONTINUOUS
Status: DISCONTINUED | OUTPATIENT
Start: 2023-07-19 | End: 2023-07-21 | Stop reason: HOSPADM

## 2023-07-19 RX ORDER — POTASSIUM CHLORIDE 7.45 MG/ML
10 INJECTION INTRAVENOUS ONCE
Status: COMPLETED | OUTPATIENT
Start: 2023-07-19 | End: 2023-07-19

## 2023-07-19 RX ORDER — POTASSIUM CHLORIDE 7.45 MG/ML
10 INJECTION INTRAVENOUS ONCE
Status: DISCONTINUED | OUTPATIENT
Start: 2023-07-19 | End: 2023-07-19

## 2023-07-19 RX ORDER — MAGNESIUM SULFATE HEPTAHYDRATE 40 MG/ML
2 INJECTION, SOLUTION INTRAVENOUS ONCE
Status: COMPLETED | OUTPATIENT
Start: 2023-07-19 | End: 2023-07-19

## 2023-07-19 RX ORDER — DEXTROSE MONOHYDRATE, SODIUM CHLORIDE, AND POTASSIUM CHLORIDE 50; 1.49; 4.5 G/1000ML; G/1000ML; G/1000ML
INJECTION, SOLUTION INTRAVENOUS CONTINUOUS
Status: DISCONTINUED | OUTPATIENT
Start: 2023-07-19 | End: 2023-07-19

## 2023-07-19 RX ADMIN — PRAMIPEXOLE DIHYDROCHLORIDE 0.25 MG: 0.12 TABLET ORAL at 08:07

## 2023-07-19 RX ADMIN — DEXTROSE, SODIUM CHLORIDE, AND POTASSIUM CHLORIDE: 5; .45; .15 INJECTION INTRAVENOUS at 11:07

## 2023-07-19 RX ADMIN — LOSARTAN POTASSIUM 100 MG: 50 TABLET, FILM COATED ORAL at 09:07

## 2023-07-19 RX ADMIN — MAGNESIUM SULFATE HEPTAHYDRATE 2 G: 40 INJECTION, SOLUTION INTRAVENOUS at 01:07

## 2023-07-19 RX ADMIN — ATORVASTATIN CALCIUM 80 MG: 80 TABLET, FILM COATED ORAL at 09:07

## 2023-07-19 RX ADMIN — POTASSIUM CHLORIDE 10 MEQ: 7.46 INJECTION, SOLUTION INTRAVENOUS at 11:07

## 2023-07-19 RX ADMIN — MEROPENEM 1 G: 1 INJECTION, POWDER, FOR SOLUTION INTRAVENOUS at 06:07

## 2023-07-19 RX ADMIN — MUPIROCIN: 20 OINTMENT TOPICAL at 08:07

## 2023-07-19 RX ADMIN — DEXTROSE, SODIUM CHLORIDE, AND POTASSIUM CHLORIDE: 5; .45; .15 INJECTION INTRAVENOUS at 06:07

## 2023-07-19 RX ADMIN — MORPHINE SULFATE 15 MG: 15 TABLET, EXTENDED RELEASE ORAL at 08:07

## 2023-07-19 RX ADMIN — POTASSIUM BICARBONATE 40 MEQ: 391 TABLET, EFFERVESCENT ORAL at 05:07

## 2023-07-19 RX ADMIN — DEXTROSE, SODIUM CHLORIDE, AND POTASSIUM CHLORIDE: 5; .45; .15 INJECTION INTRAVENOUS at 10:07

## 2023-07-19 RX ADMIN — ATENOLOL 50 MG: 25 TABLET ORAL at 09:07

## 2023-07-19 RX ADMIN — MUPIROCIN: 20 OINTMENT TOPICAL at 09:07

## 2023-07-19 RX ADMIN — MEROPENEM 1 G: 1 INJECTION, POWDER, FOR SOLUTION INTRAVENOUS at 10:07

## 2023-07-19 RX ADMIN — MEROPENEM 1 G: 1 INJECTION, POWDER, FOR SOLUTION INTRAVENOUS at 03:07

## 2023-07-19 RX ADMIN — POTASSIUM CHLORIDE 10 MEQ: 7.46 INJECTION, SOLUTION INTRAVENOUS at 04:07

## 2023-07-19 RX ADMIN — PANTOPRAZOLE SODIUM 40 MG: 40 TABLET, DELAYED RELEASE ORAL at 09:07

## 2023-07-19 RX ADMIN — DRONABINOL 2.5 MG: 2.5 CAPSULE ORAL at 04:07

## 2023-07-19 NOTE — PLAN OF CARE
[facility] - Med Surg (3rd Fl)  Initial Discharge Assessment       Primary Care Provider: Marcia Borrego NP    Admission Diagnosis: Urinary tract infection [N39.0]  Generalized weakness [R53.1]    Admission Date: 7/18/2023  Expected Discharge Date:     Transition of Care Barriers: None    Payor: BLUE CROSS BLUE SHIELD / Plan: [place] PPO / Product Type: PPO /     Extended Emergency Contact Information  Primary Emergency Contact: irene masterson  Mobile Phone: [phone]  Relation: Spouse   needed? No  Secondary Emergency Contact: Radha Masterson  Address: [address] [place] [address] [place]  Mobile Phone: [phone]  Relation: Daughter  Preferred language: English   needed? No    Discharge Plan A: Home with family, Home Health  Discharge Plan B: Hospice/home      [id number] - [place], [place] - [address]  [address]  [place] [address]  Phone: [phone] Fax: [phone]    [facility] - [place], [place] - [address]  [address]  [place] [address]  Phone: [phone] Fax: [phone]      Initial Assessment (most recent)       Adult Discharge Assessment - 07/19/23 1043          Discharge Assessment    Assessment Type Discharge Planning Assessment     Confirmed/corrected address, phone number and insurance Yes     Confirmed Demographics Correct on Facesheet     Source of Information patient;family     Communicated CYNTHIA with patient/caregiver Date not available/Unable to determine     Reason For Admission UTI     People in Home spouse     Facility Arrived From: Home     Do you expect to return to your current living situation? Yes     Do you have help at home or someone to help you manage your care at home? Yes     Who are your caregiver(s) and their phone number(s)? Radha Masterson (Daughter) [phone]     Prior to hospitilization cognitive status: Alert/Oriented     Current cognitive status:  Alert/Oriented     Walking or Climbing Stairs ambulation difficulty, assistance 1 person;transferring difficulty, requires equipment;transferring difficulty, assistance 1 person;ambulation difficulty, requires equipment     Dressing/Bathing bathing difficulty, assistance 1 person;dressing difficulty, assistance 1 person     Equipment Currently Used at Home wheelchair;bedside commode;shower chair;walker, rolling     Readmission within 30 days? No     Patient currently being followed by outpatient case management? No     Do you currently have service(s) that help you manage your care at home? Yes     Name and Contact number of agency [facility]     Is the pt/caregiver preference to resume services with current agency --   TBD    Do you take prescription medications? Yes     Do you have prescription coverage? Yes     Coverage BCBS     Do you have any problems affording any of your prescribed medications? No     How do you get to doctors appointments? family or friend will provide     Are you on dialysis? No     Discharge Plan A Home with family;Home Health     Discharge Plan B Hospice/home     DME Needed Upon Discharge  none     Discharge Plan discussed with: Patient;Adult children;Spouse/sig other     Name(s) and Number(s) Rolly Masterson (Spouse) [phone]     Transition of Care Barriers None                   Discharge assessment completed with patient, spouse and daughter at the bedside. Long discussion with PA regarding goals of care given prognosis. Patient states that she is not ready for hospice at this time and wants to 'get better.' Patient expressed a fear of dying. Understanding and empathy expressed. NOMAN discussed difference between home health and hospice with family. Provided a list of local hospice agencies should an informational session be requested. PA discussed DNR status. Patient expressed wishes for no CPR and no intubation. DNR order. NOMAN completed Living Will with patient. Spouse  and daughter present for those discussions. Living Will to be scanned into the medical record. SW to remain available.

## 2023-07-19 NOTE — PROVIDER PROGRESS NOTES - EMERGENCY DEPT.
Emergency Room Physician       This patient does have evidence of infective focus  My overall impression is sepsis.  Source: Urinary Tract  Antibiotics given-   Antibiotics (72h ago, onward)      Start     Stop Route Frequency Ordered    07/18/23 2100  mupirocin 2 % ointment         07/23 2059 Nasl 2 times daily 07/18/23 1448    07/18/23 1815  meropenem (MERREM) 1 g in sodium chloride 0.9 % 100 mL IVPB (MB+)         -- IV Every 12 hours (non-standard times) 07/18/23 1709          Latest lactate reviewed-  Recent Labs   Lab 07/18/23  1630   LACTATE 2.1     Organ dysfunction indicated by elevated lactic acid    Fluid challenge Actual Body weight- Patient will receive 30ml/kg actual body weight to calculate fluid bolus for treatment of septic shock.     Post- resuscitation assessment Yes Perfusion exam was performed within 6 hours of septic shock presentation after bolus shows Adequate tissue perfusion assessed by non-invasive monitoring       Will Not start Pressors- Levophed for MAP of 65  Source control achieved by: ER physician Shahram Horton M.D.15:00 7/18/2023

## 2023-07-19 NOTE — HPI
Patient is a 68 year old female with medical history of endometrial cancer, recurrent UTI's with MDR, DM type 2, HTN, pancreatic adenocarcinoma on palliative chemotherapy who presented to the ED with right hip pain.  She has had a fall in the past.  She has had weight loss, decreased appetite and weakness.  She denies fever, chills, CP, SOB, nausea and vomiting.        Admitted for UTI.

## 2023-07-19 NOTE — H&P
[facility] ([address] [place] [address] Medicine  History & Physical    Patient Name: Kaity Masterson  MRN: [id number]  Patient Class: IP- Inpatient  Admission Date: 7/18/2023  Attending Physician: Skye Allen MD   Primary Care Provider: Marcia Borrego NP         Patient information was obtained from patient, spouse/SO, relative(s) and ER records.     Subjective:     Principal Problem:Complicated urinary tract infection    Chief Complaint:   Chief Complaint   Patient presents with    Altered Mental Status    Diarrhea        HPI: Patient is a 68 year old female with medical history of endometrial cancer, recurrent UTI's with MDR, DM type 2, HTN, pancreatic adenocarcinoma on palliative chemotherapy who presented to the ED with right hip pain.  She has had a fall in the past.  She has had weight loss, decreased appetite and weakness.  She denies fever, chills, CP, SOB, nausea and vomiting.        Admitted for UTI.              Past Medical History:   Diagnosis Date    Anemia, unspecified     Cancer     Hypertension     Osteoporosis     PUD (peptic ulcer disease)     Type 2 diabetes mellitus without complications        Past Surgical History:   Procedure Laterality Date    CHOLECYSTECTOMY      colonsocopy      2017    ENDOSCOPIC ULTRASOUND OF UPPER GASTROINTESTINAL TRACT N/A 3/31/2023    Procedure: ULTRASOUND, UPPER GI TRACT, ENDOSCOPIC;  Surgeon: Kleber Bolaños MD;  Location: [address]);  Service: Endoscopy;  Laterality: N/A;  3/27 - precall attempted, no answer. SG    ERCP N/A 5/2/2023    Procedure: ERCP (ENDOSCOPIC RETROGRADE CHOLANGIOPANCREATOGRAPHY);  Surgeon: Jerry Vargas MD;  Location: [address]);  Service: Endoscopy;  Laterality: N/A;    HYSTERECTOMY  03/08/2023    INSERTION OF TUNNELED CENTRAL VENOUS CATHETER (CVC) WITH SUBCUTANEOUS PORT N/A 4/20/2023    Procedure: INSERTION, SINGLE LUMEN CATHETER WITH PORT, WITH FLUOROSCOPIC GUIDANCE;  Surgeon: Philip Anderson Jr., MD;   Location: [facility] OR [facility];  Service: General;  Laterality: N/A;    LAPAROSCOPIC CHOLECYSTECTOMY      LYMPH NODE BIOPSY Bilateral 03/08/2023    Procedure: BIOPSY, PELVIC LYMPH NODE;  Surgeon: Dallas Aguilar MD;  Location: [facility];  Service: OB/GYN;  Laterality: Bilateral;    ROBOTIC HYSTERECTOMY, WITH SALPINGO-OOPHORECTOMY Bilateral 03/08/2023    Procedure: ROBOTIC HYSTERECTOMY,WITH SALPINGO-OOPHORECTOMY;  Surgeon: Dallas Aguilar MD;  Location: [facility] [place] OR;  Service: OB/GYN;  Laterality: Bilateral;    TOTAL KNEE ARTHROPLASTY Left     2016    TOTAL KNEE ARTHROPLASTY Right     2019    VAGINAL DELIVERY      x 2  (one with epidural)       Review of patient's allergies indicates:  No Known Allergies    No current facility-administered medications on file prior to encounter.     Current Outpatient Medications on File Prior to Encounter   Medication Sig    aspirin (ECOTRIN) 81 MG EC tablet Take 1 tablet by mouth once daily.    atenoloL (TENORMIN) 50 MG tablet Take 50 mg by mouth once daily. am    droNABinol (MARINOL) 2.5 MG capsule Take 1 capsule (2.5 mg total) by mouth 2 (two) times daily before meals.    esomeprazole (NEXIUM) 40 MG capsule esomeprazole magnesium 40 mg capsule,delayed release   TAKE 1 CAPSULE BY MOUTH EVERY DAY    famotidine (PEPCID) 20 MG tablet Take 20 mg by mouth 2 (two) times daily.    LIDOcaine-prilocaine (EMLA) cream Apply topically as needed.    losartan (COZAAR) 100 MG tablet Take 100 mg by mouth once daily.    metFORMIN (GLUCOPHAGE) 500 MG tablet Take 500 mg by mouth daily with breakfast.    morphine (MS CONTIN) 15 MG 12 hr tablet Take 1 tablet (15 mg total) by mouth nightly.    ondansetron (ZOFRAN-ODT) 8 MG TbDL Take 1 tablet (8 mg total) by mouth every 8 (eight) hours as needed (nausea).    pramipexole (MIRAPEX) 0.25 MG tablet Take 0.25 mg by mouth every evening.    promethazine (PHENERGAN) 25 MG tablet Take 1 tablet (25 mg total) by mouth every 6 (six) hours as needed for Nausea.  "   rosuvastatin (CRESTOR) 10 MG tablet Take 10 mg by mouth every evening.    alendronate (FOSAMAX) 35 MG tablet Take 35 mg by mouth every 7 days.    calcium carbonate-vitamin D3 (CALCIUM 600 WITH VITAMIN D3) 600 mg-10 mcg (400 unit) Chew Take 600 mg by mouth 2 (two) times a day.    insulin lispro 100 unit/mL injection Inject 6-10 Units into the skin 3 (three) times daily before meals.    naloxone (NARCAN) 4 mg/actuation Spry 4mg by nasal route as needed for opioid overdose; may repeat every 2-3 minutes in alternating nostrils until medical help arrives. Call 911    ONETOUCH DELICA LANCETS 33 gauge Misc Apply topically daily as needed.    ONETOUCH ULTRA TEST Strp TEST DAILY AND AS NEEDED    ONETOUCH ULTRA2 METER Misc TEST DAILY AND AS NEEDED    oxyCODONE (ROXICODONE) 5 MG immediate release tablet Take 1 tablet (5 mg total) by mouth every 6 (six) hours as needed for Pain.    pen needle, diabetic 31 gauge x 3/16" Ndle 30 each by Misc.(Non-Drug; Combo Route) route every evening.     Family History       Problem Relation (Age of Onset)    Breast cancer Mother, Sister, Maternal Aunt    Colon cancer Mother, Maternal Aunt    Diabetes Mother    Hypertension Sister, Brother    Lung cancer Sister          Tobacco Use    Smoking status: Never     Passive exposure: Never    Smokeless tobacco: Never   Substance and Sexual Activity    Alcohol use: Not Currently    Drug use: Never    Sexual activity: Not Currently     Partners: Male     Review of Systems   Constitutional:  Positive for activity change, appetite change and fatigue. Negative for chills, fever and unexpected weight change.   HENT:  Negative for ear discharge and ear pain.    Eyes:  Negative for pain and discharge.   Respiratory:  Negative for cough and shortness of breath.    Cardiovascular:  Negative for chest pain and leg swelling.   Gastrointestinal:  Negative for nausea and vomiting.   Endocrine: Negative for cold intolerance and heat intolerance. "   Genitourinary:  Negative for difficulty urinating and dysuria.   Musculoskeletal:  Positive for arthralgias. Negative for joint swelling and myalgias.   Skin:  Negative for rash and wound.   Neurological:  Negative for dizziness and headaches.   Psychiatric/Behavioral:  Negative for agitation and confusion.    Objective:     Vital Signs (Most Recent):  Temp: 98 °F (36.7 °C) (07/19/23 1158)  Pulse: 73 (07/19/23 1402)  Resp: 16 (07/19/23 1158)  BP: 135/67 (07/19/23 1158)  SpO2: 100 % (07/19/23 1158) Vital Signs (24h Range):  Temp:  [97.5 °F (36.4 °C)-99.1 °F (37.3 °C)] 98 °F (36.7 °C)  Pulse:  [] 73  Resp:  [15-20] 16  SpO2:  [98 %-100 %] 100 %  BP: (121-172)/(63-85) 135/67     Weight: 56.6 kg (124 lb 12.5 oz)  Body mass index is 21.42 kg/m².     Physical Exam  Constitutional:       General: She is not in acute distress.  HENT:      Head: Normocephalic and atraumatic.   Eyes:      General:         Right eye: No discharge.         Left eye: No discharge.   Cardiovascular:      Rate and Rhythm: Normal rate and regular rhythm.   Pulmonary:      Effort: Pulmonary effort is normal.      Breath sounds: Normal breath sounds.   Abdominal:      General: There is no distension.      Tenderness: There is no abdominal tenderness.   Musculoskeletal:         General: No swelling or tenderness. Normal range of motion.      Cervical back: Neck supple. No tenderness.      Comments: Right hip pain     Skin:     General: Skin is warm and dry.   Neurological:      General: No focal deficit present.      Mental Status: She is alert and oriented to person, place, and time.   Psychiatric:         Mood and Affect: Mood normal.         Behavior: Behavior normal.              Significant Labs: A1C:   Recent Labs   Lab 03/21/23  0407 07/18/23  1207   HGBA1C 10.5* 5.8*     ABGs: No results for input(s): PH, PCO2, HCO3, POCSATURATED, BE, TOTALHB, COHB, METHB, O2HB, POCFIO2, PO2 in the last 48 hours.  Bilirubin:   Recent Labs   Lab  06/22/23  1112 07/05/23  1025 07/18/23  1207 07/19/23  0450   BILITOT 1.2* 0.9 1.0 0.7     Blood Culture:   Recent Labs   Lab 07/18/23  1207   LABBLOO No Growth to date  No Growth to date     BMP:   Recent Labs   Lab 07/19/23  0450 07/19/23  1051   GLU 74  --      --    K 2.9*  --    *  --    CO2 18*  --    BUN 8  --    CREATININE 0.7  --    CALCIUM 8.9  --    MG  --  1.5*     CBC:   Recent Labs   Lab 07/18/23  1207 07/19/23  0450   WBC 2.65* 1.88*   HGB 11.0* 8.3*   HCT 34.4* 25.9*    124*     CMP:   Recent Labs   Lab 07/18/23  1207 07/19/23  0450    140   K 3.8 2.9*    116*   CO2 16* 18*   * 74   BUN 8 8   CREATININE 1.1 0.7   CALCIUM 10.6* 8.9   PROT 5.4* 3.9*   ALBUMIN 2.4* 1.8*   BILITOT 1.0 0.7   ALKPHOS 192* 133   AST 88* 113*   ALT 31 30   ANIONGAP 15 6*     Cardiac Markers: No results for input(s): CKMB, MYOGLOBIN, BNP, TROPISTAT in the last 48 hours.  Coagulation: No results for input(s): PT, INR, APTT in the last 48 hours.  Lactic Acid:   Recent Labs   Lab 07/18/23  1207 07/18/23  1630 07/19/23  1051   LACTATE 8.0* 2.1 2.5*     Lipase: No results for input(s): LIPASE in the last 48 hours.  Lipid Panel: No results for input(s): CHOL, HDL, LDLCALC, TRIG, CHOLHDL in the last 48 hours.  Magnesium:   Recent Labs   Lab 07/19/23  1051   MG 1.5*     POCT Glucose:   Recent Labs   Lab 07/19/23  0753 07/19/23  0827 07/19/23  1159   POCTGLUCOSE 70 88 144*     Prealbumin: No results for input(s): PREALBUMIN in the last 48 hours.  Respiratory Culture: No results for input(s): GSRESP, RESPIRATORYC in the last 48 hours.  Troponin:   Recent Labs   Lab 07/18/23  1207   TROPONINI 0.026     TSH:   Recent Labs   Lab 07/18/23  1207   TSH 8.789*     Urine Culture: No results for input(s): LABURIN in the last 48 hours.  Urine Studies:   Recent Labs   Lab 07/18/23  1232   COLORU Yellow   APPEARANCEUA Hazy*   PHUR 7.0   SPECGRAV 1.020   PROTEINUA Negative   GLUCUA Negative   KETONESU Negative    BILIRUBINUA Negative   OCCULTUA Trace*   NITRITE Negative   UROBILINOGEN Negative   LEUKOCYTESUR 1+*   RBCUA 0   WBCUA 30*   BACTERIA Many*   SQUAMEPITHEL 5       Significant Imaging: I have reviewed all pertinent imaging results/findings within the past 24 hours.    Assessment/Plan:     * Complicated urinary tract infection  U/A with 1+ leukocytes and elevated WBC  Urine culture pending/Will need to result due to h/o drug resistance UTIs  IV meropenem     Advanced care planning/counseling discussion  Discussed current condition and high risk of medical decline goals of care were discussed.  Patient would like to continue chemotherapy and be discharge with home health.  However, she would like to be made DNR/DNI  17min    Electrolyte abnormality  Replacing IV magnesium and IV potassium  Mild hypoglycemia.  IV fluids with dextrose and potassium.  Repeat labs tomorrow       Right hip pain  ROM intact  Xrays pending       Elevated lactic acid level  8.0 admission now 2.5        Type 2 diabetes mellitus without complications  Patient's FSGs are controlled on current medication regimen.  Last A1c reviewed-   Lab Results   Component Value Date    HGBA1C 5.8 (H) 07/18/2023     Most recent fingerstick glucose reviewed-   Recent Labs   Lab 07/19/23  0727 07/19/23  0753 07/19/23  0827 07/19/23  1159   POCTGLUCOSE 66* 70 88 144*     Current correctional scale  Low  Maintain anti-hyperglycemic dose as follows-   Antihyperglycemics (From admission, onward)    Start     Stop Route Frequency Ordered    07/18/23 1813  insulin aspart U-100 pen 0-5 Units         -- SubQ Before meals & nightly PRN 07/18/23 1713        Hold Oral hypoglycemics while patient is in the hospital.    Malignant neoplasm of head of pancreas  Currently on palliative chemotherapy         VTE Risk Mitigation (From admission, onward)         Ordered     IP VTE HIGH RISK PATIENT  Once         07/18/23 1446     Place sequential compression device  Until  discontinued         07/18/23 1446                           Annamarie Park PA-C  Department of Hospital Medicine  [facility] ([facility])

## 2023-07-19 NOTE — ASSESSMENT & PLAN NOTE
U/A with 1+ leukocytes and elevated WBC  Urine culture pending/Will need to result due to h/o drug resistance UTIs  IV meropenem

## 2023-07-19 NOTE — PLAN OF CARE
Problem: Adult Inpatient Plan of Care  Goal: Plan of Care Review  Outcome: Ongoing, Progressing  Goal: Patient-Specific Goal (Individualized)  Outcome: Ongoing, Progressing  Goal: Absence of Hospital-Acquired Illness or Injury  Outcome: Ongoing, Progressing  Goal: Optimal Comfort and Wellbeing  Outcome: Ongoing, Progressing  Goal: Readiness for Transition of Care  Outcome: Ongoing, Progressing     Problem: Diabetes Comorbidity  Goal: Blood Glucose Level Within Targeted Range  Outcome: Ongoing, Progressing     Problem: Impaired Wound Healing  Goal: Optimal Wound Healing  Outcome: Ongoing, Progressing     Problem: Fall Injury Risk  Goal: Absence of Fall and Fall-Related Injury  Outcome: Ongoing, Progressing     Problem: Skin Injury Risk Increased  Goal: Skin Health and Integrity  Outcome: Ongoing, Progressing     Problem: Infection  Goal: Absence of Infection Signs and Symptoms  Outcome: Ongoing, Progressing

## 2023-07-19 NOTE — PROGRESS NOTES
Pharmacist Renal Dose Adjustment Note    Kaity Masterson is a 68 y.o. female being treated with the medication Meropenem    Patient Data:    Vital Signs (Most Recent):  Temp: 98.6 °F (37 °C) (07/19/23 0725)  Pulse: 79 (07/19/23 0800)  Resp: 16 (07/19/23 0725)  BP: (!) 172/85 (07/19/23 0725)  SpO2: 98 % (07/19/23 0725) Vital Signs (72h Range):  Temp:  [96.6 °F (35.9 °C)-99.1 °F (37.3 °C)]   Pulse:  []   Resp:  [12-22]   BP: (102-172)/(63-85)   SpO2:  [98 %-100 %]      Recent Labs   Lab 07/18/23  1207 07/19/23  0450   CREATININE 1.1 0.7     Serum creatinine: 0.7 mg/dL 07/19/23 0450  Estimated creatinine clearance: 64.6 mL/min    Medication:Meropenem dose: 1g frequency: Q12 will be changed to   Medication:Meropenem dose: 1g frequency: Q8    Pharmacist's Name: Pily Sinclair  Pharmacist's Extension: [phone]

## 2023-07-19 NOTE — ASSESSMENT & PLAN NOTE
Replacing IV magnesium and IV potassium  Mild hypoglycemia.  IV fluids with dextrose and potassium.  Repeat labs tomorrow

## 2023-07-19 NOTE — ASSESSMENT & PLAN NOTE
Patient's FSGs are controlled on current medication regimen.  Last A1c reviewed-   Lab Results   Component Value Date    HGBA1C 5.8 (H) 07/18/2023     Most recent fingerstick glucose reviewed-   Recent Labs   Lab 07/19/23  0727 07/19/23  0753 07/19/23  0827 07/19/23  1159   POCTGLUCOSE 66* 70 88 144*     Current correctional scale  Low  Maintain anti-hyperglycemic dose as follows-   Antihyperglycemics (From admission, onward)    Start     Stop Route Frequency Ordered    07/18/23 1813  insulin aspart U-100 pen 0-5 Units         -- SubQ Before meals & nightly PRN 07/18/23 1713        Hold Oral hypoglycemics while patient is in the hospital.

## 2023-07-19 NOTE — ASSESSMENT & PLAN NOTE
Discussed current condition and high risk of medical decline goals of care were discussed.  Patient would like to continue chemotherapy and be discharge with home health.  However, she would like to be made DNR/DNI  17min

## 2023-07-19 NOTE — SUBJECTIVE & OBJECTIVE
Past Medical History:   Diagnosis Date    Anemia, unspecified     Cancer     Hypertension     Osteoporosis     PUD (peptic ulcer disease)     Type 2 diabetes mellitus without complications        Past Surgical History:   Procedure Laterality Date    CHOLECYSTECTOMY      colonsocopy      2017    ENDOSCOPIC ULTRASOUND OF UPPER GASTROINTESTINAL TRACT N/A 3/31/2023    Procedure: ULTRASOUND, UPPER GI TRACT, ENDOSCOPIC;  Surgeon: Kleber Bolaños MD;  Location: [facility] ENDO (2ND FLR);  Service: Endoscopy;  Laterality: N/A;  3/27 - precall attempted, no answer. SG    ERCP N/A 5/2/2023    Procedure: ERCP (ENDOSCOPIC RETROGRADE CHOLANGIOPANCREATOGRAPHY);  Surgeon: Jerry Vargas MD;  Location: [facility] ENDO (2ND FLR);  Service: Endoscopy;  Laterality: N/A;    HYSTERECTOMY  03/08/2023    INSERTION OF TUNNELED CENTRAL VENOUS CATHETER (CVC) WITH SUBCUTANEOUS PORT N/A 4/20/2023    Procedure: INSERTION, SINGLE LUMEN CATHETER WITH PORT, WITH FLUOROSCOPIC GUIDANCE;  Surgeon: Philip Anderson Jr., MD;  Location: [facility] 2ND FLR;  Service: General;  Laterality: N/A;    LAPAROSCOPIC CHOLECYSTECTOMY      LYMPH NODE BIOPSY Bilateral 03/08/2023    Procedure: BIOPSY, PELVIC LYMPH NODE;  Surgeon: Dallas Aguilar MD;  Location: [facility];  Service: OB/GYN;  Laterality: Bilateral;    ROBOTIC HYSTERECTOMY, WITH SALPINGO-OOPHORECTOMY Bilateral 03/08/2023    Procedure: ROBOTIC HYSTERECTOMY,WITH SALPINGO-OOPHORECTOMY;  Surgeon: Dallas Aguilar MD;  Location: [facility];  Service: OB/GYN;  Laterality: Bilateral;    TOTAL KNEE ARTHROPLASTY Left     2016    TOTAL KNEE ARTHROPLASTY Right     2019    VAGINAL DELIVERY      x 2  (one with epidural)       Review of patient's allergies indicates:  No Known Allergies    No current facility-administered medications on file prior to encounter.     Current Outpatient Medications on File Prior to Encounter   Medication Sig    aspirin (ECOTRIN) 81 MG EC tablet Take 1 tablet by mouth once daily.    atenoloL (TENORMIN) 50 MG  "tablet Take 50 mg by mouth once daily. am    droNABinol (MARINOL) 2.5 MG capsule Take 1 capsule (2.5 mg total) by mouth 2 (two) times daily before meals.    esomeprazole (NEXIUM) 40 MG capsule esomeprazole magnesium 40 mg capsule,delayed release   TAKE 1 CAPSULE BY MOUTH EVERY DAY    famotidine (PEPCID) 20 MG tablet Take 20 mg by mouth 2 (two) times daily.    LIDOcaine-prilocaine (EMLA) cream Apply topically as needed.    losartan (COZAAR) 100 MG tablet Take 100 mg by mouth once daily.    metFORMIN (GLUCOPHAGE) 500 MG tablet Take 500 mg by mouth daily with breakfast.    morphine (MS CONTIN) 15 MG 12 hr tablet Take 1 tablet (15 mg total) by mouth nightly.    ondansetron (ZOFRAN-ODT) 8 MG TbDL Take 1 tablet (8 mg total) by mouth every 8 (eight) hours as needed (nausea).    pramipexole (MIRAPEX) 0.25 MG tablet Take 0.25 mg by mouth every evening.    promethazine (PHENERGAN) 25 MG tablet Take 1 tablet (25 mg total) by mouth every 6 (six) hours as needed for Nausea.    rosuvastatin (CRESTOR) 10 MG tablet Take 10 mg by mouth every evening.    alendronate (FOSAMAX) 35 MG tablet Take 35 mg by mouth every 7 days.    calcium carbonate-vitamin D3 (CALCIUM 600 WITH VITAMIN D3) 600 mg-10 mcg (400 unit) Chew Take 600 mg by mouth 2 (two) times a day.    insulin lispro 100 unit/mL injection Inject 6-10 Units into the skin 3 (three) times daily before meals.    naloxone (NARCAN) 4 mg/actuation Spry 4mg by nasal route as needed for opioid overdose; may repeat every 2-3 minutes in alternating nostrils until medical help arrives. Call 911    ONETOUCH DELICA LANCETS 33 gauge Misc Apply topically daily as needed.    ONETOUCH ULTRA TEST Strp TEST DAILY AND AS NEEDED    ONETOUCH ULTRA2 METER Misc TEST DAILY AND AS NEEDED    oxyCODONE (ROXICODONE) 5 MG immediate release tablet Take 1 tablet (5 mg total) by mouth every 6 (six) hours as needed for Pain.    pen needle, diabetic 31 gauge x 3/16" Ndle 30 each by Misc.(Non-Drug; Combo Route) " route every evening.     Family History       Problem Relation (Age of Onset)    Breast cancer Mother, Sister, Maternal Aunt    Colon cancer Mother, Maternal Aunt    Diabetes Mother    Hypertension Sister, Brother    Lung cancer Sister          Tobacco Use    Smoking status: Never     Passive exposure: Never    Smokeless tobacco: Never   Substance and Sexual Activity    Alcohol use: Not Currently    Drug use: Never    Sexual activity: Not Currently     Partners: Male     Review of Systems   Constitutional:  Positive for activity change, appetite change and fatigue. Negative for chills, fever and unexpected weight change.   HENT:  Negative for ear discharge and ear pain.    Eyes:  Negative for pain and discharge.   Respiratory:  Negative for cough and shortness of breath.    Cardiovascular:  Negative for chest pain and leg swelling.   Gastrointestinal:  Negative for nausea and vomiting.   Endocrine: Negative for cold intolerance and heat intolerance.   Genitourinary:  Negative for difficulty urinating and dysuria.   Musculoskeletal:  Positive for arthralgias. Negative for joint swelling and myalgias.   Skin:  Negative for rash and wound.   Neurological:  Negative for dizziness and headaches.   Psychiatric/Behavioral:  Negative for agitation and confusion.    Objective:     Vital Signs (Most Recent):  Temp: 98 °F (36.7 °C) (07/19/23 1158)  Pulse: 73 (07/19/23 1402)  Resp: 16 (07/19/23 1158)  BP: 135/67 (07/19/23 1158)  SpO2: 100 % (07/19/23 1158) Vital Signs (24h Range):  Temp:  [97.5 °F (36.4 °C)-99.1 °F (37.3 °C)] 98 °F (36.7 °C)  Pulse:  [] 73  Resp:  [15-20] 16  SpO2:  [98 %-100 %] 100 %  BP: (121-172)/(63-85) 135/67     Weight: 56.6 kg (124 lb 12.5 oz)  Body mass index is 21.42 kg/m².     Physical Exam  Constitutional:       General: She is not in acute distress.  HENT:      Head: Normocephalic and atraumatic.   Eyes:      General:         Right eye: No discharge.         Left eye: No discharge.    Cardiovascular:      Rate and Rhythm: Normal rate and regular rhythm.   Pulmonary:      Effort: Pulmonary effort is normal.      Breath sounds: Normal breath sounds.   Abdominal:      General: There is no distension.      Tenderness: There is no abdominal tenderness.   Musculoskeletal:         General: No swelling or tenderness. Normal range of motion.      Cervical back: Neck supple. No tenderness.      Comments: Right hip pain     Skin:     General: Skin is warm and dry.   Neurological:      General: No focal deficit present.      Mental Status: She is alert and oriented to person, place, and time.   Psychiatric:         Mood and Affect: Mood normal.         Behavior: Behavior normal.              Significant Labs: A1C:   Recent Labs   Lab 03/21/23  0407 07/18/23  1207   HGBA1C 10.5* 5.8*     ABGs: No results for input(s): PH, PCO2, HCO3, POCSATURATED, BE, TOTALHB, COHB, METHB, O2HB, POCFIO2, PO2 in the last 48 hours.  Bilirubin:   Recent Labs   Lab 06/22/23  1112 07/05/23  1025 07/18/23  1207 07/19/23  0450   BILITOT 1.2* 0.9 1.0 0.7     Blood Culture:   Recent Labs   Lab 07/18/23  1207   LABBLOO No Growth to date  No Growth to date     BMP:   Recent Labs   Lab 07/19/23  0450 07/19/23  1051   GLU 74  --      --    K 2.9*  --    *  --    CO2 18*  --    BUN 8  --    CREATININE 0.7  --    CALCIUM 8.9  --    MG  --  1.5*     CBC:   Recent Labs   Lab 07/18/23  1207 07/19/23  0450   WBC 2.65* 1.88*   HGB 11.0* 8.3*   HCT 34.4* 25.9*    124*     CMP:   Recent Labs   Lab 07/18/23  1207 07/19/23  0450    140   K 3.8 2.9*    116*   CO2 16* 18*   * 74   BUN 8 8   CREATININE 1.1 0.7   CALCIUM 10.6* 8.9   PROT 5.4* 3.9*   ALBUMIN 2.4* 1.8*   BILITOT 1.0 0.7   ALKPHOS 192* 133   AST 88* 113*   ALT 31 30   ANIONGAP 15 6*     Cardiac Markers: No results for input(s): CKMB, MYOGLOBIN, BNP, TROPISTAT in the last 48 hours.  Coagulation: No results for input(s): PT, INR, APTT in the last 48  hours.  Lactic Acid:   Recent Labs   Lab 07/18/23  1207 07/18/23  1630 07/19/23  1051   LACTATE 8.0* 2.1 2.5*     Lipase: No results for input(s): LIPASE in the last 48 hours.  Lipid Panel: No results for input(s): CHOL, HDL, LDLCALC, TRIG, CHOLHDL in the last 48 hours.  Magnesium:   Recent Labs   Lab 07/19/23  1051   MG 1.5*     POCT Glucose:   Recent Labs   Lab 07/19/23  0753 07/19/23  0827 07/19/23  1159   POCTGLUCOSE 70 88 144*     Prealbumin: No results for input(s): PREALBUMIN in the last 48 hours.  Respiratory Culture: No results for input(s): GSRESP, RESPIRATORYC in the last 48 hours.  Troponin:   Recent Labs   Lab 07/18/23  1207   TROPONINI 0.026     TSH:   Recent Labs   Lab 07/18/23  1207   TSH 8.789*     Urine Culture: No results for input(s): LABURIN in the last 48 hours.  Urine Studies:   Recent Labs   Lab 07/18/23  1232   COLORU Yellow   APPEARANCEUA Hazy*   PHUR 7.0   SPECGRAV 1.020   PROTEINUA Negative   GLUCUA Negative   KETONESU Negative   BILIRUBINUA Negative   OCCULTUA Trace*   NITRITE Negative   UROBILINOGEN Negative   LEUKOCYTESUR 1+*   RBCUA 0   WBCUA 30*   BACTERIA Many*   SQUAMEPITHEL 5       Significant Imaging: I have reviewed all pertinent imaging results/findings within the past 24 hours.

## 2023-07-20 LAB
ALBUMIN SERPL BCP-MCNC: 1.7 G/DL (ref 3.5–5.2)
ALP SERPL-CCNC: 141 U/L (ref 55–135)
ALT SERPL W/O P-5'-P-CCNC: 40 U/L (ref 10–44)
ANION GAP SERPL CALC-SCNC: 5 MMOL/L (ref 8–16)
ANISOCYTOSIS BLD QL SMEAR: ABNORMAL
AST SERPL-CCNC: 152 U/L (ref 10–40)
BACTERIA UR CULT: ABNORMAL
BASOPHILS # BLD AUTO: ABNORMAL K/UL (ref 0–0.2)
BASOPHILS NFR BLD: 0 % (ref 0–1.9)
BILIRUB SERPL-MCNC: 0.7 MG/DL (ref 0.1–1)
BUN SERPL-MCNC: 4 MG/DL (ref 8–23)
CALCIUM SERPL-MCNC: 8.8 MG/DL (ref 8.7–10.5)
CHLORIDE SERPL-SCNC: 114 MMOL/L (ref 95–110)
CO2 SERPL-SCNC: 20 MMOL/L (ref 23–29)
CREAT SERPL-MCNC: 0.6 MG/DL (ref 0.5–1.4)
DACRYOCYTES BLD QL SMEAR: ABNORMAL
DIFFERENTIAL METHOD: ABNORMAL
EOSINOPHIL # BLD AUTO: ABNORMAL K/UL (ref 0–0.5)
EOSINOPHIL NFR BLD: 1 % (ref 0–8)
ERYTHROCYTE [DISTWIDTH] IN BLOOD BY AUTOMATED COUNT: 18 % (ref 11.5–14.5)
EST. GFR  (NO RACE VARIABLE): >60 ML/MIN/1.73 M^2
GLUCOSE SERPL-MCNC: 115 MG/DL (ref 70–110)
HCT VFR BLD AUTO: 25.5 % (ref 37–48.5)
HGB BLD-MCNC: 8.2 G/DL (ref 12–16)
HYPOCHROMIA BLD QL SMEAR: ABNORMAL
IMM GRANULOCYTES # BLD AUTO: ABNORMAL K/UL (ref 0–0.04)
IMM GRANULOCYTES NFR BLD AUTO: ABNORMAL % (ref 0–0.5)
LYMPHOCYTES # BLD AUTO: ABNORMAL K/UL (ref 1–4.8)
LYMPHOCYTES NFR BLD: 48 % (ref 18–48)
MAGNESIUM SERPL-MCNC: 1.8 MG/DL (ref 1.6–2.6)
MCH RBC QN AUTO: 30.6 PG (ref 27–31)
MCHC RBC AUTO-ENTMCNC: 32.2 G/DL (ref 32–36)
MCV RBC AUTO: 95 FL (ref 82–98)
MONOCYTES # BLD AUTO: ABNORMAL K/UL (ref 0.3–1)
MONOCYTES NFR BLD: 9 % (ref 4–15)
NEUTROPHILS NFR BLD: 40 % (ref 38–73)
NEUTS BAND NFR BLD MANUAL: 2 %
NRBC BLD-RTO: 0 /100 WBC
PLATELET # BLD AUTO: 142 K/UL (ref 150–450)
PLATELET BLD QL SMEAR: ABNORMAL
PMV BLD AUTO: 10.5 FL (ref 9.2–12.9)
POCT GLUCOSE: 114 MG/DL (ref 70–110)
POCT GLUCOSE: 180 MG/DL (ref 70–110)
POCT GLUCOSE: 226 MG/DL (ref 70–110)
POCT GLUCOSE: 241 MG/DL (ref 70–110)
POTASSIUM SERPL-SCNC: 3.8 MMOL/L (ref 3.5–5.1)
PROT SERPL-MCNC: 3.8 G/DL (ref 6–8.4)
RBC # BLD AUTO: 2.68 M/UL (ref 4–5.4)
SCHISTOCYTES BLD QL SMEAR: ABNORMAL
SODIUM SERPL-SCNC: 139 MMOL/L (ref 136–145)
WBC # BLD AUTO: 1.53 K/UL (ref 3.9–12.7)

## 2023-07-20 PROCEDURE — 21400001 HC TELEMETRY ROOM

## 2023-07-20 PROCEDURE — 99900035 HC TECH TIME PER 15 MIN (STAT)

## 2023-07-20 PROCEDURE — 85007 BL SMEAR W/DIFF WBC COUNT: CPT | Performed by: PHYSICIAN ASSISTANT

## 2023-07-20 PROCEDURE — 85027 COMPLETE CBC AUTOMATED: CPT | Performed by: PHYSICIAN ASSISTANT

## 2023-07-20 PROCEDURE — 83735 ASSAY OF MAGNESIUM: CPT | Performed by: PHYSICIAN ASSISTANT

## 2023-07-20 PROCEDURE — 63600175 PHARM REV CODE 636 W HCPCS: Performed by: INTERNAL MEDICINE

## 2023-07-20 PROCEDURE — 63600175 PHARM REV CODE 636 W HCPCS: Performed by: STUDENT IN AN ORGANIZED HEALTH CARE EDUCATION/TRAINING PROGRAM

## 2023-07-20 PROCEDURE — 80053 COMPREHEN METABOLIC PANEL: CPT | Performed by: PHYSICIAN ASSISTANT

## 2023-07-20 PROCEDURE — 25000003 PHARM REV CODE 250: Performed by: INTERNAL MEDICINE

## 2023-07-20 PROCEDURE — 27000207 HC ISOLATION

## 2023-07-20 PROCEDURE — 99232 SBSQ HOSP IP/OBS MODERATE 35: CPT | Mod: GT,,, | Performed by: PHYSICIAN ASSISTANT

## 2023-07-20 PROCEDURE — 36415 COLL VENOUS BLD VENIPUNCTURE: CPT | Performed by: PHYSICIAN ASSISTANT

## 2023-07-20 PROCEDURE — 94761 N-INVAS EAR/PLS OXIMETRY MLT: CPT

## 2023-07-20 PROCEDURE — 99232 PR SUBSEQUENT HOSPITAL CARE,LEVL II: ICD-10-PCS | Mod: GT,,, | Performed by: PHYSICIAN ASSISTANT

## 2023-07-20 RX ADMIN — MEROPENEM 1 G: 1 INJECTION, POWDER, FOR SOLUTION INTRAVENOUS at 01:07

## 2023-07-20 RX ADMIN — MUPIROCIN: 20 OINTMENT TOPICAL at 08:07

## 2023-07-20 RX ADMIN — DEXTROSE, SODIUM CHLORIDE, AND POTASSIUM CHLORIDE: 5; .45; .15 INJECTION INTRAVENOUS at 12:07

## 2023-07-20 RX ADMIN — MEROPENEM 1 G: 1 INJECTION, POWDER, FOR SOLUTION INTRAVENOUS at 09:07

## 2023-07-20 RX ADMIN — PANTOPRAZOLE SODIUM 40 MG: 40 TABLET, DELAYED RELEASE ORAL at 08:07

## 2023-07-20 RX ADMIN — LOSARTAN POTASSIUM 100 MG: 50 TABLET, FILM COATED ORAL at 08:07

## 2023-07-20 RX ADMIN — ATENOLOL 50 MG: 25 TABLET ORAL at 08:07

## 2023-07-20 RX ADMIN — PRAMIPEXOLE DIHYDROCHLORIDE 0.25 MG: 0.12 TABLET ORAL at 08:07

## 2023-07-20 RX ADMIN — MEROPENEM 1 G: 1 INJECTION, POWDER, FOR SOLUTION INTRAVENOUS at 05:07

## 2023-07-20 RX ADMIN — DRONABINOL 2.5 MG: 2.5 CAPSULE ORAL at 04:07

## 2023-07-20 RX ADMIN — MORPHINE SULFATE 15 MG: 15 TABLET, EXTENDED RELEASE ORAL at 08:07

## 2023-07-20 RX ADMIN — ATORVASTATIN CALCIUM 80 MG: 80 TABLET, FILM COATED ORAL at 08:07

## 2023-07-20 RX ADMIN — DRONABINOL 2.5 MG: 2.5 CAPSULE ORAL at 06:07

## 2023-07-20 NOTE — HOSPITAL COURSE
PT HD stable on room air.  Urine culture gram negative rods.  Waiting for final urine culture due to h/o MDR UTIs.      PT HD stable on room air.  Urine culture positive for Ecoli.  Sensitive to augmentin.  Will prescribe 5 more days since patient was started on 7/18 meropenem.  Culturelle will be prescribed.

## 2023-07-20 NOTE — PROGRESS NOTES
[facility] ([address] [place] [address] Medicine  Progress Note    Patient Name: Kaity Masterson  MRN: [id number]  Patient Class: IP- Inpatient   Admission Date: 7/18/2023  Length of Stay: 2 days  Attending Physician: Skye Allen MD  Primary Care Provider: Marcia Borrego NP        Subjective:     Principal Problem:Complicated urinary tract infection        HPI:  Patient is a 68 year old female with medical history of endometrial cancer, recurrent UTI's with MDR, DM type 2, HTN, pancreatic adenocarcinoma on palliative chemotherapy who presented to the ED with right hip pain.  She has had a fall in the past.  She has had weight loss, decreased appetite and weakness.  She denies fever, chills, CP, SOB, nausea and vomiting.        Admitted for UTI.              Overview/Hospital Course:  PT HD stable on room air.  Urine culture gram negative rods.  Waiting for final urine culture due to h/o MDR UTIs.        Past Medical History:   Diagnosis Date    Anemia, unspecified     Cancer     Hypertension     Osteoporosis     PUD (peptic ulcer disease)     Type 2 diabetes mellitus without complications        Past Surgical History:   Procedure Laterality Date    CHOLECYSTECTOMY      colonsocopy      2017    ENDOSCOPIC ULTRASOUND OF UPPER GASTROINTESTINAL TRACT N/A 3/31/2023    Procedure: ULTRASOUND, UPPER GI TRACT, ENDOSCOPIC;  Surgeon: Kleber Bolaños MD;  Location: [facility] ([address] [place] [address]);  Service: Endoscopy;  Laterality: N/A;  3/27 - precall attempted, no answer. SG    ERCP N/A 5/2/2023    Procedure: ERCP (ENDOSCOPIC RETROGRADE CHOLANGIOPANCREATOGRAPHY);  Surgeon: Jerry Vargas MD;  Location: [facility] ([address] [place] [address]);  Service: Endoscopy;  Laterality: N/A;    HYSTERECTOMY  03/08/2023    INSERTION OF TUNNELED CENTRAL VENOUS CATHETER (CVC) WITH SUBCUTANEOUS PORT N/A 4/20/2023    Procedure: INSERTION, SINGLE LUMEN CATHETER WITH PORT, WITH FLUOROSCOPIC GUIDANCE;  Surgeon: Philip Anderson Jr., MD;  Location: [facility] OR [facility]  FLR;  Service: General;  Laterality: N/A;    LAPAROSCOPIC CHOLECYSTECTOMY      LYMPH NODE BIOPSY Bilateral 03/08/2023    Procedure: BIOPSY, PELVIC LYMPH NODE;  Surgeon: Dallas Aguilar MD;  Location: [facility] OR;  Service: OB/GYN;  Laterality: Bilateral;    ROBOTIC HYSTERECTOMY, WITH SALPINGO-OOPHORECTOMY Bilateral 03/08/2023    Procedure: ROBOTIC HYSTERECTOMY,WITH SALPINGO-OOPHORECTOMY;  Surgeon: Dallas Aguilar MD;  Location: [facility] OR;  Service: OB/GYN;  Laterality: Bilateral;    TOTAL KNEE ARTHROPLASTY Left     2016    TOTAL KNEE ARTHROPLASTY Right     2019    VAGINAL DELIVERY      x 2  (one with epidural)       Review of patient's allergies indicates:  No Known Allergies    No current facility-administered medications on file prior to encounter.     Current Outpatient Medications on File Prior to Encounter   Medication Sig    aspirin (ECOTRIN) 81 MG EC tablet Take 1 tablet by mouth once daily.    atenoloL (TENORMIN) 50 MG tablet Take 50 mg by mouth once daily. am    droNABinol (MARINOL) 2.5 MG capsule Take 1 capsule (2.5 mg total) by mouth 2 (two) times daily before meals.    esomeprazole (NEXIUM) 40 MG capsule esomeprazole magnesium 40 mg capsule,delayed release   TAKE 1 CAPSULE BY MOUTH EVERY DAY    famotidine (PEPCID) 20 MG tablet Take 20 mg by mouth 2 (two) times daily.    LIDOcaine-prilocaine (EMLA) cream Apply topically as needed.    losartan (COZAAR) 100 MG tablet Take 100 mg by mouth once daily.    metFORMIN (GLUCOPHAGE) 500 MG tablet Take 500 mg by mouth daily with breakfast.    morphine (MS CONTIN) 15 MG 12 hr tablet Take 1 tablet (15 mg total) by mouth nightly.    ondansetron (ZOFRAN-ODT) 8 MG TbDL Take 1 tablet (8 mg total) by mouth every 8 (eight) hours as needed (nausea).    pramipexole (MIRAPEX) 0.25 MG tablet Take 0.25 mg by mouth every evening.    promethazine (PHENERGAN) 25 MG tablet Take 1 tablet (25 mg total) by mouth every 6 (six) hours as needed for Nausea.    rosuvastatin  "(CRESTOR) 10 MG tablet Take 10 mg by mouth every evening.    alendronate (FOSAMAX) 35 MG tablet Take 35 mg by mouth every 7 days.    calcium carbonate-vitamin D3 (CALCIUM 600 WITH VITAMIN D3) 600 mg-10 mcg (400 unit) Chew Take 600 mg by mouth 2 (two) times a day.    insulin lispro 100 unit/mL injection Inject 6-10 Units into the skin 3 (three) times daily before meals.    naloxone (NARCAN) 4 mg/actuation Spry 4mg by nasal route as needed for opioid overdose; may repeat every 2-3 minutes in alternating nostrils until medical help arrives. Call 911    ONETOUCH DELICA LANCETS 33 gauge Misc Apply topically daily as needed.    ONETOUCH ULTRA TEST Strp TEST DAILY AND AS NEEDED    ONETOUCH ULTRA2 METER Misc TEST DAILY AND AS NEEDED    oxyCODONE (ROXICODONE) 5 MG immediate release tablet Take 1 tablet (5 mg total) by mouth every 6 (six) hours as needed for Pain.    pen needle, diabetic 31 gauge x 3/16" Ndle 30 each by Misc.(Non-Drug; Combo Route) route every evening.     Family History       Problem Relation (Age of Onset)    Breast cancer Mother, Sister, Maternal Aunt    Colon cancer Mother, Maternal Aunt    Diabetes Mother    Hypertension Sister, Brother    Lung cancer Sister          Tobacco Use    Smoking status: Never     Passive exposure: Never    Smokeless tobacco: Never   Substance and Sexual Activity    Alcohol use: Not Currently    Drug use: Never    Sexual activity: Not Currently     Partners: Male     Review of Systems   Constitutional:  Positive for activity change, appetite change and fatigue. Negative for chills, fever and unexpected weight change.   HENT:  Negative for ear discharge and ear pain.    Eyes:  Negative for pain and discharge.   Respiratory:  Negative for cough and shortness of breath.    Cardiovascular:  Negative for chest pain and leg swelling.   Gastrointestinal:  Negative for nausea and vomiting.   Endocrine: Negative for cold intolerance and heat intolerance.   Genitourinary:  " Negative for difficulty urinating and dysuria.   Musculoskeletal:  Positive for arthralgias. Negative for joint swelling and myalgias.   Skin:  Negative for rash and wound.   Neurological:  Negative for dizziness and headaches.   Psychiatric/Behavioral:  Negative for agitation and confusion.    Objective:     Vital Signs (Most Recent):  Temp: 98.1 °F (36.7 °C) (07/20/23 1154)  Pulse: 77 (07/20/23 1200)  Resp: 18 (07/20/23 1154)  BP: 135/76 (07/20/23 1154)  SpO2: 100 % (07/20/23 1154) Vital Signs (24h Range):  Temp:  [97.6 °F (36.4 °C)-98.4 °F (36.9 °C)] 98.1 °F (36.7 °C)  Pulse:  [69-92] 77  Resp:  [16-20] 18  SpO2:  [98 %-100 %] 100 %  BP: (117-144)/(56-78) 135/76     Weight: 56.6 kg (124 lb 12.5 oz)  Body mass index is 21.42 kg/m².     Physical Exam  Constitutional:       General: She is not in acute distress.  HENT:      Head: Normocephalic and atraumatic.   Eyes:      General:         Right eye: No discharge.         Left eye: No discharge.   Cardiovascular:      Rate and Rhythm: Normal rate and regular rhythm.   Pulmonary:      Effort: Pulmonary effort is normal.      Breath sounds: Normal breath sounds.   Abdominal:      General: There is no distension.      Tenderness: There is no abdominal tenderness.   Musculoskeletal:         General: No swelling or tenderness. Normal range of motion.      Cervical back: Neck supple. No tenderness.      Comments: Right hip pain     Skin:     General: Skin is warm and dry.   Neurological:      General: No focal deficit present.      Mental Status: She is alert and oriented to person, place, and time.      Comments: Intermittent moments of confusion but redirectable.     Psychiatric:         Mood and Affect: Mood normal.         Behavior: Behavior normal.              Significant Labs: A1C:   Recent Labs   Lab 03/21/23  0407 07/18/23  1207   HGBA1C 10.5* 5.8*       ABGs: No results for input(s): PH, PCO2, HCO3, POCSATURATED, BE, TOTALHB, COHB, METHB, O2HB, POCFIO2, PO2 in  the last 48 hours.  Bilirubin:   Recent Labs   Lab 06/22/23  1112 07/05/23  1025 07/18/23  1207 07/19/23  0450 07/20/23  0644   BILITOT 1.2* 0.9 1.0 0.7 0.7       Blood Culture:   No results for input(s): LABBLOO in the last 48 hours.    BMP:   Recent Labs   Lab 07/20/23  0644   *      K 3.8   *   CO2 20*   BUN 4*   CREATININE 0.6   CALCIUM 8.8   MG 1.8       CBC:   Recent Labs   Lab 07/19/23  0450 07/20/23  0644   WBC 1.88* 1.53*   HGB 8.3* 8.2*   HCT 25.9* 25.5*   * 142*       CMP:   Recent Labs   Lab 07/19/23  0450 07/20/23  0644    139   K 2.9* 3.8   * 114*   CO2 18* 20*   GLU 74 115*   BUN 8 4*   CREATININE 0.7 0.6   CALCIUM 8.9 8.8   PROT 3.9* 3.8*   ALBUMIN 1.8* 1.7*   BILITOT 0.7 0.7   ALKPHOS 133 141*   * 152*   ALT 30 40   ANIONGAP 6* 5*       Cardiac Markers: No results for input(s): CKMB, MYOGLOBIN, BNP, TROPISTAT in the last 48 hours.  Coagulation: No results for input(s): PT, INR, APTT in the last 48 hours.  Lactic Acid:   Recent Labs   Lab 07/18/23  1630 07/19/23  1051   LACTATE 2.1 2.5*       Lipase: No results for input(s): LIPASE in the last 48 hours.  Lipid Panel: No results for input(s): CHOL, HDL, LDLCALC, TRIG, CHOLHDL in the last 48 hours.  Magnesium:   Recent Labs   Lab 07/19/23  1051 07/20/23  0644   MG 1.5* 1.8       POCT Glucose:   Recent Labs   Lab 07/19/23  2033 07/20/23  0743 07/20/23  1128   POCTGLUCOSE 236* 114* 180*       Prealbumin: No results for input(s): PREALBUMIN in the last 48 hours.  Respiratory Culture: No results for input(s): GSRESP, RESPIRATORYC in the last 48 hours.  Troponin:   No results for input(s): TROPONINI, TROPONINIHS in the last 48 hours.    TSH:   Recent Labs   Lab 07/18/23  1207   TSH 8.789*       Urine Culture: No results for input(s): LABURIN in the last 48 hours.  Urine Studies:   No results for input(s): COLORU, APPEARANCEUA, PHUR, SPECGRAV, PROTEINUA, GLUCUA, KETONESU, BILIRUBINUA, OCCULTUA, NITRITE,  UROBILINOGEN, LEUKOCYTESUR, RBCUA, WBCUA, BACTERIA, SQUAMEPITHEL, HYALINECASTS in the last 48 hours.    Invalid input(s): TANA      Significant Imaging: I have reviewed all pertinent imaging results/findings within the past 24 hours.      Assessment/Plan:      * Complicated urinary tract infection  U/A with 1+ leukocytes and elevated WBC  Urine culture gram negative rods/Will need to result due to h/o drug resistance UTIs  IV meropenem     Advanced care planning/counseling discussion  Discussed current condition and high risk of medical decline goals of care were discussed.  Patient would like to continue chemotherapy and be discharge with home health.  However, she would like to be made DNR/DNI  17min    Electrolyte abnormality  Replacing IV magnesium and IV potassium  Mild hypoglycemia.  IV fluids with dextrose and potassium.    Improving 7/20      Right hip pain  ROM intact  Xray no fractures present      Elevated lactic acid level  8.0 admission now 2.5        Type 2 diabetes mellitus without complications  Patient's FSGs are controlled on current medication regimen.  Last A1c reviewed-   Lab Results   Component Value Date    HGBA1C 5.8 (H) 07/18/2023     Most recent fingerstick glucose reviewed-   Recent Labs   Lab 07/19/23  1553 07/19/23  2033 07/20/23  0743 07/20/23  1128   POCTGLUCOSE 175* 236* 114* 180*     Current correctional scale  Low  Maintain anti-hyperglycemic dose as follows-   Antihyperglycemics (From admission, onward)    Start     Stop Route Frequency Ordered    07/18/23 1813  insulin aspart U-100 pen 0-5 Units         -- SubQ Before meals & nightly PRN 07/18/23 1713        Hold Oral hypoglycemics while patient is in the hospital.    Malignant neoplasm of head of pancreas  Currently on palliative chemotherapy         VTE Risk Mitigation (From admission, onward)         Ordered     IP VTE HIGH RISK PATIENT  Once         07/18/23 1446     Place sequential compression device  Until discontinued          07/18/23 1446                Discharge Planning   CYNTHIA:      Code Status: DNR   Is the patient medically ready for discharge?:     Reason for patient still in hospital (select all that apply): Patient trending condition  Discharge Plan A: Home with family, Home Health                  Annamarie Park PA-C  Department of Hospital Medicine   [place] - [facility] ([facility])

## 2023-07-20 NOTE — ASSESSMENT & PLAN NOTE
Patient's FSGs are controlled on current medication regimen.  Last A1c reviewed-   Lab Results   Component Value Date    HGBA1C 5.8 (H) 07/18/2023     Most recent fingerstick glucose reviewed-   Recent Labs   Lab 07/19/23  1553 07/19/23  2033 07/20/23  0743 07/20/23  1128   POCTGLUCOSE 175* 236* 114* 180*     Current correctional scale  Low  Maintain anti-hyperglycemic dose as follows-   Antihyperglycemics (From admission, onward)    Start     Stop Route Frequency Ordered    07/18/23 1813  insulin aspart U-100 pen 0-5 Units         -- SubQ Before meals & nightly PRN 07/18/23 1713        Hold Oral hypoglycemics while patient is in the hospital.

## 2023-07-20 NOTE — ASSESSMENT & PLAN NOTE
Please report to [facility] for further evaluation of low heart rate.      Patient Education     Bradycardia    When your heart rate is slow, or less than 60 beats per minute, it is called bradycardia. Bradycardia can be normal, caused by medicines, or a sign of a disease. The slow heart rate may not be constant. It can come and go. It's a concern when it is very low, or you have symptoms.  Symptoms  The following are symptoms of bradycardia:  · Heart rate less than 60 per minute  · Dizziness or feeling lightheaded  · Weakness  · Trouble breathing  · Fainting  · Sleepiness  · More trouble exercising than usual because of tiredness (fatigue)  · Confusion or trouble concentrating  Causes  Bradycardia can have many causes. Some can be related to your heart, but some may be related to other things.  Non-heart-related causes:  · Advanced age  · Side effect of certain medicines. These include beta-blockers, calcium channel blockers, digitalis, clonidine, lithium, and medicines to treat arrhythmias such as amiodarone.  · Health conditions such as low blood sugar (hypoglycemia), low thyroid (hypothyroidism) , electrolyte disorder,  low body temperature (hypothermia), and sleep apnea  · Athletes, especially long-distance runners, may have a slow heart rate. This can be normal.  ·    · Brain injury such as stroke or bleeding inside the brain  Heart-related causes:  · Coronary artery disease. This includes angina or past heart attack (acute myocardial infarction).  · Heart valve disease  · Heart muscle disease (cardiomyopathy)  · Congestive heart failure  · Sick sinus syndrome. This is when your heart's natural pacemaker is no longer working correctly.  · Heart block. This is when your heart's natural electrical pathways no longer work correctly.  · Diseases that enter the heart such as sarcoid  · Heart infections  Sometimes the cause for the arrhythmia can't be found.  Bradycardia that causes symptoms is sometimes  ROM intact  Xray no fractures present     reversible, and can be treated with medicines. When more severe bradycardia continues, you may need a pacemaker. When the bradycardia doesn't cause symptoms, your doctor may decide to watch it over time.  Home care  The following will help you care for yourself at home:  · Go back to your usual activities when you are feeling back to normal.  · If you have any of the symptoms below when you exert yourself, stop. Don't exert yourself until you have seen your doctor for an assessment.  · Work with your doctor on any needed lifestyle changes. These might include changing your diet, stopping smoking if you are a smoker, and starting a planned exercise program.  Follow-up care  Follow up with your doctor, or as advised.  Call 911  Call 911 if any of the following occur:  · Chest pain  · Trouble breathing  · Slow heart rate with dizziness or lightheadedness  · Fainting or loss of consciousness  · Chest pain that spreads to the shoulder, arm, neck, or back  · Slow heart rate (under 50 beats per minute) if it happens along with other symptoms  When to seek medical advice  Call your healthcare provider right away if any of the following occur:  · Occasional weakness  · Dizziness  · Lightheadedness  [employer] last reviewed this educational content on 11/1/2019  © 6080-9585 [employer]. All rights reserved. This information is not intended as a substitute for professional medical care. Always follow your healthcare professional's instructions.

## 2023-07-20 NOTE — ASSESSMENT & PLAN NOTE
U/A with 1+ leukocytes and elevated WBC  Urine culture gram negative rods/Will need to result due to h/o drug resistance UTIs  IV meropenem

## 2023-07-20 NOTE — ASSESSMENT & PLAN NOTE
Replacing IV magnesium and IV potassium  Mild hypoglycemia.  IV fluids with dextrose and potassium.    Improving 7/20

## 2023-07-21 VITALS
SYSTOLIC BLOOD PRESSURE: 122 MMHG | HEART RATE: 76 BPM | DIASTOLIC BLOOD PRESSURE: 76 MMHG | BODY MASS INDEX: 21.3 KG/M2 | OXYGEN SATURATION: 100 % | TEMPERATURE: 99 F | WEIGHT: 124.75 LBS | RESPIRATION RATE: 16 BRPM | HEIGHT: 64 IN

## 2023-07-21 LAB
POCT GLUCOSE: 122 MG/DL (ref 70–110)
POCT GLUCOSE: 91 MG/DL (ref 70–110)

## 2023-07-21 PROCEDURE — 99238 HOSP IP/OBS DSCHRG MGMT 30/<: CPT | Mod: ,,, | Performed by: PHYSICIAN ASSISTANT

## 2023-07-21 PROCEDURE — 25000003 PHARM REV CODE 250: Performed by: INTERNAL MEDICINE

## 2023-07-21 PROCEDURE — 94760 N-INVAS EAR/PLS OXIMETRY 1: CPT

## 2023-07-21 PROCEDURE — 99238 PR HOSPITAL DISCHARGE DAY,<30 MIN: ICD-10-PCS | Mod: ,,, | Performed by: PHYSICIAN ASSISTANT

## 2023-07-21 PROCEDURE — 63600175 PHARM REV CODE 636 W HCPCS: Performed by: STUDENT IN AN ORGANIZED HEALTH CARE EDUCATION/TRAINING PROGRAM

## 2023-07-21 PROCEDURE — 63600175 PHARM REV CODE 636 W HCPCS: Performed by: INTERNAL MEDICINE

## 2023-07-21 RX ORDER — AMOXICILLIN AND CLAVULANATE POTASSIUM 875; 125 MG/1; MG/1
1 TABLET, FILM COATED ORAL 2 TIMES DAILY
Qty: 10 TABLET | Refills: 0 | Status: SHIPPED | OUTPATIENT
Start: 2023-07-21 | End: 2023-07-26

## 2023-07-21 RX ORDER — LACTOBACILLUS RHAMNOSUS GG 10B CELL
1 CAPSULE ORAL DAILY
Qty: 30 CAPSULE | Refills: 0 | Status: SHIPPED | OUTPATIENT
Start: 2023-07-21 | End: 2023-08-20

## 2023-07-21 RX ADMIN — DEXTROSE, SODIUM CHLORIDE, AND POTASSIUM CHLORIDE: 5; .45; .15 INJECTION INTRAVENOUS at 03:07

## 2023-07-21 RX ADMIN — DRONABINOL 2.5 MG: 2.5 CAPSULE ORAL at 06:07

## 2023-07-21 RX ADMIN — ATORVASTATIN CALCIUM 80 MG: 80 TABLET, FILM COATED ORAL at 09:07

## 2023-07-21 RX ADMIN — MEROPENEM 1 G: 1 INJECTION, POWDER, FOR SOLUTION INTRAVENOUS at 05:07

## 2023-07-21 RX ADMIN — MUPIROCIN: 20 OINTMENT TOPICAL at 09:07

## 2023-07-21 RX ADMIN — LOSARTAN POTASSIUM 100 MG: 50 TABLET, FILM COATED ORAL at 09:07

## 2023-07-21 RX ADMIN — ATENOLOL 50 MG: 25 TABLET ORAL at 09:07

## 2023-07-21 RX ADMIN — PANTOPRAZOLE SODIUM 40 MG: 40 TABLET, DELAYED RELEASE ORAL at 09:07

## 2023-07-21 NOTE — ASSESSMENT & PLAN NOTE
U/A with 1+ leukocytes and elevated WBC  Urine culture gram negative rods/Will need to result due to h/o drug resistance UTIs  IV meropenem     7/21 Urine culture positive for Ecoli sensitive to augmentin.

## 2023-07-21 NOTE — ASSESSMENT & PLAN NOTE
Patient's FSGs are controlled on current medication regimen.  Last A1c reviewed-   Lab Results   Component Value Date    HGBA1C 5.8 (H) 07/18/2023     Most recent fingerstick glucose reviewed-   Recent Labs   Lab 07/20/23  1638 07/20/23 2005 07/21/23  0807 07/21/23  1144   POCTGLUCOSE 226* 241* 91 122*     Current correctional scale  Low  Maintain anti-hyperglycemic dose as follows-   Antihyperglycemics (From admission, onward)    Start     Stop Route Frequency Ordered    07/18/23 1813  insulin aspart U-100 pen 0-5 Units         -- SubQ Before meals & nightly PRN 07/18/23 1713        Hold Oral hypoglycemics while patient is in the hospital.

## 2023-07-21 NOTE — SUBJECTIVE & OBJECTIVE
Past Medical History:   Diagnosis Date    Anemia, unspecified     Cancer     Hypertension     Osteoporosis     PUD (peptic ulcer disease)     Type 2 diabetes mellitus without complications        Past Surgical History:   Procedure Laterality Date    CHOLECYSTECTOMY      colonsocopy      2017    ENDOSCOPIC ULTRASOUND OF UPPER GASTROINTESTINAL TRACT N/A 3/31/2023    Procedure: ULTRASOUND, UPPER GI TRACT, ENDOSCOPIC;  Surgeon: Kleber Bolaños MD;  Location: [facility] ENDO (2ND FLR);  Service: Endoscopy;  Laterality: N/A;  3/27 - precall attempted, no answer. SG    ERCP N/A 5/2/2023    Procedure: ERCP (ENDOSCOPIC RETROGRADE CHOLANGIOPANCREATOGRAPHY);  Surgeon: Jerry Vargas MD;  Location: [facility] ENDO (2ND FLR);  Service: Endoscopy;  Laterality: N/A;    HYSTERECTOMY  03/08/2023    INSERTION OF TUNNELED CENTRAL VENOUS CATHETER (CVC) WITH SUBCUTANEOUS PORT N/A 4/20/2023    Procedure: INSERTION, SINGLE LUMEN CATHETER WITH PORT, WITH FLUOROSCOPIC GUIDANCE;  Surgeon: Philip Anderson Jr., MD;  Location: [facility] 2ND FLR;  Service: General;  Laterality: N/A;    LAPAROSCOPIC CHOLECYSTECTOMY      LYMPH NODE BIOPSY Bilateral 03/08/2023    Procedure: BIOPSY, PELVIC LYMPH NODE;  Surgeon: Dallas Aguilar MD;  Location: [facility];  Service: OB/GYN;  Laterality: Bilateral;    ROBOTIC HYSTERECTOMY, WITH SALPINGO-OOPHORECTOMY Bilateral 03/08/2023    Procedure: ROBOTIC HYSTERECTOMY,WITH SALPINGO-OOPHORECTOMY;  Surgeon: Dallas Aguilar MD;  Location: [facility];  Service: OB/GYN;  Laterality: Bilateral;    TOTAL KNEE ARTHROPLASTY Left     2016    TOTAL KNEE ARTHROPLASTY Right     2019    VAGINAL DELIVERY      x 2  (one with epidural)       Review of patient's allergies indicates:  No Known Allergies    No current facility-administered medications on file prior to encounter.     Current Outpatient Medications on File Prior to Encounter   Medication Sig    aspirin (ECOTRIN) 81 MG EC tablet Take 1 tablet by mouth once daily.    atenoloL (TENORMIN) 50 MG  "tablet Take 50 mg by mouth once daily. am    droNABinol (MARINOL) 2.5 MG capsule Take 1 capsule (2.5 mg total) by mouth 2 (two) times daily before meals.    esomeprazole (NEXIUM) 40 MG capsule esomeprazole magnesium 40 mg capsule,delayed release   TAKE 1 CAPSULE BY MOUTH EVERY DAY    famotidine (PEPCID) 20 MG tablet Take 20 mg by mouth 2 (two) times daily.    LIDOcaine-prilocaine (EMLA) cream Apply topically as needed.    losartan (COZAAR) 100 MG tablet Take 100 mg by mouth once daily.    metFORMIN (GLUCOPHAGE) 500 MG tablet Take 500 mg by mouth daily with breakfast.    morphine (MS CONTIN) 15 MG 12 hr tablet Take 1 tablet (15 mg total) by mouth nightly.    ondansetron (ZOFRAN-ODT) 8 MG TbDL Take 1 tablet (8 mg total) by mouth every 8 (eight) hours as needed (nausea).    pramipexole (MIRAPEX) 0.25 MG tablet Take 0.25 mg by mouth every evening.    promethazine (PHENERGAN) 25 MG tablet Take 1 tablet (25 mg total) by mouth every 6 (six) hours as needed for Nausea.    rosuvastatin (CRESTOR) 10 MG tablet Take 10 mg by mouth every evening.    alendronate (FOSAMAX) 35 MG tablet Take 35 mg by mouth every 7 days.    calcium carbonate-vitamin D3 (CALCIUM 600 WITH VITAMIN D3) 600 mg-10 mcg (400 unit) Chew Take 600 mg by mouth 2 (two) times a day.    insulin lispro 100 unit/mL injection Inject 6-10 Units into the skin 3 (three) times daily before meals.    naloxone (NARCAN) 4 mg/actuation Spry 4mg by nasal route as needed for opioid overdose; may repeat every 2-3 minutes in alternating nostrils until medical help arrives. Call 911    ONETOUCH DELICA LANCETS 33 gauge Misc Apply topically daily as needed.    ONETOUCH ULTRA TEST Strp TEST DAILY AND AS NEEDED    ONETOUCH ULTRA2 METER Misc TEST DAILY AND AS NEEDED    oxyCODONE (ROXICODONE) 5 MG immediate release tablet Take 1 tablet (5 mg total) by mouth every 6 (six) hours as needed for Pain.    pen needle, diabetic 31 gauge x 3/16" Ndle 30 each by Misc.(Non-Drug; Combo Route) " route every evening.     Family History       Problem Relation (Age of Onset)    Breast cancer Mother, Sister, Maternal Aunt    Colon cancer Mother, Maternal Aunt    Diabetes Mother    Hypertension Sister, Brother    Lung cancer Sister          Tobacco Use    Smoking status: Never     Passive exposure: Never    Smokeless tobacco: Never   Substance and Sexual Activity    Alcohol use: Not Currently    Drug use: Never    Sexual activity: Not Currently     Partners: Male     Review of Systems   Constitutional:  Positive for activity change, appetite change and fatigue. Negative for chills, fever and unexpected weight change.   HENT:  Negative for ear discharge and ear pain.    Eyes:  Negative for pain and discharge.   Respiratory:  Negative for cough and shortness of breath.    Cardiovascular:  Negative for chest pain and leg swelling.   Gastrointestinal:  Negative for nausea and vomiting.   Endocrine: Negative for cold intolerance and heat intolerance.   Genitourinary:  Negative for difficulty urinating and dysuria.   Musculoskeletal:  Positive for arthralgias. Negative for joint swelling and myalgias.   Skin:  Negative for rash and wound.   Neurological:  Negative for dizziness and headaches.   Psychiatric/Behavioral:  Negative for agitation and confusion.    Objective:     Vital Signs (Most Recent):  Temp: 98.8 °F (37.1 °C) (07/21/23 1146)  Pulse: 91 (07/21/23 1146)  Resp: 16 (07/21/23 1146)  BP: 122/76 (07/21/23 1146)  SpO2: 100 % (07/21/23 1146) Vital Signs (24h Range):  Temp:  [98.4 °F (36.9 °C)-99.2 °F (37.3 °C)] 98.8 °F (37.1 °C)  Pulse:  [71-91] 91  Resp:  [14-20] 16  SpO2:  [94 %-100 %] 100 %  BP: (118-166)/(62-81) 122/76     Weight: 56.6 kg (124 lb 12.5 oz)  Body mass index is 21.42 kg/m².     Physical Exam  Constitutional:       General: She is not in acute distress.  HENT:      Head: Normocephalic and atraumatic.   Eyes:      General:         Right eye: No discharge.         Left eye: No discharge.    Cardiovascular:      Rate and Rhythm: Normal rate and regular rhythm.   Pulmonary:      Effort: Pulmonary effort is normal.      Breath sounds: Normal breath sounds.   Abdominal:      General: There is no distension.      Tenderness: There is no abdominal tenderness.   Musculoskeletal:         General: No swelling or tenderness. Normal range of motion.      Cervical back: Neck supple. No tenderness.      Comments: Right hip pain     Skin:     General: Skin is warm and dry.   Neurological:      General: No focal deficit present.      Mental Status: She is alert and oriented to person, place, and time.      Comments: Intermittent moments of confusion but redirectable.     Psychiatric:         Mood and Affect: Mood normal.         Behavior: Behavior normal.              Significant Labs: A1C:   Recent Labs   Lab 03/21/23  0407 07/18/23  1207   HGBA1C 10.5* 5.8*       ABGs: No results for input(s): PH, PCO2, HCO3, POCSATURATED, BE, TOTALHB, COHB, METHB, O2HB, POCFIO2, PO2 in the last 48 hours.  Bilirubin:   Recent Labs   Lab 06/22/23  1112 07/05/23  1025 07/18/23  1207 07/19/23  0450 07/20/23  0644   BILITOT 1.2* 0.9 1.0 0.7 0.7       Blood Culture:   No results for input(s): LABBLOO in the last 48 hours.    BMP:   Recent Labs   Lab 07/20/23  0644   *      K 3.8   *   CO2 20*   BUN 4*   CREATININE 0.6   CALCIUM 8.8   MG 1.8       CBC:   Recent Labs   Lab 07/20/23  0644   WBC 1.53*   HGB 8.2*   HCT 25.5*   *       CMP:   Recent Labs   Lab 07/20/23  0644      K 3.8   *   CO2 20*   *   BUN 4*   CREATININE 0.6   CALCIUM 8.8   PROT 3.8*   ALBUMIN 1.7*   BILITOT 0.7   ALKPHOS 141*   *   ALT 40   ANIONGAP 5*       Cardiac Markers: No results for input(s): CKMB, MYOGLOBIN, BNP, TROPISTAT in the last 48 hours.  Coagulation: No results for input(s): PT, INR, APTT in the last 48 hours.  Lactic Acid:   No results for input(s): LACTATE in the last 48 hours.    Lipase: No results  for input(s): LIPASE in the last 48 hours.  Lipid Panel: No results for input(s): CHOL, HDL, LDLCALC, TRIG, CHOLHDL in the last 48 hours.  Magnesium:   Recent Labs   Lab 07/20/23  0644   MG 1.8       POCT Glucose:   Recent Labs   Lab 07/20/23 2005 07/21/23  0807 07/21/23  1144   POCTGLUCOSE 241* 91 122*       Prealbumin: No results for input(s): PREALBUMIN in the last 48 hours.  Respiratory Culture: No results for input(s): GSRESP, RESPIRATORYC in the last 48 hours.  Troponin:   No results for input(s): TROPONINI, TROPONINIHS in the last 48 hours.    TSH:   Recent Labs   Lab 07/18/23  1207   TSH 8.789*       Urine Culture: No results for input(s): LABURIN in the last 48 hours.  Urine Studies:   No results for input(s): COLORU, APPEARANCEUA, PHUR, SPECGRAV, PROTEINUA, GLUCUA, KETONESU, BILIRUBINUA, OCCULTUA, NITRITE, UROBILINOGEN, LEUKOCYTESUR, RBCUA, WBCUA, BACTERIA, SQUAMEPITHEL, HYALINECASTS in the last 48 hours.    Invalid input(s): WRIGHTSUR      Significant Imaging: I have reviewed all pertinent imaging results/findings within the past 24 hours.

## 2023-07-21 NOTE — DISCHARGE SUMMARY
[facility] ([facility])  [facility] Medicine  Discharge Summary      Patient Name: Kaity Masterson  MRN: [id number]  [facility]: [id number]  Patient Class: IP- Inpatient  Admission Date: 7/18/2023  Hospital Length of Stay: 3 days  Discharge Date and Time:  07/21/2023 12:26 PM  Attending Physician: Skye Allen MD   Discharging Provider: Annamarie Park PA-C  Primary Care Provider: Marcia Borrego NP    Primary Care Team: Networked reference to record PCT     HPI:   Patient is a 68 year old female with medical history of endometrial cancer, recurrent UTI's with MDR, DM type 2, HTN, pancreatic adenocarcinoma on palliative chemotherapy who presented to the ED with right hip pain.  She has had a fall in the past.  She has had weight loss, decreased appetite and weakness.  She denies fever, chills, CP, SOB, nausea and vomiting.        Admitted for UTI.              * No surgery found *      Hospital Course:   PT HD stable on room air.  Urine culture gram negative rods.  Waiting for final urine culture due to h/o MDR UTIs.      PT HD stable on room air.  Urine culture positive for Ecoli.  Sensitive to augmentin.  Will prescribe 5 more days since patient was started on 7/18 meropenem.  Culturelle will be prescribed.         Goals of Care Treatment Preferences:  Code Status: DNR    Living Will: Yes              Consults:   Consults (From admission, onward)        Status Ordering Provider     Inpatient consult to Social Work/Case Management  Once        Provider:  (Not yet assigned)    Completed SKYE ALLEN          Renal/  * Complicated urinary tract infection  U/A with 1+ leukocytes and elevated WBC  Urine culture gram negative rods/Will need to result due to h/o drug resistance UTIs  IV meropenem     7/21 Urine culture positive for Ecoli sensitive to augmentin.      Electrolyte abnormality  Replacing IV magnesium and IV potassium  Mild hypoglycemia.  IV fluids with dextrose and potassium.    Improving  7/20      Oncology  Malignant neoplasm of head of pancreas  Currently on palliative chemotherapy       Endocrine  Type 2 diabetes mellitus without complications  Patient's FSGs are controlled on current medication regimen.  Last A1c reviewed-   Lab Results   Component Value Date    HGBA1C 5.8 (H) 07/18/2023     Most recent fingerstick glucose reviewed-   Recent Labs   Lab 07/20/23  1638 07/20/23 2005 07/21/23  0807 07/21/23  1144   POCTGLUCOSE 226* 241* 91 122*     Current correctional scale  Low  Maintain anti-hyperglycemic dose as follows-   Antihyperglycemics (From admission, onward)    Start     Stop Route Frequency Ordered    07/18/23 1813  insulin aspart U-100 pen 0-5 Units         -- SubQ Before meals & nightly PRN 07/18/23 1713        Hold Oral hypoglycemics while patient is in the hospital.    Orthopedic  Right hip pain  ROM intact  Xray no fractures present      Other  Advanced care planning/counseling discussion  Discussed current condition and high risk of medical decline goals of care were discussed.  Patient would like to continue chemotherapy and be discharge with home health.  However, she would like to be made DNR/DNI  17min    Elevated lactic acid level  8.0 admission now 2.5          Final Active Diagnoses:    Diagnosis Date Noted POA    PRINCIPAL PROBLEM:  Complicated urinary tract infection [N39.0] 07/19/2023 Yes    Right hip pain [M25.551] 07/19/2023 Yes    Electrolyte abnormality [E87.8] 07/19/2023 No    Advanced care planning/counseling discussion [Z71.89] 07/19/2023 Not Applicable    Elevated lactic acid level [R79.89] 06/08/2023 Yes    Type 2 diabetes mellitus without complications [E11.9]  Yes    Malignant neoplasm of head of pancreas [C25.0] 04/05/2023 Yes      Problems Resolved During this Admission:       Discharged Condition: stable    Disposition: Home or Self Care    Follow Up:   Follow-up Information     Marcia Borrego NP Follow up.    Specialty: Family  Medicine  Why: Office has been contacted and will call daughter with an appointment when scheduled.  Contact information:  [address]  [facility]  [place] [address]  [phone]                       Patient Instructions:      Ambulatory referral/consult to Home Health   Standing Status: Future   Referral Priority: Routine Referral Type: Home Health   Referral Reason: Specialty Services Required   Requested Specialty: Home Health Services   Number of Visits Requested: 1       Significant Diagnostic Studies:   Recent Labs   Lab 03/21/23  0407 07/18/23  1207   HGBA1C 10.5* 5.8*         ABGs: No results for input(s): PH, PCO2, HCO3, POCSATURATED, BE, TOTALHB, COHB, METHB, O2HB, POCFIO2, PO2 in the last 48 hours.  Bilirubin:           Recent Labs   Lab 06/22/23  1112 07/05/23  1025 07/18/23  1207 07/19/23  0450 07/20/23  0644   BILITOT 1.2* 0.9 1.0 0.7 0.7         Blood Culture:   No results for input(s): LABBLOO in the last 48 hours.     BMP:       Recent Labs   Lab 07/20/23  0644   *      K 3.8   *   CO2 20*   BUN 4*   CREATININE 0.6   CALCIUM 8.8   MG 1.8         CBC:       Recent Labs   Lab 07/20/23  0644   WBC 1.53*   HGB 8.2*   HCT 25.5*   *         CMP:       Recent Labs   Lab 07/20/23  0644      K 3.8   *   CO2 20*   *   BUN 4*   CREATININE 0.6   CALCIUM 8.8   PROT 3.8*   ALBUMIN 1.7*   BILITOT 0.7   ALKPHOS 141*   *   ALT 40   ANIONGAP 5*      Magnesium:       Recent Labs   Lab 07/20/23  0644   MG 1.8         POCT Glucose:         Recent Labs   Lab 07/20/23 2005 07/21/23  0807 07/21/23  1144   POCTGLUCOSE 241* 91 122*         TSH:       Recent Labs   Lab 07/18/23  1207   TSH 8.789*         Significant Imaging: I have reviewed all pertinent imaging results/findings within the past 24 hours.      Pending Diagnostic Studies:     None         Medications:  Reconciled Home Medications:      Medication List      START taking these medications     amoxicillin-clavulanate 875-125mg 875-125 mg per tablet  Commonly known as: AUGMENTIN  Take 1 tablet by mouth 2 (two) times daily. for 5 days     CULTURELLE 10 billion cell capsule  Generic drug: Lactobacillus rhamnosus GG  Take 1 capsule by mouth once daily.        CONTINUE taking these medications    alendronate 35 MG tablet  Commonly known as: FOSAMAX  Take 35 mg by mouth every 7 days.     atenoloL 50 MG tablet  Commonly known as: TENORMIN  Take 50 mg by mouth once daily. am     calcium carbonate-vitamin D3 600 mg-10 mcg (400 unit) Chew  Commonly known as: CALCIUM 600 WITH VITAMIN D3  Take 600 mg by mouth 2 (two) times a day.     droNABinol 2.5 MG capsule  Commonly known as: MARINOL  Take 1 capsule (2.5 mg total) by mouth 2 (two) times daily before meals.     esomeprazole 40 MG capsule  Commonly known as: NEXIUM  esomeprazole magnesium 40 mg capsule,delayed release   TAKE 1 CAPSULE BY MOUTH EVERY DAY     famotidine 20 MG tablet  Commonly known as: PEPCID  Take 20 mg by mouth 2 (two) times daily.     insulin lispro 100 unit/mL injection  Inject 6-10 Units into the skin 3 (three) times daily before meals.     LIDOcaine-prilocaine cream  Commonly known as: EMLA  Apply topically as needed.     losartan 100 MG tablet  Commonly known as: COZAAR  Take 100 mg by mouth once daily.     metFORMIN 500 MG tablet  Commonly known as: GLUCOPHAGE  Take 500 mg by mouth daily with breakfast.     morphine 15 MG 12 hr tablet  Commonly known as: MS CONTIN  Take 1 tablet (15 mg total) by mouth nightly.     naloxone 4 mg/actuation Spry  Commonly known as: NARCAN  4mg by nasal route as needed for opioid overdose; may repeat every 2-3 minutes in alternating nostrils until medical help arrives. Call 911     ondansetron 8 MG Tbdl  Commonly known as: ZOFRAN-ODT  Take 1 tablet (8 mg total) by mouth every 8 (eight) hours as needed (nausea).     ONETOUCH DELICA LANCETS 33 gauge Misc  Generic drug: lancets  Apply topically daily as needed.    "  ONETOUCH ULTRA TEST Strp  Generic drug: blood sugar diagnostic  TEST DAILY AND AS NEEDED     ONETOUCH ULTRA2 METER Misc  Generic drug: blood-glucose meter  TEST DAILY AND AS NEEDED     oxyCODONE 5 MG immediate release tablet  Commonly known as: ROXICODONE  Take 1 tablet (5 mg total) by mouth every 6 (six) hours as needed for Pain.     pen needle, diabetic 31 gauge x 3/16" Ndle  30 each by Misc.(Non-Drug; Combo Route) route every evening.     pramipexole 0.25 MG tablet  Commonly known as: MIRAPEX  Take 0.25 mg by mouth every evening.     promethazine 25 MG tablet  Commonly known as: PHENERGAN  Take 1 tablet (25 mg total) by mouth every 6 (six) hours as needed for Nausea.     rosuvastatin 10 MG tablet  Commonly known as: CRESTOR  Take 10 mg by mouth every evening.        STOP taking these medications    aspirin 81 MG EC tablet  Commonly known as: ECOTRIN            Indwelling Lines/Drains at time of discharge:   Lines/Drains/Airways     Central Venous Catheter Line  Duration                PowerPort A Cath Single Lumen 04/20/23 0802 Internal Jugular Right 92 days                Time spent on the discharge of patient: 25 minutes         Annamarie Park PA-C  Department of Hospital Medicine  [place] - Med Surg (3rd Fl)  "

## 2023-07-21 NOTE — PROGRESS NOTES
[facility] ([address] [place] [address] Medicine  Progress Note    Patient Name: Kaity Masterson  MRN: [id number]  Patient Class: IP- Inpatient   Admission Date: 7/18/2023  Length of Stay: 3 days  Attending Physician: Skye Allen MD  Primary Care Provider: Marcia Borrego NP        Subjective:     Principal Problem:Complicated urinary tract infection        HPI:  Patient is a 68 year old female with medical history of endometrial cancer, recurrent UTI's with MDR, DM type 2, HTN, pancreatic adenocarcinoma on palliative chemotherapy who presented to the ED with right hip pain.  She has had a fall in the past.  She has had weight loss, decreased appetite and weakness.  She denies fever, chills, CP, SOB, nausea and vomiting.        Admitted for UTI.              Overview/Hospital Course:  PT HD stable on room air.  Urine culture gram negative rods.  Waiting for final urine culture due to h/o MDR UTIs.      PT HD stable on room air.  Urine culture positive for Ecoli.  Sensitive to augmentin.  Will prescribe 5 more days since patient was started on 7/18 meropenem.  Culturelle will be prescribed.        Past Medical History:   Diagnosis Date    Anemia, unspecified     Cancer     Hypertension     Osteoporosis     PUD (peptic ulcer disease)     Type 2 diabetes mellitus without complications        Past Surgical History:   Procedure Laterality Date    CHOLECYSTECTOMY      colonsocopy      2017    ENDOSCOPIC ULTRASOUND OF UPPER GASTROINTESTINAL TRACT N/A 3/31/2023    Procedure: ULTRASOUND, UPPER GI TRACT, ENDOSCOPIC;  Surgeon: Kleber Bolaños MD;  Location: [address]);  Service: Endoscopy;  Laterality: N/A;  3/27 - precall attempted, no answer. SG    ERCP N/A 5/2/2023    Procedure: ERCP (ENDOSCOPIC RETROGRADE CHOLANGIOPANCREATOGRAPHY);  Surgeon: Jerry Vargas MD;  Location: [address]);  Service: Endoscopy;  Laterality: N/A;    HYSTERECTOMY  03/08/2023    INSERTION OF TUNNELED CENTRAL VENOUS  CATHETER (CVC) WITH SUBCUTANEOUS PORT N/A 4/20/2023    Procedure: INSERTION, SINGLE LUMEN CATHETER WITH PORT, WITH FLUOROSCOPIC GUIDANCE;  Surgeon: Philip Anderson Jr., MD;  Location: [address];  Service: General;  Laterality: N/A;    LAPAROSCOPIC CHOLECYSTECTOMY      LYMPH NODE BIOPSY Bilateral 03/08/2023    Procedure: BIOPSY, PELVIC LYMPH NODE;  Surgeon: Dallas Aguilar MD;  Location: [facility] OR;  Service: OB/GYN;  Laterality: Bilateral;    ROBOTIC HYSTERECTOMY, WITH SALPINGO-OOPHORECTOMY Bilateral 03/08/2023    Procedure: ROBOTIC HYSTERECTOMY,WITH SALPINGO-OOPHORECTOMY;  Surgeon: Dallas Aguilar MD;  Location: [facility] OR;  Service: OB/GYN;  Laterality: Bilateral;    TOTAL KNEE ARTHROPLASTY Left     2016    TOTAL KNEE ARTHROPLASTY Right     2019    VAGINAL DELIVERY      x 2  (one with epidural)       Review of patient's allergies indicates:  No Known Allergies    No current facility-administered medications on file prior to encounter.     Current Outpatient Medications on File Prior to Encounter   Medication Sig    aspirin (ECOTRIN) 81 MG EC tablet Take 1 tablet by mouth once daily.    atenoloL (TENORMIN) 50 MG tablet Take 50 mg by mouth once daily. am    droNABinol (MARINOL) 2.5 MG capsule Take 1 capsule (2.5 mg total) by mouth 2 (two) times daily before meals.    esomeprazole (NEXIUM) 40 MG capsule esomeprazole magnesium 40 mg capsule,delayed release   TAKE 1 CAPSULE BY MOUTH EVERY DAY    famotidine (PEPCID) 20 MG tablet Take 20 mg by mouth 2 (two) times daily.    LIDOcaine-prilocaine (EMLA) cream Apply topically as needed.    losartan (COZAAR) 100 MG tablet Take 100 mg by mouth once daily.    metFORMIN (GLUCOPHAGE) 500 MG tablet Take 500 mg by mouth daily with breakfast.    morphine (MS CONTIN) 15 MG 12 hr tablet Take 1 tablet (15 mg total) by mouth nightly.    ondansetron (ZOFRAN-ODT) 8 MG TbDL Take 1 tablet (8 mg total) by mouth every 8 (eight) hours as needed (nausea).    pramipexole (MIRAPEX)  "0.25 MG tablet Take 0.25 mg by mouth every evening.    promethazine (PHENERGAN) 25 MG tablet Take 1 tablet (25 mg total) by mouth every 6 (six) hours as needed for Nausea.    rosuvastatin (CRESTOR) 10 MG tablet Take 10 mg by mouth every evening.    alendronate (FOSAMAX) 35 MG tablet Take 35 mg by mouth every 7 days.    calcium carbonate-vitamin D3 (CALCIUM 600 WITH VITAMIN D3) 600 mg-10 mcg (400 unit) Chew Take 600 mg by mouth 2 (two) times a day.    insulin lispro 100 unit/mL injection Inject 6-10 Units into the skin 3 (three) times daily before meals.    naloxone (NARCAN) 4 mg/actuation Spry 4mg by nasal route as needed for opioid overdose; may repeat every 2-3 minutes in alternating nostrils until medical help arrives. Call 911    [employer] LANCETS 33 gauge Misc Apply topically daily as needed.    ONETOUCH ULTRA TEST Strp TEST DAILY AND AS NEEDED    ONETOUCH ULTRA2 METER Misc TEST DAILY AND AS NEEDED    oxyCODONE (ROXICODONE) 5 MG immediate release tablet Take 1 tablet (5 mg total) by mouth every 6 (six) hours as needed for Pain.    pen needle, diabetic 31 gauge x 3/16" Ndle 30 each by Misc.(Non-Drug; Combo Route) route every evening.     Family History       Problem Relation (Age of Onset)    Breast cancer Mother, Sister, Maternal Aunt    Colon cancer Mother, Maternal Aunt    Diabetes Mother    Hypertension Sister, Brother    Lung cancer Sister          Tobacco Use    Smoking status: Never     Passive exposure: Never    Smokeless tobacco: Never   Substance and Sexual Activity    Alcohol use: Not Currently    Drug use: Never    Sexual activity: Not Currently     Partners: Male     Review of Systems   Constitutional:  Positive for activity change, appetite change and fatigue. Negative for chills, fever and unexpected weight change.   HENT:  Negative for ear discharge and ear pain.    Eyes:  Negative for pain and discharge.   Respiratory:  Negative for cough and shortness of breath.  "   Cardiovascular:  Negative for chest pain and leg swelling.   Gastrointestinal:  Negative for nausea and vomiting.   Endocrine: Negative for cold intolerance and heat intolerance.   Genitourinary:  Negative for difficulty urinating and dysuria.   Musculoskeletal:  Positive for arthralgias. Negative for joint swelling and myalgias.   Skin:  Negative for rash and wound.   Neurological:  Negative for dizziness and headaches.   Psychiatric/Behavioral:  Negative for agitation and confusion.    Objective:     Vital Signs (Most Recent):  Temp: 98.8 °F (37.1 °C) (07/21/23 1146)  Pulse: 91 (07/21/23 1146)  Resp: 16 (07/21/23 1146)  BP: 122/76 (07/21/23 1146)  SpO2: 100 % (07/21/23 1146) Vital Signs (24h Range):  Temp:  [98.4 °F (36.9 °C)-99.2 °F (37.3 °C)] 98.8 °F (37.1 °C)  Pulse:  [71-91] 91  Resp:  [14-20] 16  SpO2:  [94 %-100 %] 100 %  BP: (118-166)/(62-81) 122/76     Weight: 56.6 kg (124 lb 12.5 oz)  Body mass index is 21.42 kg/m².     Physical Exam  Constitutional:       General: She is not in acute distress.  HENT:      Head: Normocephalic and atraumatic.   Eyes:      General:         Right eye: No discharge.         Left eye: No discharge.   Cardiovascular:      Rate and Rhythm: Normal rate and regular rhythm.   Pulmonary:      Effort: Pulmonary effort is normal.      Breath sounds: Normal breath sounds.   Abdominal:      General: There is no distension.      Tenderness: There is no abdominal tenderness.   Musculoskeletal:         General: No swelling or tenderness. Normal range of motion.      Cervical back: Neck supple. No tenderness.      Comments: Right hip pain     Skin:     General: Skin is warm and dry.   Neurological:      General: No focal deficit present.      Mental Status: She is alert and oriented to person, place, and time.      Comments: Intermittent moments of confusion but redirectable.     Psychiatric:         Mood and Affect: Mood normal.         Behavior: Behavior normal.              Significant  Labs: A1C:   Recent Labs   Lab 03/21/23  0407 07/18/23  1207   HGBA1C 10.5* 5.8*       ABGs: No results for input(s): PH, PCO2, HCO3, POCSATURATED, BE, TOTALHB, COHB, METHB, O2HB, POCFIO2, PO2 in the last 48 hours.  Bilirubin:   Recent Labs   Lab 06/22/23  1112 07/05/23  1025 07/18/23  1207 07/19/23  0450 07/20/23  0644   BILITOT 1.2* 0.9 1.0 0.7 0.7       Blood Culture:   No results for input(s): LABBLOO in the last 48 hours.    BMP:   Recent Labs   Lab 07/20/23  0644   *      K 3.8   *   CO2 20*   BUN 4*   CREATININE 0.6   CALCIUM 8.8   MG 1.8       CBC:   Recent Labs   Lab 07/20/23  0644   WBC 1.53*   HGB 8.2*   HCT 25.5*   *       CMP:   Recent Labs   Lab 07/20/23  0644      K 3.8   *   CO2 20*   *   BUN 4*   CREATININE 0.6   CALCIUM 8.8   PROT 3.8*   ALBUMIN 1.7*   BILITOT 0.7   ALKPHOS 141*   *   ALT 40   ANIONGAP 5*       Cardiac Markers: No results for input(s): CKMB, MYOGLOBIN, BNP, TROPISTAT in the last 48 hours.  Coagulation: No results for input(s): PT, INR, APTT in the last 48 hours.  Lactic Acid:   No results for input(s): LACTATE in the last 48 hours.    Lipase: No results for input(s): LIPASE in the last 48 hours.  Lipid Panel: No results for input(s): CHOL, HDL, LDLCALC, TRIG, CHOLHDL in the last 48 hours.  Magnesium:   Recent Labs   Lab 07/20/23  0644   MG 1.8       POCT Glucose:   Recent Labs   Lab 07/20/23 2005 07/21/23  0807 07/21/23  1144   POCTGLUCOSE 241* 91 122*       Prealbumin: No results for input(s): PREALBUMIN in the last 48 hours.  Respiratory Culture: No results for input(s): GSRESP, RESPIRATORYC in the last 48 hours.  Troponin:   No results for input(s): TROPONINI, TROPONINIHS in the last 48 hours.    TSH:   Recent Labs   Lab 07/18/23  1207   TSH 8.789*       Urine Culture: No results for input(s): LABURIN in the last 48 hours.  Urine Studies:   No results for input(s): COLORU, APPEARANCEUA, PHUR, SPECGRAV, PROTEINUA, GLUCUA,  KETONESU, BILIRUBINUA, OCCULTUA, NITRITE, UROBILINOGEN, LEUKOCYTESUR, RBCUA, WBCUA, BACTERIA, SQUAMEPITHEL, HYALINECASTS in the last 48 hours.    Invalid input(s): TANA      Significant Imaging: I have reviewed all pertinent imaging results/findings within the past 24 hours.      Assessment/Plan:      * Complicated urinary tract infection  U/A with 1+ leukocytes and elevated WBC  Urine culture gram negative rods/Will need to result due to h/o drug resistance UTIs  IV meropenem     7/21 Urine culture positive for Ecoli sensitive to augmentin.      Advanced care planning/counseling discussion  Discussed current condition and high risk of medical decline goals of care were discussed.  Patient would like to continue chemotherapy and be discharge with home health.  However, she would like to be made DNR/DNI  17min    Electrolyte abnormality  Replacing IV magnesium and IV potassium  Mild hypoglycemia.  IV fluids with dextrose and potassium.    Improving 7/20      Right hip pain  ROM intact  Xray no fractures present      Elevated lactic acid level  8.0 admission now 2.5        Type 2 diabetes mellitus without complications  Patient's FSGs are controlled on current medication regimen.  Last A1c reviewed-   Lab Results   Component Value Date    HGBA1C 5.8 (H) 07/18/2023     Most recent fingerstick glucose reviewed-   Recent Labs   Lab 07/20/23  1638 07/20/23 2005 07/21/23  0807 07/21/23  1144   POCTGLUCOSE 226* 241* 91 122*     Current correctional scale  Low  Maintain anti-hyperglycemic dose as follows-   Antihyperglycemics (From admission, onward)    Start     Stop Route Frequency Ordered    07/18/23 1813  insulin aspart U-100 pen 0-5 Units         -- SubQ Before meals & nightly PRN 07/18/23 1713        Hold Oral hypoglycemics while patient is in the hospital.    Malignant neoplasm of head of pancreas  Currently on palliative chemotherapy         VTE Risk Mitigation (From admission, onward)         Ordered     IP  VTE HIGH RISK PATIENT  Once         07/18/23 1446     Place sequential compression device  Until discontinued         07/18/23 1446                Discharge Planning   CYNTHIA:      Code Status: DNR   Is the patient medically ready for discharge?:     Reason for patient still in hospital (select all that apply): Other (specify) discharge today  Discharge Plan A: Home with family, Home Health                  Annamarie Park PA-C  Department of Hospital Medicine   [facility] ([facility])

## 2023-07-21 NOTE — PLAN OF CARE
07/21/23 1223   Medicare Message   Important Message from Medicare regarding Discharge Appeal Rights Explained to patient/caregiver;Other (comments)  (Complete at bedside with patient but patient not able to sign paper as patient is on Neutrapenic precautions and it could not be brought into patient room.)   Date IMM was signed 07/21/23   Time IMM was signed 0954        done done

## 2023-07-21 NOTE — PLAN OF CARE
[facility] - Med Surg (3rd Fl)  Discharge Final Note    Primary Care Provider: Marcia Borrego NP    Expected Discharge Date: 7/21/2023    Final Discharge Note (most recent)       Final Note - 07/21/23 1324          Final Note    Assessment Type Final Discharge Note     Anticipated Discharge Disposition Home-Health Care [facility]     Hospital Resources/Appts/Education Provided --   PCP contacted but reception has to approve with provider. The office will contact patient's daughter.       Post-Acute Status    Post-Acute Authorization Home Health     Home Health Status Set-up Complete/Auth obtained     Discharge Delays None known at this time                     Important Message from Medicare  Important Message from Medicare regarding Discharge Appeal Rights: Explained to patient/caregiver, Other (comments) (Complete at bedside with patient but patient not able to sign paper as patient is on Neutrapenic precautions and it could not be brought into patient room.)     Date IMM was signed: 07/21/23  Time IMM was signed: 0954    Contact Info       Marcia Borrego NP   Specialty: Family Medicine   Relationship: PCP - General    [place] [facility]  [address]  [place]  [place] [address]   Phone: [phone]       Next Steps: Follow up    Instructions: Office has been contacted and will call daughter with an appointment when scheduled.          Patient discharging home with family and returning to the care of [facility].

## 2023-07-23 LAB
BACTERIA BLD CULT: NORMAL
BACTERIA BLD CULT: NORMAL

## 2023-07-24 ENCOUNTER — PATIENT OUTREACH (OUTPATIENT)
Dept: ADMINISTRATIVE | Facility: CLINIC | Age: 68
End: 2023-07-24
Payer: COMMERCIAL

## 2023-07-27 ENCOUNTER — DOCUMENT SCAN (OUTPATIENT)
Dept: HOME HEALTH SERVICES | Facility: HOSPITAL | Age: 68
End: 2023-07-27
Payer: COMMERCIAL

## 2023-08-04 ENCOUNTER — EXTERNAL HOME HEALTH (OUTPATIENT)
Dept: HOME HEALTH SERVICES | Facility: HOSPITAL | Age: 68
End: 2023-08-04
Payer: COMMERCIAL

## 2023-08-23 NOTE — ADDENDUM NOTE
Addended by: BRITTANEY ZHANG on: 8/23/2023 12:13 PM     Modules accepted: Orders, Level of Service

## 2023-10-23 PROBLEM — N39.0 COMPLICATED URINARY TRACT INFECTION: Status: RESOLVED | Noted: 2023-07-19 | Resolved: 2023-10-23

## (undated) DEVICE — BLADE SURG CARBON STEEL SZ11

## (undated) DEVICE — ELECTRODE BLD EXT INSUL 1

## (undated) DEVICE — SYR DISP LL 5CC

## (undated) DEVICE — VIDEO RENTAL SERVICE CYSTO

## (undated) DEVICE — SET TRI-LUMEN FILTERED TUBE

## (undated) DEVICE — SEAL UNIVERSAL 5MM-8MM XI

## (undated) DEVICE — TRAY DO THE ROBOT

## (undated) DEVICE — TIP YANKAUERS BULB NO VENT

## (undated) DEVICE — DRAPE C-ARM ELAS CLIP 42X120IN

## (undated) DEVICE — CLIPPER BLADE MOD 4406 (CAREF)

## (undated) DEVICE — ADHESIVE DERMABOND ADVANCED

## (undated) DEVICE — KIT WING PAD POSITIONING

## (undated) DEVICE — Device

## (undated) DEVICE — STRIP MEDI WND CLSR 1/2X4IN

## (undated) DEVICE — SUT VICRYL 2-0 CT-2 VCP269H

## (undated) DEVICE — SUT PROLENE 2-0 30 SH

## (undated) DEVICE — NDL HYPO REG 25G X 1 1/2

## (undated) DEVICE — UNDERGLOVE BIOGEL PI SZ 6.5 LF

## (undated) DEVICE — TRAY MINOR GEN SURG OMC

## (undated) DEVICE — SUT MCRYL PLUS 4-0 PS2 27IN

## (undated) DEVICE — SUT VICRYL CTD 2-0 GI 27 SH

## (undated) DEVICE — SOL IRR SOD CHL .9% POUR

## (undated) DEVICE — NDL SPINAL 20GX3.5 HUB

## (undated) DEVICE — DRESSING ANTIMICROBIAL 1 INCH

## (undated) DEVICE — APPLICATOR ENDOSCOPIC

## (undated) DEVICE — IRRIGATOR ENDOSCOPY DISP.

## (undated) DEVICE — JELLY SURGILUBE 5GR

## (undated) DEVICE — ELECTRODE REM PLYHSV RETURN 9

## (undated) DEVICE — SYR B-D DISP CONTROL 10CC100/C

## (undated) DEVICE — GLOVE BIOGEL SKINSENSE PI 8.0

## (undated) DEVICE — SOL POVIDONE PREP IODINE 4 OZ

## (undated) DEVICE — SUT STRATAFIX 0 PDS+ 22CM 9IN

## (undated) DEVICE — DRAPE INCISE IOBAN 2 23X17IN

## (undated) DEVICE — OBTURATOR BLADELESS 8MM XI CLR

## (undated) DEVICE — SYS SEE SHARP SCP ANTIFG LNG

## (undated) DEVICE — PORT AIRSEAL 12/120MM LPI

## (undated) DEVICE — SOL POVIDONE SCRUB IODINE 4 OZ

## (undated) DEVICE — SYR 50ML CATH TIP

## (undated) DEVICE — SOL IRRI STRL WATER 1000ML

## (undated) DEVICE — APPLICATOR CHLORAPREP ORN 26ML

## (undated) DEVICE — CONTAINER SPECIMEN OR STER 4OZ

## (undated) DEVICE — DRAPE COLUMN DAVINCI XI

## (undated) DEVICE — TROCAR KII BLLN 12MM 10CM

## (undated) DEVICE — DRAPE T TRNSVRS LAP 102X78X121

## (undated) DEVICE — DRAPE ARM DAVINCI XI

## (undated) DEVICE — UNDERGLOVES BIOGEL PI SZ 7 LF

## (undated) DEVICE — SOL NACL 0.9% INJ PF/50151

## (undated) DEVICE — SOL ELECTROLUBE ANTI-STIC

## (undated) DEVICE — SYR ONLY LUER LOCK 20CC

## (undated) DEVICE — SYR LUER LOCK 1CC

## (undated) DEVICE — SUT VICRYL+ 27 UR-6 VIOL

## (undated) DEVICE — ELECTRODE BLADE TEFLON 6

## (undated) DEVICE — SOL NORMAL USPCA 0.9%

## (undated) DEVICE — KIT PROCEDURE STER INLET CLOSU

## (undated) DEVICE — SUT CTD VICRYL VIL BR CR/SH

## (undated) DEVICE — GOWN NONREINF SET-IN SLV XL

## (undated) DEVICE — SYR 10CC LUER LOCK

## (undated) DEVICE — TOWEL OR DISP STRL BLUE 4/PK

## (undated) DEVICE — INSERT CUSHIONPRONE VIEW LARGE

## (undated) DEVICE — BLADE SURG STAINLESS STEEL #11

## (undated) DEVICE — DRAPE T THYROID STERILE

## (undated) DEVICE — KIT SURGIFLO HEMOSTATIC MATRIX

## (undated) DEVICE — CONTAINER SPEC OR STRL 4.5OZ

## (undated) DEVICE — SET DECANTER MEDICHOICE

## (undated) DEVICE — GLOVE BIOGEL SKINSENSE PI 6.5